# Patient Record
Sex: MALE | Race: WHITE | Employment: OTHER | URBAN - NONMETROPOLITAN AREA
[De-identification: names, ages, dates, MRNs, and addresses within clinical notes are randomized per-mention and may not be internally consistent; named-entity substitution may affect disease eponyms.]

---

## 2017-08-02 ENCOUNTER — HOSPITAL ENCOUNTER (OUTPATIENT)
Dept: CT IMAGING | Age: 67
Discharge: HOME | End: 2017-08-02
Payer: MEDICARE

## 2017-08-02 ENCOUNTER — HOSPITAL ENCOUNTER (OUTPATIENT)
Age: 67
Discharge: HOME | End: 2017-08-02
Payer: MEDICARE

## 2017-08-02 ENCOUNTER — TELEPHONE (OUTPATIENT)
Dept: SURGERY | Age: 67
End: 2017-08-02

## 2017-08-02 ENCOUNTER — OFFICE VISIT (OUTPATIENT)
Dept: SURGERY | Age: 67
End: 2017-08-02
Payer: MEDICARE

## 2017-08-02 VITALS
DIASTOLIC BLOOD PRESSURE: 68 MMHG | WEIGHT: 144 LBS | TEMPERATURE: 97.1 F | SYSTOLIC BLOOD PRESSURE: 120 MMHG | HEIGHT: 68 IN | RESPIRATION RATE: 18 BRPM | HEART RATE: 64 BPM | BODY MASS INDEX: 21.82 KG/M2

## 2017-08-02 DIAGNOSIS — N32.89 BLADDER WALL THICKENING: ICD-10-CM

## 2017-08-02 DIAGNOSIS — R10.84 GENERALIZED ABDOMINAL PAIN: Primary | ICD-10-CM

## 2017-08-02 DIAGNOSIS — N13.30 HYDRONEPHROSIS, RIGHT: ICD-10-CM

## 2017-08-02 DIAGNOSIS — R10.9 ABDOMINAL DISCOMFORT: ICD-10-CM

## 2017-08-02 LAB
BUN BLDV-MCNC: 19 MG/DL (ref 7–22)
POC CREATININE WHOLE BLOOD: 0.9 MG/DL (ref 0.5–1.2)

## 2017-08-02 PROCEDURE — 84520 ASSAY OF UREA NITROGEN: CPT

## 2017-08-02 PROCEDURE — 4040F PNEUMOC VAC/ADMIN/RCVD: CPT | Performed by: SURGERY

## 2017-08-02 PROCEDURE — 99204 OFFICE O/P NEW MOD 45 MIN: CPT | Performed by: SURGERY

## 2017-08-02 PROCEDURE — G8427 DOCREV CUR MEDS BY ELIG CLIN: HCPCS | Performed by: SURGERY

## 2017-08-02 PROCEDURE — 74177 CT ABD & PELVIS W/CONTRAST: CPT

## 2017-08-02 PROCEDURE — 36415 COLL VENOUS BLD VENIPUNCTURE: CPT

## 2017-08-02 PROCEDURE — 3017F COLORECTAL CA SCREEN DOC REV: CPT | Performed by: SURGERY

## 2017-08-02 PROCEDURE — G8420 CALC BMI NORM PARAMETERS: HCPCS | Performed by: SURGERY

## 2017-08-02 PROCEDURE — 4004F PT TOBACCO SCREEN RCVD TLK: CPT | Performed by: SURGERY

## 2017-08-02 PROCEDURE — 6360000004 HC RX CONTRAST MEDICATION: Performed by: PHYSICIAN ASSISTANT

## 2017-08-02 PROCEDURE — 82565 ASSAY OF CREATININE: CPT

## 2017-08-02 RX ADMIN — IOPAMIDOL 85 ML: 755 INJECTION, SOLUTION INTRAVENOUS at 08:44

## 2017-08-02 ASSESSMENT — ENCOUNTER SYMPTOMS
VOICE CHANGE: 0
ABDOMINAL PAIN: 1
BACK PAIN: 0
ABDOMINAL DISTENTION: 1
CONSTIPATION: 0
FACIAL SWELLING: 0
EYE REDNESS: 0
COUGH: 1
TROUBLE SWALLOWING: 0
SINUS PRESSURE: 0
VOMITING: 0
CHOKING: 0
CHEST TIGHTNESS: 0
BLOOD IN STOOL: 0
APNEA: 0
DIARRHEA: 0
SHORTNESS OF BREATH: 1
EYE DISCHARGE: 0
RHINORRHEA: 0
NAUSEA: 0
SORE THROAT: 0
WHEEZING: 1
EYE ITCHING: 0
ANAL BLEEDING: 0
STRIDOR: 0
RECTAL PAIN: 0
COLOR CHANGE: 0
PHOTOPHOBIA: 0
EYE PAIN: 0

## 2018-07-19 ENCOUNTER — APPOINTMENT (OUTPATIENT)
Dept: CT IMAGING | Age: 68
End: 2018-07-19
Payer: MEDICARE

## 2018-07-19 ENCOUNTER — HOSPITAL ENCOUNTER (EMERGENCY)
Age: 68
Discharge: HOME | End: 2018-07-19
Attending: EMERGENCY MEDICINE
Payer: MEDICARE

## 2018-07-19 ENCOUNTER — APPOINTMENT (OUTPATIENT)
Dept: GENERAL RADIOLOGY | Age: 68
End: 2018-07-19
Payer: MEDICARE

## 2018-07-19 VITALS
TEMPERATURE: 98.1 F | BODY MASS INDEX: 21.98 KG/M2 | OXYGEN SATURATION: 97 % | DIASTOLIC BLOOD PRESSURE: 90 MMHG | HEIGHT: 68 IN | SYSTOLIC BLOOD PRESSURE: 138 MMHG | RESPIRATION RATE: 16 BRPM | HEART RATE: 92 BPM | WEIGHT: 145 LBS

## 2018-07-19 DIAGNOSIS — R03.0 ELEVATED BLOOD PRESSURE READING: ICD-10-CM

## 2018-07-19 DIAGNOSIS — R20.2 PARESTHESIA: Primary | ICD-10-CM

## 2018-07-19 LAB
ALBUMIN SERPL-MCNC: 4.2 G/DL (ref 3.5–5.1)
ALP BLD-CCNC: 64 U/L (ref 38–126)
ALT SERPL-CCNC: 12 U/L (ref 11–66)
ANION GAP SERPL CALCULATED.3IONS-SCNC: 15 MEQ/L (ref 8–16)
AST SERPL-CCNC: 22 U/L (ref 5–40)
BASOPHILS # BLD: 0.5 %
BASOPHILS ABSOLUTE: 0 THOU/MM3 (ref 0–0.1)
BILIRUB SERPL-MCNC: 0.5 MG/DL (ref 0.3–1.2)
BUN BLDV-MCNC: 8 MG/DL (ref 7–22)
C-REACTIVE PROTEIN: 0.25 MG/DL (ref 0–1)
CALCIUM IONIZED: 1.13 MMOL/L (ref 1.12–1.32)
CALCIUM SERPL-MCNC: 9.6 MG/DL (ref 8.5–10.5)
CHLORIDE BLD-SCNC: 101 MEQ/L (ref 98–111)
CO2: 24 MEQ/L (ref 23–33)
CREAT SERPL-MCNC: 0.8 MG/DL (ref 0.4–1.2)
EKG ATRIAL RATE: 73 BPM
EKG P AXIS: 83 DEGREES
EKG P-R INTERVAL: 158 MS
EKG Q-T INTERVAL: 368 MS
EKG QRS DURATION: 116 MS
EKG QTC CALCULATION (BAZETT): 405 MS
EKG R AXIS: -36 DEGREES
EKG T AXIS: 71 DEGREES
EKG VENTRICULAR RATE: 73 BPM
EOSINOPHIL # BLD: 0 %
EOSINOPHILS ABSOLUTE: 0 THOU/MM3 (ref 0–0.4)
ERYTHROCYTE [DISTWIDTH] IN BLOOD BY AUTOMATED COUNT: 13.2 % (ref 11.5–14.5)
ERYTHROCYTE [DISTWIDTH] IN BLOOD BY AUTOMATED COUNT: 46.9 FL (ref 35–45)
GFR SERPL CREATININE-BSD FRML MDRD: > 90 ML/MIN/1.73M2
GLUCOSE BLD-MCNC: 88 MG/DL (ref 70–108)
HCT VFR BLD CALC: 45.7 % (ref 42–52)
HEMOGLOBIN: 15.6 GM/DL (ref 14–18)
IMMATURE GRANS (ABS): 0.02 THOU/MM3 (ref 0–0.07)
IMMATURE GRANULOCYTES: 0.3 %
LYMPHOCYTES # BLD: 34.2 %
LYMPHOCYTES ABSOLUTE: 2.5 THOU/MM3 (ref 1–4.8)
MAGNESIUM: 2.1 MG/DL (ref 1.6–2.4)
MCH RBC QN AUTO: 32.6 PG (ref 26–33)
MCHC RBC AUTO-ENTMCNC: 34.1 GM/DL (ref 32.2–35.5)
MCV RBC AUTO: 95.4 FL (ref 80–94)
MONOCYTES # BLD: 5.7 %
MONOCYTES ABSOLUTE: 0.4 THOU/MM3 (ref 0.4–1.3)
NUCLEATED RED BLOOD CELLS: 0 /100 WBC
OSMOLALITY CALCULATION: 277.1 MOSMOL/KG (ref 275–300)
PLATELET # BLD: 216 THOU/MM3 (ref 130–400)
PMV BLD AUTO: 9.4 FL (ref 9.4–12.4)
POTASSIUM SERPL-SCNC: 4.9 MEQ/L (ref 3.5–5.2)
RBC # BLD: 4.79 MILL/MM3 (ref 4.7–6.1)
SEDIMENTATION RATE, ERYTHROCYTE: 2 MM/HR (ref 0–10)
SEG NEUTROPHILS: 59.3 %
SEGMENTED NEUTROPHILS ABSOLUTE COUNT: 4.4 THOU/MM3 (ref 1.8–7.7)
SODIUM BLD-SCNC: 140 MEQ/L (ref 135–145)
TOTAL CK: 81 U/L (ref 55–170)
TOTAL PROTEIN: 6.7 G/DL (ref 6.1–8)
TROPONIN T: < 0.01 NG/ML
WBC # BLD: 7.4 THOU/MM3 (ref 4.8–10.8)

## 2018-07-19 PROCEDURE — 82550 ASSAY OF CK (CPK): CPT

## 2018-07-19 PROCEDURE — 93005 ELECTROCARDIOGRAM TRACING: CPT | Performed by: EMERGENCY MEDICINE

## 2018-07-19 PROCEDURE — 99284 EMERGENCY DEPT VISIT MOD MDM: CPT

## 2018-07-19 PROCEDURE — 71045 X-RAY EXAM CHEST 1 VIEW: CPT

## 2018-07-19 PROCEDURE — 85651 RBC SED RATE NONAUTOMATED: CPT

## 2018-07-19 PROCEDURE — 86140 C-REACTIVE PROTEIN: CPT

## 2018-07-19 PROCEDURE — 82330 ASSAY OF CALCIUM: CPT

## 2018-07-19 PROCEDURE — 85025 COMPLETE CBC W/AUTO DIFF WBC: CPT

## 2018-07-19 PROCEDURE — 84484 ASSAY OF TROPONIN QUANT: CPT

## 2018-07-19 PROCEDURE — 36415 COLL VENOUS BLD VENIPUNCTURE: CPT

## 2018-07-19 PROCEDURE — 80053 COMPREHEN METABOLIC PANEL: CPT

## 2018-07-19 PROCEDURE — 70450 CT HEAD/BRAIN W/O DYE: CPT

## 2018-07-19 PROCEDURE — 83735 ASSAY OF MAGNESIUM: CPT

## 2018-07-19 RX ORDER — SODIUM CHLORIDE 9 MG/ML
INJECTION, SOLUTION INTRAVENOUS CONTINUOUS
Status: DISCONTINUED | OUTPATIENT
Start: 2018-07-19 | End: 2018-07-20 | Stop reason: HOSPADM

## 2018-07-19 ASSESSMENT — ENCOUNTER SYMPTOMS
WHEEZING: 0
NAUSEA: 0
COUGH: 0
RHINORRHEA: 0
SHORTNESS OF BREATH: 0
VOMITING: 0
BACK PAIN: 0
SORE THROAT: 0
ABDOMINAL PAIN: 1
DIARRHEA: 0
EYE REDNESS: 0
EYE DISCHARGE: 0

## 2018-07-19 NOTE — ED PROVIDER NOTES
[facility]  eMERGENCY dEPARTMENT eNCOUnter          [address] [facility]       Chief Complaint   Patient presents with    Numbness     bilateral hands and feet       Nurses Notes reviewed and I agree except as noted in the HPI. HISTORY OF PRESENT ILLNESS    Rivero All is a 75 y.o. male who presents to Emergency Department with bilateral hand and leg numbness. The patient explains that the numbness has been ongoing intermittently since February of 2018 but his lower extremities and left forearm started feeling numb and tingly for the last six days. He reports that his strength has decreased due to the numbness and he finds it hard to even open up a bottle of water or hold things for a long time. Patient denies any fevers, chills, nausea, vomiting or diarrhea. Patient denies any chest pain or shortness of breath. He is a smoker and drinks alcohol occasionally. Patient has recently had some abdominal pain but denies any right now. He is allergic to lisinopril and has a history of   COPD and hypertension. He has no further complaints during initial evaluation. REVIEW OF SYSTEMS     Review of Systems   Constitutional: Negative for appetite change, chills, fatigue and fever. HENT: Negative for congestion, ear pain, rhinorrhea and sore throat. Eyes: Negative for discharge, redness and visual disturbance. Respiratory: Negative for cough, shortness of breath and wheezing. Cardiovascular: Negative for chest pain, palpitations and leg swelling. Gastrointestinal: Positive for abdominal pain (none now). Negative for diarrhea, nausea and vomiting. Genitourinary: Negative for decreased urine volume, difficulty urinating, dysuria and hematuria. Musculoskeletal: Negative for arthralgias, back pain, joint swelling and neck pain. Skin: Negative for pallor and rash. Allergic/Immunologic: Negative for environmental allergies.    Neurological: Positive for numbness (hands, legs, and left forearm). Negative for dizziness, syncope, weakness, light-headedness and headaches. Hematological: Negative for adenopathy. Psychiatric/Behavioral: Negative for confusion and suicidal ideas. The patient is not nervous/anxious. PAST MEDICAL HISTORY    has a past medical history of COPD (chronic obstructive pulmonary disease) ([address] Gastric reflux; Hyperlipidemia; and Hypertension. SURGICAL HISTORY      has a past surgical history that includes Kidney surgery and hernia repair (80's). CURRENT MEDICATIONS       Discharge Medication List as of 7/19/2018  9:47 PM      CONTINUE these medications which have NOT CHANGED    Details   busPIRone (BUSPAR) 10 MG tablet Take 10 mg by mouth 2 times dailyHistorical Med      etodolac (LODINE) 400 MG tablet Take 400 mg by mouth 2 times dailyHistorical Med      Ipratropium Bromide (ATROVENT IN) Inhale  into the lungs 4 times daily. Albuterol (PROVENTIL IN) Inhale  into the lungs as needed. budesonide-formoterol (SYMBICORT) 160-4.5 MCG/ACT AERO Inhale 2 puffs into the lungs 2 times daily. terazosin (HYTRIN) 2 MG capsule Take 2 mg by mouth nightly. sildenafil (VIAGRA) 100 MG tablet Take 50 mg by mouth as needed. omeprazole (PRILOSEC) 20 MG capsule Take 20 mg by mouth daily. ranitidine (ZANTAC) 150 MG tablet Take 150 mg by mouth every morning. losartan (COZAAR) 50 MG tablet Take 50 mg by mouth daily. atorvastatin (LIPITOR) 80 MG tablet Take 40 mg by mouth daily. hydrochlorothiazide (HYDRODIURIL) 25 MG tablet Take 12.5 mg by mouth daily. ALLERGIES     is allergic to lisinopril. FAMILY HISTORY     is adopted. family history is not on file. He was adopted. SOCIAL HISTORY      reports that he has been smoking Cigarettes. He has been smoking about 1.00 pack per day. He has never used smokeless tobacco. He reports that he drinks alcohol. He reports that he does not use drugs.     PHYSICAL EXAM     INITIAL Co-signs this chart in the absence of a cardiologist.  EKG interpreted by Jose Salinas MD:    Vent. Rate: 73 bpm  CO interval: 158 ms  QRS duration: 116 ms  QTc: 405 ms  P-R-T axes: 83, -36, 71  Normal sinus rhythm  Left axis deviation  Imcomplete right bundle branch block   Nonspecific ST and T wave abnormality  No STEMI. RADIOLOGY: non-plain film images(s) such as CT, Ultrasound and MRI are read by the radiologist.  No results found. CT HEAD WO CONTRAST   Final Result    No evidence of an acute process. **This report has been created using voice recognition software. It may contain minor errors which are inherent in voice recognition technology. **      Final report electronically signed by Dr. Jocelyn An on 7/19/2018 9:06 PM      XR CHEST PORTABLE   Final Result   Hyperinflated lungs suggesting COPD. No lobar consolidation               **This report has been created using voice recognition software. It may contain minor errors which are inherent in voice recognition technology. **      Final report electronically signed by Dr. Kimmy Shaikh on 7/19/2018 8:43 PM        [x] Visualized and interpreted by me   [x] Radiologist's Wet Read Report Reviewed   [] Discussed with Radiologist.    Ezequiel Alfaro:   Results for orders placed or performed during the hospital encounter of 07/19/18   CBC Auto Differential   Result Value Ref Range    WBC 7.4 4.8 - 10.8 thou/mm3    RBC 4.79 4.70 - 6.10 mill/mm3    Hemoglobin 15.6 14.0 - 18.0 gm/dl    Hematocrit 45.7 42.0 - 52.0 %    MCV 95.4 (H) 80.0 - 94.0 fL    MCH 32.6 26.0 - 33.0 pg    MCHC 34.1 32.2 - 35.5 gm/dl    RDW-CV 13.2 11.5 - 14.5 %    RDW-SD 46.9 (H) 35.0 - 45.0 fL    Platelets 716 788 - 222 thou/mm3    MPV 9.4 9.4 - 12.4 fL    Seg Neutrophils 59.3 %    Lymphocytes 34.2 %    Monocytes 5.7 %    Eosinophils 0.0 %    Basophils 0.5 %    Immature Granulocytes 0.3 %    Segs Absolute 4.4 1.8 - 7.7 thou/mm3    Lymphocytes # 2.5 1.0 - 4.8 thou/mm3    Monocytes # 0.4 Decision Making    Patient was seen and evaluated by me at bedside for bilateral hand and leg numbness that has been intermittent for months. Patient did not appear in any distress. History and physical exam were obtained which resulted within normal limits. ECG was normal. Appropriate labs and imaging studies were ordered and reviewed which were all within acceptable range. All results were discussed with patient. The patient was comfortable with the plan of discharge home and to follow up with his PCP and a cardiologist as discussed. Anticipatory guidance was given. The patient is instructed to return to the emergency department for any worsening or otherwise change in their symptoms. Patient discharged from the emergency department in good condition with all questions answered. See disposition below. CRITICAL CARE:   None    CONSULTS:  None     PROCEDURES:  None    FINAL IMPRESSION      1. Paresthesia    2. Elevated blood pressure reading          DISPOSITION/PLAN   Discharge    PATIENT REFERRED TO:  Diana Castellanos, [address] [place] [address]  [phone]            DISCHARGE MEDICATIONS:  Discharge Medication List as of 7/19/2018  9:47 PM        Scribe:  Mary Jo Don(Name) 7/19/18 9:20 PM Scribing for and in the presence of Jose Salinas MD.    Signed by: Mary Jo Don(Name), Scribe, 07/20/18 2:45 AM    Provider:  I personally performed the services described in the documentation, reviewed and edited the documentation which was dictated to the scribe in my presence, and it accurately records my words and actions.     Jose Salinas MD 07/20/18 2:45 AM    (Please note that portions of this note were completed with a voice recognition program.  Efforts were made to edit the dictations but occasionally words are mis-transcribed.)    MD Jose Mcgraw MD  07/20/18 [id number]

## 2018-07-20 PROCEDURE — 93010 ELECTROCARDIOGRAM REPORT: CPT | Performed by: INTERNAL MEDICINE

## 2018-09-05 ENCOUNTER — APPOINTMENT (OUTPATIENT)
Dept: GENERAL RADIOLOGY | Age: 68
DRG: 329 | End: 2018-09-05
Payer: MEDICARE

## 2018-09-05 ENCOUNTER — ANESTHESIA (OUTPATIENT)
Dept: OPERATING ROOM | Age: 68
DRG: 329 | End: 2018-09-05
Payer: MEDICARE

## 2018-09-05 ENCOUNTER — ANESTHESIA EVENT (OUTPATIENT)
Dept: OPERATING ROOM | Age: 68
DRG: 329 | End: 2018-09-05
Payer: MEDICARE

## 2018-09-05 ENCOUNTER — APPOINTMENT (OUTPATIENT)
Dept: CT IMAGING | Age: 68
DRG: 329 | End: 2018-09-05
Payer: MEDICARE

## 2018-09-05 ENCOUNTER — HOSPITAL ENCOUNTER (INPATIENT)
Age: 68
LOS: 12 days | Discharge: HOME | DRG: 329 | End: 2018-09-17
Attending: INTERNAL MEDICINE | Admitting: INTERNAL MEDICINE
Payer: MEDICARE

## 2018-09-05 VITALS — OXYGEN SATURATION: 95 % | DIASTOLIC BLOOD PRESSURE: 66 MMHG | SYSTOLIC BLOOD PRESSURE: 121 MMHG

## 2018-09-05 DIAGNOSIS — J44.1 CHRONIC OBSTRUCTIVE PULMONARY DISEASE WITH ACUTE EXACERBATION (HCC): Primary | ICD-10-CM

## 2018-09-05 DIAGNOSIS — R91.8 HILAR MASS: ICD-10-CM

## 2018-09-05 DIAGNOSIS — K40.30 UNILATERAL INGUINAL HERNIA WITH OBSTRUCTION AND WITHOUT GANGRENE, RECURRENCE NOT SPECIFIED: ICD-10-CM

## 2018-09-05 PROBLEM — K46.9 ABDOMINAL HERNIA: Status: ACTIVE | Noted: 2018-09-05

## 2018-09-05 LAB
ALBUMIN SERPL-MCNC: 4.2 G/DL (ref 3.5–5.1)
ALLEN TEST: POSITIVE
ALP BLD-CCNC: 78 U/L (ref 38–126)
ALT SERPL-CCNC: 10 U/L (ref 11–66)
ANION GAP SERPL CALCULATED.3IONS-SCNC: 20 MEQ/L (ref 8–16)
AST SERPL-CCNC: 18 U/L (ref 5–40)
BASE EXCESS (CALCULATED): 4.6 MMOL/L (ref -2.5–2.5)
BASOPHILS # BLD: 0.3 %
BASOPHILS ABSOLUTE: 0 THOU/MM3 (ref 0–0.1)
BILIRUB SERPL-MCNC: 0.9 MG/DL (ref 0.3–1.2)
BILIRUBIN DIRECT: < 0.2 MG/DL (ref 0–0.3)
BUN BLDV-MCNC: 18 MG/DL (ref 7–22)
CALCIUM SERPL-MCNC: 10 MG/DL (ref 8.5–10.5)
CHLORIDE BLD-SCNC: 95 MEQ/L (ref 98–111)
CO2: 23 MEQ/L (ref 23–33)
COLLECTED BY:: ABNORMAL
CREAT SERPL-MCNC: 0.6 MG/DL (ref 0.4–1.2)
DEVICE: ABNORMAL
EKG ATRIAL RATE: 102 BPM
EKG P AXIS: 79 DEGREES
EKG P-R INTERVAL: 152 MS
EKG Q-T INTERVAL: 346 MS
EKG QRS DURATION: 118 MS
EKG QTC CALCULATION (BAZETT): 450 MS
EKG R AXIS: 34 DEGREES
EKG T AXIS: 73 DEGREES
EKG VENTRICULAR RATE: 102 BPM
EOSINOPHIL # BLD: 0 %
EOSINOPHILS ABSOLUTE: 0 THOU/MM3 (ref 0–0.4)
ERYTHROCYTE [DISTWIDTH] IN BLOOD BY AUTOMATED COUNT: 12.2 % (ref 11.5–14.5)
ERYTHROCYTE [DISTWIDTH] IN BLOOD BY AUTOMATED COUNT: 41.5 FL (ref 35–45)
GFR SERPL CREATININE-BSD FRML MDRD: > 90 ML/MIN/1.73M2
GLUCOSE BLD-MCNC: 125 MG/DL (ref 70–108)
HCO3: 29 MMOL/L (ref 23–28)
HCT VFR BLD CALC: 44.6 % (ref 42–52)
HEMOGLOBIN: 16.1 GM/DL (ref 14–18)
IMMATURE GRANS (ABS): 0.04 THOU/MM3 (ref 0–0.07)
IMMATURE GRANULOCYTES: 0.4 %
LACTIC ACID: 0.9 MMOL/L (ref 0.5–2.2)
LIPASE: 16.6 U/L (ref 5.6–51.3)
LYMPHOCYTES # BLD: 15.4 %
LYMPHOCYTES ABSOLUTE: 1.6 THOU/MM3 (ref 1–4.8)
MCH RBC QN AUTO: 33.5 PG (ref 26–33)
MCHC RBC AUTO-ENTMCNC: 36.1 GM/DL (ref 32.2–35.5)
MCV RBC AUTO: 92.7 FL (ref 80–94)
MONOCYTES # BLD: 6.7 %
MONOCYTES ABSOLUTE: 0.7 THOU/MM3 (ref 0.4–1.3)
NUCLEATED RED BLOOD CELLS: 0 /100 WBC
O2 SATURATION: 95 %
OSMOLALITY CALCULATION: 279.1 MOSMOL/KG (ref 275–300)
PCO2: 39 MMHG (ref 35–45)
PH BLOOD GAS: 7.47 (ref 7.35–7.45)
PLATELET # BLD: 217 THOU/MM3 (ref 130–400)
PMV BLD AUTO: 9.3 FL (ref 9.4–12.4)
PO2: 73 MMHG (ref 71–104)
POTASSIUM SERPL-SCNC: 3.8 MEQ/L (ref 3.5–5.2)
PRO-BNP: 20.4 PG/ML (ref 0–900)
RBC # BLD: 4.81 MILL/MM3 (ref 4.7–6.1)
SEG NEUTROPHILS: 77.2 %
SEGMENTED NEUTROPHILS ABSOLUTE COUNT: 8 THOU/MM3 (ref 1.8–7.7)
SODIUM BLD-SCNC: 138 MEQ/L (ref 135–145)
SOURCE, BLOOD GAS: ABNORMAL
TOTAL PROTEIN: 7 G/DL (ref 6.1–8)
TROPONIN T: < 0.01 NG/ML
WBC # BLD: 10.3 THOU/MM3 (ref 4.8–10.8)

## 2018-09-05 PROCEDURE — 2580000003 HC RX 258: Performed by: INTERNAL MEDICINE

## 2018-09-05 PROCEDURE — 49521 REREPAIR ING HERNIA BLOCKED: CPT | Performed by: SURGERY

## 2018-09-05 PROCEDURE — 0YQ50ZZ REPAIR RIGHT INGUINAL REGION, OPEN APPROACH: ICD-10-PCS | Performed by: SURGERY

## 2018-09-05 PROCEDURE — 3600000003 HC SURGERY LEVEL 3 BASE: Performed by: SURGERY

## 2018-09-05 PROCEDURE — 82803 BLOOD GASES ANY COMBINATION: CPT

## 2018-09-05 PROCEDURE — 83605 ASSAY OF LACTIC ACID: CPT

## 2018-09-05 PROCEDURE — 99223 1ST HOSP IP/OBS HIGH 75: CPT | Performed by: SURGERY

## 2018-09-05 PROCEDURE — 83690 ASSAY OF LIPASE: CPT

## 2018-09-05 PROCEDURE — 2709999900 HC NON-CHARGEABLE SUPPLY

## 2018-09-05 PROCEDURE — C1781 MESH (IMPLANTABLE): HCPCS | Performed by: SURGERY

## 2018-09-05 PROCEDURE — 0DB80ZZ EXCISION OF SMALL INTESTINE, OPEN APPROACH: ICD-10-PCS | Performed by: SURGERY

## 2018-09-05 PROCEDURE — 6370000000 HC RX 637 (ALT 250 FOR IP): Performed by: INTERNAL MEDICINE

## 2018-09-05 PROCEDURE — 74177 CT ABD & PELVIS W/CONTRAST: CPT

## 2018-09-05 PROCEDURE — 94640 AIRWAY INHALATION TREATMENT: CPT

## 2018-09-05 PROCEDURE — 36415 COLL VENOUS BLD VENIPUNCTURE: CPT

## 2018-09-05 PROCEDURE — 93005 ELECTROCARDIOGRAM TRACING: CPT | Performed by: INTERNAL MEDICINE

## 2018-09-05 PROCEDURE — 6360000004 HC RX CONTRAST MEDICATION: Performed by: INTERNAL MEDICINE

## 2018-09-05 PROCEDURE — 3700000001 HC ADD 15 MINUTES (ANESTHESIA): Performed by: SURGERY

## 2018-09-05 PROCEDURE — 6360000002 HC RX W HCPCS: Performed by: NURSE ANESTHETIST, CERTIFIED REGISTERED

## 2018-09-05 PROCEDURE — 83880 ASSAY OF NATRIURETIC PEPTIDE: CPT

## 2018-09-05 PROCEDURE — 82248 BILIRUBIN DIRECT: CPT

## 2018-09-05 PROCEDURE — 80053 COMPREHEN METABOLIC PANEL: CPT

## 2018-09-05 PROCEDURE — 93010 ELECTROCARDIOGRAM REPORT: CPT | Performed by: NUCLEAR MEDICINE

## 2018-09-05 PROCEDURE — 99285 EMERGENCY DEPT VISIT HI MDM: CPT

## 2018-09-05 PROCEDURE — 85025 COMPLETE CBC W/AUTO DIFF WBC: CPT

## 2018-09-05 PROCEDURE — 3600000013 HC SURGERY LEVEL 3 ADDTL 15MIN: Performed by: SURGERY

## 2018-09-05 PROCEDURE — 3700000000 HC ANESTHESIA ATTENDED CARE: Performed by: SURGERY

## 2018-09-05 PROCEDURE — 1200000003 HC TELEMETRY R&B

## 2018-09-05 PROCEDURE — 2500000003 HC RX 250 WO HCPCS: Performed by: SURGERY

## 2018-09-05 PROCEDURE — 71045 X-RAY EXAM CHEST 1 VIEW: CPT

## 2018-09-05 PROCEDURE — 6360000002 HC RX W HCPCS: Performed by: SURGERY

## 2018-09-05 PROCEDURE — 84484 ASSAY OF TROPONIN QUANT: CPT

## 2018-09-05 PROCEDURE — 36600 WITHDRAWAL OF ARTERIAL BLOOD: CPT

## 2018-09-05 PROCEDURE — 71260 CT THORAX DX C+: CPT

## 2018-09-05 PROCEDURE — 0YJ54ZZ INSPECTION OF RIGHT INGUINAL REGION, PERCUTANEOUS ENDOSCOPIC APPROACH: ICD-10-PCS | Performed by: SURGERY

## 2018-09-05 DEVICE — MESH HERN L W1.6XL2IN INGUINAL WHT POLYPR MFIL PLUG PTCH: Type: IMPLANTABLE DEVICE | Site: GROIN | Status: FUNCTIONAL

## 2018-09-05 RX ORDER — 0.9 % SODIUM CHLORIDE 0.9 %
500 INTRAVENOUS SOLUTION INTRAVENOUS ONCE
Status: COMPLETED | OUTPATIENT
Start: 2018-09-05 | End: 2018-09-05

## 2018-09-05 RX ORDER — DOXAZOSIN 2 MG/1
2 TABLET ORAL NIGHTLY
Status: DISCONTINUED | OUTPATIENT
Start: 2018-09-05 | End: 2018-09-17 | Stop reason: HOSPADM

## 2018-09-05 RX ORDER — FENTANYL CITRATE 50 UG/ML
INJECTION, SOLUTION INTRAMUSCULAR; INTRAVENOUS PRN
Status: DISCONTINUED | OUTPATIENT
Start: 2018-09-05 | End: 2018-09-05 | Stop reason: SDUPTHER

## 2018-09-05 RX ORDER — RANITIDINE 150 MG/1
150 TABLET ORAL EVERY MORNING
Status: DISCONTINUED | OUTPATIENT
Start: 2018-09-06 | End: 2018-09-05 | Stop reason: CLARIF

## 2018-09-05 RX ORDER — SODIUM CHLORIDE 9 MG/ML
INJECTION, SOLUTION INTRAVENOUS CONTINUOUS
Status: DISCONTINUED | OUTPATIENT
Start: 2018-09-05 | End: 2018-09-11

## 2018-09-05 RX ORDER — PANTOPRAZOLE SODIUM 40 MG/1
40 TABLET, DELAYED RELEASE ORAL DAILY
Status: DISCONTINUED | OUTPATIENT
Start: 2018-09-05 | End: 2018-09-07

## 2018-09-05 RX ORDER — KETOROLAC TROMETHAMINE 30 MG/ML
15 INJECTION, SOLUTION INTRAMUSCULAR; INTRAVENOUS EVERY 6 HOURS
Status: DISPENSED | OUTPATIENT
Start: 2018-09-05 | End: 2018-09-07

## 2018-09-05 RX ORDER — TERAZOSIN 1 MG/1
2 CAPSULE ORAL NIGHTLY
Status: DISCONTINUED | OUTPATIENT
Start: 2018-09-05 | End: 2018-09-05 | Stop reason: CLARIF

## 2018-09-05 RX ORDER — LIDOCAINE HYDROCHLORIDE AND EPINEPHRINE 10; 10 MG/ML; UG/ML
INJECTION, SOLUTION INFILTRATION; PERINEURAL PRN
Status: DISCONTINUED | OUTPATIENT
Start: 2018-09-05 | End: 2018-09-05 | Stop reason: HOSPADM

## 2018-09-05 RX ORDER — BUPIVACAINE HYDROCHLORIDE 5 MG/ML
INJECTION, SOLUTION PERINEURAL PRN
Status: DISCONTINUED | OUTPATIENT
Start: 2018-09-05 | End: 2018-09-05 | Stop reason: HOSPADM

## 2018-09-05 RX ORDER — FAMOTIDINE 20 MG/1
20 TABLET, FILM COATED ORAL EVERY MORNING
Status: DISCONTINUED | OUTPATIENT
Start: 2018-09-06 | End: 2018-09-11

## 2018-09-05 RX ORDER — IPRATROPIUM BROMIDE AND ALBUTEROL SULFATE 2.5; .5 MG/3ML; MG/3ML
1 SOLUTION RESPIRATORY (INHALATION) ONCE
Status: COMPLETED | OUTPATIENT
Start: 2018-09-05 | End: 2018-09-05

## 2018-09-05 RX ORDER — IPRATROPIUM BROMIDE AND ALBUTEROL SULFATE 2.5; .5 MG/3ML; MG/3ML
1 SOLUTION RESPIRATORY (INHALATION)
Status: DISCONTINUED | OUTPATIENT
Start: 2018-09-05 | End: 2018-09-17 | Stop reason: HOSPADM

## 2018-09-05 RX ORDER — CEFOXITIN 1 G/1
INJECTION, POWDER, FOR SOLUTION INTRAVENOUS PRN
Status: DISCONTINUED | OUTPATIENT
Start: 2018-09-05 | End: 2018-09-05 | Stop reason: SDUPTHER

## 2018-09-05 RX ORDER — BUDESONIDE AND FORMOTEROL FUMARATE DIHYDRATE 160; 4.5 UG/1; UG/1
2 AEROSOL RESPIRATORY (INHALATION) 2 TIMES DAILY
Status: DISCONTINUED | OUTPATIENT
Start: 2018-09-05 | End: 2018-09-05 | Stop reason: CLARIF

## 2018-09-05 RX ORDER — ACETAMINOPHEN 325 MG/1
650 TABLET ORAL EVERY 4 HOURS PRN
Status: DISCONTINUED | OUTPATIENT
Start: 2018-09-05 | End: 2018-09-17 | Stop reason: HOSPADM

## 2018-09-05 RX ORDER — PROPOFOL 10 MG/ML
INJECTION, EMULSION INTRAVENOUS PRN
Status: DISCONTINUED | OUTPATIENT
Start: 2018-09-05 | End: 2018-09-05 | Stop reason: SDUPTHER

## 2018-09-05 RX ORDER — OMEPRAZOLE 20 MG/1
20 CAPSULE, DELAYED RELEASE ORAL DAILY
Status: DISCONTINUED | OUTPATIENT
Start: 2018-09-05 | End: 2018-09-05 | Stop reason: CLARIF

## 2018-09-05 RX ORDER — MIDAZOLAM HYDROCHLORIDE 1 MG/ML
INJECTION INTRAMUSCULAR; INTRAVENOUS PRN
Status: DISCONTINUED | OUTPATIENT
Start: 2018-09-05 | End: 2018-09-05 | Stop reason: SDUPTHER

## 2018-09-05 RX ORDER — LOSARTAN POTASSIUM 50 MG/1
50 TABLET ORAL DAILY
Status: DISCONTINUED | OUTPATIENT
Start: 2018-09-06 | End: 2018-09-17 | Stop reason: HOSPADM

## 2018-09-05 RX ORDER — BUSPIRONE HYDROCHLORIDE 10 MG/1
10 TABLET ORAL 2 TIMES DAILY
Status: DISCONTINUED | OUTPATIENT
Start: 2018-09-05 | End: 2018-09-17 | Stop reason: HOSPADM

## 2018-09-05 RX ADMIN — BUSPIRONE HYDROCHLORIDE 10 MG: 10 TABLET ORAL at 22:17

## 2018-09-05 RX ADMIN — PROPOFOL 50 MG: 10 INJECTION, EMULSION INTRAVENOUS at 18:51

## 2018-09-05 RX ADMIN — DOXAZOSIN 2 MG: 2 TABLET ORAL at 22:17

## 2018-09-05 RX ADMIN — PROPOFOL 50 MG: 10 INJECTION, EMULSION INTRAVENOUS at 18:35

## 2018-09-05 RX ADMIN — Medication 2 PUFF: at 22:11

## 2018-09-05 RX ADMIN — FENTANYL CITRATE 50 MCG: 50 INJECTION INTRAMUSCULAR; INTRAVENOUS at 18:35

## 2018-09-05 RX ADMIN — PANTOPRAZOLE SODIUM 40 MG: 40 TABLET, DELAYED RELEASE ORAL at 22:17

## 2018-09-05 RX ADMIN — FENTANYL CITRATE 50 MCG: 50 INJECTION INTRAMUSCULAR; INTRAVENOUS at 18:58

## 2018-09-05 RX ADMIN — SODIUM CHLORIDE 500 ML: 9 INJECTION, SOLUTION INTRAVENOUS at 15:32

## 2018-09-05 RX ADMIN — IPRATROPIUM BROMIDE AND ALBUTEROL SULFATE 1 AMPULE: .5; 3 SOLUTION RESPIRATORY (INHALATION) at 22:11

## 2018-09-05 RX ADMIN — PROPOFOL 50 MG: 10 INJECTION, EMULSION INTRAVENOUS at 18:41

## 2018-09-05 RX ADMIN — SODIUM CHLORIDE: 9 INJECTION, SOLUTION INTRAVENOUS at 18:15

## 2018-09-05 RX ADMIN — IOPAMIDOL 80 ML: 755 INJECTION, SOLUTION INTRAVENOUS at 16:35

## 2018-09-05 RX ADMIN — IPRATROPIUM BROMIDE AND ALBUTEROL SULFATE 1 AMPULE: .5; 3 SOLUTION RESPIRATORY (INHALATION) at 14:22

## 2018-09-05 RX ADMIN — MIDAZOLAM HYDROCHLORIDE 2 MG: 1 INJECTION, SOLUTION INTRAMUSCULAR; INTRAVENOUS at 18:34

## 2018-09-05 RX ADMIN — KETOROLAC TROMETHAMINE 15 MG: 30 INJECTION, SOLUTION INTRAMUSCULAR at 22:17

## 2018-09-05 RX ADMIN — PROPOFOL 50 MG: 10 INJECTION, EMULSION INTRAVENOUS at 18:58

## 2018-09-05 RX ADMIN — CEFOXITIN 2 G: 1 INJECTION, POWDER, FOR SOLUTION INTRAVENOUS at 18:38

## 2018-09-05 ASSESSMENT — PULMONARY FUNCTION TESTS
PIF_VALUE: 0
PIF_VALUE: 1
PIF_VALUE: 0

## 2018-09-05 ASSESSMENT — ENCOUNTER SYMPTOMS
WHEEZING: 0
COLOR CHANGE: 0
BLOOD IN STOOL: 0
ABDOMINAL PAIN: 0
SHORTNESS OF BREATH: 1
VOMITING: 1
NAUSEA: 1
RHINORRHEA: 0
EYE REDNESS: 0
COUGH: 1
TROUBLE SWALLOWING: 0
NAUSEA: 0
VOICE CHANGE: 0
DIARRHEA: 0
SORE THROAT: 0
VOMITING: 0
BACK PAIN: 0
EYE DISCHARGE: 0
DYSPNEA ACTIVITY LEVEL: AFTER AMBULATING 1 FLIGHT OF STAIRS

## 2018-09-05 ASSESSMENT — PAIN SCALES - GENERAL
PAINLEVEL_OUTOF10: 4
PAINLEVEL_OUTOF10: 6

## 2018-09-05 ASSESSMENT — COPD QUESTIONNAIRES: CAT_SEVERITY: SEVERE

## 2018-09-05 NOTE — BRIEF OP NOTE
Brief Postoperative Note  ______________________________________________________________    Patient: Mendez Lewis  YOB: 1950  MRN: [id number]  Date of Procedure: 9/5/2018    Pre-Op Diagnosis: RIGHT INGUINAL HERNIA, incarcerated recurrent    Post-Op Diagnosis: Same       Procedure(s):  RIGHT INGUINAL HERNIA REPAIR    Anesthesia: Monitor Anesthesia Care    Surgeon(s):  Fe Zambrano MD    Staff:  Bonnie Scrub: Мария Trinh  Scrub Person First: Prem Casas  Scrub Person Second: Rylee Meléndez     Estimated Blood Loss: * No values recorded between 9/5/2018  6:29 PM and 9/5/2018  7:14 PM 40 mL    Complications: None    Specimens:   * No specimens in log *    Implants:    Implant Name Type Inv.  Item Serial No.  Lot No. LRB No. Used   MESH SURG ANAHY PLUG PERFIX XL 1.6X2IN Mesh MESH SURG ANAHY PLUG PERFIX XL 1.6X2IN   [employer] [id number] Right 1         Drains:      Findings:     Fe Zambrano MD  Date: 9/5/2018  Time: 7:14 PM

## 2018-09-05 NOTE — ED TRIAGE NOTES
Pt presents to rm 12 with family c/o dizziness, weakness, and an inguinal hernia that started today. Wife states that pt has not been feeling well for awhile and has been losing a lot of weight over the last year. VSS. Dr. Bismark Agee at bedside for evaluation. Call light in reach. Will monitor.

## 2018-09-05 NOTE — CONSULTS
[address] [address]  General Surgery Consult Note  Margy Mckinnon MD     Pt Name: Jessica Villegas  MRN: [id number]  YOB: 1950  Date of evaluation: 9/5/2018  Primary Care Physician: Chantell Strange MD  Patient evaluated at the request of  Dr. Adina Salas  Reason for evaluation: incarcerated right inguinal hernia  IMPRESSIONS:   1. Incarcerated left inguinal hernia. Small bowel with closed loop obstruction  2. COPD  3. Left perihilar pulmonary mass  4. HTN  5. Prior history of alcohol abuse  PLANS:   1. Urgent operative repair of incarcerated right inguinal hernia  2. MAC anesthesia, general if required. 3.  Risks of procedure discussed as below. 4.  Evaluation of left perihilar mass per Dr. noel who has graciously accepted the patient for admission is multiple medical issues. SUBJECTIVE:   Chief complaint: Dizziness nausea and painful mass in right groin    History of present illness: Mr. Marla Razo is a 50-year-old male who presented to the emergency department with complaints of shortness of breath and dizziness for about a week. He has chronic shortness of breath and COPD but is not on home oxygen. He has had a cough but no hemoptysis. He states he is vomited secondary to shortness of breath. With his coughing or last day or so he developed a mass in his right groin consistent with an incarcerated hernia. He has remote history of bilateral inguinal hernia repairs in the 80s. He stated he was told in [place] that if he were to have surgery he might not survive due to his lung status. On examination he has a palpable nonreducible mass in the right groin. He stated the pain is made worse by coughing or standing. There is no overlying skin erythema. Could not really palpate any bowel loops. CT scan revealed incarcerated small bowel with closed loop obstruction. Urgent operation was recommended. We will attempt to perform the surgery under local anesthesia with sedation.   However, he was Intravenous Once     Continuous Infusions:  PRN Meds:. Allergies:  is allergic to lisinopril. Family History:  family history is not on file. He was adopted. Social History:   reports that he has been smoking Cigarettes. He has been smoking about 1.00 pack per day. He has never used smokeless tobacco. He reports that he drinks alcohol. He reports that he does not use drugs. Review of Systems:  Review of Systems   Constitutional: Negative for chills, fatigue, fever and unexpected weight change. HENT: Negative for sore throat, trouble swallowing and voice change. Eyes: Negative for visual disturbance. Respiratory: Positive for cough and shortness of breath. Negative for wheezing. Cardiovascular: Negative for chest pain and palpitations. Gastrointestinal: Positive for nausea. Negative for abdominal pain, blood in stool and vomiting. Endocrine: Negative for cold intolerance, heat intolerance and polydipsia. Genitourinary: Negative for dysuria, flank pain and hematuria. Musculoskeletal: Negative for gait problem, joint swelling and myalgias. Skin: Negative for color change and rash. Allergic/Immunologic: Negative for immunocompromised state. Neurological: Positive for dizziness and light-headedness. Negative for tremors, seizures and speech difficulty. Hematological: Does not bruise/bleed easily. Psychiatric/Behavioral: Negative for behavioral problems, confusion and suicidal ideas. OBJECTIVE:   CURRENT VITALS:  oral temperature is 97.9 °F (36.6 °C). His blood pressure is 130/79 and his pulse is 93. His respiration is 20 and oxygen saturation is 96%.    Temperature Range (24h):Temp: 97.9 °F (36.6 °C) Temp  Av.9 °F (36.6 °C)  Min: 97.9 °F (36.6 °C)  Max: 97.9 °F (36.6 °C)  BP Range (12G): Systolic (08TBB), IXA:205 , Min:118 , XRK:862     Diastolic (86BLP), GSK:59, Min:79, Max:79    Pulse Range (24h): Pulse  Av.5  Min: 93  Max: 110  Respiration Range (24h): Resp  Av BUN 18 09/05/2018    CREATININE 0.6 09/05/2018    LABGLOM >90 09/05/2018    GLUCOSE 125 09/05/2018    PROT 7.0 09/05/2018    LABALBU 4.2 09/05/2018    CALCIUM 10.0 09/05/2018    BILITOT 0.9 09/05/2018    ALKPHOS 78 09/05/2018    AST 18 09/05/2018    ALT 10 09/05/2018     U/A:    Lab Results   Component Value Date    COLORU YELLOW 07/11/2017    PROTEINU NEGATIVE 07/11/2017    PHUR 7.0 07/11/2017    WBCUA NONE SEEN 07/11/2017    RBCUA NONE SEEN 07/11/2017    YEAST NONE SEEN 07/11/2017    BACTERIA NONE 07/11/2017    LEUKOCYTESUR NEGATIVE 07/11/2017    UROBILINOGEN 0.2 07/11/2017    BILIRUBINUR NEGATIVE 07/11/2017    BLOODU NEGATIVE 07/11/2017    GLUCOSEU NEGATIVE 07/11/2017       RADIOLOGY:   I have personally reviewed the following films:       Narrative   PROCEDURE: CT ABDOMEN PELVIS W IV CONTRAST       CLINICAL INFORMATION: 60year-old male with generalized abdominal pressure and pain at hernia site .       COMPARISON: Comparison is made to previous exam dated 11-17.       TECHNIQUE: 5 mm axial CT images were obtained through the abdomen and pelvis after the administration of intravenous and oral contrast. Coronal and sagittal reconstructions were obtained.       All CT scans at this facility use dose modulation, iterative reconstruction, and/or weight-based dosing when appropriate to reduce radiation dose to as low as reasonably achievable.       FINDINGS:   Please refer to dictation from CT of the thorax from the same date for thoracic findings.       The liver has a smooth contour and is normal in size.       The gallbladder, spleen, pancreas and adrenal glands are within normal limits.       The kidneys are symmetric in degree of enhancement. There is prominence of the right renal collecting system which is similar to the prior study.       There is a right direct inguinal hernia which contains a loop of small bowel. The small intestine proximal to the site is markedly dilated and distal is decompressed.  These findings are consistent with small bowel obstruction.       Diverticula are scattered throughout the sigmoid colon. No colonic wall thickening or edema.       There is no free intraperitoneal air. There is no free fluid in the abdomen.       Atherosclerotic calcifications are through the visualized portions of the aorta.       The bladder has a normal appearance.       There are no acute fractures.           Impression   1. Right-sided direct inguinal hernia containing a loop of small bowel resulting in complete small bowel obstruction. 2. Sigmoid diverticulosis without evidence of diverticulitis.       Findings were discussed by Dr. Blade Meraz with Dr. Una Mora at 4:53 PM  09/05/2018.           **This report has been created using voice recognition software. It may contain minor errors which are inherent in voice recognition technology. **       Final report electronically signed by Dr Rowena Ann on 9/5/2018 4:58 PM            Narrative   PROCEDURE: CT CHEST W CONTRAST       CLINICAL INFORMATION: 77-year-old male with fullness of the hilum on x-ray. Chest congestion and chronic shortness of breath.       COMPARISON: X-ray dated 9/5/2018.       TECHNIQUE: 5 mm axial images were obtained through the chest after the administration of intravenous contrast. Sagittal and coronal reconstructions were obtained.       All CT scans at this facility use dose modulation, iterative reconstruction, and/or weight-based dosing when appropriate to reduce radiation dose to as low as reasonably achievable.       FINDINGS:       There are centrilobular emphysematous changes of the lungs bilaterally. There is no pleural effusion or pneumothorax. The right lung is clear of masses or nodules.       The heart is normal in size. There is no pericardial effusion.       Atherosclerotic calcifications are seen throughout the visualized portions of the aorta.  There is no aneurysmal dilatation.       In the left hilar region there is a

## 2018-09-05 NOTE — ANESTHESIA PRE PROCEDURE
Department of Anesthesiology  Preprocedure Note       Name:  Baldomero Coombs   Age:  75 y.o.  :  1950                                          MRN:  [id number]         Date:  2018      Surgeon: Natasha Martinez):  Evan Marcus MD    Procedure: Procedure(s):  RIGHT INGUINAL HERNIA REPAIR    Medications prior to admission:   Prior to Admission medications    Medication Sig Start Date End Date Taking? Authorizing Provider   busPIRone (BUSPAR) 10 MG tablet Take 10 mg by mouth 2 times daily   Yes Historical Provider, MD   etodolac (LODINE) 400 MG tablet Take 400 mg by mouth 2 times daily   Yes Historical Provider, MD   Ipratropium Bromide (ATROVENT IN) Inhale  into the lungs 4 times daily. Yes Historical Provider, MD   Albuterol (PROVENTIL IN) Inhale  into the lungs as needed. Yes Historical Provider, MD   budesonide-formoterol (SYMBICORT) 160-4.5 MCG/ACT AERO Inhale 2 puffs into the lungs 2 times daily. Yes Historical Provider, MD   terazosin (HYTRIN) 2 MG capsule Take 2 mg by mouth nightly. Yes Historical Provider, MD   sildenafil (VIAGRA) 100 MG tablet Take 50 mg by mouth as needed. Yes Historical Provider, MD   omeprazole (PRILOSEC) 20 MG capsule Take 20 mg by mouth daily. Yes Historical Provider, MD   ranitidine (ZANTAC) 150 MG tablet Take 150 mg by mouth every morning. Yes Historical Provider, MD   losartan (COZAAR) 50 MG tablet Take 50 mg by mouth daily. Yes Historical Provider, MD   atorvastatin (LIPITOR) 80 MG tablet Take 40 mg by mouth daily. Yes Historical Provider, MD   hydrochlorothiazide (HYDRODIURIL) 25 MG tablet Take 12.5 mg by mouth daily.    Yes Historical Provider, MD       Current medications:    Current Facility-Administered Medications   Medication Dose Route Frequency Provider Last Rate Last Dose    budesonide-formoterol (SYMBICORT) 160-4.5 MCG/ACT inhaler 2 puff  2 puff Inhalation BID Ly Mckay MD        busPIRone (BUSPAR) tablet 10 mg  10 mg Oral BID Ly Mckay MD  losartan (COZAAR) tablet 50 mg  50 mg Oral Daily Bin Bhat MD        omeprazole (PRILOSEC) delayed release capsule 20 mg  20 mg Oral Daily Bin Bhat MD       [facility] ranitidine (ZANTAC) tablet 150 mg  150 mg Oral QAM Bin Bhat MD        terazosin (HYTRIN) capsule 2 mg  2 mg Oral Nightly Bin Bhat MD        enoxaparin (LOVENOX) injection 40 mg  40 mg Subcutaneous Daily Bin Bhat MD        0.9 % sodium chloride infusion   Intravenous Continuous Bin Bhat MD        ipratropium-albuterol (DUONEB) nebulizer solution 1 ampule  1 ampule Inhalation Q4H WA Bin Bhat MD           Allergies:     Allergies   Allergen Reactions    Lisinopril Other (See Comments)     coughing       Problem List:    Patient Active Problem List   Diagnosis Code    Abdominal hernia K46.9       Past Medical History:        Diagnosis Date    COPD ([facility]ronic obstructive [facility] disease) ([facility]     Gastric reflux     Hyperlipidemia     Hypertension        Past Surgical History:        Procedure Laterality Date    HERNIA REPAIR  80's    bilateral inguinal    KIDNEY SURGERY      stent placement       Social History:    Social History   Substance Use Topics    Smoking status: Current Every Day Smoker     Packs/day: 1.00     Types: Cigarettes    Smokeless tobacco: Never Used    Alcohol use Yes      Comment: once and awhile                                 Ready to quit: Not Answered  Counseling given: Not Answered      Vital Signs (Current):   Vitals:    09/05/18 1451 09/05/18 1530 09/05/18 1700 09/05/18 1745   BP: 130/79 134/72 134/68 (!) 140/65   Pulse: 93 81 78 82   Resp: 20 20 20 22   Temp:    98.2 °F (36.8 °C)   TempSrc:    Oral   SpO2: 96% 95% 95% 97%   Weight:    128 lb 3.2 oz (58.2 kg)   Height:    5' 8\" (1.727 m)                                              BP Readings from Last 3 Encounters:   09/05/18 (!) 140/65   07/19/18 (!) 138/90   08/02/17 120/68       NPO Status: BMI:   Wt Readings from Last 3 Encounters:   09/05/18 128 lb 3.2 oz (58.2 kg)   07/19/18 145 lb (65.8 kg)   08/02/17 144 lb (65.3 kg)     Body mass index is 19.49 kg/m². CBC:   Lab Results   Component Value Date    WBC 10.3 09/05/2018    RBC 4.81 09/05/2018    HGB 16.1 09/05/2018    HCT 44.6 09/05/2018    MCV 92.7 09/05/2018    RDW 13.3 09/09/2013     09/05/2018       CMP:   Lab Results   Component Value Date     09/05/2018    K 3.8 09/05/2018    CL 95 09/05/2018    CO2 23 09/05/2018    BUN 18 09/05/2018    CREATININE 0.6 09/05/2018    LABGLOM >90 09/05/2018    GLUCOSE 125 09/05/2018    PROT 7.0 09/05/2018    CALCIUM 10.0 09/05/2018    BILITOT 0.9 09/05/2018    ALKPHOS 78 09/05/2018    AST 18 09/05/2018    ALT 10 09/05/2018       POC Tests: No results for input(s): POCGLU, POCNA, POCK, POCCL, POCBUN, POCHEMO, POCHCT in the last 72 hours. Coags: No results found for: PROTIME, INR, APTT    HCG (If Applicable): No results found for: PREGTESTUR, PREGSERUM, HCG, HCGQUANT     ABGs: No results found for: PHART, PO2ART, UFT2DSE, ZRA7CVL, BEART, U4SXWFFN     Type & Screen (If Applicable):  No results found for: [facility]    Anesthesia Evaluation  Patient summary reviewed and Nursing notes reviewed no history of anesthetic complications:   Airway: Mallampati: II  TM distance: >3 FB   Neck ROM: full  Mouth opening: > = 3 FB Dental:          Pulmonary: breath sounds clear to auscultation  (+) COPD: severe,                             Cardiovascular:  Exercise tolerance: poor (<4 METS),   (+) hypertension:, [place]: after ambulating 1 flight of stairs,       ECG reviewed    Rate: normal                    Neuro/Psych:               GI/Hepatic/Renal:   (+) GERD:,           Endo/Other:                     Abdominal:       Abdomen: soft.     Vascular:                                        Anesthesia Plan      MAC     ASA 4       Induction:

## 2018-09-05 NOTE — ED PROVIDER NOTES
[facility]  eMERGENCY dEPARTMENT eNCOUnter          [facility]       Chief Complaint   Patient presents with    Nausea    Fatigue    Dizziness    Inguinal Hernia       Nurses Notes reviewed and I agree except as noted in the HPI. HISTORY OF PRESENT ILLNESS    Josie Giles is a 75 y.o. male who presents to the Emergency Department with a medical history of COPD, indigestion, and HTN for the evaluation of shortness of breath and dizziness. The patient states that his shortness of breath is exacerbated secondary to exertion. He reports a cough and vomiting secondary to his shortness of breath. He states that he has inhalers at home but denies to have any albuterol and nebulizer treatments. He denies any fever, chills, nausea, post nasal drip, constipation, diarrhea, chest pain, or abdominal pain. The patient describes his dizziness as a spinning sensation. He denies any syncopal episodes. He smokes one pack of cigarettes a day. No additional symptoms or complaints at this time. The HPI was provided by the patient. REVIEW OF SYSTEMS     Review of Systems   Constitutional: Negative for appetite change, chills, fatigue and fever. HENT: Negative for congestion, ear pain, rhinorrhea and sore throat. Eyes: Negative for discharge, redness and visual disturbance. Respiratory: Positive for cough and shortness of breath. Negative for wheezing. Cardiovascular: Negative for chest pain, palpitations and leg swelling. Gastrointestinal: Positive for vomiting. Negative for abdominal pain, diarrhea and nausea. Genitourinary: Negative for decreased urine volume, difficulty urinating, dysuria and hematuria. Musculoskeletal: Negative for arthralgias, back pain, joint swelling and neck pain. Skin: Negative for pallor and rash. Allergic/Immunologic: Negative for environmental allergies.    Neurological: Negative for dizziness, syncope, weakness, light-headedness, numbness and headaches. Hematological: Negative for adenopathy. Psychiatric/Behavioral: Negative for confusion and suicidal ideas. The patient is not nervous/anxious. PAST MEDICAL HISTORY    has a past medical history of COPD (chronic obstructive pulmonary disease) ([address] Gastric reflux; Hyperlipidemia; and Hypertension. SURGICAL HISTORY      has a past surgical history that includes Kidney surgery and hernia repair (80's). CURRENT MEDICATIONS       Current Discharge Medication List      CONTINUE these medications which have NOT CHANGED    Details   busPIRone (BUSPAR) 10 MG tablet Take 10 mg by mouth 2 times daily      etodolac (LODINE) 400 MG tablet Take 400 mg by mouth 2 times daily      Ipratropium Bromide (ATROVENT IN) Inhale  into the lungs 4 times daily. Albuterol (PROVENTIL IN) Inhale  into the lungs as needed. budesonide-formoterol (SYMBICORT) 160-4.5 MCG/ACT AERO Inhale 2 puffs into the lungs 2 times daily. sildenafil (VIAGRA) 100 MG tablet Take 50 mg by mouth as needed. omeprazole (PRILOSEC) 20 MG capsule Take 20 mg by mouth daily. ranitidine (ZANTAC) 150 MG tablet Take 150 mg by mouth every morning. losartan (COZAAR) 50 MG tablet Take 50 mg by mouth daily. atorvastatin (LIPITOR) 80 MG tablet Take 40 mg by mouth daily. hydrochlorothiazide (HYDRODIURIL) 25 MG tablet Take 12.5 mg by mouth daily. terazosin (HYTRIN) 2 MG capsule Take 2 mg by mouth nightly. ALLERGIES     is allergic to lisinopril. FAMILY HISTORY     is adopted. family history is not on file. He was adopted. SOCIAL HISTORY      reports that he has been smoking Cigarettes. He has been smoking about 1.00 pack per day. He has never used smokeless tobacco. He reports that he drinks alcohol. He reports that he does not use drugs. PHYSICAL EXAM     INITIAL VITALS:  height is 5' 8\" (1.727 m) and weight is 128 lb 3.2 oz (58.2 kg). His oral temperature is 97.4 °F (36.3 °C).  His

## 2018-09-06 PROBLEM — J44.9 COPD (CHRONIC OBSTRUCTIVE PULMONARY DISEASE) (HCC): Status: ACTIVE | Noted: 2018-09-06

## 2018-09-06 PROBLEM — I10 HYPERTENSION: Status: ACTIVE | Noted: 2018-09-06

## 2018-09-06 PROBLEM — K46.0 INCARCERATED HERNIA: Status: ACTIVE | Noted: 2018-09-05

## 2018-09-06 PROBLEM — E43 SEVERE MALNUTRITION (HCC): Status: ACTIVE | Noted: 2018-09-06

## 2018-09-06 LAB
ANION GAP SERPL CALCULATED.3IONS-SCNC: 13 MEQ/L (ref 8–16)
BASOPHILS # BLD: 0.4 %
BASOPHILS ABSOLUTE: 0 THOU/MM3 (ref 0–0.1)
BUN BLDV-MCNC: 15 MG/DL (ref 7–22)
CALCIUM SERPL-MCNC: 8.7 MG/DL (ref 8.5–10.5)
CHLORIDE BLD-SCNC: 98 MEQ/L (ref 98–111)
CO2: 24 MEQ/L (ref 23–33)
CREAT SERPL-MCNC: 0.7 MG/DL (ref 0.4–1.2)
EOSINOPHIL # BLD: 0.1 %
EOSINOPHILS ABSOLUTE: 0 THOU/MM3 (ref 0–0.4)
ERYTHROCYTE [DISTWIDTH] IN BLOOD BY AUTOMATED COUNT: 12.5 % (ref 11.5–14.5)
ERYTHROCYTE [DISTWIDTH] IN BLOOD BY AUTOMATED COUNT: 42.9 FL (ref 35–45)
GFR SERPL CREATININE-BSD FRML MDRD: > 90 ML/MIN/1.73M2
GLUCOSE BLD-MCNC: 125 MG/DL (ref 70–108)
HCT VFR BLD CALC: 40.2 % (ref 42–52)
HEMOGLOBIN: 14 GM/DL (ref 14–18)
IMMATURE GRANS (ABS): 0.04 THOU/MM3 (ref 0–0.07)
IMMATURE GRANULOCYTES: 0.5 %
LYMPHOCYTES # BLD: 15.4 %
LYMPHOCYTES ABSOLUTE: 1.2 THOU/MM3 (ref 1–4.8)
MCH RBC QN AUTO: 32.9 PG (ref 26–33)
MCHC RBC AUTO-ENTMCNC: 34.8 GM/DL (ref 32.2–35.5)
MCV RBC AUTO: 94.6 FL (ref 80–94)
MONOCYTES # BLD: 7.3 %
MONOCYTES ABSOLUTE: 0.6 THOU/MM3 (ref 0.4–1.3)
NUCLEATED RED BLOOD CELLS: 0 /100 WBC
PLATELET # BLD: 198 THOU/MM3 (ref 130–400)
PMV BLD AUTO: 9.6 FL (ref 9.4–12.4)
POTASSIUM SERPL-SCNC: 3.9 MEQ/L (ref 3.5–5.2)
RBC # BLD: 4.25 MILL/MM3 (ref 4.7–6.1)
SEG NEUTROPHILS: 76.3 %
SEGMENTED NEUTROPHILS ABSOLUTE COUNT: 6.1 THOU/MM3 (ref 1.8–7.7)
SODIUM BLD-SCNC: 135 MEQ/L (ref 135–145)
WBC # BLD: 8 THOU/MM3 (ref 4.8–10.8)

## 2018-09-06 PROCEDURE — 6370000000 HC RX 637 (ALT 250 FOR IP): Performed by: INTERNAL MEDICINE

## 2018-09-06 PROCEDURE — 99024 POSTOP FOLLOW-UP VISIT: CPT | Performed by: SURGERY

## 2018-09-06 PROCEDURE — 94640 AIRWAY INHALATION TREATMENT: CPT

## 2018-09-06 PROCEDURE — 80048 BASIC METABOLIC PNL TOTAL CA: CPT

## 2018-09-06 PROCEDURE — 6360000002 HC RX W HCPCS: Performed by: SURGERY

## 2018-09-06 PROCEDURE — 2580000003 HC RX 258: Performed by: SURGERY

## 2018-09-06 PROCEDURE — 36415 COLL VENOUS BLD VENIPUNCTURE: CPT

## 2018-09-06 PROCEDURE — A4614 HAND-HELD PEFR METER: HCPCS

## 2018-09-06 PROCEDURE — 2709999900 HC NON-CHARGEABLE SUPPLY

## 2018-09-06 PROCEDURE — 1200000003 HC TELEMETRY R&B

## 2018-09-06 PROCEDURE — 85025 COMPLETE CBC W/AUTO DIFF WBC: CPT

## 2018-09-06 RX ADMIN — PANTOPRAZOLE SODIUM 40 MG: 40 TABLET, DELAYED RELEASE ORAL at 06:08

## 2018-09-06 RX ADMIN — IPRATROPIUM BROMIDE AND ALBUTEROL SULFATE 1 AMPULE: .5; 3 SOLUTION RESPIRATORY (INHALATION) at 17:23

## 2018-09-06 RX ADMIN — CEFOXITIN SODIUM 1 G: 1 POWDER, FOR SOLUTION INTRAVENOUS at 11:47

## 2018-09-06 RX ADMIN — Medication 2 PUFF: at 22:08

## 2018-09-06 RX ADMIN — CEFOXITIN SODIUM 1 G: 1 POWDER, FOR SOLUTION INTRAVENOUS at 18:12

## 2018-09-06 RX ADMIN — KETOROLAC TROMETHAMINE 15 MG: 30 INJECTION, SOLUTION INTRAMUSCULAR at 08:22

## 2018-09-06 RX ADMIN — KETOROLAC TROMETHAMINE: 30 INJECTION, SOLUTION INTRAMUSCULAR at 20:32

## 2018-09-06 RX ADMIN — IPRATROPIUM BROMIDE AND ALBUTEROL SULFATE 1 AMPULE: .5; 3 SOLUTION RESPIRATORY (INHALATION) at 07:51

## 2018-09-06 RX ADMIN — Medication 2 PUFF: at 07:58

## 2018-09-06 RX ADMIN — KETOROLAC TROMETHAMINE 15 MG: 30 INJECTION, SOLUTION INTRAMUSCULAR at 13:59

## 2018-09-06 RX ADMIN — CEFOXITIN SODIUM 1 G: 1 POWDER, FOR SOLUTION INTRAVENOUS at 00:54

## 2018-09-06 RX ADMIN — CEFOXITIN SODIUM 1 G: 1 POWDER, FOR SOLUTION INTRAVENOUS at 05:40

## 2018-09-06 RX ADMIN — BUSPIRONE HYDROCHLORIDE 10 MG: 10 TABLET ORAL at 20:32

## 2018-09-06 RX ADMIN — FAMOTIDINE 20 MG: 20 TABLET, FILM COATED ORAL at 08:22

## 2018-09-06 RX ADMIN — KETOROLAC TROMETHAMINE 15 MG: 30 INJECTION, SOLUTION INTRAMUSCULAR at 03:31

## 2018-09-06 RX ADMIN — DOXAZOSIN 2 MG: 2 TABLET ORAL at 20:32

## 2018-09-06 RX ADMIN — BUSPIRONE HYDROCHLORIDE 10 MG: 10 TABLET ORAL at 08:22

## 2018-09-06 RX ADMIN — ENOXAPARIN SODIUM 40 MG: 40 INJECTION SUBCUTANEOUS at 08:22

## 2018-09-06 RX ADMIN — LOSARTAN POTASSIUM 50 MG: 50 TABLET, FILM COATED ORAL at 08:23

## 2018-09-06 RX ADMIN — IPRATROPIUM BROMIDE AND ALBUTEROL SULFATE 1 AMPULE: .5; 3 SOLUTION RESPIRATORY (INHALATION) at 22:08

## 2018-09-06 ASSESSMENT — PAIN SCALES - GENERAL
PAINLEVEL_OUTOF10: 6
PAINLEVEL_OUTOF10: 0
PAINLEVEL_OUTOF10: 4

## 2018-09-06 NOTE — H&P
[address] [place] [address]                               HISTORY AND PHYSICAL    PATIENT NAME: Rosaura Alcantar                    :        1950  MED REC NO:   [id number]                           ROOM:       0012  ACCOUNT NO:   [de-identified]                           ADMIT DATE: 2018  PROVIDER:     Belem Nicole M.D. PRESENTING COMPLAINT:  Lower abdominal discomfort with a groin swelling. HISTORY OF PRESENT ILLNESS:  The patient is 75years old who has been on  disability since fall, a , who visits the hospital for his routine  medical need, presented to the hospital today due to an apparent lower  abdominal discomfort, pain, and then right groin swelling. The patient  states that this occurred about a week ago, appears to have this swelling  come up actually this morning and keeping him uncomfortable. There was no  accompanying nausea or vomiting with this problem. The patient also notes  about a 30-pound weight loss in just about a year or so despite his good  eating habit. The patient was similarly evaluated by the ER physician, was  consulted with a surgeon, since then being evaluated as well. We requested  for a CT of the abdomen, just to rule out any possible incarcerated hernia. The patient also has some shortness of breath, but he does have a history  of obstructive airway disease despite which he smokes a pack a day. PAST MEDICAL HISTORY:  Remarkable for COPD, some GERD, hyperlipidemia, and  hypertension. PAST SURGICAL HISTORY:  Remarkable for hernia repair in the past, both  bilateral in the groin, and he has had kidney stent placement. ALLERGIES:  Listed allergies to LISINOPRIL. SOCIAL HISTORY:  He is single, never . No children. Smokes about a  pack day he states from unknown number of years. He also drinks alcohol  socially.     FAMILY HISTORY:  Not retrievable as he was an adopted child. HOME MEDICATIONS:  Yet to be reconciled, but looking through some past  history, listed as BuSpar, Atrovent, Proventil, Symbicort, Hytrin, Viagra,  Prilosec, Zantac, Cozaar, Lipitor, and HydroDIURIL. REVIEW OF SYSTEMS:  Appetite is fair. Again, no nausea or vomiting. He  continues to lose weight. The patient also admits to dyspnea on exertion. No report of any significant cough, fever, or chills. Again, the abdomen  showed some slight discomfort with right groin swelling. PHYSICAL EXAMINATION:  GENERAL:  I found an elderly patient who appears chronically ill looking  actually. HEENT:  Head normocephalic. Poor dentition. VITAL SIGNS:  Showing blood pressure of 130/79 with a pulse of about 93,  respiration is 20, and temperature of 97.9. LUNGS:  Show diminished breath sounds. No obvious wheezes currently. CARDIOVASCULAR SYSTEM:  Diffuse PMI. S1 and S2 well heard. No gallop or  rub. ABDOMEN:  Full. Bowel sounds positive. Some slight discomfort suprapubic  mainly right side. He does have a swelling in the groin and not quite  certain if it is a part of the gut or omentum. EXTREMITIES:  Show no edema or any cyanosis. NEUROLOGIC:  He is alert, awake, responsive to commands appropriately and  able to move all extremities without any focal neurological deficits. AVAILABLE DIAGNOSTIC DATA:  Includes the CBC with a white count of 10.3,  hemoglobin is 16.1, hematocrit is 44.6, and a platelet count of 903. Normal differential and a blood gas, pH 7.47, pCO2 is 39, _____ 73, and  saturation of 95%. Electrolyte pending. Portable chest x-ray reports  abnormal left hilar contour concerning for mass or lymphadenopathy. Electrocardiogram:  Sinus rhythm with a right bundle-branch block. ASSESSMENT AND PLAN:  1. History of possible incarcerated hernia. The patient already evaluated  by the surgeon.   A CT scan of the abdomen and pelvis requested and we will  evaluate and

## 2018-09-06 NOTE — OP NOTE
[address] [place] [address]                                 OPERATIVE REPORT    PATIENT NAME: Jania Greer                    :        1950  MED REC NO:   [id number]                           ROOM:       0012  ACCOUNT NO:   [de-identified]                           ADMIT DATE: 2018  PROVIDER:     Wil Chen M.D.    DATE OF PROCEDURE:  2018    PREOPERATIVE DIAGNOSIS:  Incarcerated recurrent right inguinal hernia,  direct. POSTOPERATIVE DIAGNOSIS:  Incarcerated recurrent right inguinal hernia,  direct. PROCEDURE:  Open repair of recurrent incarcerated right inguinal hernia  repair, direct. SURGEON:  Wil Chen M.D. FINDINGS:  Incarcerated loop of small bowel. Obstruction relieved bowel  appeared viable. ANESTHESIA:  IV local sedation and monitored anesthesia care. ESTIMATED BLOOD LOSS:  40 ml. INDICATIONS:  The patient is a 50-year-old male who has shortness of breath  and cough for a week. With coughing, he developed palpable mass in the  right groin which developed some pain and he presented to the emergency  department for evaluation. He was told he should not have general  anesthesia, he might not survive. Discussed with him CT findings and  recommended urgent operative intervention and he agreed. PROCEDURE IN DETAILS:  The patient was brought to the operating room,  placed supine on the operating table. He was given antibiotic  intravenously. He was given sedation per the anesthesia department. He  had pneumatic sequential compression devices on lower extremities. Right  groin was prepped and draped. Incision was made over the old incision and  opened. The incarcerated hernia sac was encountered that was in a direct  space and it was quite tight. It was opened laterally out towards and  adjacent to the femoral vessels.   Small bowel was then reduced, it pinked  up and appeared

## 2018-09-06 NOTE — PROGRESS NOTES
Pharmacy Medication History Note      List of current medications patient is taking is complete. Source of information: patient and prescription bottles    Changes made to medication list:  Medications removed (include reason, ex. therapy complete or physician discontinued):  · none    Medications added/doses adjusted:  · Atorvastatin 80mg tab, 40mg daily - clarified as atrovastatin 80mg, 80mg daily  · Ipratropium bromide (Atrovent) QID - clarified as BID  · Omeprazole 20mg daily - clarified as omeprazole 20mg, 40mg daily    Other notes (ex. Recent course of antibiotics, Coumadin dosing):    · Denies use of other OTC or herbal medications.       Allergies reviewed      Electronically signed by Ev Razo on 9/6/2018 at 12:04 PM

## 2018-09-06 NOTE — PROGRESS NOTES
Dr. Richelle Nicole Progress note        9/6/2018                  12:27 PM        Patient:  Kareem Flowers  YOB: 1950    MRN: [id number]   Acct:  [de-identified]   [id number]-12/012-A  Primary Care Physician: Phu Cutler MD    Admit Date: 9/5/2018           Subjective: s/p relief of the incarcerated hernia. Objective:   Vitals: BP (!) 101/59   Pulse 91   Temp 98.8 °F (37.1 °C) (Oral)   Resp 16   Ht 5' 8\" (1.727 m)   Wt 128 lb 3.2 oz (58.2 kg)   SpO2 93%   BMI 19.49 kg/m²   Physical Exam:  General appearance: alert, appears stated age and cooperative  Skin: Skin color, texture, turgor normal.   HEENT: Head: Normal, normocephalic, atraumatic. Neck: no adenopathy, no carotid bruit and no JVD  Lungs: honorio air movement  Heart: S1, S2 normal  Abdomen: ice over the site of surgery on the rt groin.  pos bs  Extremities: extremities normal, atraumatic, no cyanosis or edema  Lymphatic: No significant lymph node enlargement papable  Neurologic: Mental status: Alert, oriented, thought content appropriate      Diet: DIET CLEAR LIQUID;        Data:   Scheduled Meds: Scheduled Meds:   busPIRone  10 mg Oral BID    losartan  50 mg Oral Daily    ipratropium-albuterol  1 ampule Inhalation Q4H WA    enoxaparin  40 mg Subcutaneous Daily    cefOXitin  1 g Intravenous 4 times per day    ketorolac  15 mg Intravenous Q6H    pantoprazole  40 mg Oral Daily    doxazosin  2 mg Oral Nightly    mometasone-formoterol  2 puff Inhalation BID    famotidine  20 mg Oral QAM     Continuous Infusions:   sodium chloride       PRN Meds:.acetaminophen  Continuous Infusions:   sodium chloride         Intake/Output Summary (Last 24 hours) at 09/06/18 1227  Last data filed at 09/06/18 1000   Gross per 24 hour   Intake             1545 ml   Output              300 ml   Net             1245 ml       CBC:   Recent Labs      09/05/18   1407  09/06/18   0642   WBC  10.3  8.0   HGB 16.1  14.0   HCT  44.6  40.2*   PLT  217  198     BMP:  Recent Labs      09/05/18   1407  09/06/18   0642   NA  138  135   K  3.8  3.9   CL  95*  98   CO2  23  24   BUN  18  15   CREATININE  0.6  0.7   GLUCOSE  125*  125*     Hepatic: Recent Labs      09/05/18   1407   AST  18   ALT  10*   BILITOT  0.9   ALKPHOS  78     Urine: urine culture  ABGs: No results found for: PHART, PO2ART, BCL2CWJ  INR: No results for input(s): INR in the last 72 hours. -----------------------------------------------------------------  Data Review Recent Labs      09/05/18   1407  09/06/18   0642   WBC  10.3  8.0   HGB  16.1  14.0   HCT  44.6  40.2*   PLT  217  198     Recent Labs      09/05/18   1407  09/06/18   0642   NA  138  135   CL  95*  98   K  3.8  3.9   CO2  23  24   BUN  18  15   CREATININE  0.6  0.7   GLUCOSE  125*  125*   CALCIUM  10.0  8.7   LIPASE  16.6   --    PROT  7.0   --    BILIDIR  <0.2   --    BILITOT  0.9   --    ALKPHOS  78   --      No results for input(s): CKTOTAL, CKMB, CKMBINDEX, CKMBCALCMETH, TROPONINI in the last 72 hours. No results for input(s): HDL, LDLDIRECT, TRIG in the last 72 hours. Invalid input(s): TOTALCHLTL, LDLCHLC  No results for input(s): BNP, TSH, FT4, ACETONE in the last 72 hours. Assessment   Principal Problem:    Incarcerated hernia  Active Problems:    COPD (chronic obstructive pulmonary disease) ([facility])    Hypertension  Resolved Problems:    * No resolved hospital problems.  *        Patient Active Problem List   Diagnosis    Incarcerated hernia    [facility]    Hypertension        Plan   Will follow post op protocol  Ot/pt  Dc home vale Sung MD

## 2018-09-07 ENCOUNTER — APPOINTMENT (OUTPATIENT)
Dept: GENERAL RADIOLOGY | Age: 68
DRG: 329 | End: 2018-09-07
Payer: MEDICARE

## 2018-09-07 LAB
ERYTHROCYTE [DISTWIDTH] IN BLOOD BY AUTOMATED COUNT: 12.3 % (ref 11.5–14.5)
ERYTHROCYTE [DISTWIDTH] IN BLOOD BY AUTOMATED COUNT: 43.1 FL (ref 35–45)
HCT VFR BLD CALC: 41 % (ref 42–52)
HEMOGLOBIN: 14.5 GM/DL (ref 14–18)
INR BLD: 1.07 (ref 0.85–1.13)
MCH RBC QN AUTO: 33.6 PG (ref 26–33)
MCHC RBC AUTO-ENTMCNC: 35.4 GM/DL (ref 32.2–35.5)
MCV RBC AUTO: 94.9 FL (ref 80–94)
PLATELET # BLD: 198 THOU/MM3 (ref 130–400)
PMV BLD AUTO: 9.7 FL (ref 9.4–12.4)
RBC # BLD: 4.32 MILL/MM3 (ref 4.7–6.1)
WBC # BLD: 6.3 THOU/MM3 (ref 4.8–10.8)

## 2018-09-07 PROCEDURE — 6370000000 HC RX 637 (ALT 250 FOR IP): Performed by: INTERNAL MEDICINE

## 2018-09-07 PROCEDURE — 31622 DX BRONCHOSCOPE/WASH: CPT | Performed by: INTERNAL MEDICINE

## 2018-09-07 PROCEDURE — 99024 POSTOP FOLLOW-UP VISIT: CPT | Performed by: SURGERY

## 2018-09-07 PROCEDURE — 99233 SBSQ HOSP IP/OBS HIGH 50: CPT | Performed by: INTERNAL MEDICINE

## 2018-09-07 PROCEDURE — 6360000002 HC RX W HCPCS: Performed by: SURGERY

## 2018-09-07 PROCEDURE — 99152 MOD SED SAME PHYS/QHP 5/>YRS: CPT | Performed by: INTERNAL MEDICINE

## 2018-09-07 PROCEDURE — 88305 TISSUE EXAM BY PATHOLOGIST: CPT

## 2018-09-07 PROCEDURE — 85610 PROTHROMBIN TIME: CPT

## 2018-09-07 PROCEDURE — 87184 SC STD DISK METHOD PER PLATE: CPT

## 2018-09-07 PROCEDURE — 94640 AIRWAY INHALATION TREATMENT: CPT

## 2018-09-07 PROCEDURE — 87116 MYCOBACTERIA CULTURE: CPT

## 2018-09-07 PROCEDURE — 36415 COLL VENOUS BLD VENIPUNCTURE: CPT

## 2018-09-07 PROCEDURE — C9113 INJ PANTOPRAZOLE SODIUM, VIA: HCPCS | Performed by: SURGERY

## 2018-09-07 PROCEDURE — 87206 SMEAR FLUORESCENT/ACID STAI: CPT

## 2018-09-07 PROCEDURE — 99153 MOD SED SAME PHYS/QHP EA: CPT | Performed by: INTERNAL MEDICINE

## 2018-09-07 PROCEDURE — 74019 RADEX ABDOMEN 2 VIEWS: CPT

## 2018-09-07 PROCEDURE — 85027 COMPLETE CBC AUTOMATED: CPT

## 2018-09-07 PROCEDURE — 87081 CULTURE SCREEN ONLY: CPT

## 2018-09-07 PROCEDURE — 0BJ08ZZ INSPECTION OF TRACHEOBRONCHIAL TREE, VIA NATURAL OR ARTIFICIAL OPENING ENDOSCOPIC: ICD-10-PCS | Performed by: INTERNAL MEDICINE

## 2018-09-07 PROCEDURE — 6360000002 HC RX W HCPCS: Performed by: INTERNAL MEDICINE

## 2018-09-07 PROCEDURE — 87205 SMEAR GRAM STAIN: CPT

## 2018-09-07 PROCEDURE — 88112 CYTOPATH CELL ENHANCE TECH: CPT

## 2018-09-07 PROCEDURE — 2580000003 HC RX 258: Performed by: SURGERY

## 2018-09-07 PROCEDURE — 3609027000 HC BRONCHOSCOPY FLUOROSCOPY: Performed by: INTERNAL MEDICINE

## 2018-09-07 PROCEDURE — 2709999900 HC NON-CHARGEABLE SUPPLY

## 2018-09-07 PROCEDURE — 87070 CULTURE OTHR SPECIMN AEROBIC: CPT

## 2018-09-07 PROCEDURE — 87102 FUNGUS ISOLATION CULTURE: CPT

## 2018-09-07 PROCEDURE — 87255 GENET VIRUS ISOLATE HSV: CPT

## 2018-09-07 PROCEDURE — 87186 SC STD MICRODIL/AGAR DIL: CPT

## 2018-09-07 PROCEDURE — 87077 CULTURE AEROBIC IDENTIFY: CPT

## 2018-09-07 PROCEDURE — 1200000003 HC TELEMETRY R&B

## 2018-09-07 RX ORDER — FENTANYL CITRATE 50 UG/ML
INJECTION, SOLUTION INTRAMUSCULAR; INTRAVENOUS PRN
Status: DISCONTINUED | OUTPATIENT
Start: 2018-09-07 | End: 2018-09-07 | Stop reason: HOSPADM

## 2018-09-07 RX ORDER — PANTOPRAZOLE SODIUM 40 MG/10ML
40 INJECTION, POWDER, LYOPHILIZED, FOR SOLUTION INTRAVENOUS DAILY
Status: DISCONTINUED | OUTPATIENT
Start: 2018-09-07 | End: 2018-09-17 | Stop reason: HOSPADM

## 2018-09-07 RX ORDER — MIDAZOLAM HYDROCHLORIDE 1 MG/ML
INJECTION INTRAMUSCULAR; INTRAVENOUS PRN
Status: DISCONTINUED | OUTPATIENT
Start: 2018-09-07 | End: 2018-09-07 | Stop reason: HOSPADM

## 2018-09-07 RX ORDER — ONDANSETRON 2 MG/ML
4 INJECTION INTRAMUSCULAR; INTRAVENOUS EVERY 6 HOURS PRN
Status: DISCONTINUED | OUTPATIENT
Start: 2018-09-07 | End: 2018-09-17 | Stop reason: HOSPADM

## 2018-09-07 RX ORDER — 0.9 % SODIUM CHLORIDE 0.9 %
10 VIAL (ML) INJECTION DAILY
Status: DISCONTINUED | OUTPATIENT
Start: 2018-09-07 | End: 2018-09-17 | Stop reason: HOSPADM

## 2018-09-07 RX ADMIN — Medication 10 ML: at 15:29

## 2018-09-07 RX ADMIN — BUSPIRONE HYDROCHLORIDE 10 MG: 10 TABLET ORAL at 21:02

## 2018-09-07 RX ADMIN — CEFOXITIN SODIUM 1 G: 1 POWDER, FOR SOLUTION INTRAVENOUS at 06:15

## 2018-09-07 RX ADMIN — FAMOTIDINE 20 MG: 20 TABLET, FILM COATED ORAL at 08:02

## 2018-09-07 RX ADMIN — LOSARTAN POTASSIUM 50 MG: 50 TABLET, FILM COATED ORAL at 08:03

## 2018-09-07 RX ADMIN — CEFOXITIN SODIUM 1 G: 1 POWDER, FOR SOLUTION INTRAVENOUS at 01:35

## 2018-09-07 RX ADMIN — SODIUM CHLORIDE: 9 INJECTION, SOLUTION INTRAVENOUS at 12:35

## 2018-09-07 RX ADMIN — CEFOXITIN SODIUM 1 G: 1 POWDER, FOR SOLUTION INTRAVENOUS at 12:33

## 2018-09-07 RX ADMIN — KETOROLAC TROMETHAMINE 15 MG: 30 INJECTION, SOLUTION INTRAMUSCULAR at 15:28

## 2018-09-07 RX ADMIN — ONDANSETRON 4 MG: 2 INJECTION INTRAMUSCULAR; INTRAVENOUS at 08:40

## 2018-09-07 RX ADMIN — IPRATROPIUM BROMIDE AND ALBUTEROL SULFATE 1 AMPULE: .5; 3 SOLUTION RESPIRATORY (INHALATION) at 15:42

## 2018-09-07 RX ADMIN — DOXAZOSIN 2 MG: 2 TABLET ORAL at 21:03

## 2018-09-07 RX ADMIN — BUSPIRONE HYDROCHLORIDE 10 MG: 10 TABLET ORAL at 08:03

## 2018-09-07 RX ADMIN — Medication 2 PUFF: at 20:58

## 2018-09-07 RX ADMIN — PANTOPRAZOLE SODIUM 40 MG: 40 INJECTION, POWDER, FOR SOLUTION INTRAVENOUS at 15:28

## 2018-09-07 RX ADMIN — IPRATROPIUM BROMIDE AND ALBUTEROL SULFATE 1 AMPULE: .5; 3 SOLUTION RESPIRATORY (INHALATION) at 12:29

## 2018-09-07 RX ADMIN — IPRATROPIUM BROMIDE AND ALBUTEROL SULFATE 1 AMPULE: .5; 3 SOLUTION RESPIRATORY (INHALATION) at 20:58

## 2018-09-07 RX ADMIN — PANTOPRAZOLE SODIUM 40 MG: 40 TABLET, DELAYED RELEASE ORAL at 06:16

## 2018-09-07 RX ADMIN — KETOROLAC TROMETHAMINE 15 MG: 30 INJECTION, SOLUTION INTRAMUSCULAR at 08:02

## 2018-09-07 RX ADMIN — CEFOXITIN SODIUM 1 G: 1 POWDER, FOR SOLUTION INTRAVENOUS at 21:02

## 2018-09-07 ASSESSMENT — PAIN SCALES - GENERAL
PAINLEVEL_OUTOF10: 4
PAINLEVEL_OUTOF10: 4
PAINLEVEL_OUTOF10: 5
PAINLEVEL_OUTOF10: 4
PAINLEVEL_OUTOF10: 4
PAINLEVEL_OUTOF10: 0

## 2018-09-07 NOTE — PROGRESS NOTES
6811  09/07/18   0633   WBC  10.3  8.0  6.3   HGB  16.1  14.0  14.5   HCT  44.6  40.2*  41.0*   PLT  217  198  198     BMP:    Recent Labs      09/05/18   1407  09/06/18   0642   NA  138  135   K  3.8  3.9   CL  95*  98   CO2  23  24   BUN  18  15   CREATININE  0.6  0.7   GLUCOSE  125*  125*     Hepatic:   Recent Labs      09/05/18   1407   AST  18   ALT  10*   BILITOT  0.9   ALKPHOS  78     Urine: urine culture  ABGs: No results found for: PHART, PO2ART, KSM9VHH  INR: No results for input(s): INR in the last 72 hours. -----------------------------------------------------------------  Data Review   Recent Labs      09/06/18   0642  09/07/18   0633   WBC  8.0  6.3   HGB  14.0  14.5   HCT  40.2*  41.0*   PLT  198  198     Recent Labs      09/05/18   1407  09/06/18   0642   NA  138  135   CL  95*  98   K  3.8  3.9   CO2  23  24   BUN  18  15   CREATININE  0.6  0.7   GLUCOSE  125*  125*   CALCIUM  10.0  8.7   LIPASE  16.6   --    PROT  7.0   --    BILIDIR  <0.2   --    BILITOT  0.9   --    ALKPHOS  78   --      No results for input(s): CKTOTAL, CKMB, CKMBINDEX, CKMBCALCMETH, TROPONINI in the last 72 hours. No results for input(s): HDL, LDLDIRECT, TRIG in the last 72 hours. Invalid input(s): TOTALCHLTL, LDLCHLC  No results for input(s): BNP, TSH, FT4, ACETONE in the last 72 hours. Assessment   Principal Problem:    Incarcerated hernia  Active Problems:    COPD (chronic obstructive pulmonary disease) (HCC)    Hypertension    Severe malnutrition (HCC)  Resolved Problems:    * No resolved hospital problems.  *  Severe protein-calorie malnutrition    in the setting of weight loss, BMI <20, lung mass, & patient meets ASPEN criteria being treated with dietician following      Patient Active Problem List   Diagnosis    Incarcerated hernia    COPD (chronic obstructive [facility] disease) ([facility]    Hypertension    Severe malnutrition ([facility]        Plan   Planning for ng tube placement  Cont ivf  F/u labs    Alicia L

## 2018-09-07 NOTE — OP NOTE
Bronchoscopy Procedure Note    Patient: Ted Fernandez  : 1950        Surgeon: Ana Garces    Operation: Flexible diagnostic fiberoptic bronchoscopy without fluoroscopy. During procedure Bronch washings obtained  from Left tracheobronchial tree including left upper lobe, lingular and left lower lobe sub segments. Date of Operation: 18    Indication for procedure:   Abnormal CT chest with Left hilar mass. Chronic hx of tobacco smoking. COPD unspecified. Pre operative diagnoses:   Abnormal CT chest with Left hilar mass. Chronic hx of tobacco smoking. COPD unspecified. Pre operative diagnoses:   Abnormal CT chest with Left hilar mass. Chronic hx of tobacco smoking. COPD unspecified. Consent: Ted Fernandez is a 75 y.o. male . The risks, benefits, complications, treatment options and expected outcomes were discussed with the patient. Consent obtained from the patient after explaining the risks and complications of the procedure. The possibilities of reaction to medication, pulmonary aspiration, perforation of a viscus, bleeding, failure to diagnose a condition and creating a complication requiring transfusion or operation were discussed with the patient who freely signed the consent. Anesthesia: Patient was given conscious sedation with Versed 3mg ( 1+ 1+1) and Fentanyl 50mcg IV      Description of Procedure: The patient was taken to endoscopy suite, identified as Ted Fernandez and the procedure verified as Flexible Fiberoptic Bronchoscopy. A Time Out was held and the above information confirmed. After the induction of topical nasopharyngeal anesthesia, the patient was placed in appropriate position and the Flexible Fibrooptic bronchoscope was passed through the Oral route. The vocal cords were visualized and 1% Lidocaine was topically placed onto the cords. The cords were normal and moving equally with no growths. The scope was then passed into the trachea. Lidocaine 1% was used topically on the trevon. The trevon is sharp with no growths. Careful inspection of the tracheal lumen was accomplished with no growths. The scope was  advanced into the right main bronchus and then into the Right upper lobe, Right middle lobe, and Right lower lobe bronchi and segmental bronchi. The scope was sequentially passed into the left main and then left upper and lower bronchi and segmental bronchi. Left apico- posterior segmental bronchus was found to be narrowed from external compression. Bronchial washings were done form Left tracheobronchial tree including left upper lobe, lingular and left lower lobe sub segments. More than 25 ml of Bronchial washings were obtained and sent to lab for further analysis. Endobronchial findings:  Trachea: Normal mucosa. Trevon: Normal mucosa  Right main bronchus: Normal mucosa  Right upper lobe bronchus: Normal mucosa  Right Middle lobe bronchus: Normal mucosa  Right Lower lobe bronchus:Normal mucosa  Left main bronchus: Normal mucosa  Left upper lobe bronchus: Narrowed Left apico- posterior segmental bronchus. No obvious mass shadow seen on Fluoroscopy to biopsy. Due to persistent cough and oxygen desaturations no biopsies were attempted. Left lower lobe bronchus: Normal mucosa    No endobronchial lesions seen. The Patient was taken to the Endoscopy Recovery area in satisfactory condition. Estimated Blood Loss: Less than 5ml. Complications: None. Recommendation/Plan:    1. F/U on culturs results  2. F/U on cytology results. 3. Will request CT guided biopsy of left apical mass by IR. Attestation: I performed the procedure.     Arthur Garcia

## 2018-09-07 NOTE — PROGRESS NOTES
obtained reviewed dilated stomach and loops of small bowel. Ileus versus partial obstruction    ASSESSMENT  1. POD # 2. Repair of incarcerated recurrent right direct inguinal hernia  2. Left perihilar lung mass  3. Bowel distention    PLAN  1. Nothing by mouth and NG tube recommended  2. VTE: lovenox  3. Evaluation of lung mass per Dr. Mayco Pak. Recommend pulmonary consult. 4.  Continue IV antibiotics today with incarcerated small bowel and possibility of translocation in the setting of a prosthetic mesh in the groin.   Charan Rosas MD  Electronically signed 9/7/2018 at 8:16 AM

## 2018-09-07 NOTE — CONSULTS
[place] [facility]    Patient - Mendez Lewis   MRN -  [id number]   [facility]   - 1950      Date of Admission -  2018  1:12 PM  Date of evaluation -  2018  [address], [place] Primary Care Physician - Manny Justin MD     Problem List      Active Hospital Problems    Diagnosis Date Noted    COPD (chronic obstructive pulmonary disease) ([facility] [address] [J44.9] 2018    Hypertension [I10] 2018    Severe malnutrition ([facility] [address] [E43] 2018     Class: Chronic    Incarcerated hernia [K46.0] 2018     Reason for Consult    For further evaluation and management of Left hilar lung mass. HPI   History Obtained From: Patient and electronic medical record. Mendez Lewis is a 75 y.o. male  was initially admitted under hospitalist service. Pulmonary medicine was consulted for further management of Left hilar lung mass noted on CT chest. He is S/p Incarcerated recurrent right direct inguinal hernia POD #2. He underwent CT scan of chest on 18 at [facility]. He had a chronic hx of tobacco smoking for 55years. He is currently smoking with 1PPD prior to hospitalization. He usually follows with [facility] for his COPD. He is currently on treatment with  Symbicort 160/4.5mcg spray MDI, 2 puffs via inhalation BID along with Albuterol HFA 90mcg/Spray MDI, 2puffs  Q6Hprn and Atrovent HFA 2puffs Q6h prn. He is not using any home O2 at home. He was never diagnosed with any lung cancer or mass so far. He gives a hx of occasional cough with no hemoptysis or expectoration. No recent travel history. He denies any history of pulmonary tuberculosis or exposure to TB patients recently. He denies any recent travel history to endemic places to tuberculosis. He denies any history of hemoptysis. He denies any history of orthopnea. He denies any history of paroxysmal nocturnal dyspnea.      He ml   Net             -258 ml     I/O last 3 completed shifts: In: 1622 [P. O.:680; I.V.:942]  Out: 500 [Urine:500]   Patient Vitals for the past 96 hrs (Last 3 readings):   Weight   09/05/18 1745 128 lb 3.2 oz (58.2 kg)       Exam   General Appearance: moderately built, moderately nourished in no acute distress on room air. HEENT: Normal, Head is normocephalic, atraumatic. Oropharynx is clear and moist.  No oral thrush. PERRL. Neck - Supple, No JVD present. No tracheal deviation. Lungs - Bilateral air entry present. Good breath sounds on  bilateral side of chest. Bilateral expiratory wheezes. No rales. Cardiovascular - Heart sounds are normal.  Regular rhythm normal rate without murmur, gallop or rub. Abdomen - Soft, nontender, nondistended, no masses or organomegaly. Old surgical scar present over right groin. NG tube in place. Neurologic - Awake, alert, oriented. There are no focal motor or sensory deficits. Extremities - No cyanosis, clubbing or edema. Musculoskeletal: Normal range of motion. Patient exhibits no tenderness. Lymphadenopathy:  No cervical adenopathy. Psychiatric: Patient  has a normal mood and affect. Skin - No bruising or bleeding.     Labs  - Old records and notes have been reviewed in [facility]   ABG  Lab Results   Component Value Date    PH 7.47 09/05/2018    PO2 73 09/05/2018    PCO2 39 09/05/2018    HCO3 29 09/05/2018    O2SAT 95 09/05/2018     No results found for: Lieutenant Bermeo, JESSICA  CBC  Recent Labs      09/05/18   1407  09/06/18   0642  09/07/18   0633   WBC  10.3  8.0  6.3   RBC  4.81  4.25*  4.32*   HGB  16.1  14.0  14.5   HCT  44.6  40.2*  41.0*   MCV  92.7  94.6*  94.9*   MCH  33.5*  32.9  33.6*   MCHC  36.1*  34.8  35.4   PLT  217  198  198   MPV  9.3*  9.6  9.7      BMP  Recent Labs      09/05/18   1407  09/06/18   0642   NA  138  135   K  3.8  3.9   CL  95*  98   CO2  23  24   BUN  18  15   CREATININE  0.6  0.7   GLUCOSE  125*  125*   CALCIUM  10.0  8.7 LFT  Recent Labs      09/05/18   1407   AST  18   ALT  10*   BILITOT  0.9   ALKPHOS  78   LIPASE  16.6     TROP  Lab Results   Component Value Date    TROPONINT < 0.010 09/05/2018    TROPONINT < 0.010 07/19/2018     BNP  No results for input(s): BNP in the last 72 hours. Lactic Acid  Recent Labs      09/05/18   1407   LACTA  0.9     INR  No results for input(s): INR, PROTIME in the last 72 hours. PTT  No results for input(s): APTT in the last 72 hours. Glucose  No results for input(s): POCGLU in the last 72 hours. UA No results for input(s): SPECGRAV, PHUR, COLORU, CLARITYU, MUCUS, PROTEINU, BLOODU, RBCUA, WBCUA, BACTERIA, NITRU, GLUCOSEU, BILIRUBINUR, UROBILINOGEN, KETUA, LABCAST, LABCASTTY, AMORPHOS in the last 72 hours. Invalid input(s): CRYSTALS. PFTs   No old studies in Epic. Sleep studies   No old studies in Epic. Cultures    None    EKG     Echocardiogram   No old studies in Epic. Radiology    CXR  Sep 5, 2018  PROCEDURE: XR CHEST PORTABLE  Abnormal left hilar contour concerning for possible underlying mass or lymphadenopathy. Findings might also be vascular. Further evaluation with chest CT is advised. CT Scans  (See actual reports for details)  9/05/2018  PROCEDURE: CT CHEST W CONTRAST  1. Conglomeration of soft tissue density in the left hilar region surrounding the left main bronchus and pulmonary artery, highly concerning for malignancy. 2. Upper lobe predominant centrilobular emphysematous changes of the lungs. CT abdomen and pelvis with contrast:  Sep 5, 2018  PROCEDURE: CT ABDOMEN PELVIS W IV CONTRAST  1. Right-sided direct inguinal hernia containing a loop of small bowel resulting in complete small bowel obstruction. 2. Sigmoid diverticulosis without evidence of diverticulitis. Findings were discussed by Dr. Yayo Loera with Dr. Juancho Kendrick at 4:53 PM  09/05/2018.         Assessment   -Left hilar mass surrounding the left main bronchus and pulmonary artery, highly concerning for malignancy  -COPD unspecified severity.  -Chronic hx of tobacco smoking for 55years with 1PPD. -GERD.  -S/p Incarcerated recurrent right direct inguinal hernia POD #2.  -Hypertension.  -Severe malnutrition. Plan   -Keep patient NPO except meds from now.  -Hold Lovenox. He had last dose of of Lovenox on 9/6/18- On hold due to coffee ground emesis.  -Schedule patient for Bronchoscopy with  biopsy under fluoroscopy in endoscopy suite at [address] [place] [address] to further evaluate the etiology of Left hilar mass.   -Mat All educated and informed about bronchoscopy procedure,method, complicantions of procedure including but not limited to development of pneumothorax with requirement of chest tube placement. He agreed for the procedure and verbalizes understanding.  -Continue Dulera ( Symbicort at home)  -Change duonebs 3ml via neb Q4h while awake. -Will send INR and PT now.  -Deep Venous Thrombosis Prophylaxis: SCDs. \"Thank you for asking us to see this patient\"     -Discussed with Ms Edi Biggs RN taking care of patient regarding patient condition and my management plan. Purcell All educated about my impression and plan. He verbalizes understanding. Questions and concerns addressed.     Electronically signed by   Shavonne Zamora MD on 9/7/2018 at 1:57 PM

## 2018-09-07 NOTE — PROGRESS NOTES
Pt vomiting dark brown emesis. Notified Dr. Jean-Paul Schwartz that stomach seems more distended than assessment yesterday.  Asked for order for anti-emetic

## 2018-09-07 NOTE — PROGRESS NOTES
S:  Nothing was said because the patient was sleeping peacefully. O:   The pt. was resting comfortably in their bed. And no family was present. A:  Since the pt. was unable to respond, I offered a prayer of                        Comfort at patients bedside. P:            It would be helpful if Spiritual Care would continue to follow up on this                       case. I also placed a spiritual care card in the room just in case if the                      patient or family needed to contact a .

## 2018-09-08 LAB
ANION GAP SERPL CALCULATED.3IONS-SCNC: 14 MEQ/L (ref 8–16)
BUN BLDV-MCNC: 12 MG/DL (ref 7–22)
CALCIUM SERPL-MCNC: 8.9 MG/DL (ref 8.5–10.5)
CHLORIDE BLD-SCNC: 96 MEQ/L (ref 98–111)
CO2: 25 MEQ/L (ref 23–33)
CREAT SERPL-MCNC: 0.5 MG/DL (ref 0.4–1.2)
GFR SERPL CREATININE-BSD FRML MDRD: > 90 ML/MIN/1.73M2
GLUCOSE BLD-MCNC: 112 MG/DL (ref 70–108)
POTASSIUM SERPL-SCNC: 3.7 MEQ/L (ref 3.5–5.2)
SODIUM BLD-SCNC: 135 MEQ/L (ref 135–145)

## 2018-09-08 PROCEDURE — C9113 INJ PANTOPRAZOLE SODIUM, VIA: HCPCS | Performed by: SURGERY

## 2018-09-08 PROCEDURE — 6370000000 HC RX 637 (ALT 250 FOR IP): Performed by: INTERNAL MEDICINE

## 2018-09-08 PROCEDURE — 94640 AIRWAY INHALATION TREATMENT: CPT

## 2018-09-08 PROCEDURE — 2709999900 HC NON-CHARGEABLE SUPPLY

## 2018-09-08 PROCEDURE — 80048 BASIC METABOLIC PNL TOTAL CA: CPT

## 2018-09-08 PROCEDURE — 2580000003 HC RX 258: Performed by: SURGERY

## 2018-09-08 PROCEDURE — 6360000002 HC RX W HCPCS: Performed by: SURGERY

## 2018-09-08 PROCEDURE — 1200000003 HC TELEMETRY R&B

## 2018-09-08 PROCEDURE — 99233 SBSQ HOSP IP/OBS HIGH 50: CPT | Performed by: INTERNAL MEDICINE

## 2018-09-08 PROCEDURE — 94760 N-INVAS EAR/PLS OXIMETRY 1: CPT

## 2018-09-08 PROCEDURE — 99024 POSTOP FOLLOW-UP VISIT: CPT | Performed by: SURGERY

## 2018-09-08 PROCEDURE — 36415 COLL VENOUS BLD VENIPUNCTURE: CPT

## 2018-09-08 RX ADMIN — IPRATROPIUM BROMIDE AND ALBUTEROL SULFATE 1 AMPULE: .5; 3 SOLUTION RESPIRATORY (INHALATION) at 08:52

## 2018-09-08 RX ADMIN — IPRATROPIUM BROMIDE AND ALBUTEROL SULFATE 1 AMPULE: .5; 3 SOLUTION RESPIRATORY (INHALATION) at 12:05

## 2018-09-08 RX ADMIN — CEFOXITIN SODIUM 1 G: 1 POWDER, FOR SOLUTION INTRAVENOUS at 13:15

## 2018-09-08 RX ADMIN — SODIUM CHLORIDE: 9 INJECTION, SOLUTION INTRAVENOUS at 19:15

## 2018-09-08 RX ADMIN — IPRATROPIUM BROMIDE AND ALBUTEROL SULFATE 1 AMPULE: .5; 3 SOLUTION RESPIRATORY (INHALATION) at 21:03

## 2018-09-08 RX ADMIN — Medication 2 PUFF: at 21:11

## 2018-09-08 RX ADMIN — BUSPIRONE HYDROCHLORIDE 10 MG: 10 TABLET ORAL at 09:33

## 2018-09-08 RX ADMIN — BUSPIRONE HYDROCHLORIDE 10 MG: 10 TABLET ORAL at 21:18

## 2018-09-08 RX ADMIN — DOXAZOSIN 2 MG: 2 TABLET ORAL at 22:11

## 2018-09-08 RX ADMIN — LOSARTAN POTASSIUM 50 MG: 50 TABLET, FILM COATED ORAL at 09:32

## 2018-09-08 RX ADMIN — Medication 10 ML: at 09:35

## 2018-09-08 RX ADMIN — FAMOTIDINE 20 MG: 20 TABLET, FILM COATED ORAL at 09:37

## 2018-09-08 RX ADMIN — PANTOPRAZOLE SODIUM 40 MG: 40 INJECTION, POWDER, FOR SOLUTION INTRAVENOUS at 09:33

## 2018-09-08 RX ADMIN — CEFOXITIN SODIUM 1 G: 1 POWDER, FOR SOLUTION INTRAVENOUS at 18:15

## 2018-09-08 RX ADMIN — CEFOXITIN SODIUM 1 G: 1 POWDER, FOR SOLUTION INTRAVENOUS at 00:53

## 2018-09-08 RX ADMIN — CEFOXITIN SODIUM 1 G: 1 POWDER, FOR SOLUTION INTRAVENOUS at 06:34

## 2018-09-08 RX ADMIN — ENOXAPARIN SODIUM 40 MG: 40 INJECTION SUBCUTANEOUS at 19:45

## 2018-09-08 RX ADMIN — IPRATROPIUM BROMIDE AND ALBUTEROL SULFATE 1 AMPULE: .5; 3 SOLUTION RESPIRATORY (INHALATION) at 17:23

## 2018-09-08 RX ADMIN — Medication 2 PUFF: at 08:57

## 2018-09-08 RX ADMIN — SODIUM CHLORIDE: 9 INJECTION, SOLUTION INTRAVENOUS at 09:30

## 2018-09-08 ASSESSMENT — PAIN DESCRIPTION - LOCATION: LOCATION: ABDOMEN

## 2018-09-08 ASSESSMENT — PAIN SCALES - GENERAL
PAINLEVEL_OUTOF10: 0
PAINLEVEL_OUTOF10: 2

## 2018-09-08 ASSESSMENT — PAIN DESCRIPTION - PAIN TYPE: TYPE: ACUTE PAIN

## 2018-09-08 NOTE — PROGRESS NOTES
[facility]  Postoperative Progress Note    Pt Name: Patricia Kan  Medical Record Number: [id number]  Date of Birth 1950   Today's Date: 9/8/2018    Tony An says he is hungry. Thousand mL's bilious output from his NG last shift. No flatus or bowel movement as yet. Incision right groin looks good. Abdomen distended is nontender. Status post bronchoscopy yesterday. Urine output remains adequate. OBJECTIVE  VITALS: VITALS:  /61   Pulse 112   Temp 99 °F (37.2 °C) (Oral)   Resp 20   Ht 5' 8\" (1.727 m)   Wt 128 lb 3.2 oz (58.2 kg)   SpO2 91%   BMI 19.49 kg/m²   GENERAL: alert, cooperative, no acute distress, nontoxic in appearance.   LUNGS: clear to ausculation, without wheezes, rales or rhonci nonproductive cough  HEART: Slightly tachycardia cardiac  ABDOMEN: soft, non-tender, mild to moderate distention though not taut  INCISION: clean, dry and intact  EXTERMITY: no cyanosis or edema  INTAKE/OUTPUT :   INTAKE/OUTPUT:      Intake/Output Summary (Last 24 hours) at 09/08/18 0902  Last data filed at 09/08/18 0525   Gross per 24 hour   Intake                0 ml   Output             2175 ml   Net            -2175 ml        LABS  CBC with Differential:    Lab Results   Component Value Date    WBC 6.3 09/07/2018    RBC 4.32 09/07/2018    HGB 14.5 09/07/2018    HCT 41.0 09/07/2018     09/07/2018    MCV 94.9 09/07/2018    MCH 33.6 09/07/2018    MCHC 35.4 09/07/2018    RDW 13.3 09/09/2013    NRBC 0 09/06/2018    SEGSPCT 76.3 09/06/2018    MONOPCT 7.3 09/06/2018    MONOSABS 0.6 09/06/2018    LYMPHSABS 1.2 09/06/2018    EOSABS 0.0 09/06/2018    BASOSABS 0.0 09/06/2018     BMP:    Lab Results   Component Value Date     09/06/2018    K 3.9 09/06/2018    CL 98 09/06/2018    CO2 24 09/06/2018    BUN 15 09/06/2018    LABALBU 4.2 09/05/2018    CREATININE 0.7 09/06/2018    CALCIUM 8.7 09/06/2018    LABGLOM >90 09/06/2018    GLUCOSE

## 2018-09-08 NOTE — PLAN OF CARE
Problem: Impaired respiratory status  Goal: Clear lung sounds  Outcome: Ongoing  Treatment will be continued as ordered to improve aeration.

## 2018-09-08 NOTE — PROGRESS NOTES
sluggish  Extremities: warm  edema  Neurologic:  Alert, oriented X3      Impression :   S/p Repair of incarcerated Inguinal hernia Rt   S/p bronch with washing per pulm  COPD  HTN   Hyperlipidemia  GERD  Plan :   Cont bronchodilators  Increase activity  Informed NGT fell off and sherri be replaced today  F/u electrolytes  Cont ARB  GI and DVT prophylaxis    Yeny Carranza MD

## 2018-09-08 NOTE — PROGRESS NOTES
(HYDRODIURIL) 25 MG tablet, Take 12.5 mg by mouth daily. terazosin (HYTRIN) 2 MG capsule, Take 2 mg by mouth nightly. Diet    Diet NPO Effective Now  Allergies    Lisinopril  Vitals     height is 5' 8\" (1.727 m) and weight is 128 lb 3.2 oz (58.2 kg). His oral temperature is 99 °F (37.2 °C). His blood pressure is 129/61 and his pulse is 112. His respiration is 20 and oxygen saturation is 91%. Body mass index is 19.49 kg/m². SUPPLEMENTAL O2:     I/O        Intake/Output Summary (Last 24 hours) at 09/08/18 1411  Last data filed at 09/08/18 0525   Gross per 24 hour   Intake                0 ml   Output             1175 ml   Net            -1175 ml     I/O last 3 completed shifts:  In: -   Out: 2175 [Urine:175; Emesis/NG output:2000]   Patient Vitals for the past 96 hrs (Last 3 readings):   Weight   09/05/18 1745 128 lb 3.2 oz (58.2 kg)       Exam   Constitutional: Patient appears in no distress. Mouth/Throat: Oropharynx is clear and moist.  No oral thrush. Neck: Neck supple. Cardiovascular: S1 and S2 heard. No murmurs. Pulmonary/Chest: Bilateral air entry present. Good breath sounds on both sides, diminished at bases. No wheezes. No rales. Abdominal: Soft. No tenderness. S/p hernia surgery scar present. Extremities: Patient exhibits no edema. Neurological: Patient is awake and alert.       Labs  - Old records and notes have been reviewed in [facility]   ABG  Lab Results   Component Value Date    PH 7.47 09/05/2018    PO2 73 09/05/2018    PCO2 39 09/05/2018    HCO3 29 09/05/2018    O2SAT 95 09/05/2018     No results found for: Lupis Mayela, SETPEEP  CBC  Recent Labs      09/06/18   0642  09/07/18   0633   WBC  8.0  6.3   RBC  4.25*  4.32*   HGB  14.0  14.5   HCT  40.2*  41.0*   MCV  94.6*  94.9*   MCH  32.9  33.6*   MCHC  34.8  35.4   PLT  198  198   MPV  9.6  9.7      BMP  Recent Labs      09/06/18   0642  09/08/18   0840   NA  135  135   K  3.9  3.7   CL  98  96*   CO2  24  25   BUN  15  12

## 2018-09-09 ENCOUNTER — APPOINTMENT (OUTPATIENT)
Dept: GENERAL RADIOLOGY | Age: 68
DRG: 329 | End: 2018-09-09
Payer: MEDICARE

## 2018-09-09 LAB
ANION GAP SERPL CALCULATED.3IONS-SCNC: 14 MEQ/L (ref 8–16)
BUN BLDV-MCNC: 13 MG/DL (ref 7–22)
CALCIUM SERPL-MCNC: 8.7 MG/DL (ref 8.5–10.5)
CHLORIDE BLD-SCNC: 98 MEQ/L (ref 98–111)
CO2: 25 MEQ/L (ref 23–33)
CREAT SERPL-MCNC: 0.6 MG/DL (ref 0.4–1.2)
ERYTHROCYTE [DISTWIDTH] IN BLOOD BY AUTOMATED COUNT: 12.3 % (ref 11.5–14.5)
ERYTHROCYTE [DISTWIDTH] IN BLOOD BY AUTOMATED COUNT: 43.4 FL (ref 35–45)
GFR SERPL CREATININE-BSD FRML MDRD: > 90 ML/MIN/1.73M2
GLUCOSE BLD-MCNC: 110 MG/DL (ref 70–108)
HCT VFR BLD CALC: 38.7 % (ref 42–52)
HEMOGLOBIN: 13.4 GM/DL (ref 14–18)
MCH RBC QN AUTO: 32.9 PG (ref 26–33)
MCHC RBC AUTO-ENTMCNC: 34.6 GM/DL (ref 32.2–35.5)
MCV RBC AUTO: 95.1 FL (ref 80–94)
PLATELET # BLD: 210 THOU/MM3 (ref 130–400)
PMV BLD AUTO: 9.3 FL (ref 9.4–12.4)
POTASSIUM SERPL-SCNC: 3.3 MEQ/L (ref 3.5–5.2)
POTASSIUM SERPL-SCNC: 3.6 MEQ/L (ref 3.5–5.2)
RBC # BLD: 4.07 MILL/MM3 (ref 4.7–6.1)
SODIUM BLD-SCNC: 137 MEQ/L (ref 135–145)
WBC # BLD: 3.8 THOU/MM3 (ref 4.8–10.8)

## 2018-09-09 PROCEDURE — 2580000003 HC RX 258: Performed by: SURGERY

## 2018-09-09 PROCEDURE — 36415 COLL VENOUS BLD VENIPUNCTURE: CPT

## 2018-09-09 PROCEDURE — 74019 RADEX ABDOMEN 2 VIEWS: CPT

## 2018-09-09 PROCEDURE — 6360000002 HC RX W HCPCS: Performed by: SURGERY

## 2018-09-09 PROCEDURE — 2709999900 HC NON-CHARGEABLE SUPPLY

## 2018-09-09 PROCEDURE — 2580000003 HC RX 258: Performed by: INTERNAL MEDICINE

## 2018-09-09 PROCEDURE — 99024 POSTOP FOLLOW-UP VISIT: CPT | Performed by: SURGERY

## 2018-09-09 PROCEDURE — 6370000000 HC RX 637 (ALT 250 FOR IP): Performed by: INTERNAL MEDICINE

## 2018-09-09 PROCEDURE — 51798 US URINE CAPACITY MEASURE: CPT

## 2018-09-09 PROCEDURE — C9113 INJ PANTOPRAZOLE SODIUM, VIA: HCPCS | Performed by: SURGERY

## 2018-09-09 PROCEDURE — 84132 ASSAY OF SERUM POTASSIUM: CPT

## 2018-09-09 PROCEDURE — 1200000003 HC TELEMETRY R&B

## 2018-09-09 PROCEDURE — 80048 BASIC METABOLIC PNL TOTAL CA: CPT

## 2018-09-09 PROCEDURE — 6370000000 HC RX 637 (ALT 250 FOR IP): Performed by: SURGERY

## 2018-09-09 PROCEDURE — 99232 SBSQ HOSP IP/OBS MODERATE 35: CPT | Performed by: INTERNAL MEDICINE

## 2018-09-09 PROCEDURE — 94640 AIRWAY INHALATION TREATMENT: CPT

## 2018-09-09 PROCEDURE — 85027 COMPLETE CBC AUTOMATED: CPT

## 2018-09-09 RX ORDER — SODIUM CHLORIDE 9 MG/ML
INJECTION, SOLUTION INTRAVENOUS CONTINUOUS
Status: ACTIVE | OUTPATIENT
Start: 2018-09-09 | End: 2018-09-09

## 2018-09-09 RX ORDER — POTASSIUM CHLORIDE 7.45 MG/ML
10 INJECTION INTRAVENOUS
Status: DISCONTINUED | OUTPATIENT
Start: 2018-09-09 | End: 2018-09-09

## 2018-09-09 RX ORDER — SCOLOPAMINE TRANSDERMAL SYSTEM 1 MG/1
1 PATCH, EXTENDED RELEASE TRANSDERMAL
Status: DISCONTINUED | OUTPATIENT
Start: 2018-09-09 | End: 2018-09-09

## 2018-09-09 RX ORDER — SODIUM CHLORIDE 9 MG/ML
INJECTION, SOLUTION INTRAVENOUS CONTINUOUS
Status: ACTIVE | OUTPATIENT
Start: 2018-09-09 | End: 2018-09-11

## 2018-09-09 RX ADMIN — IPRATROPIUM BROMIDE AND ALBUTEROL SULFATE 1 AMPULE: .5; 3 SOLUTION RESPIRATORY (INHALATION) at 16:52

## 2018-09-09 RX ADMIN — Medication 10 ML: at 10:22

## 2018-09-09 RX ADMIN — LOSARTAN POTASSIUM 50 MG: 50 TABLET, FILM COATED ORAL at 09:41

## 2018-09-09 RX ADMIN — CEFOXITIN SODIUM 1 G: 1 POWDER, FOR SOLUTION INTRAVENOUS at 00:12

## 2018-09-09 RX ADMIN — IPRATROPIUM BROMIDE AND ALBUTEROL SULFATE 1 AMPULE: .5; 3 SOLUTION RESPIRATORY (INHALATION) at 12:20

## 2018-09-09 RX ADMIN — CEFOXITIN SODIUM 1 G: 1 POWDER, FOR SOLUTION INTRAVENOUS at 05:23

## 2018-09-09 RX ADMIN — SODIUM CHLORIDE: 9 INJECTION, SOLUTION INTRAVENOUS at 20:05

## 2018-09-09 RX ADMIN — FAMOTIDINE 20 MG: 20 TABLET, FILM COATED ORAL at 09:42

## 2018-09-09 RX ADMIN — SODIUM CHLORIDE: 9 INJECTION, SOLUTION INTRAVENOUS at 17:45

## 2018-09-09 RX ADMIN — CEFOXITIN SODIUM 1 G: 1 POWDER, FOR SOLUTION INTRAVENOUS at 12:03

## 2018-09-09 RX ADMIN — IPRATROPIUM BROMIDE AND ALBUTEROL SULFATE 1 AMPULE: .5; 3 SOLUTION RESPIRATORY (INHALATION) at 20:55

## 2018-09-09 RX ADMIN — Medication 2 PUFF: at 21:00

## 2018-09-09 RX ADMIN — PANTOPRAZOLE SODIUM 40 MG: 40 INJECTION, POWDER, FOR SOLUTION INTRAVENOUS at 10:22

## 2018-09-09 RX ADMIN — Medication 2 PUFF: at 08:49

## 2018-09-09 RX ADMIN — ACETAMINOPHEN 650 MG: 325 TABLET ORAL at 20:04

## 2018-09-09 RX ADMIN — BUSPIRONE HYDROCHLORIDE 10 MG: 10 TABLET ORAL at 09:42

## 2018-09-09 RX ADMIN — CEFOXITIN SODIUM 1 G: 1 POWDER, FOR SOLUTION INTRAVENOUS at 17:43

## 2018-09-09 RX ADMIN — CEFOXITIN SODIUM 1 G: 1 POWDER, FOR SOLUTION INTRAVENOUS at 23:49

## 2018-09-09 RX ADMIN — BUSPIRONE HYDROCHLORIDE 10 MG: 10 TABLET ORAL at 20:04

## 2018-09-09 RX ADMIN — IPRATROPIUM BROMIDE AND ALBUTEROL SULFATE 1 AMPULE: .5; 3 SOLUTION RESPIRATORY (INHALATION) at 08:48

## 2018-09-09 RX ADMIN — DOXAZOSIN 2 MG: 2 TABLET ORAL at 20:04

## 2018-09-09 ASSESSMENT — PAIN SCALES - GENERAL
PAINLEVEL_OUTOF10: 0

## 2018-09-09 NOTE — PROGRESS NOTES
Problem: Impaired respiratory status  Goal: Clear lung sounds  Outcome: Ongoing  Breathing treatments continued as ordered to help improve aeration, and to maintain clear airways.

## 2018-09-09 NOTE — PROGRESS NOTES
09/08/18   0840  09/09/18   0617  09/09/18   0842   NA  135  137   --    K  3.7  3.3*  3.6   CL  96*  98   --    CO2  25  25   --    BUN  12  13   --    CREATININE  0.5  0.6   --    GLUCOSE  112*  110*   --    CALCIUM  8.9  8.7   --      LFT  No results for input(s): AST, ALT, ALB, BILITOT, ALKPHOS, LIPASE in the last 72 hours. Invalid input(s): AMYLASE  TROP  Lab Results   Component Value Date    TROPONINT < 0.010 09/05/2018    TROPONINT < 0.010 07/19/2018     BNP  No results for input(s): BNP in the last 72 hours. Lactic Acid  No results for input(s): LACTA in the last 72 hours. INR  Recent Labs      09/07/18   1525   INR  1.07     PTT  No results for input(s): APTT in the last 72 hours. Glucose  No results for input(s): POCGLU in the last 72 hours. UA No results for input(s): SPECGRAV, PHUR, COLORU, CLARITYU, MUCUS, PROTEINU, BLOODU, RBCUA, WBCUA, BACTERIA, NITRU, GLUCOSEU, BILIRUBINUR, UROBILINOGEN, KETUA, LABCAST, LABCASTTY, AMORPHOS in the last 72 hours. Invalid input(s): CRYSTALS. PFTs   No old studies in Epic. Sleep studies   No old studies in Epic. Cultures    Bronch cultures- normal alvaro. Bronch washings on 9/7/18: Streptococcus pneumoniae (A)- moderate growth. EKG     Echocardiogram   No old studies in Epic. Radiology    CXR  Sep 5, 2018  PROCEDURE: XR CHEST PORTABLE  Abnormal left hilar contour concerning for possible underlying mass or lymphadenopathy. Findings might also be vascular. Further evaluation with chest CT is advised. CT Scans  (See actual reports for details)  9/05/2018  PROCEDURE: CT CHEST W CONTRAST  1. Conglomeration of soft tissue density in the left hilar region surrounding the left main bronchus and pulmonary artery, highly concerning for malignancy. 2. Upper lobe predominant centrilobular emphysematous changes of the lungs. CT abdomen and pelvis with contrast:  Sep 5, 2018  PROCEDURE: CT ABDOMEN PELVIS W IV CONTRAST  1.  Right-sided direct

## 2018-09-09 NOTE — PROGRESS NOTES
edema  Neurologic:  Alert, oriented X3      Impression :   S/p Repair of incarcerated Inguinal hernia Rt   S/p bronch with washing per pulm  COPD  HTN   Hyperlipidemia  GERD  Hypokalemia    Plan :   Await repeat Xray abd  Ambulate  Replace K    Alex Michel MD

## 2018-09-10 ENCOUNTER — APPOINTMENT (OUTPATIENT)
Dept: CT IMAGING | Age: 68
DRG: 329 | End: 2018-09-10
Payer: MEDICARE

## 2018-09-10 ENCOUNTER — ANESTHESIA EVENT (OUTPATIENT)
Dept: OPERATING ROOM | Age: 68
DRG: 329 | End: 2018-09-10
Payer: MEDICARE

## 2018-09-10 LAB
BASOPHILS # BLD: 0.4 %
BASOPHILS ABSOLUTE: 0 THOU/MM3 (ref 0–0.1)
EOSINOPHIL # BLD: 6 %
EOSINOPHILS ABSOLUTE: 0.3 THOU/MM3 (ref 0–0.4)
ERYTHROCYTE [DISTWIDTH] IN BLOOD BY AUTOMATED COUNT: 12.4 % (ref 11.5–14.5)
ERYTHROCYTE [DISTWIDTH] IN BLOOD BY AUTOMATED COUNT: 44.8 FL (ref 35–45)
HCT VFR BLD CALC: 36 % (ref 42–52)
HEMOGLOBIN: 12.3 GM/DL (ref 14–18)
IMMATURE GRANS (ABS): 0.01 THOU/MM3 (ref 0–0.07)
IMMATURE GRANULOCYTES: 0.2 %
LYMPHOCYTES # BLD: 17.2 %
LYMPHOCYTES ABSOLUTE: 0.8 THOU/MM3 (ref 1–4.8)
MCH RBC QN AUTO: 33.7 PG (ref 26–33)
MCHC RBC AUTO-ENTMCNC: 34.2 GM/DL (ref 32.2–35.5)
MCV RBC AUTO: 98.6 FL (ref 80–94)
MISC. #1 REFERENCE GROUP TEST: NORMAL
MONOCYTES # BLD: 14.5 %
MONOCYTES ABSOLUTE: 0.7 THOU/MM3 (ref 0.4–1.3)
NUCLEATED RED BLOOD CELLS: 0 /100 WBC
PLATELET # BLD: 235 THOU/MM3 (ref 130–400)
PMV BLD AUTO: 9.6 FL (ref 9.4–12.4)
RBC # BLD: 3.65 MILL/MM3 (ref 4.7–6.1)
SEG NEUTROPHILS: 61.7 %
SEGMENTED NEUTROPHILS ABSOLUTE COUNT: 2.9 THOU/MM3 (ref 1.8–7.7)
WBC # BLD: 4.7 THOU/MM3 (ref 4.8–10.8)

## 2018-09-10 PROCEDURE — 51798 US URINE CAPACITY MEASURE: CPT

## 2018-09-10 PROCEDURE — APPSS45 APP SPLIT SHARED TIME 31-45 MINUTES: Performed by: NURSE PRACTITIONER

## 2018-09-10 PROCEDURE — 6370000000 HC RX 637 (ALT 250 FOR IP): Performed by: INTERNAL MEDICINE

## 2018-09-10 PROCEDURE — 99024 POSTOP FOLLOW-UP VISIT: CPT | Performed by: SURGERY

## 2018-09-10 PROCEDURE — 1200000003 HC TELEMETRY R&B

## 2018-09-10 PROCEDURE — 2580000003 HC RX 258: Performed by: SURGERY

## 2018-09-10 PROCEDURE — 85025 COMPLETE CBC W/AUTO DIFF WBC: CPT

## 2018-09-10 PROCEDURE — 74177 CT ABD & PELVIS W/CONTRAST: CPT

## 2018-09-10 PROCEDURE — 6360000002 HC RX W HCPCS: Performed by: SURGERY

## 2018-09-10 PROCEDURE — 6360000002 HC RX W HCPCS: Performed by: INTERNAL MEDICINE

## 2018-09-10 PROCEDURE — C9113 INJ PANTOPRAZOLE SODIUM, VIA: HCPCS | Performed by: SURGERY

## 2018-09-10 PROCEDURE — 6360000004 HC RX CONTRAST MEDICATION: Performed by: SURGERY

## 2018-09-10 PROCEDURE — 99232 SBSQ HOSP IP/OBS MODERATE 35: CPT | Performed by: INTERNAL MEDICINE

## 2018-09-10 PROCEDURE — 2580000003 HC RX 258: Performed by: INTERNAL MEDICINE

## 2018-09-10 PROCEDURE — 94640 AIRWAY INHALATION TREATMENT: CPT

## 2018-09-10 PROCEDURE — 36415 COLL VENOUS BLD VENIPUNCTURE: CPT

## 2018-09-10 PROCEDURE — 2709999900 HC NON-CHARGEABLE SUPPLY

## 2018-09-10 PROCEDURE — 6370000000 HC RX 637 (ALT 250 FOR IP): Performed by: SURGERY

## 2018-09-10 RX ORDER — DOXAZOSIN MESYLATE 4 MG/1
2 TABLET ORAL DAILY
Status: DISCONTINUED | OUTPATIENT
Start: 2018-09-10 | End: 2018-09-10 | Stop reason: SDUPTHER

## 2018-09-10 RX ORDER — HYDROCHLOROTHIAZIDE 25 MG/1
12.5 TABLET ORAL DAILY
Status: DISCONTINUED | OUTPATIENT
Start: 2018-09-10 | End: 2018-09-17 | Stop reason: HOSPADM

## 2018-09-10 RX ORDER — NICOTINE 21 MG/24HR
1 PATCH, TRANSDERMAL 24 HOURS TRANSDERMAL DAILY
Status: DISCONTINUED | OUTPATIENT
Start: 2018-09-10 | End: 2018-09-17 | Stop reason: HOSPADM

## 2018-09-10 RX ADMIN — FAMOTIDINE 20 MG: 20 TABLET, FILM COATED ORAL at 08:38

## 2018-09-10 RX ADMIN — ENOXAPARIN SODIUM 40 MG: 40 INJECTION SUBCUTANEOUS at 21:35

## 2018-09-10 RX ADMIN — IPRATROPIUM BROMIDE AND ALBUTEROL SULFATE 1 AMPULE: .5; 3 SOLUTION RESPIRATORY (INHALATION) at 19:58

## 2018-09-10 RX ADMIN — Medication 2 PUFF: at 19:58

## 2018-09-10 RX ADMIN — IOPAMIDOL 80 ML: 755 INJECTION, SOLUTION INTRAVENOUS at 10:56

## 2018-09-10 RX ADMIN — IPRATROPIUM BROMIDE AND ALBUTEROL SULFATE 1 AMPULE: .5; 3 SOLUTION RESPIRATORY (INHALATION) at 12:10

## 2018-09-10 RX ADMIN — PANTOPRAZOLE SODIUM 40 MG: 40 INJECTION, POWDER, FOR SOLUTION INTRAVENOUS at 08:29

## 2018-09-10 RX ADMIN — SODIUM CHLORIDE: 9 INJECTION, SOLUTION INTRAVENOUS at 01:53

## 2018-09-10 RX ADMIN — ACETAMINOPHEN 650 MG: 325 TABLET ORAL at 08:27

## 2018-09-10 RX ADMIN — BUSPIRONE HYDROCHLORIDE 10 MG: 10 TABLET ORAL at 21:34

## 2018-09-10 RX ADMIN — HYDROCHLOROTHIAZIDE 12.5 MG: 25 TABLET ORAL at 14:03

## 2018-09-10 RX ADMIN — BUSPIRONE HYDROCHLORIDE 10 MG: 10 TABLET ORAL at 08:28

## 2018-09-10 RX ADMIN — IPRATROPIUM BROMIDE AND ALBUTEROL SULFATE 1 AMPULE: .5; 3 SOLUTION RESPIRATORY (INHALATION) at 16:01

## 2018-09-10 RX ADMIN — SODIUM CHLORIDE: 9 INJECTION, SOLUTION INTRAVENOUS at 22:53

## 2018-09-10 RX ADMIN — LOSARTAN POTASSIUM 50 MG: 50 TABLET, FILM COATED ORAL at 08:28

## 2018-09-10 RX ADMIN — DOXAZOSIN 2 MG: 2 TABLET ORAL at 21:42

## 2018-09-10 RX ADMIN — Medication 2 PUFF: at 08:52

## 2018-09-10 RX ADMIN — SODIUM CHLORIDE: 9 INJECTION, SOLUTION INTRAVENOUS at 12:15

## 2018-09-10 RX ADMIN — CEFOXITIN SODIUM 1 G: 1 POWDER, FOR SOLUTION INTRAVENOUS at 06:09

## 2018-09-10 RX ADMIN — CEFEPIME HYDROCHLORIDE 2 G: 2 INJECTION, POWDER, FOR SOLUTION INTRAVENOUS at 21:35

## 2018-09-10 RX ADMIN — CEFOXITIN SODIUM 1 G: 1 POWDER, FOR SOLUTION INTRAVENOUS at 12:22

## 2018-09-10 RX ADMIN — Medication 10 ML: at 08:29

## 2018-09-10 RX ADMIN — IPRATROPIUM BROMIDE AND ALBUTEROL SULFATE 1 AMPULE: .5; 3 SOLUTION RESPIRATORY (INHALATION) at 08:44

## 2018-09-10 ASSESSMENT — PAIN DESCRIPTION - PAIN TYPE: TYPE: ACUTE PAIN

## 2018-09-10 ASSESSMENT — PAIN DESCRIPTION - LOCATION: LOCATION: ABDOMEN

## 2018-09-10 ASSESSMENT — PAIN SCALES - GENERAL
PAINLEVEL_OUTOF10: 0
PAINLEVEL_OUTOF10: 6
PAINLEVEL_OUTOF10: 0
PAINLEVEL_OUTOF10: 2
PAINLEVEL_OUTOF10: 0

## 2018-09-10 NOTE — CARE COORDINATION
9/10/18, 2:12 PM      [facility]  Location: [facility]12/012-A Reason for admit: Abdominal hernia [K46.9]   Procedure: 9/05/18 Open repair of recurrent incarcerated right inguinal hernia  repair, direct. 9/07/18 Bronch  9/11/18 OR planned-laparoscopy and possible laparotomy. Treatment Plan of Care: N/G to LIWS, General Surgery and Pulmonology following, CT guided lung biopsy on hold at this time, IV fluids, IV Mefoxin, Duoneb nebulizer, IV Protonix, Potassium replacement protocol, Nicotine patch for smoking cessation, incentive spirometry, telemetry. PCP: Giacomo Kim MD  Readmission Risk Score: 9%  Discharge Plan: Home with his fiance pending course of recovery.

## 2018-09-10 NOTE — PROGRESS NOTES
CREATININE 0.6 09/09/2018    CALCIUM 8.7 09/09/2018    LABGLOM >90 09/09/2018    GLUCOSE 110 09/09/2018         RADIOLOGY:  AXR ordered for today  ASSESSMENT  1. POD # 5. Repair of incarcerated recurrent right direct inguinal hernia  2. Left perihilar lung mass/Evaluation in process. CT-guided biopsy plan today. 3.  Bowel distention- abdominal x-ray not improved. 4.  Persistent NG outputs. 5. PLAN  1. Nothing by mouth. Continue NG tube. Had a lengthy discussion with the patient. I think he warrants a laparoscopy he may have residual obstruction or abnormality in the segment of small bowel that was reduced and has failed to recover. If no improvement by tomorrow recommend laparoscopy and possible laparotomy. Patient is aware he will require general anesthesia. He is agreeable. 2. VTE: lovenox on hold today for CT-guided biopsy. 3. Pulmonary following for evaluation of a lung mass. CT-guided biopsy planned  4. Continue IV antibiotics with incarcerated small bowel and possibility of translocation in the setting of a prosthetic mesh in the groin. 5.  Resume home Terazosin  Diuretic per primary service.   Larry Adan MD  Electronically signed 9/10/2018 at 8:22 AM

## 2018-09-10 NOTE — PROGRESS NOTES
Nutrition Assessment    Type and Reason for Visit: Reassess (follow-up)    Nutrition Recommendations:   If bowel rest needed recommend consider TPN with a dose weight of 58 kg, and start at 10 kcals/kg, 1.2 grams protein/kg, with 30% lipids. Malnutrition Assessment:  · Malnutrition Status: Meets the criteria for severe malnutrition  · Context: Chronic illness  · Findings of the 6 clinical characteristics of malnutrition (Minimum of 2 out of 6 clinical characteristics is required to make the diagnosis of moderate or severe Protein Calorie Malnutrition based on AND/ASPEN Guidelines):  1. Energy Intake-Less than or equal to 75%, greater than or equal to 1 month    2. Weight Loss- (-11% weight loss), in 1 year (per EMR)  3. Fat Loss-Severe subcutaneous fat loss, Triceps, Orbital  4. Muscle Loss-Severe muscle mass loss, Temples (temporalis muscle), Clavicles (pectoralis and deltoids), Thigh (quadriceps)  5. Fluid Accumulation-No significant fluid accumulation, Extremities  6.  Strength-Not measured    Nutrition Diagnosis:   · Problem: Severe malnutrition, in context of chronic illness  · Etiology: related to Insufficient energy/nutrient consumption     Signs and symptoms:  as evidenced by Patient report of, Severe loss of subcutaneous fat, Severe muscle loss (consuming less than 75% of energy intake over the past month)    Nutrition Assessment:  · Subjective Assessment: Patient admitted with abdominal hernia. Patient is s/p infuinal hernia repair on 9/5. Patient seen by RD on 9/6 and reported only eating 1-2 meals/day, 30# weight loss since Dec 2018, ONS was started (ensure clear). Patient is currently NPO with planned lung bx today. Patient has NGT with persistent output (1100 ml note din the last 24 hours). Per MD notes: bowel distention not improved, may need laparoscopy with possible laparotom due to possible residual obstruction or abnormality.   If bowel rest needed will need to consider TPN for

## 2018-09-10 NOTE — PROGRESS NOTES
[place] [facility]    Patient - Levi Byrd   MRN -  [id number]   [facility]   - 1950      Date of Admission -  2018  1:12 PM  Date of evaluation -  9/10/2018  Room - --A   Hospital Day - 428 Aundrea Turner MD Primary Care Physician - Tin Cooley MD     Problem List      Active Hospital Problems    Diagnosis Date Noted    Recurrent unilateral inguinal hernia with obstruction and without gangrene [K40.31]     Hilar mass [R91.8]     Tobacco abuse [Z72.0]     COPD (chronic obstructive pulmonary disease) ([facility] [address] [J44.9] 2018    Hypertension [I10] 2018    Severe malnutrition ([facility] [address] [E43] 2018     Class: Chronic    Incarcerated hernia [K46.0] 2018     Chief complaint   For management of left upper lobe lung mass  HPI   History Obtained From: Patient and electronic medical record. Levi Byrd is a 75 y.o. male  was initially admitted under hospitalist service. Pulmonary medicine was consulted for further management of Left hilar lung mass noted on CT chest. He is S/p Incarcerated recurrent right direct inguinal hernia POD #2. He underwent CT scan of chest on 18 at [facility]. He had a chronic hx of tobacco smoking for 55years. He is currently smoking with 1PPD prior to hospitalization. He usually follows with [facility] for his COPD. He is currently on treatment with  Symbicort 160/4.5mcg spray MDI, 2 puffs via inhalation BID along with Albuterol HFA 90mcg/Spray MDI, 2puffs  Q6Hprn and Atrovent HFA 2puffs Q6h prn. He is not using any home O2 at home. He was never diagnosed with any lung cancer or mass so far. He gives a hx of occasional cough with no hemoptysis or expectoration. No recent travel history. He denies any history of pulmonary tuberculosis or exposure to TB patients recently. He denies any recent travel history to endemic places to tuberculosis.     He denies any history of hemoptysis. He denies any history of orthopnea. He denies any history of paroxysmal nocturnal dyspnea. He denies any history of lung cancer in first-degree relative; exposure to asbestos, radon, or uranium. He was never diagnosed with malignancy in the past. No previous history of pulmonary embolism.          Endobronchial findings: 9/7/18  Trachea: Normal mucosa. Vesta: Normal mucosa  Right main bronchus: Normal mucosa  Right upper lobe bronchus: Normal mucosa  Right Middle lobe bronchus: Normal mucosa  Right Lower lobe bronchus:Normal mucosa  Left main bronchus: Normal mucosa  Left upper lobe bronchus: Narrowed Left apico- posterior segmental bronchus. No obvious mass shadow seen on Fluoroscopy to biopsy. Due to persistent cough and oxygen desaturations no biopsies were attempted.    Left lower lobe bronchus: Normal mucosa     No endobronchial lesions seen.      Subjective/Events Past 24 hours/ROS   Chart reviewed  Remains on room air with no SOB or cough 91-93%  Bronch washing with strep pneumoniae and gram negative bacilli  Afebrile  Remains NPO with NG to suction  Defers CT guided biopsy of DUNG lung mass until recovered from surgery  [facility] and is concerned about medical cost while here    PMHx   Past Medical History      Diagnosis Date    [facility] obstructive pulmonary disease) ([facility] [address]     Gastric reflux     Hyperlipidemia     Hypertension      Meds    Current Medications    hydrochlorothiazide  12.5 mg Oral Daily    potassium replacement protocol   Other RX Placeholder    pantoprazole  40 mg Intravenous Daily    And    sodium chloride (PF)  10 mL Intravenous Daily    busPIRone  10 mg Oral BID    losartan  50 mg Oral Daily    ipratropium-albuterol  1 ampule Inhalation Q4H WA    enoxaparin  40 mg Subcutaneous Daily    cefOXitin  1 g Intravenous 4 times per day    doxazosin  2 mg Oral Nightly    mometasone-formoterol  2 puff Inhalation BID    famotidine

## 2018-09-10 NOTE — PLAN OF CARE
Problem: Pain:  Goal: Pain level will decrease  Pain level will decrease   Outcome: Ongoing  HAS SOME LOW ABD DISCOMFORT relieved with tylenol    Problem: Nutrition  Goal: Optimal nutrition therapy  Outcome: Ongoing  Npo has n/g draining  Dark liquid had ct abdomen awaiting results    Problem: Respiratory  Goal: No pulmonary complications  Outcome: Ongoing  Ct chest needle biopsy on hold until stomach  resolves  Goal: O2 Sat > 90%  Outcome: Ongoing  91 % on rm air encouraged to use incentive spirometer    Problem: GI  Goal: No bowel complications  Outcome: Ongoing  Passing some gas ambulating with assist in prakash    Comments: Care plan reviewed with patient and . Patient and  verbalize understanding of the plan of care and contribute to goal setting.

## 2018-09-10 NOTE — PLAN OF CARE
Problem: Nutrition  Goal: Optimal nutrition therapy  Outcome: Ongoing  Nutrition Problem: Severe malnutrition, in context of chronic illness  Intervention: Food and/or Nutrient Delivery: Continue NPO (Start oral diet as feasible.   Consider TPN if bowel rest needed.)  Nutritional Goals: Pt will receive adequate nutrition within 1-4 days

## 2018-09-11 ENCOUNTER — ANESTHESIA (OUTPATIENT)
Dept: OPERATING ROOM | Age: 68
DRG: 329 | End: 2018-09-11
Payer: MEDICARE

## 2018-09-11 VITALS
OXYGEN SATURATION: 99 % | RESPIRATION RATE: 3 BRPM | SYSTOLIC BLOOD PRESSURE: 140 MMHG | DIASTOLIC BLOOD PRESSURE: 72 MMHG | TEMPERATURE: 98.4 F

## 2018-09-11 LAB
ANION GAP SERPL CALCULATED.3IONS-SCNC: 20 MEQ/L (ref 8–16)
BUN BLDV-MCNC: 8 MG/DL (ref 7–22)
CALCIUM IONIZED: 1.17 MMOL/L (ref 1.12–1.32)
CALCIUM SERPL-MCNC: 8.7 MG/DL (ref 8.5–10.5)
CHLORIDE BLD-SCNC: 100 MEQ/L (ref 98–111)
CO2: 20 MEQ/L (ref 23–33)
CREAT SERPL-MCNC: 0.5 MG/DL (ref 0.4–1.2)
GFR SERPL CREATININE-BSD FRML MDRD: > 90 ML/MIN/1.73M2
GLUCOSE BLD-MCNC: 84 MG/DL (ref 70–108)
GRAM STAIN RESULT: ABNORMAL
MAGNESIUM: 1.7 MG/DL (ref 1.6–2.4)
ORGANISM: ABNORMAL
ORGANISM: ABNORMAL
PHOSPHORUS: 3.4 MG/DL (ref 2.4–4.7)
POTASSIUM SERPL-SCNC: 3.5 MEQ/L (ref 3.5–5.2)
RESPIRATORY CULTURE: ABNORMAL
SODIUM BLD-SCNC: 140 MEQ/L (ref 135–145)
TRIGL SERPL-MCNC: 84 MG/DL (ref 0–199)

## 2018-09-11 PROCEDURE — 6370000000 HC RX 637 (ALT 250 FOR IP): Performed by: INTERNAL MEDICINE

## 2018-09-11 PROCEDURE — 3600000014 HC SURGERY LEVEL 4 ADDTL 15MIN: Performed by: SURGERY

## 2018-09-11 PROCEDURE — 80048 BASIC METABOLIC PNL TOTAL CA: CPT

## 2018-09-11 PROCEDURE — 2500000003 HC RX 250 WO HCPCS: Performed by: NURSE ANESTHETIST, CERTIFIED REGISTERED

## 2018-09-11 PROCEDURE — 3600000004 HC SURGERY LEVEL 4 BASE: Performed by: SURGERY

## 2018-09-11 PROCEDURE — 2580000003 HC RX 258: Performed by: INTERNAL MEDICINE

## 2018-09-11 PROCEDURE — 99232 SBSQ HOSP IP/OBS MODERATE 35: CPT | Performed by: INTERNAL MEDICINE

## 2018-09-11 PROCEDURE — APPSS30 APP SPLIT SHARED TIME 16-30 MINUTES: Performed by: NURSE PRACTITIONER

## 2018-09-11 PROCEDURE — 84478 ASSAY OF TRIGLYCERIDES: CPT

## 2018-09-11 PROCEDURE — 94640 AIRWAY INHALATION TREATMENT: CPT

## 2018-09-11 PROCEDURE — 1200000003 HC TELEMETRY R&B

## 2018-09-11 PROCEDURE — 83735 ASSAY OF MAGNESIUM: CPT

## 2018-09-11 PROCEDURE — 82330 ASSAY OF CALCIUM: CPT

## 2018-09-11 PROCEDURE — 2709999900 HC NON-CHARGEABLE SUPPLY

## 2018-09-11 PROCEDURE — 6360000002 HC RX W HCPCS: Performed by: SURGERY

## 2018-09-11 PROCEDURE — 2709999900 HC NON-CHARGEABLE SUPPLY: Performed by: SURGERY

## 2018-09-11 PROCEDURE — 94760 N-INVAS EAR/PLS OXIMETRY 1: CPT

## 2018-09-11 PROCEDURE — 2580000003 HC RX 258: Performed by: SURGERY

## 2018-09-11 PROCEDURE — 6360000002 HC RX W HCPCS: Performed by: INTERNAL MEDICINE

## 2018-09-11 PROCEDURE — 36415 COLL VENOUS BLD VENIPUNCTURE: CPT

## 2018-09-11 PROCEDURE — 7100000000 HC PACU RECOVERY - FIRST 15 MIN: Performed by: SURGERY

## 2018-09-11 PROCEDURE — 84100 ASSAY OF PHOSPHORUS: CPT

## 2018-09-11 PROCEDURE — C9113 INJ PANTOPRAZOLE SODIUM, VIA: HCPCS | Performed by: SURGERY

## 2018-09-11 PROCEDURE — 2700000000 HC OXYGEN THERAPY PER DAY

## 2018-09-11 PROCEDURE — 2580000003 HC RX 258: Performed by: NURSE ANESTHETIST, CERTIFIED REGISTERED

## 2018-09-11 PROCEDURE — 3700000000 HC ANESTHESIA ATTENDED CARE: Performed by: SURGERY

## 2018-09-11 PROCEDURE — 88307 TISSUE EXAM BY PATHOLOGIST: CPT

## 2018-09-11 PROCEDURE — 94761 N-INVAS EAR/PLS OXIMETRY MLT: CPT

## 2018-09-11 PROCEDURE — 44120 REMOVAL OF SMALL INTESTINE: CPT | Performed by: SURGERY

## 2018-09-11 PROCEDURE — 6360000002 HC RX W HCPCS: Performed by: NURSE ANESTHETIST, CERTIFIED REGISTERED

## 2018-09-11 PROCEDURE — 3700000001 HC ADD 15 MINUTES (ANESTHESIA): Performed by: SURGERY

## 2018-09-11 PROCEDURE — 7100000001 HC PACU RECOVERY - ADDTL 15 MIN: Performed by: SURGERY

## 2018-09-11 PROCEDURE — 2500000003 HC RX 250 WO HCPCS: Performed by: SURGERY

## 2018-09-11 RX ORDER — MEPERIDINE HYDROCHLORIDE 25 MG/ML
12.5 INJECTION INTRAMUSCULAR; INTRAVENOUS; SUBCUTANEOUS EVERY 5 MIN PRN
Status: DISCONTINUED | OUTPATIENT
Start: 2018-09-11 | End: 2018-09-11 | Stop reason: HOSPADM

## 2018-09-11 RX ORDER — DEXTROSE MONOHYDRATE 50 MG/ML
INJECTION, SOLUTION INTRAVENOUS CONTINUOUS
Status: DISCONTINUED | OUTPATIENT
Start: 2018-09-11 | End: 2018-09-12

## 2018-09-11 RX ORDER — SODIUM CHLORIDE 9 MG/ML
INJECTION, SOLUTION INTRAVENOUS CONTINUOUS
Status: DISCONTINUED | OUTPATIENT
Start: 2018-09-11 | End: 2018-09-11

## 2018-09-11 RX ORDER — PROPOFOL 10 MG/ML
INJECTION, EMULSION INTRAVENOUS PRN
Status: DISCONTINUED | OUTPATIENT
Start: 2018-09-11 | End: 2018-09-11 | Stop reason: SDUPTHER

## 2018-09-11 RX ORDER — LIDOCAINE HYDROCHLORIDE 10 MG/ML
5 INJECTION, SOLUTION EPIDURAL; INFILTRATION; INTRACAUDAL; PERINEURAL ONCE
Status: DISCONTINUED | OUTPATIENT
Start: 2018-09-11 | End: 2018-09-11

## 2018-09-11 RX ORDER — FENTANYL CITRATE 50 UG/ML
25 INJECTION, SOLUTION INTRAMUSCULAR; INTRAVENOUS EVERY 5 MIN PRN
Status: DISCONTINUED | OUTPATIENT
Start: 2018-09-11 | End: 2018-09-11 | Stop reason: HOSPADM

## 2018-09-11 RX ORDER — DEXAMETHASONE SODIUM PHOSPHATE 4 MG/ML
INJECTION, SOLUTION INTRA-ARTICULAR; INTRALESIONAL; INTRAMUSCULAR; INTRAVENOUS; SOFT TISSUE PRN
Status: DISCONTINUED | OUTPATIENT
Start: 2018-09-11 | End: 2018-09-11 | Stop reason: SDUPTHER

## 2018-09-11 RX ORDER — FENTANYL CITRATE 50 UG/ML
INJECTION, SOLUTION INTRAMUSCULAR; INTRAVENOUS PRN
Status: DISCONTINUED | OUTPATIENT
Start: 2018-09-11 | End: 2018-09-11 | Stop reason: SDUPTHER

## 2018-09-11 RX ORDER — LABETALOL HYDROCHLORIDE 5 MG/ML
5 INJECTION, SOLUTION INTRAVENOUS EVERY 10 MIN PRN
Status: DISCONTINUED | OUTPATIENT
Start: 2018-09-11 | End: 2018-09-11 | Stop reason: HOSPADM

## 2018-09-11 RX ORDER — FENTANYL CITRATE 50 UG/ML
50 INJECTION, SOLUTION INTRAMUSCULAR; INTRAVENOUS EVERY 5 MIN PRN
Status: DISCONTINUED | OUTPATIENT
Start: 2018-09-11 | End: 2018-09-11 | Stop reason: HOSPADM

## 2018-09-11 RX ORDER — BUPIVACAINE HYDROCHLORIDE AND EPINEPHRINE 5; 5 MG/ML; UG/ML
INJECTION, SOLUTION EPIDURAL; INTRACAUDAL; PERINEURAL PRN
Status: DISCONTINUED | OUTPATIENT
Start: 2018-09-11 | End: 2018-09-11 | Stop reason: HOSPADM

## 2018-09-11 RX ORDER — ONDANSETRON 2 MG/ML
INJECTION INTRAMUSCULAR; INTRAVENOUS PRN
Status: DISCONTINUED | OUTPATIENT
Start: 2018-09-11 | End: 2018-09-11 | Stop reason: SDUPTHER

## 2018-09-11 RX ORDER — SODIUM CHLORIDE 0.9 % (FLUSH) 0.9 %
10 SYRINGE (ML) INJECTION PRN
Status: DISCONTINUED | OUTPATIENT
Start: 2018-09-11 | End: 2018-09-17 | Stop reason: HOSPADM

## 2018-09-11 RX ORDER — HYDRALAZINE HYDROCHLORIDE 20 MG/ML
5 INJECTION INTRAMUSCULAR; INTRAVENOUS EVERY 10 MIN PRN
Status: DISCONTINUED | OUTPATIENT
Start: 2018-09-11 | End: 2018-09-11 | Stop reason: HOSPADM

## 2018-09-11 RX ORDER — ROCURONIUM BROMIDE 10 MG/ML
INJECTION, SOLUTION INTRAVENOUS PRN
Status: DISCONTINUED | OUTPATIENT
Start: 2018-09-11 | End: 2018-09-11 | Stop reason: SDUPTHER

## 2018-09-11 RX ORDER — SODIUM CHLORIDE, SODIUM LACTATE, POTASSIUM CHLORIDE, CALCIUM CHLORIDE 600; 310; 30; 20 MG/100ML; MG/100ML; MG/100ML; MG/100ML
INJECTION, SOLUTION INTRAVENOUS CONTINUOUS PRN
Status: DISCONTINUED | OUTPATIENT
Start: 2018-09-11 | End: 2018-09-11 | Stop reason: SDUPTHER

## 2018-09-11 RX ORDER — ONDANSETRON 2 MG/ML
4 INJECTION INTRAMUSCULAR; INTRAVENOUS
Status: DISCONTINUED | OUTPATIENT
Start: 2018-09-11 | End: 2018-09-11 | Stop reason: HOSPADM

## 2018-09-11 RX ORDER — KETOROLAC TROMETHAMINE 30 MG/ML
15 INJECTION, SOLUTION INTRAMUSCULAR; INTRAVENOUS EVERY 6 HOURS
Status: COMPLETED | OUTPATIENT
Start: 2018-09-11 | End: 2018-09-13

## 2018-09-11 RX ORDER — SODIUM CHLORIDE 0.9 % (FLUSH) 0.9 %
10 SYRINGE (ML) INJECTION EVERY 12 HOURS SCHEDULED
Status: DISCONTINUED | OUTPATIENT
Start: 2018-09-11 | End: 2018-09-17 | Stop reason: HOSPADM

## 2018-09-11 RX ORDER — SUCCINYLCHOLINE CHLORIDE 20 MG/ML
INJECTION INTRAMUSCULAR; INTRAVENOUS PRN
Status: DISCONTINUED | OUTPATIENT
Start: 2018-09-11 | End: 2018-09-11 | Stop reason: SDUPTHER

## 2018-09-11 RX ORDER — PROMETHAZINE HYDROCHLORIDE 25 MG/ML
6.25 INJECTION, SOLUTION INTRAMUSCULAR; INTRAVENOUS
Status: DISCONTINUED | OUTPATIENT
Start: 2018-09-11 | End: 2018-09-11 | Stop reason: HOSPADM

## 2018-09-11 RX ADMIN — CEFEPIME HYDROCHLORIDE 2 G: 2 INJECTION, POWDER, FOR SOLUTION INTRAVENOUS at 02:27

## 2018-09-11 RX ADMIN — DOXAZOSIN 2 MG: 2 TABLET ORAL at 21:49

## 2018-09-11 RX ADMIN — IPRATROPIUM BROMIDE AND ALBUTEROL SULFATE 1 AMPULE: .5; 3 SOLUTION RESPIRATORY (INHALATION) at 07:57

## 2018-09-11 RX ADMIN — CEFEPIME HYDROCHLORIDE 2 G: 2 INJECTION, POWDER, FOR SOLUTION INTRAVENOUS at 18:43

## 2018-09-11 RX ADMIN — IPRATROPIUM BROMIDE AND ALBUTEROL SULFATE 1 AMPULE: .5; 3 SOLUTION RESPIRATORY (INHALATION) at 20:04

## 2018-09-11 RX ADMIN — DEXAMETHASONE SODIUM PHOSPHATE 8 MG: 4 INJECTION, SOLUTION INTRAMUSCULAR; INTRAVENOUS at 13:20

## 2018-09-11 RX ADMIN — PROPOFOL 100 MG: 10 INJECTION, EMULSION INTRAVENOUS at 12:59

## 2018-09-11 RX ADMIN — FENTANYL CITRATE 100 MCG: 50 INJECTION INTRAMUSCULAR; INTRAVENOUS at 12:59

## 2018-09-11 RX ADMIN — FENTANYL CITRATE 50 MCG: 50 INJECTION INTRAMUSCULAR; INTRAVENOUS at 13:12

## 2018-09-11 RX ADMIN — SODIUM CHLORIDE, POTASSIUM CHLORIDE, SODIUM LACTATE AND CALCIUM CHLORIDE: 600; 310; 30; 20 INJECTION, SOLUTION INTRAVENOUS at 12:52

## 2018-09-11 RX ADMIN — LOSARTAN POTASSIUM 50 MG: 50 TABLET, FILM COATED ORAL at 09:02

## 2018-09-11 RX ADMIN — SUGAMMADEX 116 MG: 100 INJECTION, SOLUTION INTRAVENOUS at 13:44

## 2018-09-11 RX ADMIN — DEXTROSE MONOHYDRATE: 50 INJECTION, SOLUTION INTRAVENOUS at 21:47

## 2018-09-11 RX ADMIN — HYDROCHLOROTHIAZIDE 12.5 MG: 25 TABLET ORAL at 09:02

## 2018-09-11 RX ADMIN — ROCURONIUM BROMIDE 30 MG: 10 INJECTION INTRAVENOUS at 13:09

## 2018-09-11 RX ADMIN — Medication 10 ML: at 09:02

## 2018-09-11 RX ADMIN — Medication 2 PUFF: at 20:12

## 2018-09-11 RX ADMIN — FAMOTIDINE 20 MG: 20 TABLET, FILM COATED ORAL at 09:02

## 2018-09-11 RX ADMIN — CEFEPIME HYDROCHLORIDE 2 G: 2 INJECTION, POWDER, FOR SOLUTION INTRAVENOUS at 11:01

## 2018-09-11 RX ADMIN — SODIUM CHLORIDE: 9 INJECTION, SOLUTION INTRAVENOUS at 18:43

## 2018-09-11 RX ADMIN — BUSPIRONE HYDROCHLORIDE 10 MG: 10 TABLET ORAL at 09:02

## 2018-09-11 RX ADMIN — Medication 100 MG: at 12:59

## 2018-09-11 RX ADMIN — PANTOPRAZOLE SODIUM 40 MG: 40 INJECTION, POWDER, FOR SOLUTION INTRAVENOUS at 09:02

## 2018-09-11 RX ADMIN — KETOROLAC TROMETHAMINE 15 MG: 30 INJECTION, SOLUTION INTRAMUSCULAR at 21:42

## 2018-09-11 RX ADMIN — Medication 10 ML: at 21:43

## 2018-09-11 RX ADMIN — ONDANSETRON HYDROCHLORIDE 4 MG: 4 INJECTION, SOLUTION INTRAMUSCULAR; INTRAVENOUS at 13:20

## 2018-09-11 RX ADMIN — BUSPIRONE HYDROCHLORIDE 10 MG: 10 TABLET ORAL at 21:43

## 2018-09-11 RX ADMIN — IPRATROPIUM BROMIDE AND ALBUTEROL SULFATE 1 AMPULE: .5; 3 SOLUTION RESPIRATORY (INHALATION) at 15:55

## 2018-09-11 RX ADMIN — Medication 2 PUFF: at 08:05

## 2018-09-11 ASSESSMENT — PULMONARY FUNCTION TESTS
PIF_VALUE: 15
PIF_VALUE: 17
PIF_VALUE: 15
PIF_VALUE: 16
PIF_VALUE: 20
PIF_VALUE: 15
PIF_VALUE: 16
PIF_VALUE: 1
PIF_VALUE: 7
PIF_VALUE: 15
PIF_VALUE: 19
PIF_VALUE: 6
PIF_VALUE: 15
PIF_VALUE: 1
PIF_VALUE: 16
PIF_VALUE: 1
PIF_VALUE: 15
PIF_VALUE: 16
PIF_VALUE: 15
PIF_VALUE: 16
PIF_VALUE: 16
PIF_VALUE: 15
PIF_VALUE: 16
PIF_VALUE: 1
PIF_VALUE: 15
PIF_VALUE: 1
PIF_VALUE: 16
PIF_VALUE: 15
PIF_VALUE: 15
PIF_VALUE: 14
PIF_VALUE: 19
PIF_VALUE: 32
PIF_VALUE: 14
PIF_VALUE: 15
PIF_VALUE: 3
PIF_VALUE: 17
PIF_VALUE: 15
PIF_VALUE: 18
PIF_VALUE: 15
PIF_VALUE: 1
PIF_VALUE: 16
PIF_VALUE: 15
PIF_VALUE: 15
PIF_VALUE: 16
PIF_VALUE: 1
PIF_VALUE: 2
PIF_VALUE: 16
PIF_VALUE: 15
PIF_VALUE: 16
PIF_VALUE: 15
PIF_VALUE: 15
PIF_VALUE: 1
PIF_VALUE: 15

## 2018-09-11 ASSESSMENT — PAIN DESCRIPTION - PAIN TYPE: TYPE: ACUTE PAIN

## 2018-09-11 ASSESSMENT — PAIN SCALES - GENERAL
PAINLEVEL_OUTOF10: 5
PAINLEVEL_OUTOF10: 0
PAINLEVEL_OUTOF10: 0
PAINLEVEL_OUTOF10: 5
PAINLEVEL_OUTOF10: 0
PAINLEVEL_OUTOF10: 0

## 2018-09-11 ASSESSMENT — ENCOUNTER SYMPTOMS: DYSPNEA ACTIVITY LEVEL: AFTER AMBULATING 1 FLIGHT OF STAIRS

## 2018-09-11 ASSESSMENT — PAIN DESCRIPTION - LOCATION: LOCATION: ABDOMEN

## 2018-09-11 ASSESSMENT — COPD QUESTIONNAIRES: CAT_SEVERITY: SEVERE

## 2018-09-11 NOTE — PLAN OF CARE
Problem: Impaired respiratory status  Goal: Clear lung sounds  Outcome: Ongoing  Keep lungs clear of any wheezes

## 2018-09-11 NOTE — PROGRESS NOTES
He denies any history of orthopnea. He denies any history of paroxysmal nocturnal dyspnea. He denies any history of lung cancer in first-degree relative; exposure to asbestos, radon, or uranium. He was never diagnosed with malignancy in the past. No previous history of pulmonary embolism. Endobronchial findings: 9/7/18  Trachea: Normal mucosa. Vesta: Normal mucosa  Right main bronchus: Normal mucosa  Right upper lobe bronchus: Normal mucosa  Right Middle lobe bronchus: Normal mucosa  Right Lower lobe bronchus:Normal mucosa  Left main bronchus: Normal mucosa  Left upper lobe bronchus: Narrowed Left apico- posterior segmental bronchus. No obvious mass shadow seen on Fluoroscopy to biopsy. Due to persistent cough and oxygen desaturations no biopsies were attempted.    Left lower lobe bronchus: Normal mucosa     No endobronchial lesions seen.      Subjective/Events Past 24 hours/ROS   Chart reviewed, got update from nurse   Had a second surgery this am for exploratory lap and small bowel resection   Has NG tube to LIWS  Started on TPN per Dr Lawyer Whyte  Low grade temp - max 99.6   Antibiotic changed to Cefepime   Compliant with IS  Use  On 4L/min NC - feels slightly more SOB than yesterday   PMHx   Past Medical History      Diagnosis Date    COPD (chronic obstructive pulmonary disease) (HCC)     Gastric reflux     Hyperlipidemia     Hypertension      Meds    Current Medications    lidocaine 1 % injection  5 mL Intradermal Once    sodium chloride flush  10 mL Intravenous 2 times per day    insulin lispro  0-12 Units Subcutaneous Q6H    hydrochlorothiazide  12.5 mg Oral Daily    nicotine  1 patch Transdermal Daily    cefepime  2 g Intravenous Q8H    potassium replacement protocol   Other RX Placeholder    pantoprazole  40 mg Intravenous Daily    And    sodium chloride (PF)  10 mL Intravenous Daily    busPIRone  10 mg Oral BID    losartan  50 mg Oral Daily    ipratropium-albuterol  1 ampule gentamicin Sensitive <=1 mcg/mL Final   trimethoprim-sulfamethoxazole Sensitive <=20 mcg/mL Final         STREPTOCOCCUS PNEUMONIAE     Antibiotic Interpretation TREMAINE Unit Status   Ceftriaxone (non-meningitis) Sensitive <=0.12 mcg/mL Final   Penicilin V oral Intermediate =0.25 mcg/mL Final   Penicillin IV  meningitis Resistant =0.25 mcg/mL Final   Penicillin IV  non-meningitis Sensitive =0.25 mcg/mL Final   cefTRIAXone (meningitis) Sensitive <=0.12 mcg/mL Final   vancomycin Sensitive =0.5 mcg/mL Final           EKG     Echocardiogram   No old studies in Epic. Radiology    CXR  Sep 5, 2018  PROCEDURE: XR CHEST PORTABLE  Abnormal left hilar contour concerning for possible underlying mass or lymphadenopathy. Findings might also be vascular. Further evaluation with chest CT is advised. CT Scans  (See actual reports for details)  9/05/2018  PROCEDURE: CT CHEST W CONTRAST  1. Conglomeration of soft tissue density in the left hilar region surrounding the left main bronchus and pulmonary artery, highly concerning for malignancy. 2. Upper lobe predominant centrilobular emphysematous changes of the lungs. CT abdomen and pelvis with contrast:  Sep 5, 2018  PROCEDURE: CT ABDOMEN PELVIS W IV CONTRAST  1. Right-sided direct inguinal hernia containing a loop of small bowel resulting in complete small bowel obstruction. 2. Sigmoid diverticulosis without evidence of diverticulitis. Findings were discussed by Dr. Willian Tracy with Dr. Elvira Echols at 4:53 PM  09/05/2018. Assessment   Left hilar mass highly concerning for malignancy. S/p bronch- no endobronchial lesions. Unable to biopsy due to cough and hypoxia.   Positive Bronch washings for Streptococcus pneumoniae (A), .ENTEROBACTER CLOACAE COMPLEX- antibiotic was changed to Cefepime 9/10/18    COPD unspecified severity without exacerbation ([facility] [place], on Symbicort, albuterol and atrovent)  Nicotine dependence with 55 PY history still smoking 1

## 2018-09-11 NOTE — ANESTHESIA POSTPROCEDURE EVALUATION
Department of Anesthesiology  Postprocedure Note    Patient: Shaggy Conway  MRN: [id number]  YOB: 1950  Date of evaluation: 9/11/2018  Time:  3:28 PM     Procedure Summary     Date:  09/11/18 Room / Location:  [address] [place]    Anesthesia Start:  1252 Anesthesia Stop:  1400    Procedure:  EXPLORATORY LAPAROSCOPY  OPEN PROCEDURE, SMALL BOWEL RESECTION (N/A Abdomen) Diagnosis:  (INCARCERATED HERNIA)    Surgeon:  Larry Adan MD Responsible Provider:  Bernard Kerns DO    Anesthesia Type:  general ASA Status:  4          Anesthesia Type: general    Sully Phase I: Sully Score: 9    Sully Phase II: Sully Score: 10    Last vitals: Reviewed and per EMR flowsheets.        Anesthesia Post Evaluation    Patient location during evaluation: PACU  Patient participation: complete - patient participated  Level of consciousness: awake and alert  Airway patency: patent  Nausea & Vomiting: no nausea  Complications: no  Cardiovascular status: blood pressure returned to baseline and hemodynamically stable  Respiratory status: acceptable and spontaneous ventilation  Hydration status: euvolemic

## 2018-09-11 NOTE — ANESTHESIA PRE PROCEDURE
Department of Anesthesiology  Preprocedure Note       Name:  Alejandrina Johnson   Age:  75 y.o.  :  1950                                          MRN:  [id number]         Date:  2018      Surgeon: Iqra Collado):  Crow Samuel MD    Procedure: Procedure(s):  RIGHT INGUINAL HERNIA REPAIR    Medications prior to admission:   Prior to Admission medications    Medication Sig Start Date End Date Taking? Authorizing Provider   busPIRone (BUSPAR) 10 MG tablet Take 10 mg by mouth 2 times daily    Historical Provider, MD   etodolac (LODINE) 400 MG tablet Take 400 mg by mouth 2 times daily    Historical Provider, MD   Ipratropium Bromide (ATROVENT IN) Inhale into the lungs 2 times daily     Historical Provider, MD   Albuterol (PROVENTIL IN) Inhale  into the lungs as needed. Historical Provider, MD   budesonide-formoterol (SYMBICORT) 160-4.5 MCG/ACT AERO Inhale 2 puffs into the lungs 2 times daily. Historical Provider, MD   terazosin (HYTRIN) 2 MG capsule Take 2 mg by mouth nightly. Historical Provider, MD   sildenafil (VIAGRA) 100 MG tablet Take 50 mg by mouth as needed. Historical Provider, MD   omeprazole (PRILOSEC) 20 MG capsule Take 40 mg by mouth daily     Historical Provider, MD   ranitidine (ZANTAC) 150 MG tablet Take 150 mg by mouth every morning. Historical Provider, MD   losartan (COZAAR) 50 MG tablet Take 50 mg by mouth daily. Historical Provider, MD   atorvastatin (LIPITOR) 80 MG tablet Take 80 mg by mouth daily     Historical Provider, MD   hydrochlorothiazide (HYDRODIURIL) 25 MG tablet Take 12.5 mg by mouth daily. Historical Provider, MD       Current medications:    No current facility-administered medications for this visit. No current outpatient prescriptions on file.      Facility-Administered Medications Ordered in Other Visits   Medication Dose Route Frequency Provider Last Rate Last Dose    lidocaine PF 1 % injection 5 mL  5 mL Intradermal Once MD Raya Jovel

## 2018-09-11 NOTE — BRIEF OP NOTE
Brief Postoperative Note  ______________________________________________________________    Patient: iGlmar Abarca  YOB: 1950  MRN: [id number]  Date of Procedure: 9/11/2018    Pre-Op Diagnosis: sbo    Post-Op Diagnosis: Same       Procedure(s):  DIAGNOSTIC LAPAROSCOPY  OPEN SMALL BOWEL RESECTION    Anesthesia: General    Surgeon(s):  Rajiv Arias MD    Staff:  Scrub Person First: Duarte Will  Scrub Person Second: Babs Rocha     Estimated Blood Loss: * No values recorded between 9/11/2018 12:57 PM and 9/11/2018  3:89 PM *     Complications: None    Specimens:   ID Type Source Tests Collected by Time Destination   A : PORTION OF SMALL BOWEL Tissue Small Intestine SURGICAL PATHOLOGY Rajiv Arias MD 9/11/2018 1329        Implants:  * No implants in log *      Drains:   NG/OG Tube Nasogastric 16 fr Left nostril (Active)   Surrounding Skin Dry; Intact 9/11/2018  6:00 AM   Dressing Status Clean;Dry; Intact 9/11/2018  6:00 AM   Status Suction-low intermittent 9/11/2018  6:00 AM   Placement Verified by Gastric Contents 9/11/2018  6:00 AM   Drainage Appearance Ree Opitz; Coffee Ground 9/11/2018  6:00 AM   Output (mL) 50 ml 9/11/2018  6:00 AM       Findings:     Rajiv Arias MD  Date: 9/11/2018  Time: 1:53 PM

## 2018-09-11 NOTE — PROGRESS NOTES
Pharmacy Consult       TPN    Ordering Provider: Dr. Judith Evans    Indication for TPN: possible bowel resection, NPO    Macronutrients (per dietary):   DW: 58kg   AA: 1.2 g/kg   Total Kcal: 10 Kcal/kg   Lipids: 30 % of Total Kcal   Infusion Rate: 80 mL/hr    Electrolytes (per bag)   Na Acetate:    20 meq   NaCl:         160 meq   NaPhos:       9 mmol     K Acetate:     60 meq     Calcium Gluc:   2.33 meq   Magnesium:      8.12 meq      MV:      10 mL    Electrolyte Replacement: none    Assessment/ Plan:  1. Ordered medium dose insulin sliding scale  2.   Ordered BMP, Mg, Phos, ical for tomorrow AM

## 2018-09-11 NOTE — PLAN OF CARE
Problem: Pain:  Goal: Pain level will decrease  Pain level will decrease   Outcome: Ongoing  Patient reports no pain with pain goal of 4/10. Patient satisfied with current interventions. Pain assessments ongoing. Problem: Nutrition  Goal: Optimal nutrition therapy  Outcome: Ongoing  Patient NPO at this time. Problem: Safety:  Goal: Free from accidental physical injury  Free from accidental physical injury   Outcome: Ongoing  Patient free from falls this shift. Patient refusing bed alarm but using call light appropriately. Call light in reach, nonskid footwear on, hourly rounding, fall sign posted, bed rails up x2. Patient at risk for falls due to generalized weakness. Problem: Daily Care:  Goal: Daily care needs are met  Daily care needs are met   Outcome: Ongoing  Patient assisted with ADLs as needed. Problem: Cardiovascular  Goal: No DVT, peripheral vascular complications  Outcome: Ongoing  Patient denies calf pain. Patient wearing SCDs while in bed and receiving lovenox. Problem:   Goal: Adequate urinary output  Outcome: Ongoing  Patient voiding adequate amounts with no pain or complications reported. Comments: Care plan reviewed with patient. Patient verbalizes understanding of the plan of care and contribute to goal setting.

## 2018-09-11 NOTE — PLAN OF CARE
Problem: Nutrition  Goal: Optimal nutrition therapy  Outcome: Ongoing  Nutrition Problem: Severe malnutrition, in context of chronic illness  Intervention: Food and/or Nutrient Delivery: Continue NPO, Start Parenteral Nutrition  Nutritional Goals: Pt will receive PN to meet 75% or more of estimated nutritional needs until able to transition to PO feeds during LOS.

## 2018-09-11 NOTE — PROGRESS NOTES
Dr. Avery Led Progress note        9/11/2018                  9:58 AM        Patient:  Levi Byrd  YOB: 1950    MRN: [id number]   Acct:  [de-identified]   5K-12/012-A  Primary Care Physician: Tin Cooley MD    Admit Date: 9/5/2018           Subjective: remains npo with ng and reportedly going back for possible bowel resection. Objective:   Vitals: /63   Pulse 98   Temp 98.8 °F (37.1 °C) (Oral)   Resp 19   Ht 5' 8\" (1.727 m)   Wt 128 lb 3.2 oz (58.2 kg)   SpO2 90%   BMI 19.49 kg/m²   Physical Exam:  General appearance: alert, appears stated age and cooperative  Skin: Skin color, texture, turgor normal.   HEENT: Head: Normal, normocephalic, atraumatic. Neck: no adenopathy, no carotid bruit and no JVD  Lungs: honorio air movement  Heart: S1, S2 normal  Abdomen: full but with pos bs.   Extremities: extremities normal, atraumatic, no cyanosis or edema  Lymphatic: No significant lymph node enlargement papable  Neurologic: Mental status: Alert, oriented, thought content appropriate      Diet: Diet NPO Effective Now        Data:   Scheduled Meds: Scheduled Meds:   hydrochlorothiazide  12.5 mg Oral Daily    nicotine  1 patch Transdermal Daily    cefepime  2 g Intravenous Q8H    potassium replacement protocol   Other RX Placeholder    pantoprazole  40 mg Intravenous Daily    And    sodium chloride (PF)  10 mL Intravenous Daily    busPIRone  10 mg Oral BID    losartan  50 mg Oral Daily    ipratropium-albuterol  1 ampule Inhalation Q4H WA    enoxaparin  40 mg Subcutaneous Daily    doxazosin  2 mg Oral Nightly    mometasone-formoterol  2 puff Inhalation BID    famotidine  20 mg Oral QAM     Continuous Infusions:   sodium chloride 100 mL/hr at 09/10/18 2253     PRN Meds:.ondansetron, phenol, acetaminophen  Continuous Infusions:   sodium chloride 100 mL/hr at 09/10/18 2253       Intake/Output Summary (Last 24 hours) at 09/11/18 427 Fide ,# 29 filed at 09/11/18 0600   Gross per 24 hour   Intake             2392 ml   Output             1600 ml   Net              792 ml       CBC:   Recent Labs      09/09/18   0617  09/10/18   0543   WBC  3.8*  4.7*   HGB  13.4*  12.3*   HCT  38.7*  36.0*   PLT  210  235     BMP:    Recent Labs      09/09/18 0617 09/09/18   0842   NA  137   --    K  3.3*  3.6   CL  98   --    CO2  25   --    BUN  13   --    CREATININE  0.6   --    GLUCOSE  110*   --      Hepatic:   No results for input(s): AST, ALT, ALB, BILITOT, ALKPHOS in the last 72 hours. Urine: urine culture  ABGs: No results found for: PHART, PO2ART, [id number]  INR: No results for input(s): INR in the last 72 hours. -----------------------------------------------------------------  Data Review   Recent Labs      09/09/18 0617  09/10/18   0543   WBC  3.8*  4.7*   HGB  13.4*  12.3*   HCT  38.7*  36.0*   PLT  210  235     Recent Labs      09/09/18 0617  09/09/18   0842   NA  137   --    CL  98   --    K  3.3*  3.6   CO2  25   --    BUN  13   --    CREATININE  0.6   --    GLUCOSE  110*   --    CALCIUM  8.7   --      No results for input(s): CKTOTAL, CKMB, CKMBINDEX, CKMBCALCMETH, TROPONINI in the last 72 hours. No results for input(s): HDL, LDLDIRECT, TRIG in the last 72 hours. Invalid input(s): TOTALCHLTL, LDLCHLC  No results for input(s): BNP, TSH, FT4, ACETONE in the last 72 hours. Assessment   Principal Problem:    Incarcerated hernia  Active Problems:    COPD (chronic obstructive pulmonary disease) (HCC)    Hypertension    Severe malnutrition (HCC)    Hilar mass    Tobacco abuse    Recurrent unilateral inguinal hernia with obstruction and without gangrene  Resolved Problems:    * No resolved hospital problems.  *  Severe protein-calorie malnutrition    in the setting of weight loss, BMI <20, lung mass, & patient meets ASPEN criteria being treated with dietician following      Patient Active Problem List   Diagnosis    Incarcerated

## 2018-09-11 NOTE — PLAN OF CARE
Problem: Pain:  Goal: Pain level will decrease  Pain level will decrease   Outcome: Ongoing  Patient denies pain during shift. Problem: Impaired respiratory status  Goal: Clear lung sounds  Outcome: Ongoing  Lung sounds clear     Problem: Nutrition  Goal: Optimal nutrition therapy  Outcome: Ongoing  Diet poor due to NPO status. PICC line ordered and  TPN to be initiated. Problem: Discharge Planning:  Goal: Discharged to appropriate level of care  Discharged to appropriate level of care   Outcome: Ongoing  No plans for discharge at this time. Patient resides with fiance in private residence. Problem: Safety:  Goal: Free from accidental physical injury  Free from accidental physical injury   Outcome: Ongoing  All fall precautions in place. Bed in low position, alarm activated and appropriate use of call light. Problem: Cardiovascular  Goal: No DVT, peripheral vascular complications  Outcome: Ongoing  No signs of DVT during shift. Patient receiving Lovenox injection for DVT prophylaxis. Problem: Respiratory  Goal: O2 Sat > 90%  Outcome: Ongoing  O2 Saturation > 90%    Problem: GI  Goal: No bowel complications  Outcome: Ongoing  Patient to have bowel resection today. No active bowel sounds.

## 2018-09-12 LAB
ALBUMIN SERPL-MCNC: 2.6 G/DL (ref 3.5–5.1)
ALP BLD-CCNC: 46 U/L (ref 38–126)
ALT SERPL-CCNC: 7 U/L (ref 11–66)
ANION GAP SERPL CALCULATED.3IONS-SCNC: 16 MEQ/L (ref 8–16)
AST SERPL-CCNC: 14 U/L (ref 5–40)
BASOPHILS # BLD: 0.5 %
BASOPHILS ABSOLUTE: 0 THOU/MM3 (ref 0–0.1)
BILIRUB SERPL-MCNC: 0.4 MG/DL (ref 0.3–1.2)
BUN BLDV-MCNC: 9 MG/DL (ref 7–22)
CALCIUM IONIZED: 1.05 MMOL/L (ref 1.12–1.32)
CALCIUM SERPL-MCNC: 8.4 MG/DL (ref 8.5–10.5)
CHLORIDE BLD-SCNC: 98 MEQ/L (ref 98–111)
CO2: 21 MEQ/L (ref 23–33)
CREAT SERPL-MCNC: 0.5 MG/DL (ref 0.4–1.2)
EOSINOPHIL # BLD: 0 %
EOSINOPHILS ABSOLUTE: 0 THOU/MM3 (ref 0–0.4)
ERYTHROCYTE [DISTWIDTH] IN BLOOD BY AUTOMATED COUNT: 12.2 % (ref 11.5–14.5)
ERYTHROCYTE [DISTWIDTH] IN BLOOD BY AUTOMATED COUNT: 43.2 FL (ref 35–45)
GFR SERPL CREATININE-BSD FRML MDRD: > 90 ML/MIN/1.73M2
GLUCOSE BLD-MCNC: 100 MG/DL (ref 70–108)
GLUCOSE BLD-MCNC: 106 MG/DL (ref 70–108)
GLUCOSE BLD-MCNC: 112 MG/DL (ref 70–108)
GLUCOSE BLD-MCNC: 122 MG/DL (ref 70–108)
GLUCOSE BLD-MCNC: 141 MG/DL (ref 70–108)
HCT VFR BLD CALC: 36.1 % (ref 42–52)
HEMOGLOBIN: 12.3 GM/DL (ref 14–18)
IMMATURE GRANS (ABS): 0.09 THOU/MM3 (ref 0–0.07)
IMMATURE GRANULOCYTES: 1.4 %
LYMPHOCYTES # BLD: 15.1 %
LYMPHOCYTES ABSOLUTE: 1 THOU/MM3 (ref 1–4.8)
MAGNESIUM: 1.7 MG/DL (ref 1.6–2.4)
MCH RBC QN AUTO: 32.9 PG (ref 26–33)
MCHC RBC AUTO-ENTMCNC: 34.1 GM/DL (ref 32.2–35.5)
MCV RBC AUTO: 96.5 FL (ref 80–94)
MONOCYTES # BLD: 11.7 %
MONOCYTES ABSOLUTE: 0.7 THOU/MM3 (ref 0.4–1.3)
NUCLEATED RED BLOOD CELLS: 0 /100 WBC
PHOSPHORUS: 3.4 MG/DL (ref 2.4–4.7)
PLATELET # BLD: 261 THOU/MM3 (ref 130–400)
PLATELET ESTIMATE: ADEQUATE
PMV BLD AUTO: 9.5 FL (ref 9.4–12.4)
POTASSIUM SERPL-SCNC: 3.4 MEQ/L (ref 3.5–5.2)
RBC # BLD: 3.74 MILL/MM3 (ref 4.7–6.1)
SCAN OF BLOOD SMEAR: NORMAL
SEG NEUTROPHILS: 71.3 %
SEGMENTED NEUTROPHILS ABSOLUTE COUNT: 4.5 THOU/MM3 (ref 1.8–7.7)
SODIUM BLD-SCNC: 135 MEQ/L (ref 135–145)
TOTAL PROTEIN: 5.4 G/DL (ref 6.1–8)
WBC # BLD: 6.3 THOU/MM3 (ref 4.8–10.8)

## 2018-09-12 PROCEDURE — 6360000002 HC RX W HCPCS: Performed by: INTERNAL MEDICINE

## 2018-09-12 PROCEDURE — 82948 REAGENT STRIP/BLOOD GLUCOSE: CPT

## 2018-09-12 PROCEDURE — 82330 ASSAY OF CALCIUM: CPT

## 2018-09-12 PROCEDURE — 36415 COLL VENOUS BLD VENIPUNCTURE: CPT

## 2018-09-12 PROCEDURE — A4212 NON CORING NEEDLE OR STYLET: HCPCS

## 2018-09-12 PROCEDURE — 99232 SBSQ HOSP IP/OBS MODERATE 35: CPT | Performed by: INTERNAL MEDICINE

## 2018-09-12 PROCEDURE — 2580000003 HC RX 258: Performed by: INTERNAL MEDICINE

## 2018-09-12 PROCEDURE — 99024 POSTOP FOLLOW-UP VISIT: CPT | Performed by: SURGERY

## 2018-09-12 PROCEDURE — 80053 COMPREHEN METABOLIC PANEL: CPT

## 2018-09-12 PROCEDURE — 6370000000 HC RX 637 (ALT 250 FOR IP): Performed by: INTERNAL MEDICINE

## 2018-09-12 PROCEDURE — 2709999900 HC NON-CHARGEABLE SUPPLY

## 2018-09-12 PROCEDURE — 6360000002 HC RX W HCPCS: Performed by: SURGERY

## 2018-09-12 PROCEDURE — 83735 ASSAY OF MAGNESIUM: CPT

## 2018-09-12 PROCEDURE — 85025 COMPLETE CBC W/AUTO DIFF WBC: CPT

## 2018-09-12 PROCEDURE — C1751 CATH, INF, PER/CENT/MIDLINE: HCPCS

## 2018-09-12 PROCEDURE — 94640 AIRWAY INHALATION TREATMENT: CPT

## 2018-09-12 PROCEDURE — 6360000002 HC RX W HCPCS: Performed by: PHARMACIST

## 2018-09-12 PROCEDURE — 76937 US GUIDE VASCULAR ACCESS: CPT

## 2018-09-12 PROCEDURE — 2500000003 HC RX 250 WO HCPCS: Performed by: PHARMACIST

## 2018-09-12 PROCEDURE — 2500000003 HC RX 250 WO HCPCS: Performed by: INTERNAL MEDICINE

## 2018-09-12 PROCEDURE — 2580000003 HC RX 258: Performed by: SURGERY

## 2018-09-12 PROCEDURE — 1200000003 HC TELEMETRY R&B

## 2018-09-12 PROCEDURE — C9113 INJ PANTOPRAZOLE SODIUM, VIA: HCPCS | Performed by: SURGERY

## 2018-09-12 PROCEDURE — 36569 INSJ PICC 5 YR+ W/O IMAGING: CPT

## 2018-09-12 PROCEDURE — 84100 ASSAY OF PHOSPHORUS: CPT

## 2018-09-12 PROCEDURE — 6370000000 HC RX 637 (ALT 250 FOR IP): Performed by: SURGERY

## 2018-09-12 PROCEDURE — APPSS30 APP SPLIT SHARED TIME 16-30 MINUTES: Performed by: NURSE PRACTITIONER

## 2018-09-12 PROCEDURE — 94760 N-INVAS EAR/PLS OXIMETRY 1: CPT

## 2018-09-12 RX ORDER — POTASSIUM CHLORIDE 29.8 MG/ML
20 INJECTION INTRAVENOUS
Status: COMPLETED | OUTPATIENT
Start: 2018-09-12 | End: 2018-09-12

## 2018-09-12 RX ORDER — POTASSIUM CHLORIDE 29.8 MG/ML
40 INJECTION INTRAVENOUS ONCE
Status: DISCONTINUED | OUTPATIENT
Start: 2018-09-12 | End: 2018-09-12

## 2018-09-12 RX ADMIN — PANTOPRAZOLE SODIUM 40 MG: 40 INJECTION, POWDER, FOR SOLUTION INTRAVENOUS at 10:45

## 2018-09-12 RX ADMIN — Medication 2 PUFF: at 21:18

## 2018-09-12 RX ADMIN — ENOXAPARIN SODIUM 40 MG: 40 INJECTION SUBCUTANEOUS at 20:36

## 2018-09-12 RX ADMIN — Medication 10 ML: at 20:37

## 2018-09-12 RX ADMIN — CEFEPIME HYDROCHLORIDE 2 G: 2 INJECTION, POWDER, FOR SOLUTION INTRAVENOUS at 02:59

## 2018-09-12 RX ADMIN — HYDROCHLOROTHIAZIDE 12.5 MG: 25 TABLET ORAL at 11:30

## 2018-09-12 RX ADMIN — BUSPIRONE HYDROCHLORIDE 10 MG: 10 TABLET ORAL at 20:36

## 2018-09-12 RX ADMIN — CEFEPIME HYDROCHLORIDE 2 G: 2 INJECTION, POWDER, FOR SOLUTION INTRAVENOUS at 20:24

## 2018-09-12 RX ADMIN — CALCIUM GLUCONATE: 94 INJECTION, SOLUTION INTRAVENOUS at 12:25

## 2018-09-12 RX ADMIN — CEFEPIME HYDROCHLORIDE 2 G: 2 INJECTION, POWDER, FOR SOLUTION INTRAVENOUS at 11:30

## 2018-09-12 RX ADMIN — HYDROCHLOROTHIAZIDE 12.5 MG: 25 TABLET ORAL at 09:25

## 2018-09-12 RX ADMIN — IPRATROPIUM BROMIDE AND ALBUTEROL SULFATE 1 AMPULE: .5; 3 SOLUTION RESPIRATORY (INHALATION) at 21:17

## 2018-09-12 RX ADMIN — KETOROLAC TROMETHAMINE 15 MG: 30 INJECTION, SOLUTION INTRAMUSCULAR at 10:46

## 2018-09-12 RX ADMIN — KETOROLAC TROMETHAMINE 15 MG: 30 INJECTION, SOLUTION INTRAMUSCULAR at 05:16

## 2018-09-12 RX ADMIN — POTASSIUM CHLORIDE 20 MEQ: 400 INJECTION, SOLUTION INTRAVENOUS at 16:13

## 2018-09-12 RX ADMIN — IPRATROPIUM BROMIDE AND ALBUTEROL SULFATE 1 AMPULE: .5; 3 SOLUTION RESPIRATORY (INHALATION) at 13:03

## 2018-09-12 RX ADMIN — NALOXEGOL OXALATE 12.5 MG: 12.5 TABLET, FILM COATED ORAL at 09:26

## 2018-09-12 RX ADMIN — CALCIUM GLUCONATE: 94 INJECTION, SOLUTION INTRAVENOUS at 18:41

## 2018-09-12 RX ADMIN — KETOROLAC TROMETHAMINE 15 MG: 30 INJECTION, SOLUTION INTRAMUSCULAR at 23:05

## 2018-09-12 RX ADMIN — BUSPIRONE HYDROCHLORIDE 10 MG: 10 TABLET ORAL at 09:25

## 2018-09-12 RX ADMIN — IPRATROPIUM BROMIDE AND ALBUTEROL SULFATE 1 AMPULE: .5; 3 SOLUTION RESPIRATORY (INHALATION) at 09:01

## 2018-09-12 RX ADMIN — Medication 10 ML: at 14:06

## 2018-09-12 RX ADMIN — KETOROLAC TROMETHAMINE 15 MG: 30 INJECTION, SOLUTION INTRAMUSCULAR at 16:18

## 2018-09-12 RX ADMIN — Medication 2 PUFF: at 09:01

## 2018-09-12 RX ADMIN — IPRATROPIUM BROMIDE AND ALBUTEROL SULFATE 1 AMPULE: .5; 3 SOLUTION RESPIRATORY (INHALATION) at 17:29

## 2018-09-12 RX ADMIN — POTASSIUM CHLORIDE 20 MEQ: 400 INJECTION, SOLUTION INTRAVENOUS at 14:06

## 2018-09-12 RX ADMIN — DEXTROSE MONOHYDRATE: 50 INJECTION, SOLUTION INTRAVENOUS at 10:47

## 2018-09-12 RX ADMIN — LOSARTAN POTASSIUM 50 MG: 50 TABLET, FILM COATED ORAL at 09:25

## 2018-09-12 RX ADMIN — DOXAZOSIN 2 MG: 2 TABLET ORAL at 20:36

## 2018-09-12 RX ADMIN — Medication 10 ML: at 10:46

## 2018-09-12 ASSESSMENT — PAIN SCALES - GENERAL
PAINLEVEL_OUTOF10: 0
PAINLEVEL_OUTOF10: 0
PAINLEVEL_OUTOF10: 1
PAINLEVEL_OUTOF10: 0

## 2018-09-12 NOTE — PROGRESS NOTES
Reached out to pharmacy to advise of no PICC line for pt at this time and TPN in refrigerator, was advised in report that PICC would be placed 9/12 and passed this along to pharmacy as well. Dr. Walt Licea notified of no PICC, updated fluid status to D5 continuous for pt, advised to contact surgery for NG tube status if to be clamped. Speaking with pt he noted an appointment for him in [place] on 9/25 with a neurosurgeon to see him regarding a pinched nerve in his neck that has been making him numb/tingling on his hands, bilat. Wanted to wait on CT needle-biopsy until after this was finalized.

## 2018-09-12 NOTE — PROGRESS NOTES
Received handoff report from Miriam Gerardo. Patient in bed, call light and table in reach, bed in lowest position.  Electronically signed by Gaby Burnham on 9/12/2018 at 07:47

## 2018-09-12 NOTE — OP NOTE
[address] [place] [address]                                 OPERATIVE REPORT    PATIENT NAME: Chinmay Santana                    :        1950  MED REC NO:   [id number]                           ROOM:       0012  ACCOUNT NO:   [de-identified]                           ADMIT DATE: 2018  PROVIDER:     Kvng Landeros M.D.    DATE OF PROCEDURE:  2018    PREOPERATIVE DIAGNOSIS:  Persistent small bowel obstruction. POSTOPERATIVE DIAGNOSES:  1. Persistent small bowel obstruction. 2.  Recent open anterior repair of incarcerated right inguinal hernia. PROCEDURE PERFORMED:  Diagnostic laparoscopy, laparotomy, and small bowel  resection. SURGEON:  Kvng Landeros M.D.    SPECIMEN:  Small bowel to Pathology. ANESTHESIA:  General.    ESTIMATED BLOOD LOSS:  20 mL. FINDINGS:  Persistent obstructive loop of small bowel and area of hernia  internally. INDICATIONS:  The patient is a 56-year-old male who earlier this admission  had undergone urgent open anterior repair of an incarcerated right inguinal  hernia. Small bowel loop appeared to be entirely reduced anteriorly;  however, symptoms of bowel obstruction persisted and were confirmed on  imaging studies. He was recommended laparoscopy and even laparotomy as  needed with possible small bowel resection. He consented. DESCRIPTION OF PROCEDURE:  The patient was brought to the operating room,  placed supine on the operating table with pneumatic sequential compression  devices on the lower extremities. He was already on broad spectrum  antibiotics therapy. After induction of general anesthetic, the patient's  abdomen was prepped and draped. A small incision was made below the  umbilicus. A 10-mm port was inserted in open fashion and CO2  pneumoperitoneum was introduced. There were visibly dilated loops of small  bowel which all appeared viable.   There appeared to

## 2018-09-12 NOTE — PROGRESS NOTES
0543  09/12/18   0529   WBC  4.7*  6.3   RBC  3.65*  3.74*   HGB  12.3*  12.3*   HCT  36.0*  36.1*   MCV  98.6*  96.5*   MCH  33.7*  32.9   MCHC  34.2  34.1   PLT  235  261   MPV  9.6  9.5      BMP  Recent Labs      09/11/18   1220  09/12/18   0529   NA  140  135   K  3.5  3.4*   CL  100  98   CO2  20*  21*   BUN  8  9   CREATININE  0.5  0.5   GLUCOSE  84  141*   MG  1.7  1.7   PHOS  3.4  3.4   CALCIUM  8.7  8.4*     LFT  Recent Labs      09/12/18   0529   AST  14   ALT  7*   BILITOT  0.4   ALKPHOS  46     TROP  Lab Results   Component Value Date    TROPONINT < 0.010 09/05/2018    TROPONINT < 0.010 07/19/2018     BNP  No results for input(s): BNP in the last 72 hours. Lactic Acid  No results for input(s): LACTA in the last 72 hours. INR  No results for input(s): INR, PROTIME in the last 72 hours. PTT  No results for input(s): APTT in the last 72 hours. Glucose  No results for input(s): POCGLU in the last 72 hours. UA No results for input(s): SPECGRAV, PHUR, COLORU, CLARITYU, MUCUS, PROTEINU, BLOODU, RBCUA, WBCUA, BACTERIA, NITRU, GLUCOSEU, BILIRUBINUR, UROBILINOGEN, KETUA, LABCAST, LABCASTTY, AMORPHOS in the last 72 hours. Invalid input(s): CRYSTALS. PFTs   No old studies in Epic. Sleep studies   No old studies in Epic.     Cultures    Bronch cultureswashings- Streptococcus pneumoniae with Enterobacter cloacae complex    Source: bronchial washings       Site: left trachial tree          Current Antibiotics:   Cefoxitin, Cefepime   Culture & Susceptibility     ENTEROBACTER CLOACAE COMPLEX     Antibiotic Interpretation TREMAINE Unit Status   cefepime Sensitive <=1 mcg/mL Final   gentamicin Sensitive <=1 mcg/mL Final   trimethoprim-sulfamethoxazole Sensitive <=20 mcg/mL Final         STREPTOCOCCUS PNEUMONIAE     Antibiotic Interpretation TREMAINE Unit Status   Ceftriaxone (non-meningitis) Sensitive <=0.12 mcg/mL Final   Penicilin V oral Intermediate =0.25 mcg/mL Final   Penicillin IV  meningitis Resistant =0.25

## 2018-09-12 NOTE — PLAN OF CARE
Problem: Impaired respiratory status  Goal: Clear lung sounds  Outcome: Ongoing  Duoneb and dulera ongoing to improve aeration to lungs. Weaning oxygen as tolerated.

## 2018-09-12 NOTE — PROGRESS NOTES
In bed, bed locked and in lowest position, rails up x2, call light and table in reach, 2 liters of O2 on. Reported off to [facility].  Electronically signed by Felicita Stephens RSCSN on 9/12/2018 at 2:32 PM

## 2018-09-12 NOTE — PROGRESS NOTES
PICC Procedure Note    Kareem July   Admitted- 9/5/2018  1:12 PM  Admission diagnosis- Abdominal hernia [K46.9]      Attending Physician- Joel Locke MD  Ordering Physician-Dr Wu  Indication for Insertion: TPN    Catheter Insertion Date- 9/12/2018   Lot Number- [id number]  Gauge-5  Lumen-dual    Insertion Site- PINO Brachial  Vein Diameter- 3 mm  Catheter Length- 42 cm  Internal Length- 40 cm  Exposed Catheter Length- 2cm   Upper Arm Circumference- 23cm  Tip Confirmation System Bundle met- Yes  If X-ray required, Tip Location- N/A  Radiologist- N/A    PICC insertion successful- Yes  Ultrasound- yes    Okay To Use PICC- Yes    Electronically signed by Marleni Vera RN on 9/12/2018 at 10:23 AM

## 2018-09-12 NOTE — PROGRESS NOTES
Dr. Abdirashid Fiore Progress note        9/12/2018                  2:24 PM        Patient:  Jessica Villegas  YOB: 1950    MRN: [id number]   Acct:  [de-identified]   5K-12/012-A  Primary Care Physician: Chantell Strange MD    Admit Date: 9/5/2018           Subjective: he feels better today after the surgery. Objective:   Vitals: /85   Pulse 81   Temp 98 °F (36.7 °C) (Oral)   Resp 18   Ht 5' 8\" (1.727 m)   Wt 128 lb 3.2 oz (58.2 kg)   SpO2 95%   BMI 19.49 kg/m²   Physical Exam:  General appearance: alert, appears stated age and cooperative  Skin: Skin color, texture, turgor normal.   HEENT: Head: Normal, normocephalic, atraumatic. Neck: no adenopathy, no carotid bruit and no JVD  Lungs: honorio air movement  Heart: S1, S2 normal  Abdomen: full but with pos bs.   Extremities: extremities normal, atraumatic, no cyanosis or edema  Lymphatic: No significant lymph node enlargement papable  Neurologic: Mental status: Alert, oriented, thought content appropriate      Diet: Diet NPO Effective Now Exceptions are: Sips with Meds  PN-Adult 3 IN 1 Peripheral Line (Custom)  PN-Adult 3 IN 1 Central Line (Custom)        Data:   Scheduled Meds: Scheduled Meds:   naloxegol  12.5 mg Oral QAM    potassium chloride  20 mEq Intravenous Q2H    sodium chloride flush  10 mL Intravenous 2 times per day    insulin lispro  0-12 Units Subcutaneous Q6H    enoxaparin  40 mg Subcutaneous Q24H    ketorolac  15 mg Intravenous Q6H    hydrochlorothiazide  12.5 mg Oral Daily    nicotine  1 patch Transdermal Daily    cefepime  2 g Intravenous Q8H    potassium replacement protocol   Other RX Placeholder    pantoprazole  40 mg Intravenous Daily    And    sodium chloride (PF)  10 mL Intravenous Daily    busPIRone  10 mg Oral BID    losartan  50 mg Oral Daily    ipratropium-albuterol  1 ampule Inhalation Q4H WA    doxazosin  2 mg Oral Nightly    mometasone-formoterol 2 puff Inhalation BID     Continuous Infusions:   PN-Adult  3 IN 1 Central Line (Custom)      PN-Adult  3 IN 1 Central Line (Custom) 80 mL/hr at 09/12/18 1225    dextrose 50 mL/hr at 09/12/18 1047     PRN Meds:.sodium chloride flush, ondansetron, phenol, acetaminophen  Continuous Infusions:   PN-Adult  3 IN 1 Central Line (Custom)      PN-Adult  3 IN 1 Central Line (Custom) 80 mL/hr at 09/12/18 1225    dextrose 50 mL/hr at 09/12/18 1047       Intake/Output Summary (Last 24 hours) at 09/12/18 1424  Last data filed at 09/12/18 1352   Gross per 24 hour   Intake          1663.88 ml   Output             3165 ml   Net         -1501.12 ml       CBC:   Recent Labs      09/10/18   0543  09/12/18   0529   WBC  4.7*  6.3   HGB  12.3*  12.3*   HCT  36.0*  36.1*   PLT  235  261     BMP:    Recent Labs      09/11/18   1220  09/12/18   0529   NA  140  135   K  3.5  3.4*   CL  100  98   CO2  20*  21*   BUN  8  9   CREATININE  0.5  0.5   GLUCOSE  84  141*     Hepatic:   Recent Labs      09/12/18   0529   AST  14   ALT  7*   BILITOT  0.4   ALKPHOS  46     Urine: urine culture  ABGs: No results found for: PHART, PO2ART, WUS1HWC  INR: No results for input(s): INR in the last 72 hours. -----------------------------------------------------------------  Data Review   Recent Labs      09/10/18   0543  09/12/18   0529   WBC  4.7*  6.3   HGB  12.3*  12.3*   HCT  36.0*  36.1*   PLT  235  261     Recent Labs      09/11/18   1220  09/12/18   0529   NA  140  135   CL  100  98   K  3.5  3.4*   CO2  20*  21*   BUN  8  9   CREATININE  0.5  0.5   GLUCOSE  84  141*   PHOS  3.4  3.4   MG  1.7  1.7   CALCIUM  8.7  8.4*   PROT   --   5.4*   BILITOT   --   0.4   ALKPHOS   --   46     No results for input(s): CKTOTAL, CKMB, CKMBINDEX, CKMBCALCMETH, TROPONINI in the last 72 hours. Recent Labs      09/11/18   1034   TRIG  84     No results for input(s): BNP, TSH, FT4, ACETONE in the last 72 hours.           Assessment   Principal Problem: Incarcerated hernia  Active Problems:    COPD (chronic obstructive pulmonary disease) (HCC)    Hypertension    Severe malnutrition (HCC)    Hilar mass    Tobacco abuse    Recurrent unilateral inguinal hernia with obstruction and without gangrene  Resolved Problems:    * No resolved hospital problems.  *  Severe protein-calorie malnutrition    in the setting of weight loss, BMI <20, lung mass, & patient meets ASPEN criteria being treated with dietician following      Patient Active Problem List   Diagnosis    Incarcerated hernia    COPD (chronic obstructive pulmonary disease) ([address]    Hypertension    Severe malnutrition ([address]    Hilar mass    Tobacco abuse    Recurrent unilateral inguinal hernia with obstruction and without gangrene        Plan   On tpn  Replace  Ot/pt    David Romano MD

## 2018-09-12 NOTE — PROGRESS NOTES
[facility]  Postoperative Progress Note    Pt Name: Josie Giles  Medical Record Number: [id number]  Date of Birth 1950   Today's Date: 9/12/2018    Josefina Ca says he wants to eat. OBJECTIVE  VITALS: VITALS:  BP (!) 142/60   Pulse 101   Temp 98.4 °F (36.9 °C) (Oral)   Resp 16   Ht 5' 8\" (1.727 m)   Wt 128 lb 3.2 oz (58.2 kg)   SpO2 92%   BMI 19.49 kg/m²   GENERAL: alert, cooperative, no acute distress, nontoxic in appearance. LUNGS: clear to ausculation, without wheezes, rales or rhonci nonproductive cough  HEART: Slightly tachycardic  ABDOMEN: soft, non-tender, nondistended. Bowel sounds are hypoactive. Complains of suprapubic pressure. INCISIONS: clean, dry and intact  EXTERMITY: no cyanosis or edema  INTAKE/OUTPUT :   INTAKE/OUTPUT:      Intake/Output Summary (Last 24 hours) at 09/12/18 0643  Last data filed at 09/12/18 0523   Gross per 24 hour   Intake           976.59 ml   Output             2440 ml   Net         -1463.41 ml        LABS  CBC with Differential:    Lab Results   Component Value Date    WBC 4.7 09/10/2018    RBC 3.65 09/10/2018    HGB 12.3 09/10/2018    HCT 36.0 09/10/2018     09/10/2018    MCV 98.6 09/10/2018    MCH 33.7 09/10/2018    MCHC 34.2 09/10/2018    RDW 13.3 09/09/2013    NRBC 0 09/10/2018    SEGSPCT 61.7 09/10/2018    MONOPCT 14.5 09/10/2018    MONOSABS 0.7 09/10/2018    LYMPHSABS 0.8 09/10/2018    EOSABS 0.3 09/10/2018    BASOSABS 0.0 09/10/2018     BMP:    Lab Results   Component Value Date     09/12/2018    K 3.4 09/12/2018    CL 98 09/12/2018    CO2 21 09/12/2018    BUN 9 09/12/2018    LABALBU 2.6 09/12/2018    CREATININE 0.5 09/12/2018    CALCIUM 8.4 09/12/2018    LABGLOM >90 09/12/2018    GLUCOSE 141 09/12/2018         RADIOLOGY:  AXR ordered for today  ASSESSMENT  1. POD # 56/1. Repair of incarcerated recurrent right direct inguinal hernia.small bowel resection  2.  Left perihilar lung

## 2018-09-13 LAB
ANION GAP SERPL CALCULATED.3IONS-SCNC: 11 MEQ/L (ref 8–16)
BUN BLDV-MCNC: 12 MG/DL (ref 7–22)
CALCIUM IONIZED: 1.14 MMOL/L (ref 1.12–1.32)
CALCIUM SERPL-MCNC: 8.6 MG/DL (ref 8.5–10.5)
CHLORIDE BLD-SCNC: 98 MEQ/L (ref 98–111)
CO2: 27 MEQ/L (ref 23–33)
CREAT SERPL-MCNC: 0.5 MG/DL (ref 0.4–1.2)
GFR SERPL CREATININE-BSD FRML MDRD: > 90 ML/MIN/1.73M2
GLUCOSE BLD-MCNC: 101 MG/DL (ref 70–108)
GLUCOSE BLD-MCNC: 102 MG/DL (ref 70–108)
GLUCOSE BLD-MCNC: 104 MG/DL (ref 70–108)
GLUCOSE BLD-MCNC: 122 MG/DL (ref 70–108)
GLUCOSE BLD-MCNC: 125 MG/DL (ref 70–108)
MAGNESIUM: 1.7 MG/DL (ref 1.6–2.4)
PHOSPHORUS: 2.4 MG/DL (ref 2.4–4.7)
POTASSIUM SERPL-SCNC: 3.6 MEQ/L (ref 3.5–5.2)
SODIUM BLD-SCNC: 136 MEQ/L (ref 135–145)

## 2018-09-13 PROCEDURE — 2580000003 HC RX 258: Performed by: SURGERY

## 2018-09-13 PROCEDURE — 1200000003 HC TELEMETRY R&B

## 2018-09-13 PROCEDURE — 94640 AIRWAY INHALATION TREATMENT: CPT

## 2018-09-13 PROCEDURE — 2500000003 HC RX 250 WO HCPCS: Performed by: PHARMACIST

## 2018-09-13 PROCEDURE — 99406 BEHAV CHNG SMOKING 3-10 MIN: CPT | Performed by: NURSE PRACTITIONER

## 2018-09-13 PROCEDURE — 6370000000 HC RX 637 (ALT 250 FOR IP): Performed by: INTERNAL MEDICINE

## 2018-09-13 PROCEDURE — 2580000003 HC RX 258: Performed by: INTERNAL MEDICINE

## 2018-09-13 PROCEDURE — C9113 INJ PANTOPRAZOLE SODIUM, VIA: HCPCS | Performed by: SURGERY

## 2018-09-13 PROCEDURE — 99024 POSTOP FOLLOW-UP VISIT: CPT | Performed by: SURGERY

## 2018-09-13 PROCEDURE — 83735 ASSAY OF MAGNESIUM: CPT

## 2018-09-13 PROCEDURE — 6360000002 HC RX W HCPCS: Performed by: INTERNAL MEDICINE

## 2018-09-13 PROCEDURE — 36415 COLL VENOUS BLD VENIPUNCTURE: CPT

## 2018-09-13 PROCEDURE — 2709999900 HC NON-CHARGEABLE SUPPLY

## 2018-09-13 PROCEDURE — 84100 ASSAY OF PHOSPHORUS: CPT

## 2018-09-13 PROCEDURE — 80048 BASIC METABOLIC PNL TOTAL CA: CPT

## 2018-09-13 PROCEDURE — 99232 SBSQ HOSP IP/OBS MODERATE 35: CPT | Performed by: INTERNAL MEDICINE

## 2018-09-13 PROCEDURE — 82948 REAGENT STRIP/BLOOD GLUCOSE: CPT

## 2018-09-13 PROCEDURE — 6360000002 HC RX W HCPCS: Performed by: SURGERY

## 2018-09-13 PROCEDURE — 6370000000 HC RX 637 (ALT 250 FOR IP): Performed by: SURGERY

## 2018-09-13 PROCEDURE — APPSS30 APP SPLIT SHARED TIME 16-30 MINUTES: Performed by: NURSE PRACTITIONER

## 2018-09-13 PROCEDURE — 82330 ASSAY OF CALCIUM: CPT

## 2018-09-13 RX ORDER — NICOTINE 21 MG/24HR
1 PATCH, TRANSDERMAL 24 HOURS TRANSDERMAL DAILY
Qty: 30 PATCH | Refills: 3
Start: 2018-09-14 | End: 2018-10-11

## 2018-09-13 RX ADMIN — IPRATROPIUM BROMIDE AND ALBUTEROL SULFATE 1 AMPULE: .5; 3 SOLUTION RESPIRATORY (INHALATION) at 12:08

## 2018-09-13 RX ADMIN — BUSPIRONE HYDROCHLORIDE 10 MG: 10 TABLET ORAL at 21:51

## 2018-09-13 RX ADMIN — Medication 2 PUFF: at 08:38

## 2018-09-13 RX ADMIN — Medication 10 ML: at 08:54

## 2018-09-13 RX ADMIN — ENOXAPARIN SODIUM 40 MG: 40 INJECTION SUBCUTANEOUS at 21:51

## 2018-09-13 RX ADMIN — BUSPIRONE HYDROCHLORIDE 10 MG: 10 TABLET ORAL at 08:11

## 2018-09-13 RX ADMIN — LOSARTAN POTASSIUM 50 MG: 50 TABLET, FILM COATED ORAL at 08:54

## 2018-09-13 RX ADMIN — NALOXEGOL OXALATE 12.5 MG: 12.5 TABLET, FILM COATED ORAL at 08:54

## 2018-09-13 RX ADMIN — Medication 10 ML: at 08:56

## 2018-09-13 RX ADMIN — PANTOPRAZOLE SODIUM 40 MG: 40 INJECTION, POWDER, FOR SOLUTION INTRAVENOUS at 08:55

## 2018-09-13 RX ADMIN — IPRATROPIUM BROMIDE AND ALBUTEROL SULFATE 1 AMPULE: .5; 3 SOLUTION RESPIRATORY (INHALATION) at 08:32

## 2018-09-13 RX ADMIN — Medication 2 PUFF: at 20:54

## 2018-09-13 RX ADMIN — CEFEPIME HYDROCHLORIDE 2 G: 2 INJECTION, POWDER, FOR SOLUTION INTRAVENOUS at 10:56

## 2018-09-13 RX ADMIN — KETOROLAC TROMETHAMINE 15 MG: 30 INJECTION, SOLUTION INTRAMUSCULAR at 05:12

## 2018-09-13 RX ADMIN — CEFEPIME HYDROCHLORIDE 2 G: 2 INJECTION, POWDER, FOR SOLUTION INTRAVENOUS at 17:44

## 2018-09-13 RX ADMIN — HYDROCHLOROTHIAZIDE 12.5 MG: 25 TABLET ORAL at 08:54

## 2018-09-13 RX ADMIN — CEFEPIME HYDROCHLORIDE 2 G: 2 INJECTION, POWDER, FOR SOLUTION INTRAVENOUS at 02:05

## 2018-09-13 RX ADMIN — CALCIUM GLUCONATE: 94 INJECTION, SOLUTION INTRAVENOUS at 18:47

## 2018-09-13 RX ADMIN — KETOROLAC TROMETHAMINE 15 MG: 30 INJECTION, SOLUTION INTRAMUSCULAR at 17:44

## 2018-09-13 RX ADMIN — IPRATROPIUM BROMIDE AND ALBUTEROL SULFATE 1 AMPULE: .5; 3 SOLUTION RESPIRATORY (INHALATION) at 15:35

## 2018-09-13 RX ADMIN — KETOROLAC TROMETHAMINE 15 MG: 30 INJECTION, SOLUTION INTRAMUSCULAR at 10:55

## 2018-09-13 RX ADMIN — IPRATROPIUM BROMIDE AND ALBUTEROL SULFATE 1 AMPULE: .5; 3 SOLUTION RESPIRATORY (INHALATION) at 20:54

## 2018-09-13 RX ADMIN — DOXAZOSIN 2 MG: 2 TABLET ORAL at 21:51

## 2018-09-13 ASSESSMENT — PAIN SCALES - GENERAL
PAINLEVEL_OUTOF10: 0
PAINLEVEL_OUTOF10: 3
PAINLEVEL_OUTOF10: 0

## 2018-09-13 NOTE — PROGRESS NOTES
Dr. Godoy [facility] Progress note        9/13/2018                  11:13 AM        Patient:  Vj Guzman  YOB: 1950    MRN: [id number]   Acct:  [de-identified]   5K-12/012-A  Primary Care Physician: Arie Nolan MD    Admit Date: 9/5/2018           Subjective: He feels better. Ng d/radhika. Objective:   Vitals: BP (!) 154/78   Pulse 100   Temp 98.3 °F (36.8 °C) (Oral)   Resp 18   Ht 5' 8\" (1.727 m)   Wt 128 lb 3.2 oz (58.2 kg)   SpO2 93%   BMI 19.49 kg/m²   Physical Exam:  General appearance: alert, appears stated age and cooperative  Skin: Skin color, texture, turgor normal.   HEENT: Head: Normal, normocephalic, atraumatic. Neck: no adenopathy, no carotid bruit and no JVD  Lungs: honorio air movement  Heart: S1, S2 normal  Abdomen: full but with pos bs.   Extremities: extremities normal, atraumatic, no cyanosis or edema  Lymphatic: No significant lymph node enlargement papable  Neurologic: Mental status: Alert, oriented, thought content appropriate      Diet: PN-Adult 3 IN 1 Central Line (Custom)  DIET CLEAR LIQUID;        Data:   Scheduled Meds: Scheduled Meds:   naloxegol  12.5 mg Oral QAM    sodium chloride flush  10 mL Intravenous 2 times per day    insulin lispro  0-12 Units Subcutaneous Q6H    enoxaparin  40 mg Subcutaneous Q24H    ketorolac  15 mg Intravenous Q6H    hydrochlorothiazide  12.5 mg Oral Daily    nicotine  1 patch Transdermal Daily    cefepime  2 g Intravenous Q8H    potassium replacement protocol   Other RX Placeholder    pantoprazole  40 mg Intravenous Daily    And    sodium chloride (PF)  10 mL Intravenous Daily    busPIRone  10 mg Oral BID    losartan  50 mg Oral Daily    ipratropium-albuterol  1 ampule Inhalation Q4H WA    doxazosin  2 mg Oral Nightly    mometasone-formoterol  2 puff Inhalation BID     Continuous Infusions:   PN-Adult  3 IN 1 Central Line (Custom) 80 mL/hr at 09/12/18 1841     PRN

## 2018-09-13 NOTE — PROGRESS NOTES
recurrent right direct inguinal hernia.small bowel resection  2. Left perihilar lung mass. Patient is holding off on CT-guided biopsy at present. 3.  Bowel distention- improved  4. Tolerating NG tube out     PLAN  1. Clear liquid diet if tolerates may advance to full liquid diet later today. 2. Encourage activity  3. Pulmonary following for evaluation of a lung mass. CT-guided biopsy planned at some point patient deferred at this point. 4.  Continue IV antibiotics per pulmonary for strep pneumo in sputum bronchial washings. Can discontinue antibiotics from a surgical standpoint at any point.     Radha Guzman MD  Electronically signed 9/13/2018 at 12:14 PM

## 2018-09-13 NOTE — PROGRESS NOTES
2997: Shift report with primary nurse Ant. Staciaida Bullard [facility].    0800: Shift assessment and vital signs in flowsheet. Patient denies and pain or discomfort at this time, resting in bed, side rails up x 2, wheels locked, low position. Call light within reach, bedside table within reach. Patient instructed to use call light with any needs or assistance. Staciaida Bullard [facility].     0900: Patient walked with 1 assist around the unit. Total of 950 feet. Patient tolerated well. [facility].     4022: Patient walked with 1 assist around the unit. Total of 950 feet. Patient tolerated well. Patient denies and pain or discomfort at this time, resting in bed, side rails up x 2, wheels locked, low position. Call light within reach, bedside table within reach. Patient instructed to use call light with any needs or assistance. [facility].    1330: Report off to primary nurse Ant. Patient denies and pain or discomfort at this time, resting in bed, side rails up x 2, wheels locked, low position. Call light within reach, bedside table within reach. Patient instructed to use call light with any needs or assistance.  [facility].

## 2018-09-13 NOTE — PROGRESS NOTES
[facility]    Patient - Alejandrina Johnson   MRN -  [id number]   [facility]   - 1950      Date of Admission -  2018  1:12 PM  Date of evaluation -  2018  Room - -12012-A   Hospital Day - MD Abraham Primary Care Physician - Jimy Momin MD     Problem List      Active Hospital Problems    Diagnosis Date Noted    Recurrent unilateral inguinal hernia with obstruction and without gangrene [K40.31]     Hilar mass [R91.8]     Tobacco abuse [Z72.0]     COPD (chronic obstructive [facility] disease) ([facility] [J44.9] 2018    Hypertension [I10] 2018    Severe malnutrition ([facility] [E43] 2018     Class: Chronic    Incarcerated hernia [K46.0] 2018     Chief complaint   Following for lung mass   HPI   History Obtained From: Patient and electronic medical record. Alejandrina Johnson is a 75 y.o. male  was initially admitted under hospitalist service. Pulmonary medicine was consulted for further management of Left hilar lung mass noted on CT chest. He is S/p Incarcerated recurrent right direct inguinal hernia POD #2. He underwent CT scan of chest on 18 at [facility]. He had a chronic hx of tobacco smoking for 55years. He is currently smoking with 1PPD prior to hospitalization. He usually follows with [facility] for his COPD. He is currently on treatment with  Symbicort 160/4.5mcg spray MDI, 2 puffs via inhalation BID along with Albuterol HFA 90mcg/Spray MDI, 2puffs  Q6Hprn and Atrovent HFA 2puffs Q6h prn. He is not using any home O2 at home. He was never diagnosed with any lung cancer or mass so far. He gives a hx of occasional cough with no hemoptysis or expectoration. No recent travel history. He denies any history of pulmonary tuberculosis or exposure to TB patients recently. He denies any recent travel history to endemic places to tuberculosis. He denies any history of hemoptysis.

## 2018-09-13 NOTE — PROGRESS NOTES
Nutrition Assessment    Type and Reason for Visit: Reassess (TPN Monitor)    Nutrition Recommendations: Recommend increasing 3-in-1 TPN to 15 kcal/kg, 1.5 g/kg protein and 30% lipid kcals based on dosing weight of 58 kg. *Continue Clear Liquid diet as tolerated. Continue Ensure Clear BID. Malnutrition Assessment:  · Malnutrition Status: Meets the criteria for severe malnutrition  · Context: Chronic illness  · Findings of the 6 clinical characteristics of malnutrition (Minimum of 2 out of 6 clinical characteristics is required to make the diagnosis of moderate or severe Protein Calorie Malnutrition based on AND/ASPEN Guidelines):  1. Energy Intake-Less than or equal to 75%, greater than or equal to 1 month    2. Fat Loss-Severe subcutaneous fat loss, Triceps, Orbital  3. Muscle Loss-Severe muscle mass loss, Temples (temporalis muscle), Clavicles (pectoralis and deltoids), Thigh (quadriceps)    Nutrition Diagnosis:   · Problem: Severe malnutrition, in context of chronic illness  · Etiology: related to Insufficient energy/nutrient consumption     Signs and symptoms:  as evidenced by Patient report of, Severe loss of subcutaneous fat, Severe muscle loss (consuming less than 75% of energy intake over the past month)    Nutrition Assessment:  · Subjective Assessment: Pt s/p diagnostic laparoscopy, laparotomy & small bowel resection on 9/11. NGT removed. Diet advanced to Clear Liquid today. Pt seen- he reports tolerating breakfast well, having some jello and spite. Pt denies nausea or abdominal pain. Pt reports he is passing flatus; no BM yet. Pt with PICC in place on TPN. Labs: K+ 3.6, Phos 2.4, Mg 1.7, Glucose 104. Rx includes: PPI, hydrodiuril, maxipime, humalog, movantic.   · Wound Type: Surgical Wound (s/p hernia repair 9/5/18, s/p laparoscopy, laparotomy and small bowel resection 9/11/18 (abdominal incision))  · Current Nutrition Therapies:  · Oral Diet Orders: Clear Liquid   · Oral Diet intake: 1-25% (bites of Intake, Diet Tolerance, Wound Healing, Weight, Comparative Standards, Pertinent Labs, Constipation    See Adult Nutrition Doc Flowsheet for more detail.      Electronically signed by Bienvenido Hernandez RD, LD on 9/13/18 at 10:28 AM    Contact Number: [phone]

## 2018-09-14 LAB
ANION GAP SERPL CALCULATED.3IONS-SCNC: 13 MEQ/L (ref 8–16)
BUN BLDV-MCNC: 12 MG/DL (ref 7–22)
CALCIUM IONIZED: 1.1 MMOL/L (ref 1.12–1.32)
CALCIUM SERPL-MCNC: 8.8 MG/DL (ref 8.5–10.5)
CHLORIDE BLD-SCNC: 95 MEQ/L (ref 98–111)
CO2: 30 MEQ/L (ref 23–33)
CREAT SERPL-MCNC: 0.4 MG/DL (ref 0.4–1.2)
GFR SERPL CREATININE-BSD FRML MDRD: > 90 ML/MIN/1.73M2
GLUCOSE BLD-MCNC: 117 MG/DL (ref 70–108)
GLUCOSE BLD-MCNC: 128 MG/DL (ref 70–108)
GLUCOSE BLD-MCNC: 129 MG/DL (ref 70–108)
GLUCOSE BLD-MCNC: 135 MG/DL (ref 70–108)
MAGNESIUM: 1.8 MG/DL (ref 1.6–2.4)
PHOSPHORUS: 2.7 MG/DL (ref 2.4–4.7)
POTASSIUM SERPL-SCNC: 4 MEQ/L (ref 3.5–5.2)
SODIUM BLD-SCNC: 138 MEQ/L (ref 135–145)
VIRAL CULTURE,RAPID,RESPIRATOR: NORMAL

## 2018-09-14 PROCEDURE — 2580000003 HC RX 258: Performed by: INTERNAL MEDICINE

## 2018-09-14 PROCEDURE — 84100 ASSAY OF PHOSPHORUS: CPT

## 2018-09-14 PROCEDURE — C9113 INJ PANTOPRAZOLE SODIUM, VIA: HCPCS | Performed by: SURGERY

## 2018-09-14 PROCEDURE — 1200000003 HC TELEMETRY R&B

## 2018-09-14 PROCEDURE — 6370000000 HC RX 637 (ALT 250 FOR IP): Performed by: INTERNAL MEDICINE

## 2018-09-14 PROCEDURE — 82330 ASSAY OF CALCIUM: CPT

## 2018-09-14 PROCEDURE — APPSS30 APP SPLIT SHARED TIME 16-30 MINUTES: Performed by: NURSE PRACTITIONER

## 2018-09-14 PROCEDURE — 6360000002 HC RX W HCPCS: Performed by: INTERNAL MEDICINE

## 2018-09-14 PROCEDURE — 6360000002 HC RX W HCPCS: Performed by: SURGERY

## 2018-09-14 PROCEDURE — 36415 COLL VENOUS BLD VENIPUNCTURE: CPT

## 2018-09-14 PROCEDURE — 80048 BASIC METABOLIC PNL TOTAL CA: CPT

## 2018-09-14 PROCEDURE — 99232 SBSQ HOSP IP/OBS MODERATE 35: CPT | Performed by: INTERNAL MEDICINE

## 2018-09-14 PROCEDURE — 94640 AIRWAY INHALATION TREATMENT: CPT

## 2018-09-14 PROCEDURE — 6370000000 HC RX 637 (ALT 250 FOR IP): Performed by: SURGERY

## 2018-09-14 PROCEDURE — 2580000003 HC RX 258: Performed by: SURGERY

## 2018-09-14 PROCEDURE — 83735 ASSAY OF MAGNESIUM: CPT

## 2018-09-14 PROCEDURE — 82948 REAGENT STRIP/BLOOD GLUCOSE: CPT

## 2018-09-14 RX ORDER — BISACODYL 10 MG
10 SUPPOSITORY, RECTAL RECTAL DAILY PRN
Status: DISCONTINUED | OUTPATIENT
Start: 2018-09-14 | End: 2018-09-17 | Stop reason: HOSPADM

## 2018-09-14 RX ORDER — CALCIUM CARBONATE 200(500)MG
500 TABLET,CHEWABLE ORAL 3 TIMES DAILY PRN
Status: DISCONTINUED | OUTPATIENT
Start: 2018-09-14 | End: 2018-09-17 | Stop reason: HOSPADM

## 2018-09-14 RX ADMIN — Medication 10 ML: at 08:55

## 2018-09-14 RX ADMIN — BISACODYL 10 MG: 10 SUPPOSITORY RECTAL at 16:32

## 2018-09-14 RX ADMIN — PANTOPRAZOLE SODIUM 40 MG: 40 INJECTION, POWDER, FOR SOLUTION INTRAVENOUS at 08:55

## 2018-09-14 RX ADMIN — CEFEPIME HYDROCHLORIDE 2 G: 2 INJECTION, POWDER, FOR SOLUTION INTRAVENOUS at 03:00

## 2018-09-14 RX ADMIN — Medication 2 PUFF: at 21:47

## 2018-09-14 RX ADMIN — DOXAZOSIN 2 MG: 2 TABLET ORAL at 20:46

## 2018-09-14 RX ADMIN — IPRATROPIUM BROMIDE AND ALBUTEROL SULFATE 1 AMPULE: .5; 3 SOLUTION RESPIRATORY (INHALATION) at 12:21

## 2018-09-14 RX ADMIN — IPRATROPIUM BROMIDE AND ALBUTEROL SULFATE 1 AMPULE: .5; 3 SOLUTION RESPIRATORY (INHALATION) at 09:05

## 2018-09-14 RX ADMIN — CEFEPIME HYDROCHLORIDE 2 G: 2 INJECTION, POWDER, FOR SOLUTION INTRAVENOUS at 09:39

## 2018-09-14 RX ADMIN — ENOXAPARIN SODIUM 40 MG: 40 INJECTION SUBCUTANEOUS at 20:46

## 2018-09-14 RX ADMIN — Medication 2 PUFF: at 09:05

## 2018-09-14 RX ADMIN — LOSARTAN POTASSIUM 50 MG: 50 TABLET, FILM COATED ORAL at 08:54

## 2018-09-14 RX ADMIN — ONDANSETRON 4 MG: 2 INJECTION INTRAMUSCULAR; INTRAVENOUS at 20:46

## 2018-09-14 RX ADMIN — NALOXEGOL OXALATE 12.5 MG: 12.5 TABLET, FILM COATED ORAL at 08:54

## 2018-09-14 RX ADMIN — BUSPIRONE HYDROCHLORIDE 10 MG: 10 TABLET ORAL at 20:46

## 2018-09-14 RX ADMIN — IPRATROPIUM BROMIDE AND ALBUTEROL SULFATE 1 AMPULE: .5; 3 SOLUTION RESPIRATORY (INHALATION) at 21:47

## 2018-09-14 RX ADMIN — HYDROCHLOROTHIAZIDE 12.5 MG: 25 TABLET ORAL at 08:54

## 2018-09-14 RX ADMIN — BUSPIRONE HYDROCHLORIDE 10 MG: 10 TABLET ORAL at 08:54

## 2018-09-14 RX ADMIN — Medication 10 ML: at 08:54

## 2018-09-14 RX ADMIN — IPRATROPIUM BROMIDE AND ALBUTEROL SULFATE 1 AMPULE: .5; 3 SOLUTION RESPIRATORY (INHALATION) at 17:27

## 2018-09-14 RX ADMIN — CEFEPIME HYDROCHLORIDE 2 G: 2 INJECTION, POWDER, FOR SOLUTION INTRAVENOUS at 17:53

## 2018-09-14 ASSESSMENT — PAIN SCALES - GENERAL
PAINLEVEL_OUTOF10: 0
PAINLEVEL_OUTOF10: 0

## 2018-09-14 NOTE — PLAN OF CARE
Problem: Pain:  Goal: Pain level will decrease  Pain level will decrease   Outcome: Ongoing  Patient denies pain this shift. Prn Tylenol available. Problem: Nutrition  Goal: Optimal nutrition therapy  Outcome: Ongoing  Patient on full liquid diet and tolerating well. Problem: Discharge Planning:  Goal: Discharged to appropriate level of care  Discharged to appropriate level of care   Outcome: Ongoing  Plans to return home at discharge. No discharge orders at this time. Problem: Skin Integrity:  Goal: Skin integrity will stabilize  Skin integrity will stabilize   Outcome: Ongoing  No new skin issues noted this shift. Patient up with 1 assist. Turns self. Ambulates frequently. Abdomen incisions clean, dry and intact. Problem: Cardiovascular  Goal: No DVT, peripheral vascular complications  Outcome: Ongoing  No s/s of DVT this shift. Patient ambulates frequently. Will continue to monitor. Problem: Respiratory  Goal: O2 Sat > 90%  Outcome: Ongoing  Patient SpO2 above 90 % on room air this shift. Problem: GI  Goal: No bowel complications  Outcome: Ongoing  Patient still unable to have bowel movement. Problem:   Goal: Adequate urinary output  Outcome: Ongoing  Patient voiding adequate amounts this shift. Problem: Infection - Central Venous Catheter-Associated Bloodstream Infection:  Goal: Will show no infection signs and symptoms  Will show no infection signs and symptoms   Outcome: Ongoing  No s/s of infection this shift. Patient afebrile. Problem: Falls - Risk of:  Goal: Will remain free from falls  Will remain free from falls   Outcome: Ongoing  Patient remains free from falls this shift. Call light and bedside table within reach. Bed in lowest position with alarm on. Rounding hourly. Comments: Care plan reviewed with patient. Patient verbalizes understanding of the plan of care and contribute to goal setting.

## 2018-09-14 NOTE — PROGRESS NOTES
[facility]    Patient - Wayne Eddy   MRN -  [id number]   [facility]   - 1950      Date of Admission -  2018  1:12 PM  Date of evaluation -  2018  Room - -012-A   Hospital Day - [address] [facility] [address], [place] Primary Care Physician - Heidi Madera MD     Problem List      Active Hospital Problems    Diagnosis Date Noted    Recurrent unilateral inguinal hernia with obstruction and without gangrene [K40.31]     Hilar mass [R91.8]     Tobacco abuse [Z72.0]     COPD (chronic obstructive pulmonary disease) [facility] [J44.9] 2018    Hypertension [I10] 2018    Severe malnutrition ([facility] [E43] 2018     Class: Chronic    Incarcerated hernia [K46.0] 2018     Chief complaint   Following for lung mass   HPI   History Obtained From: Patient and electronic medical record. Wayne Eddy is a 75 y.o. male  was initially admitted under hospitalist service. Pulmonary medicine was consulted for further management of Left hilar lung mass noted on CT chest. He is S/p Incarcerated recurrent right direct inguinal hernia POD #2. He underwent CT scan of chest on 18 at [facility]. He had a chronic hx of tobacco smoking for 55years. He is currently smoking with 1PPD prior to hospitalization. He usually follows with [facility] for his COPD. He is currently on treatment with  Symbicort 160/4.5mcg spray MDI, 2 puffs via inhalation BID along with Albuterol HFA 90mcg/Spray MDI, 2puffs  Q6Hprn and Atrovent HFA 2puffs Q6h prn. He is not using any home O2 at home. He was never diagnosed with any lung cancer or mass so far. He gives a hx of occasional cough with no hemoptysis or expectoration. No recent travel history. He denies any history of pulmonary tuberculosis or exposure to TB patients recently. He denies any recent travel history to endemic places to tuberculosis. He denies any history of hemoptysis. He denies any history of orthopnea. He denies any history of paroxysmal nocturnal dyspnea. He denies any history of lung cancer in first-degree relative; exposure to asbestos, radon, or uranium. He was never diagnosed with malignancy in the past. No previous history of pulmonary embolism. Endobronchial findings: 9/7/18  Trachea: Normal mucosa. Vesta: Normal mucosa  Right main bronchus: Normal mucosa  Right upper lobe bronchus: Normal mucosa  Right Middle lobe bronchus: Normal mucosa  Right Lower lobe bronchus:Normal mucosa  Left main bronchus: Normal mucosa  Left upper lobe bronchus: Narrowed Left apico- posterior segmental bronchus. No obvious mass shadow seen on Fluoroscopy to biopsy. Due to persistent cough and oxygen desaturations no biopsies were attempted.    Left lower lobe bronchus: Normal mucosa     No endobronchial lesions seen.      Subjective/Events Past 24 hours/ROS   -Afebrile  -OOB to chair  -Patient on Full liquid diet  -Passing flatus  -Reports SOB improved compared to time of admission  All other systems reviewed  PMHx   Past Medical History      Diagnosis Date    COPD (chronic obstructive pulmonary disease) (HCC)     Gastric reflux     Hyperlipidemia     Hypertension      Meds    Current Medications    naloxegol  12.5 mg Oral QAM    sodium chloride flush  10 mL Intravenous 2 times per day    insulin lispro  0-12 Units Subcutaneous Q6H    enoxaparin  40 mg Subcutaneous Q24H    hydrochlorothiazide  12.5 mg Oral Daily    nicotine  1 patch Transdermal Daily    cefepime  2 g Intravenous Q8H    potassium replacement protocol   Other RX Placeholder    pantoprazole  40 mg Intravenous Daily    And    sodium chloride (PF)  10 mL Intravenous Daily    busPIRone  10 mg Oral BID    losartan  50 mg Oral Daily    ipratropium-albuterol  1 ampule Inhalation Q4H WA    doxazosin  2 mg Oral Nightly    mometasone-formoterol  2 puff Inhalation BID     sodium chloride flush, ondansetron, HCT  36.1*   MCV  96.5*   MCH  32.9   MCHC  34.1   PLT  261   MPV  9.5      BMP  Recent Labs      09/12/18   0529  09/13/18   0520  09/14/18   0530   NA  135  136  138   K  3.4*  3.6  4.0   CL  98  98  95*   CO2  21*  27  30   BUN  9  12  12   CREATININE  0.5  0.5  0.4   GLUCOSE  141*  104  129*   MG  1.7  1.7  1.8   PHOS  3.4  2.4  2.7   CALCIUM  8.4*  8.6  8.8     LFT  Recent Labs      09/12/18   0529   AST  14   ALT  7*   BILITOT  0.4   ALKPHOS  46     TROP  Lab Results   Component Value Date    TROPONINT < 0.010 09/05/2018    TROPONINT < 0.010 07/19/2018     BNP  No results for input(s): BNP in the last 72 hours. Lactic Acid  No results for input(s): LACTA in the last 72 hours. INR  No results for input(s): INR, PROTIME in the last 72 hours. PTT  No results for input(s): APTT in the last 72 hours. Glucose  Recent Labs      09/14/18   0522  09/14/18   0815  09/14/18   1208   POCGLU  117*  135*  128*     UA No results for input(s): SPECGRAV, PHUR, COLORU, CLARITYU, MUCUS, PROTEINU, BLOODU, RBCUA, WBCUA, BACTERIA, NITRU, GLUCOSEU, BILIRUBINUR, UROBILINOGEN, KETUA, LABCAST, LABCASTTY, AMORPHOS in the last 72 hours. Invalid input(s): CRYSTALS. PFTs   No old studies in Epic. Sleep studies   No old studies in Epic.   Cultures    Bronch cultureswashings- Streptococcus pneumoniae with Enterobacter cloacae complex    Source: bronchial washings       Site: left trachial tree          Current Antibiotics:   Cefoxitin, Cefepime   Culture & Susceptibility     ENTEROBACTER CLOACAE COMPLEX     Antibiotic Interpretation TREMAINE Unit Status   cefepime Sensitive <=1 mcg/mL Final   gentamicin Sensitive <=1 mcg/mL Final   trimethoprim-sulfamethoxazole Sensitive <=20 mcg/mL Final         STREPTOCOCCUS PNEUMONIAE     Antibiotic Interpretation TREMAINE Unit Status   Ceftriaxone (non-meningitis) Sensitive <=0.12 mcg/mL Final   Penicilin V oral Intermediate =0.25 mcg/mL Final   Penicillin IV  meningitis Resistant =0.25 mcg/mL Final

## 2018-09-14 NOTE — PROGRESS NOTES
[facility]  Postoperative Progress Note    Pt Name: Regan Alcaraz  Medical Record Number: [id number]  Date of Birth 1950   Today's Date: 9/14/2018    Barbara Martin says he feels much better. He has taken small amounts of fluids. No flatus yet. He is holding off on CT-guided lung biopsy at this time. OBJECTIVE  VITALS: VITALS:  BP (!) 141/90   Pulse 101   Temp 98.1 °F (36.7 °C) (Oral)   Resp 17   Ht 5' 8\" (1.727 m)   Wt 128 lb 3.2 oz (58.2 kg)   SpO2 91%   BMI 19.49 kg/m²   GENERAL: alert, cooperative, no acute distress, nontoxic in appearance. LUNGS: clear to ausculation, without wheezes, rales or rhonci nonproductive cough  HEART: Slightly tachycardic  ABDOMEN: soft, non-tender, nondistended. Bowel sounds are hypoactive. INCISIONS: clean, dry and intact  EXTERMITY: no cyanosis or edema  INTAKE/OUTPUT :   INTAKE/OUTPUT:      Intake/Output Summary (Last 24 hours) at 09/14/18 1818  Last data filed at 09/14/18 1313   Gross per 24 hour   Intake             2369 ml   Output             1425 ml   Net              944 ml        LABS  CBC with Differential:    Lab Results   Component Value Date    WBC 6.3 09/12/2018    RBC 3.74 09/12/2018    HGB 12.3 09/12/2018    HCT 36.1 09/12/2018     09/12/2018    MCV 96.5 09/12/2018    MCH 32.9 09/12/2018    MCHC 34.1 09/12/2018    RDW 13.3 09/09/2013    NRBC 0 09/12/2018    SEGSPCT 71.3 09/12/2018    MONOPCT 11.7 09/12/2018    MONOSABS 0.7 09/12/2018    LYMPHSABS 1.0 09/12/2018    EOSABS 0.0 09/12/2018    BASOSABS 0.0 09/12/2018     BMP:    Lab Results   Component Value Date     09/14/2018    K 4.0 09/14/2018    CL 95 09/14/2018    CO2 30 09/14/2018    BUN 12 09/14/2018    LABALBU 2.6 09/12/2018    CREATININE 0.4 09/14/2018    CALCIUM 8.8 09/14/2018    LABGLOM >90 09/14/2018    GLUCOSE 129 09/14/2018         RADIOLOGY:  AXR ordered for today  ASSESSMENT  1. POD # 8/2.   Repair of incarcerated recurrent right direct inguinal hernia.small bowel resection  2. Left perihilar lung mass. Patient is holding off on CT-guided biopsy at present. 3.  Bowel distention- improved  4. Tolerating NG tube out     PLAN  1. Patient on full liquids but is really not had much in the way of bowel habits  2. Encourage activity  3. Pulmonary following for evaluation of a lung mass. CT-guided biopsy planned at some point patient deferred at this point. 4.  Continue IV antibiotics per pulmonary for strep pneumo in sputum bronchial washings. Can discontinue antibiotics from a surgical standpoint at any point.     Concepción Maurice MD  Electronically signed 9/14/2018 at 6:18 PM

## 2018-09-14 NOTE — PROGRESS NOTES
toward goals   · Goals: Pt sherri tolerate adequate nutrition support and po intake to meet 75% or more of estimated nutritional needs until able to transition to solely PO feeds during LOS   · Monitoring: Meal Intake, Supplement Intake, Diet Tolerance, Diet Progression, Skin Integrity, Wound Healing, Weight, Comparative Standards, Pertinent Labs, Constipation    See Adult Nutrition Doc Flowsheet for more detail.      Electronically signed by Beba Bustamante RD, CHANA on 9/14/18 at 10:22 AM    Contact Number: [phone]

## 2018-09-14 NOTE — PROGRESS NOTES
malnutrition ([address]    Hilar mass    Tobacco abuse    Recurrent unilateral inguinal hernia with obstruction and without gangrene  Resolved Problems:    * No resolved hospital problems.  *  Severe protein-calorie malnutrition    in the setting of weight loss, BMI <20, lung mass, & patient meets ASPEN criteria being treated with dietician following      Patient Active Problem List   Diagnosis    Incarcerated hernia    COPD (chronic obstructive pulmonary disease) ([address]    Hypertension    Severe malnutrition ([address]    Hilar mass    Tobacco abuse    Recurrent unilateral inguinal hernia with obstruction and without gangrene        Plan   On diet, clear liquid  Will stop tpn after this  Dulcolax supp      Ly Mckay MD

## 2018-09-14 NOTE — PROGRESS NOTES
TPN Follow Up Note    Assessment: Diet increased to full liquid but hasn't tried yet    Electrolyte Replacement: None    TPN changes for (today) at 1800:   1. Remove K Acetate  2. Add KCl 50meq  3.  Increase Calcium gluconate to 4.65meq    See dietitian's note for macro changes    Re-check BMP, Mg, PO4, iCa 9/15/18 AM    Susan Peerz PharmD, BCPS 9/14/2018 12:25 PM

## 2018-09-14 NOTE — CARE COORDINATION
9/14/18, 1:33 PM      [facility] day: 9  Location: [address] [place] [address] Reason for admit: Abdominal hernia [K46.9]   Procedure: 9/05/18  DIAGNOSTIC LAPAROSCOPY  OPEN SMALL BOWEL RESECTION &RIGHT INGUINAL HERNIA REPAIR  9/07/18 Bronch  9/11/18 Diagnostic laparoscopy, laparotomy, and small bowel resection. 9/12/18 PICC  Treatment Plan of Care: TPN last dose today. PCP: Heidi Madera MD  Readmission Risk Score: 11%  Discharge Plan: Home with [facility].

## 2018-09-15 PROCEDURE — 6370000000 HC RX 637 (ALT 250 FOR IP): Performed by: INTERNAL MEDICINE

## 2018-09-15 PROCEDURE — 6360000002 HC RX W HCPCS: Performed by: SURGERY

## 2018-09-15 PROCEDURE — 2580000003 HC RX 258: Performed by: SURGERY

## 2018-09-15 PROCEDURE — 6370000000 HC RX 637 (ALT 250 FOR IP): Performed by: SURGERY

## 2018-09-15 PROCEDURE — 6370000000 HC RX 637 (ALT 250 FOR IP): Performed by: HOSPITALIST

## 2018-09-15 PROCEDURE — 1200000003 HC TELEMETRY R&B

## 2018-09-15 PROCEDURE — C9113 INJ PANTOPRAZOLE SODIUM, VIA: HCPCS | Performed by: SURGERY

## 2018-09-15 PROCEDURE — 94640 AIRWAY INHALATION TREATMENT: CPT

## 2018-09-15 PROCEDURE — 6360000002 HC RX W HCPCS: Performed by: INTERNAL MEDICINE

## 2018-09-15 PROCEDURE — 2709999900 HC NON-CHARGEABLE SUPPLY

## 2018-09-15 PROCEDURE — 99024 POSTOP FOLLOW-UP VISIT: CPT | Performed by: SURGERY

## 2018-09-15 PROCEDURE — 2580000003 HC RX 258: Performed by: INTERNAL MEDICINE

## 2018-09-15 RX ORDER — METOCLOPRAMIDE HYDROCHLORIDE 5 MG/ML
10 INJECTION INTRAMUSCULAR; INTRAVENOUS EVERY 6 HOURS
Status: DISCONTINUED | OUTPATIENT
Start: 2018-09-15 | End: 2018-09-17 | Stop reason: HOSPADM

## 2018-09-15 RX ORDER — SODIUM CHLORIDE 9 MG/ML
INJECTION, SOLUTION INTRAVENOUS CONTINUOUS
Status: DISCONTINUED | OUTPATIENT
Start: 2018-09-15 | End: 2018-09-17 | Stop reason: HOSPADM

## 2018-09-15 RX ADMIN — IPRATROPIUM BROMIDE AND ALBUTEROL SULFATE 1 AMPULE: .5; 3 SOLUTION RESPIRATORY (INHALATION) at 15:54

## 2018-09-15 RX ADMIN — CEFEPIME HYDROCHLORIDE 2 G: 2 INJECTION, POWDER, FOR SOLUTION INTRAVENOUS at 11:06

## 2018-09-15 RX ADMIN — LOSARTAN POTASSIUM 50 MG: 50 TABLET, FILM COATED ORAL at 09:25

## 2018-09-15 RX ADMIN — SODIUM CHLORIDE: 9 INJECTION, SOLUTION INTRAVENOUS at 09:24

## 2018-09-15 RX ADMIN — CEFEPIME HYDROCHLORIDE 2 G: 2 INJECTION, POWDER, FOR SOLUTION INTRAVENOUS at 19:10

## 2018-09-15 RX ADMIN — SODIUM CHLORIDE: 9 INJECTION, SOLUTION INTRAVENOUS at 19:10

## 2018-09-15 RX ADMIN — ENOXAPARIN SODIUM 40 MG: 40 INJECTION SUBCUTANEOUS at 20:19

## 2018-09-15 RX ADMIN — Medication 2 PUFF: at 20:29

## 2018-09-15 RX ADMIN — ONDANSETRON 4 MG: 2 INJECTION INTRAMUSCULAR; INTRAVENOUS at 03:32

## 2018-09-15 RX ADMIN — Medication 2 PUFF: at 08:38

## 2018-09-15 RX ADMIN — Medication 10 ML: at 09:31

## 2018-09-15 RX ADMIN — IPRATROPIUM BROMIDE AND ALBUTEROL SULFATE 1 AMPULE: .5; 3 SOLUTION RESPIRATORY (INHALATION) at 08:32

## 2018-09-15 RX ADMIN — BUSPIRONE HYDROCHLORIDE 10 MG: 10 TABLET ORAL at 09:24

## 2018-09-15 RX ADMIN — IPRATROPIUM BROMIDE AND ALBUTEROL SULFATE 1 AMPULE: .5; 3 SOLUTION RESPIRATORY (INHALATION) at 20:23

## 2018-09-15 RX ADMIN — IPRATROPIUM BROMIDE AND ALBUTEROL SULFATE 1 AMPULE: .5; 3 SOLUTION RESPIRATORY (INHALATION) at 12:03

## 2018-09-15 RX ADMIN — DOXAZOSIN 2 MG: 2 TABLET ORAL at 20:19

## 2018-09-15 RX ADMIN — BUSPIRONE HYDROCHLORIDE 10 MG: 10 TABLET ORAL at 20:19

## 2018-09-15 RX ADMIN — METOCLOPRAMIDE 10 MG: 5 INJECTION, SOLUTION INTRAMUSCULAR; INTRAVENOUS at 09:24

## 2018-09-15 RX ADMIN — NALOXEGOL OXALATE 12.5 MG: 12.5 TABLET, FILM COATED ORAL at 09:24

## 2018-09-15 RX ADMIN — CEFEPIME HYDROCHLORIDE 2 G: 2 INJECTION, POWDER, FOR SOLUTION INTRAVENOUS at 03:25

## 2018-09-15 RX ADMIN — ANTACID TABLETS 500 MG: 500 TABLET, CHEWABLE ORAL at 00:17

## 2018-09-15 RX ADMIN — METOCLOPRAMIDE 10 MG: 5 INJECTION, SOLUTION INTRAMUSCULAR; INTRAVENOUS at 16:15

## 2018-09-15 RX ADMIN — HYDROCHLOROTHIAZIDE 12.5 MG: 25 TABLET ORAL at 09:25

## 2018-09-15 RX ADMIN — PANTOPRAZOLE SODIUM 40 MG: 40 INJECTION, POWDER, FOR SOLUTION INTRAVENOUS at 09:24

## 2018-09-15 RX ADMIN — METOCLOPRAMIDE 10 MG: 5 INJECTION, SOLUTION INTRAMUSCULAR; INTRAVENOUS at 20:19

## 2018-09-15 ASSESSMENT — PAIN SCALES - GENERAL
PAINLEVEL_OUTOF10: 0
PAINLEVEL_OUTOF10: 0

## 2018-09-15 NOTE — PROGRESS NOTES
[facility]  Postoperative Progress Note    Pt Name: Levi Byrd  Medical Record Number: [id number]  Date of Birth 1950   Today's Date: 9/15/2018      ASSESSMENT  1. POD # 9/3. Repair of incarcerated recurrent right direct inguinal hernia.small bowel resection  2. Left perihilar lung mass. Patient is holding off on CT-guided biopsy at present. 3.  Bowel distention- continues  Restart IVF  Already on GI stim  4. Tolerating NG tube out     PLAN  1. Patient on full liquids but is really not had much in the way of bowel habits  2. Encourage activity  3. Pulmonary following for evaluation of a lung mass. CT-guided biopsy planned at some point patient deferred at this point. 4.  Continue IV antibiotics per pulmonary for strep pneumo in sputum bronchial washings. Can discontinue antibiotics from a surgical standpoint at any point. Lv Menjivar says he had emesis this am, po not going well, having hiccups and a lot of heartburn    OBJECTIVE  VITALS: VITALS:  /77   Pulse 101   Temp 98.2 °F (36.8 °C) (Oral)   Resp 16   Ht 5' 8\" (1.727 m)   Wt 128 lb 3.2 oz (58.2 kg)   SpO2 92%   BMI 19.49 kg/m²   GENERAL: alert, cooperative, no acute distress, nontoxic in appearance. LUNGS: clear to ausculation, without wheezes, rales or rhonci nonproductive cough  HEART: Slightly tachycardic  ABDOMEN: soft, non-tender, mildly distended, hypoactive BS.     INCISIONS: clean, dry and intact  EXTREMITY: no cyanosis or edema  INTAKE/OUTPUT :   INTAKE/OUTPUT:      Intake/Output Summary (Last 24 hours) at 09/15/18 0718  Last data filed at 09/14/18 1313   Gross per 24 hour   Intake              710 ml   Output              225 ml   Net              485 ml        LABS  CBC with Differential:    Lab Results   Component Value Date    WBC 6.3 09/12/2018    RBC 3.74 09/12/2018    HGB 12.3 09/12/2018    HCT 36.1 09/12/2018     09/12/2018    MCV 96.5

## 2018-09-15 NOTE — PROGRESS NOTES
mL/hr at 09/15/18 0924       Intake/Output Summary (Last 24 hours) at 09/15/18 1054  Last data filed at 09/14/18 1313   Gross per 24 hour   Intake              710 ml   Output              225 ml   Net              485 ml       CBC:   No results for input(s): WBC, HGB, HCT, PLT in the last 72 hours. BMP:    Recent Labs      09/13/18   0520  09/14/18   0530   NA  136  138   K  3.6  4.0   CL  98  95*   CO2  27  30   BUN  12  12   CREATININE  0.5  0.4   GLUCOSE  104  129*     Hepatic:   No results for input(s): AST, ALT, ALB, BILITOT, ALKPHOS in the last 72 hours. Urine: urine culture  ABGs: No results found for: PHART, PO2ART, GTN5NHV  INR: No results for input(s): INR in the last 72 hours. -----------------------------------------------------------------  Data Review   No results for input(s): WBC, HGB, HCT, PLT, DIFFTYPE in the last 72 hours. Recent Labs      09/13/18   0520  09/14/18   0530   NA  136  138   CL  98  95*   K  3.6  4.0   CO2  27  30   BUN  12  12   CREATININE  0.5  0.4   GLUCOSE  104  129*   PHOS  2.4  2.7   MG  1.7  1.8   CALCIUM  8.6  8.8     No results for input(s): CKTOTAL, CKMB, CKMBINDEX, CKMBCALCMETH, TROPONINI in the last 72 hours. No results for input(s): HDL, LDLDIRECT, TRIG in the last 72 hours. Invalid input(s): TOTALCHLTL, LDLCHLC  No results for input(s): BNP, TSH, FT4, ACETONE in the last 72 hours. Assessment   Principal Problem:    Incarcerated hernia  Active Problems:    COPD (chronic obstructive pulmonary disease) (HCC)    Hypertension    Severe malnutrition (HCC)    Hilar mass    Tobacco abuse    Recurrent unilateral inguinal hernia with obstruction and without gangrene  Resolved Problems:    * No resolved hospital problems.  *  Severe protein-calorie malnutrition    in the setting of weight loss, BMI <20, lung mass, & patient meets ASPEN criteria being treated with dietician following      Patient Active Problem List   Diagnosis    Incarcerated hernia    [facility]    Hypertension    Severe malnutrition (HCC)    Hilar mass    Tobacco abuse    Recurrent unilateral inguinal hernia with obstruction and without gangrene        Plan   On diet, clear liquid  Had a bm with dulcolax supp  Cont post op protocol      Leroy Somers MD

## 2018-09-15 NOTE — PLAN OF CARE
Problem: Pain:  Goal: Pain level will decrease  Pain level will decrease   Outcome: Ongoing  Patient complains of trapped air in stomach. Reglan and Protonix ordered. Patient reports relief of discomfort. Problem: Nutrition  Goal: Optimal nutrition therapy  Outcome: Ongoing  Full liquid diet ordered for patient. Tolerating well. Patient states he is afraid to \"overdo it\" No Bowel movement during shift. Suppository offered but patient declines. Problem: Safety:  Goal: Free from accidental physical injury  Free from accidental physical injury   Outcome: Ongoing  All fall precautions in place. Bed in low position, alarm activated and appropriate use of call light. Problem: DISCHARGE BARRIERS  Goal: Patient's continuum of care needs are met  Outcome: Ongoing  Discharge plans possible at beginning of week. Patient to be discharged back to private residence with fiance.

## 2018-09-16 LAB — LEGIONELLA SPECIES CULTURE: NORMAL

## 2018-09-16 PROCEDURE — 94640 AIRWAY INHALATION TREATMENT: CPT

## 2018-09-16 PROCEDURE — 6360000002 HC RX W HCPCS: Performed by: SURGERY

## 2018-09-16 PROCEDURE — 6360000002 HC RX W HCPCS: Performed by: INTERNAL MEDICINE

## 2018-09-16 PROCEDURE — 2580000003 HC RX 258: Performed by: SURGERY

## 2018-09-16 PROCEDURE — 94762 N-INVAS EAR/PLS OXIMTRY CONT: CPT

## 2018-09-16 PROCEDURE — 6370000000 HC RX 637 (ALT 250 FOR IP): Performed by: INTERNAL MEDICINE

## 2018-09-16 PROCEDURE — 2709999900 HC NON-CHARGEABLE SUPPLY

## 2018-09-16 PROCEDURE — 94761 N-INVAS EAR/PLS OXIMETRY MLT: CPT

## 2018-09-16 PROCEDURE — 1200000003 HC TELEMETRY R&B

## 2018-09-16 PROCEDURE — 2580000003 HC RX 258: Performed by: INTERNAL MEDICINE

## 2018-09-16 PROCEDURE — 6370000000 HC RX 637 (ALT 250 FOR IP): Performed by: SURGERY

## 2018-09-16 PROCEDURE — C9113 INJ PANTOPRAZOLE SODIUM, VIA: HCPCS | Performed by: SURGERY

## 2018-09-16 RX ADMIN — PANTOPRAZOLE SODIUM 40 MG: 40 INJECTION, POWDER, FOR SOLUTION INTRAVENOUS at 08:17

## 2018-09-16 RX ADMIN — Medication 2 PUFF: at 08:38

## 2018-09-16 RX ADMIN — BISACODYL 10 MG: 10 SUPPOSITORY RECTAL at 10:22

## 2018-09-16 RX ADMIN — IPRATROPIUM BROMIDE AND ALBUTEROL SULFATE 1 AMPULE: .5; 3 SOLUTION RESPIRATORY (INHALATION) at 16:13

## 2018-09-16 RX ADMIN — METOCLOPRAMIDE 10 MG: 5 INJECTION, SOLUTION INTRAMUSCULAR; INTRAVENOUS at 20:48

## 2018-09-16 RX ADMIN — BUSPIRONE HYDROCHLORIDE 10 MG: 10 TABLET ORAL at 20:48

## 2018-09-16 RX ADMIN — CEFEPIME HYDROCHLORIDE 2 G: 2 INJECTION, POWDER, FOR SOLUTION INTRAVENOUS at 02:25

## 2018-09-16 RX ADMIN — ENOXAPARIN SODIUM 40 MG: 40 INJECTION SUBCUTANEOUS at 20:48

## 2018-09-16 RX ADMIN — METOCLOPRAMIDE 10 MG: 5 INJECTION, SOLUTION INTRAMUSCULAR; INTRAVENOUS at 08:17

## 2018-09-16 RX ADMIN — Medication 10 ML: at 08:17

## 2018-09-16 RX ADMIN — METOCLOPRAMIDE 10 MG: 5 INJECTION, SOLUTION INTRAMUSCULAR; INTRAVENOUS at 02:25

## 2018-09-16 RX ADMIN — METOCLOPRAMIDE 10 MG: 5 INJECTION, SOLUTION INTRAMUSCULAR; INTRAVENOUS at 15:07

## 2018-09-16 RX ADMIN — LOSARTAN POTASSIUM 50 MG: 50 TABLET, FILM COATED ORAL at 08:16

## 2018-09-16 RX ADMIN — DOXAZOSIN 2 MG: 2 TABLET ORAL at 20:48

## 2018-09-16 RX ADMIN — CEFEPIME HYDROCHLORIDE 2 G: 2 INJECTION, POWDER, FOR SOLUTION INTRAVENOUS at 18:26

## 2018-09-16 RX ADMIN — NALOXEGOL OXALATE 12.5 MG: 12.5 TABLET, FILM COATED ORAL at 08:16

## 2018-09-16 RX ADMIN — CEFEPIME HYDROCHLORIDE 2 G: 2 INJECTION, POWDER, FOR SOLUTION INTRAVENOUS at 10:22

## 2018-09-16 RX ADMIN — IPRATROPIUM BROMIDE AND ALBUTEROL SULFATE 1 AMPULE: .5; 3 SOLUTION RESPIRATORY (INHALATION) at 21:54

## 2018-09-16 RX ADMIN — HYDROCHLOROTHIAZIDE 12.5 MG: 25 TABLET ORAL at 08:16

## 2018-09-16 RX ADMIN — IPRATROPIUM BROMIDE AND ALBUTEROL SULFATE 1 AMPULE: .5; 3 SOLUTION RESPIRATORY (INHALATION) at 12:09

## 2018-09-16 RX ADMIN — BUSPIRONE HYDROCHLORIDE 10 MG: 10 TABLET ORAL at 08:16

## 2018-09-16 RX ADMIN — IPRATROPIUM BROMIDE AND ALBUTEROL SULFATE 1 AMPULE: .5; 3 SOLUTION RESPIRATORY (INHALATION) at 08:30

## 2018-09-16 RX ADMIN — SODIUM CHLORIDE: 9 INJECTION, SOLUTION INTRAVENOUS at 18:27

## 2018-09-16 RX ADMIN — Medication 2 PUFF: at 21:54

## 2018-09-16 ASSESSMENT — PAIN SCALES - GENERAL
PAINLEVEL_OUTOF10: 0
PAINLEVEL_OUTOF10: 0

## 2018-09-16 NOTE — PROGRESS NOTES
Dr. Enrico Sin Progress note        9/16/2018                  11:55 AM        Patient:  Shaggy Conway  YOB: 1950    MRN: [id number]   Acct:  [de-identified]   [id number]  Primary Care Physician: Giacomo Kim MD    Admit Date: 9/5/2018           Subjective: feels a lot better today. Off the tpn. Objective:   Vitals: BP (!) 143/72   Pulse 85   Temp 98.5 °F (36.9 °C) (Oral)   Resp 18   Ht 5' 8\" (1.727 m)   Wt 128 lb 3.2 oz (58.2 kg)   SpO2 92%   BMI 19.49 kg/m²   Physical Exam:  General appearance: alert, appears stated age and cooperative  Skin: Skin color, texture, turgor normal.   HEENT: Head: Normal, normocephalic, atraumatic. Neck: no adenopathy, no carotid bruit and no JVD  Lungs: honorio air movement  Heart: S1, S2 normal  Abdomen: full but with pos bs.   Extremities: extremities normal, atraumatic, no cyanosis or edema  Lymphatic: No significant lymph node enlargement papable  Neurologic: Mental status: Alert, oriented, thought content appropriate      Diet: DIET GENERAL;        Data:   Scheduled Meds: Scheduled Meds:   metoclopramide  10 mg Intravenous Q6H    naloxegol  12.5 mg Oral QAM    sodium chloride flush  10 mL Intravenous 2 times per day    enoxaparin  40 mg Subcutaneous Q24H    hydrochlorothiazide  12.5 mg Oral Daily    nicotine  1 patch Transdermal Daily    cefepime  2 g Intravenous Q8H    potassium replacement protocol   Other RX Placeholder    pantoprazole  40 mg Intravenous Daily    And    sodium chloride (PF)  10 mL Intravenous Daily    busPIRone  10 mg Oral BID    losartan  50 mg Oral Daily    ipratropium-albuterol  1 ampule Inhalation Q4H WA    doxazosin  2 mg Oral Nightly    mometasone-formoterol  2 puff Inhalation BID     Continuous Infusions:   sodium chloride 100 mL/hr at 09/15/18 1910     PRN Meds:.bisacodyl, calcium carbonate, sodium chloride flush, ondansetron, phenol, acetaminophen  Continuous Infusions:   sodium chloride 100 mL/hr at 09/15/18 1910       Intake/Output Summary (Last 24 hours) at 09/16/18 1155  Last data filed at 09/16/18 0649   Gross per 24 hour   Intake          3353.42 ml   Output             1225 ml   Net          2128.42 ml       CBC:   No results for input(s): WBC, HGB, HCT, PLT in the last 72 hours. BMP:    Recent Labs      09/14/18   0530   NA  138   K  4.0   CL  95*   CO2  30   BUN  12   CREATININE  0.4   GLUCOSE  129*     Hepatic:   No results for input(s): AST, ALT, ALB, BILITOT, ALKPHOS in the last 72 hours. Urine: urine culture  ABGs: No results found for: PHART, PO2ART, TDV4DXE  INR: No results for input(s): INR in the last 72 hours. -----------------------------------------------------------------  Data Review   No results for input(s): WBC, HGB, HCT, PLT, DIFFTYPE in the last 72 hours. Recent Labs      09/14/18   0530   NA  138   CL  95*   K  4.0   CO2  30   BUN  12   CREATININE  0.4   GLUCOSE  129*   PHOS  2.7   MG  1.8   CALCIUM  8.8     No results for input(s): CKTOTAL, CKMB, CKMBINDEX, CKMBCALCMETH, TROPONINI in the last 72 hours. No results for input(s): HDL, LDLDIRECT, TRIG in the last 72 hours. Invalid input(s): TOTALCHLTL, LDLCHLC  No results for input(s): BNP, TSH, FT4, ACETONE in the last 72 hours. Assessment   Principal Problem:    Incarcerated hernia  Active Problems:    COPD (chronic obstructive pulmonary disease) (HCC)    Hypertension    Severe malnutrition (HCC)    Hilar mass    Tobacco abuse    Recurrent unilateral inguinal hernia with obstruction and without gangrene  Resolved Problems:    * No resolved hospital problems.  *  Severe protein-calorie malnutrition    in the setting of weight loss, BMI <20, lung mass, & patient meets ASPEN criteria being treated with dietician following      Patient Active Problem List   Diagnosis    Incarcerated hernia    [facility]    Hypertension    Severe malnutrition (HCC)    Hilar mass    Tobacco abuse    Recurrent unilateral inguinal hernia with obstruction and without gangrene        Plan   Diet advanced  Cont pt/ot  Home vale Freire MD

## 2018-09-16 NOTE — PLAN OF CARE
Problem: Pain:  Goal: Pain level will decrease  Pain level will decrease   Outcome: Ongoing  Patient denies pain. Problem: Nutrition  Goal: Optimal nutrition therapy  Outcome: Ongoing  Tolerating full liquids. Increased to general diet for lunch. Problem: Discharge Planning:  Goal: Discharged to appropriate level of care  Discharged to appropriate level of care   Outcome: Ongoing  Care plan reviewed with patient. Patient verbalizes understanding of the plan of care and contribute to goal setting. Problem: Cardiovascular  Goal: No DVT, peripheral vascular complications  Outcome: Ongoing  Ambulates frequently in halls with standby assist.     Problem: Respiratory  Goal: No pulmonary complications  Outcome: Ongoing  Room air satuations 92%   Encouraged incentive spirometer    Problem: GI  Goal: No bowel complications  Outcome: Ongoing  Dulcolax suppository given with good results. Active bowel sounds. IV Reglan. Problem:   Goal: Adequate urinary output  Outcome: Ongoing  Continuing to measure accurate outputs. Voiding yellow urine, at least 30ml/hr.

## 2018-09-16 NOTE — PLAN OF CARE
Problem: Impaired respiratory status  Goal: Clear lung sounds  Outcome: Ongoing  Continue treatments as ordered to improve breath sounds

## 2018-09-17 VITALS
HEIGHT: 68 IN | TEMPERATURE: 98.2 F | OXYGEN SATURATION: 92 % | HEART RATE: 97 BPM | DIASTOLIC BLOOD PRESSURE: 72 MMHG | SYSTOLIC BLOOD PRESSURE: 126 MMHG | RESPIRATION RATE: 18 BRPM | WEIGHT: 128.2 LBS | BODY MASS INDEX: 19.43 KG/M2

## 2018-09-17 LAB — PHOSPHORUS: 3.2 MG/DL (ref 2.4–4.7)

## 2018-09-17 PROCEDURE — 6370000000 HC RX 637 (ALT 250 FOR IP): Performed by: INTERNAL MEDICINE

## 2018-09-17 PROCEDURE — 2580000003 HC RX 258: Performed by: SURGERY

## 2018-09-17 PROCEDURE — 6360000002 HC RX W HCPCS: Performed by: INTERNAL MEDICINE

## 2018-09-17 PROCEDURE — 6360000002 HC RX W HCPCS: Performed by: SURGERY

## 2018-09-17 PROCEDURE — 94762 N-INVAS EAR/PLS OXIMTRY CONT: CPT

## 2018-09-17 PROCEDURE — 99232 SBSQ HOSP IP/OBS MODERATE 35: CPT | Performed by: INTERNAL MEDICINE

## 2018-09-17 PROCEDURE — 94640 AIRWAY INHALATION TREATMENT: CPT

## 2018-09-17 PROCEDURE — 2580000003 HC RX 258: Performed by: INTERNAL MEDICINE

## 2018-09-17 PROCEDURE — C9113 INJ PANTOPRAZOLE SODIUM, VIA: HCPCS | Performed by: SURGERY

## 2018-09-17 PROCEDURE — 6370000000 HC RX 637 (ALT 250 FOR IP): Performed by: SURGERY

## 2018-09-17 PROCEDURE — 99024 POSTOP FOLLOW-UP VISIT: CPT | Performed by: SURGERY

## 2018-09-17 PROCEDURE — APPSS30 APP SPLIT SHARED TIME 16-30 MINUTES: Performed by: NURSE PRACTITIONER

## 2018-09-17 PROCEDURE — 36415 COLL VENOUS BLD VENIPUNCTURE: CPT

## 2018-09-17 PROCEDURE — 36592 COLLECT BLOOD FROM PICC: CPT

## 2018-09-17 PROCEDURE — 2709999900 HC NON-CHARGEABLE SUPPLY

## 2018-09-17 PROCEDURE — 84100 ASSAY OF PHOSPHORUS: CPT

## 2018-09-17 RX ORDER — ACETAMINOPHEN 325 MG/1
650 TABLET ORAL EVERY 4 HOURS PRN
Qty: 120 TABLET | Refills: 3 | Status: ON HOLD | OUTPATIENT
Start: 2018-09-17 | End: 2019-10-22 | Stop reason: HOSPADM

## 2018-09-17 RX ADMIN — IPRATROPIUM BROMIDE AND ALBUTEROL SULFATE 1 AMPULE: .5; 3 SOLUTION RESPIRATORY (INHALATION) at 08:16

## 2018-09-17 RX ADMIN — NALOXEGOL OXALATE 12.5 MG: 12.5 TABLET, FILM COATED ORAL at 08:23

## 2018-09-17 RX ADMIN — CEFEPIME HYDROCHLORIDE 2 G: 2 INJECTION, POWDER, FOR SOLUTION INTRAVENOUS at 17:12

## 2018-09-17 RX ADMIN — CEFEPIME HYDROCHLORIDE 2 G: 2 INJECTION, POWDER, FOR SOLUTION INTRAVENOUS at 11:07

## 2018-09-17 RX ADMIN — IPRATROPIUM BROMIDE AND ALBUTEROL SULFATE 1 AMPULE: .5; 3 SOLUTION RESPIRATORY (INHALATION) at 17:03

## 2018-09-17 RX ADMIN — IPRATROPIUM BROMIDE AND ALBUTEROL SULFATE 1 AMPULE: .5; 3 SOLUTION RESPIRATORY (INHALATION) at 13:19

## 2018-09-17 RX ADMIN — METOCLOPRAMIDE 10 MG: 5 INJECTION, SOLUTION INTRAMUSCULAR; INTRAVENOUS at 03:31

## 2018-09-17 RX ADMIN — HYDROCHLOROTHIAZIDE 12.5 MG: 25 TABLET ORAL at 08:23

## 2018-09-17 RX ADMIN — BUSPIRONE HYDROCHLORIDE 10 MG: 10 TABLET ORAL at 08:23

## 2018-09-17 RX ADMIN — Medication 10 ML: at 08:25

## 2018-09-17 RX ADMIN — METOCLOPRAMIDE 10 MG: 5 INJECTION, SOLUTION INTRAMUSCULAR; INTRAVENOUS at 08:23

## 2018-09-17 RX ADMIN — LOSARTAN POTASSIUM 50 MG: 50 TABLET, FILM COATED ORAL at 08:23

## 2018-09-17 RX ADMIN — Medication 2 PUFF: at 08:18

## 2018-09-17 RX ADMIN — PANTOPRAZOLE SODIUM 40 MG: 40 INJECTION, POWDER, FOR SOLUTION INTRAVENOUS at 08:23

## 2018-09-17 RX ADMIN — CEFEPIME HYDROCHLORIDE 2 G: 2 INJECTION, POWDER, FOR SOLUTION INTRAVENOUS at 03:28

## 2018-09-17 RX ADMIN — Medication 10 ML: at 08:23

## 2018-09-17 RX ADMIN — SODIUM CHLORIDE: 9 INJECTION, SOLUTION INTRAVENOUS at 05:18

## 2018-09-17 RX ADMIN — METOCLOPRAMIDE 10 MG: 5 INJECTION, SOLUTION INTRAMUSCULAR; INTRAVENOUS at 14:50

## 2018-09-17 ASSESSMENT — PAIN SCALES - GENERAL: PAINLEVEL_OUTOF10: 0

## 2018-09-17 NOTE — DISCHARGE SUMMARY
metoclopramide  10 mg Intravenous Q6H    naloxegol  12.5 mg Oral QAM    sodium chloride flush  10 mL Intravenous 2 times per day    enoxaparin  40 mg Subcutaneous Q24H    hydrochlorothiazide  12.5 mg Oral Daily    nicotine  1 patch Transdermal Daily    cefepime  2 g Intravenous Q8H    potassium replacement protocol   Other RX Placeholder    pantoprazole  40 mg Intravenous Daily    And    sodium chloride (PF)  10 mL Intravenous Daily    busPIRone  10 mg Oral BID    losartan  50 mg Oral Daily    ipratropium-albuterol  1 ampule Inhalation Q4H WA    doxazosin  2 mg Oral Nightly    mometasone-formoterol  2 puff Inhalation BID     Continuous Infusions:   sodium chloride 100 mL/hr at 09/17/18 0518     PRN Meds:.bisacodyl, calcium carbonate, sodium chloride flush, ondansetron, phenol, acetaminophen  Continuous Infusions:   sodium chloride 100 mL/hr at 09/17/18 0518       Intake/Output Summary (Last 24 hours) at 09/17/18 1124  Last data filed at 09/17/18 0830   Gross per 24 hour   Intake             1650 ml   Output             3150 ml   Net            -1500 ml       Diet:  DIET GENERAL;  Dietary Nutrition Supplements: Standard High Calorie Oral Supplement    Activity:  Up ad edda (Patient can move independently)    Follow-up:  in the next few weeks with Scott Hernandez MD,       Consultants:  tea [facility]. Procedures:  Surgery as stated.     Diagnostic Test:    Objective:  Lab Results   Component Value Date    WBC 6.3 09/12/2018    RBC 3.74 09/12/2018    HGB 12.3 09/12/2018    HCT 36.1 09/12/2018    MCV 96.5 09/12/2018    MCH 32.9 09/12/2018    MCHC 34.1 09/12/2018    RDW 13.3 09/09/2013     09/12/2018    MPV 9.5 09/12/2018     Lab Results   Component Value Date     09/14/2018    K 4.0 09/14/2018    CL 95 09/14/2018    CO2 30 09/14/2018    BUN 12 09/14/2018    CREATININE 0.4 09/14/2018    GLUCOSE 129 09/14/2018    CALCIUM 8.8 09/14/2018     Lab Results   Component Value Date    CALCIUM 8.8 09/14/2018     No results found for: IONCA  Lab Results   Component Value Date    MG 1.8 09/14/2018     Lab Results   Component Value Date    PHOS 3.2 09/17/2018     No results found for: BNP  Lab Results   Component Value Date    ALKPHOS 46 09/12/2018    ALT 7 09/12/2018    AST 14 09/12/2018    PROT 5.4 09/12/2018    BILITOT 0.4 09/12/2018    BILIDIR <0.2 09/05/2018    LABALBU 2.6 09/12/2018     Lab Results   Component Value Date    LACTA 0.9 09/05/2018     No results found for: AMYLASE  Lab Results   Component Value Date    LIPASE 16.6 09/05/2018     Lab Results   Component Value Date    TRIG 84 09/11/2018     Recent Labs      09/14/18   1208   POCGLU  128*     No results for input(s): CKTOTAL, CKMB, TROPONINI in the last 72 hours. No results found for: LABA1C  Lab Results   Component Value Date    INR 1.07 09/07/2018     No results found for: PHART, PO2ART, MNU0MHS, ROG6EAG, [facility] Course: clinical course has improved, after the surgery. First had repair of the rt incarcerated inguinal hernia and later had a small bowel resection for the sbo. His diet was advanced as he tolerates till this discharge. Vitals: /71   Pulse 88   Temp 98.4 °F (36.9 °C) (Oral)   Resp 17   Ht 5' 8\" (1.727 m)   Wt 128 lb 3.2 oz (58.2 kg)   SpO2 94%   BMI 19.49 kg/m²   Physical Exam:  General appearance: alert, appears stated age and cooperative  Skin: Skin color, texture, turgor normal.   HEENT: Head: Normal, normocephalic, atraumatic. Neck: no adenopathy, no carotid bruit and no JVD  Lungs: honorio air movement  Heart: S1, S2 normal  Abdomen: full but with pos bs.   Extremities: extremities normal, atraumatic, no cyanosis or edema  Lymphatic: No significant lymph node enlargement papable  Neurologic: Mental status: Alert, oriented, thought content appropriat          Disposition: home    Condition: Stable        Marlene Bermeo MD     Copy: Primary Care Physician: Emeka Wilson MD  Internal Medicine

## 2018-09-17 NOTE — PROGRESS NOTES
(Initiated today)  · Anthropometric Measures:  · Ht: 5' 8\" (172.7 cm)   · Current Body Wt: 128 lb 3.2 oz (58.2 kg) (9/5; no edema noted)  · Admission Body Wt: 128 lb 3.2 oz (58.2 kg) (9/5; no edema noted)  · Usual Body Wt: 166 lb (75.3 kg) (per pt report; 144 lb on 8/2/17)  · % Weight Change: -11.1% weight loss,  in one year per EMR  · Ideal Body Wt: 154 lb (69.9 kg), % Ideal Body 83%  · BMI Classification: BMI 18.5 - 24.9 Normal Weight (19.5)  · Comparative Standards (Estimated Nutrition Needs):  · Estimated Daily Total Kcal: ~1745 + (~30+ kcals/kg current weight of 58 kg)  · Estimated Daily Protein (g): 70+ (1.2+ grams/kg current weight of 58 kg)    Estimated Intake vs Estimated Needs: Intake Improving    Nutrition Risk Level: High    Nutrition Interventions:   Continue current diet, Continue current ONS, Vitamin Supplement (Recommend starting a Multivitamin w/minerals daily. Initiated [employer]ure [employer].)  Continued Inpatient Monitoring, Education Initiated (Encouraged po intake of meals and ONS at best effort during LOS)    Nutrition Evaluation:   · Evaluation: Goals set   · Goals: Pt will consume 75% or more of meals during LOS to aid in maintaining weight, lean muscle mass and aid in wound healing    · Monitoring: Meal Intake, Supplement Intake, Diet Tolerance, Skin Integrity, Wound Healing, Weight, Comparative Standards, Pertinent Labs    See Adult Nutrition Doc Flowsheet for more detail.      Electronically signed by Eleanor New RD, LD on 9/17/18 at 10:20 AM    Contact Number: [phone]

## 2018-09-17 NOTE — PLAN OF CARE
Problem: Pain:  Goal: Pain level will decrease  Pain level will decrease   Outcome: Ongoing  Patient denying pain throughout shift. Patient has prn pain medications ordered if needed. Problem: Nutrition  Goal: Optimal nutrition therapy  Outcome: Ongoing  Patient on general diet. Tolerating well at this point. Problem: Discharge Planning:  Goal: Discharged to appropriate level of care  Discharged to appropriate level of care   Outcome: Ongoing  Plan is for patient to return to private residence upon discharge. Problem: Safety:  Goal: Free from accidental physical injury  Free from accidental physical injury   Outcome: Ongoing  Free from accidental injury throughout shift. Problem: Daily Care:  Goal: Daily care needs are met  Daily care needs are met   Outcome: Ongoing  Patient assisted with daily cares as needed throughout shift. Problem: Skin Integrity:  Goal: Skin integrity will stabilize  Skin integrity will stabilize   Outcome: Ongoing  No new skin issues noted throughout shift. Problem: Cardiovascular  Goal: No DVT, peripheral vascular complications  Outcome: Ongoing  Patient free of sings of DVT throughout shift. Problem: Respiratory  Goal: No pulmonary complications  Outcome: Ongoing  Lung sounds are clear and diminished. Free of pulmonary complications at this time. Problem: GI  Goal: No bowel complications  Outcome: Ongoing  No bowel complications throughout shift. Patient had bowel movement during day shift. Problem:   Goal: Adequate urinary output  Outcome: Ongoing  Patient urinary output adequate throughout shift. Problem: Infection - Central Venous Catheter-Associated Bloodstream Infection:  Goal: Will show no infection signs and symptoms  Will show no infection signs and symptoms   Outcome: Ongoing  No signs or symptoms of infected PICC line.      Problem: Falls - Risk of:  Goal: Will remain free from falls  Will remain free from falls   Outcome: Ongoing  Patient free of falls throughout shift. Patient is standby assist. Tolerates well. Comments: Care plan reviewed with patient. Patient verbalizes understanding of plan of care.

## 2018-09-17 NOTE — PROGRESS NOTES
He denies any history of orthopnea. He denies any history of paroxysmal nocturnal dyspnea. He denies any history of lung cancer in first-degree relative; exposure to asbestos, radon, or uranium. He was never diagnosed with malignancy in the past. No previous history of pulmonary embolism. Endobronchial findings: 9/7/18  Trachea: Normal mucosa. Vesta: Normal mucosa  Right main bronchus: Normal mucosa  Right upper lobe bronchus: Normal mucosa  Right Middle lobe bronchus: Normal mucosa  Right Lower lobe bronchus:Normal mucosa  Left main bronchus: Normal mucosa  Left upper lobe bronchus: Narrowed Left apico- posterior segmental bronchus. No obvious mass shadow seen on Fluoroscopy to biopsy. Due to persistent cough and oxygen desaturations no biopsies were attempted.    Left lower lobe bronchus: Normal mucosa     No endobronchial lesions seen.      Subjective/Events Past 24 hours/ROS   -Afebrile  -On RA  -Reports SOB improved  PMHx   Past Medical History      Diagnosis Date    COPD (chronic obstructive pulmonary disease) (HCC)     Gastric reflux     Hyperlipidemia     Hypertension      Meds    Current Medications    metoclopramide  10 mg Intravenous Q6H    sodium chloride flush  10 mL Intravenous 2 times per day    enoxaparin  40 mg Subcutaneous Q24H    hydrochlorothiazide  12.5 mg Oral Daily    nicotine  1 patch Transdermal Daily    cefepime  2 g Intravenous Q8H    potassium replacement protocol   Other RX Placeholder    pantoprazole  40 mg Intravenous Daily    And    sodium chloride (PF)  10 mL Intravenous Daily    busPIRone  10 mg Oral BID    losartan  50 mg Oral Daily    ipratropium-albuterol  1 ampule Inhalation Q4H WA    doxazosin  2 mg Oral Nightly    mometasone-formoterol  2 puff Inhalation BID     bisacodyl, calcium carbonate, sodium chloride flush, ondansetron, phenol, acetaminophen  IV Drips/Infusions   sodium chloride Stopped (09/17/18 1208)       Diet    DIET GENERAL;  Dietary Component Value Date    TROPONINT < 0.010 09/05/2018    TROPONINT < 0.010 07/19/2018     BNP  No results for input(s): BNP in the last 72 hours. Lactic Acid  No results for input(s): LACTA in the last 72 hours. INR  No results for input(s): INR, PROTIME in the last 72 hours. PTT  No results for input(s): APTT in the last 72 hours. Glucose  No results for input(s): POCGLU in the last 72 hours. UA No results for input(s): SPECGRAV, PHUR, COLORU, CLARITYU, MUCUS, PROTEINU, BLOODU, RBCUA, WBCUA, BACTERIA, NITRU, GLUCOSEU, BILIRUBINUR, UROBILINOGEN, KETUA, LABCAST, LABCASTTY, AMORPHOS in the last 72 hours. Invalid input(s): CRYSTALS. PFTs   No old studies in Epic. Sleep studies   No old studies in Epic. Cultures    Bronch cultureswashings- Streptococcus pneumoniae with Enterobacter cloacae complex    Source: bronchial washings       Site: left trachial tree          Current Antibiotics:   Cefoxitin, Cefepime   Culture & Susceptibility     ENTEROBACTER CLOACAE COMPLEX     Antibiotic Interpretation TREMAINE Unit Status   cefepime Sensitive <=1 mcg/mL Final   gentamicin Sensitive <=1 mcg/mL Final   trimethoprim-sulfamethoxazole Sensitive <=20 mcg/mL Final         STREPTOCOCCUS PNEUMONIAE     Antibiotic Interpretation TREMAINE Unit Status   Ceftriaxone (non-meningitis) Sensitive <=0.12 mcg/mL Final   Penicilin V oral Intermediate =0.25 mcg/mL Final   Penicillin IV  meningitis Resistant =0.25 mcg/mL Final   Penicillin IV  non-meningitis Sensitive =0.25 mcg/mL Final   cefTRIAXone (meningitis) Sensitive <=0.12 mcg/mL Final   vancomycin Sensitive =0.5 mcg/mL Final           EKG     Echocardiogram   No old studies in Epic. Radiology    CXR  Sep 5, 2018  PROCEDURE: XR CHEST PORTABLE  Abnormal left hilar contour concerning for possible underlying mass or lymphadenopathy. Findings might also be vascular. Further evaluation with chest CT is advised.         CT Scans  (See actual reports for details)  9/05/2018  PROCEDURE: CT CHEST W CONTRAST  1. Conglomeration of soft tissue density in the left hilar region surrounding the left main bronchus and pulmonary artery, highly concerning for malignancy. 2. Upper lobe predominant centrilobular emphysematous changes of the lungs. CT abdomen and pelvis with contrast:  Sep 5, 2018  PROCEDURE: CT ABDOMEN PELVIS W IV CONTRAST  1. Right-sided direct inguinal hernia containing a loop of small bowel resulting in complete small bowel obstruction. 2. Sigmoid diverticulosis without evidence of diverticulitis. Findings were discussed by Dr. Regan Machuca with Dr. Tello Colon at 4:53 PM  09/05/2018. Home O2 carmelina Diaz [facility] 9/16/18  A home oxygen evaluation has been completed. Patient is an inpatient. It is expected that the patient will be discharged within the next 48 hours. Qualified provider to write order for home prescription if patient qualifies. Social service/care managers will arrange for home oxygen. If patient is active, arrange for Home Medical supplier to assess for Oxygen Conserving Device per pulse oximetry. Patient is an outpatient. Results will be faxed to the ordering provider. Qualified provider to write order for home prescription if patient qualifies and arranges for home oxygen. Patient has been on room air all day. SpO2 was 95 % on room air at rest. Patients SpO2 was above 88% and did not qualify for home oxygen. Patient was walked for 6 minutes. SpO2 was 91 % during walking. Patients SpO2 was above 88% and did not qualify for home oxygen. Patient does not have a positive pressure airway device at home. Patient is not  diagnosed with Obstructive Sleep Apnea. Patient will be set up/instructed on a nocturnal study. Results will be given to qualified provider. Assessment   Left hilar mass highly concerning for malignancy. S/p bronch- no endobronchial lesions. Unable to biopsy due to cough and hypoxia. He defers inpatient biopsy.    Positive Bronch washings for Streptococcus pneumoniae and enterobacter cloacae complex sensitive to Cefepime ( 9/10/18)   COPD unspecified severity without exacerbation ([facility] [place]erol and atrovent)  Nicotine dependence with 54 PY history still smoking 1 PPD  Incarcerated recurrent Rt inguinal hernia with repair POD 6  S/p exploratory laparoscopy, converted to open lap, small bowel resection POD 2  Hypertension: Controlled  Malnutrition  Deconditioning   Full Code  Plan   -Patient refusing CT guided biopsy at this time patient is concerned about finances and having neck surgery completed prior to CT guided biopsy explained to patient that hilar mass is concerning for possible malignancy patient verbalized understanding and prefers to have outpatient appointment with [place] [facility]. Informed patient that delay in diagnostic testing can result in delay of treatment and poorer outcomes. Patient verbalized understanding and wishes to pursue CT guided [facility] as [facility].  -Continue Cefepime for 7 day course last dose scheduled at 1830 9/17/18  -Continue Dulera and duonebs  -Patient reports that he is planning to quit smoking using \"cold turkey\" method offered NRT patient declined  -DVT prophylaxis with lovenox  -[facility] as noted [facility]    Case discussed with nurse, patient and Dr. Gustavo Duong. Questions and concerns addressed. Electronically signed by   CARLOS Arndt CNP on 9/17/2018 at 2:38 PM     Addendum by Dr. Gustavo Duong MD:  I have seen and examined the patient independently. Face to face evaluation and examination was performed. The above evaluation and note has been reviewed. Labs and radiographs were reviewed. I Have discussed with Mr. Nancy Lee CNP about this patient in detail. The above assessment and plan has been reviewed.  Please see my modifications

## 2018-10-08 LAB
FUNGUS IDENTIFIED: NORMAL
FUNGUS SMEAR: NORMAL

## 2018-10-11 ENCOUNTER — OFFICE VISIT (OUTPATIENT)
Dept: SURGERY | Age: 68
End: 2018-10-11

## 2018-10-11 VITALS
RESPIRATION RATE: 18 BRPM | TEMPERATURE: 96.8 F | SYSTOLIC BLOOD PRESSURE: 128 MMHG | DIASTOLIC BLOOD PRESSURE: 62 MMHG | OXYGEN SATURATION: 94 % | WEIGHT: 128.8 LBS | HEART RATE: 102 BPM | HEIGHT: 68 IN | BODY MASS INDEX: 19.52 KG/M2

## 2018-10-11 DIAGNOSIS — K46.0 INCARCERATED HERNIA: ICD-10-CM

## 2018-10-11 DIAGNOSIS — Z98.890 POSTOPERATIVE STATE: ICD-10-CM

## 2018-10-11 DIAGNOSIS — K40.31 RECURRENT UNILATERAL INGUINAL HERNIA WITH OBSTRUCTION AND WITHOUT GANGRENE: Primary | ICD-10-CM

## 2018-10-11 PROCEDURE — 99024 POSTOP FOLLOW-UP VISIT: CPT | Performed by: SURGERY

## 2018-10-11 NOTE — PROGRESS NOTES
evidence of recurrent inguinal hernia. No distention. [facility]     Jc Ware                  [id number]  Assoc.                                              Page 1 of 1  [address]  [facility] [place], [place] [address]                                                      PROC: 2018  [place]/ [facility]                                    RECV: 2018  [address]                                    RPTD: 2018  [facility] [place], [place] [address]                      MRN:  [id number]    LOC: 5KS                      ACCT: [de-identified]  SEX: M                      : 1950  AGE: 75 Y                         PATHOLOGY REPORT                      ATTN: Peter Elena                      REQ: Olamide PAYNE      Copies To:   Peter Elena      Clinical Information: INCARCERATED HERNIA    FINAL DIAGNOSIS:  Small bowel, excision:   Features consistent with mucosal ischemia.   Viable appearing resection margins. Specimen:  SMALL BOWEL EXCISION, PORTION OF      Gross Examination:  The container is labeled Jose Francisco Salvia, portion of small bowel. Received in formalin is a short segment of small bowel measuring 5 cm  in length x 2.5 cm in diameter.  The serosal surface is hemorrhagic. There is narrowing near one of the stapled lines.   The specimen is  opened revealing an erythematous appearing mucosal surface.   There are  no masses.   There is a small defect in the bowel wall at the area of  narrowing.   Representative sections are submitted.    Cassette 1 -  resection lines; cassette 2 - area of narrowing with defect.   SS. SMW/DKR:lgl    Microscopic Examination:  Sections demonstrate mucosal inflammation and necrosis consistent with  ischemia.  The resection margins appear viable.  There is no evidence  of malignancy. [id number]                                                    <Sign Out Dr. Nupur Olivares M.D., F.C.A.P.       [place]/ [facility]  Printed on:

## 2018-10-22 LAB — AFB CULTURE & SMEAR: NORMAL

## 2019-01-02 ENCOUNTER — HOSPITAL ENCOUNTER (EMERGENCY)
Age: 69
Discharge: HOME | End: 2019-01-02
Attending: FAMILY MEDICINE
Payer: MEDICARE

## 2019-01-02 ENCOUNTER — APPOINTMENT (OUTPATIENT)
Dept: GENERAL RADIOLOGY | Age: 69
End: 2019-01-02
Payer: MEDICARE

## 2019-01-02 VITALS
OXYGEN SATURATION: 93 % | DIASTOLIC BLOOD PRESSURE: 76 MMHG | HEART RATE: 84 BPM | RESPIRATION RATE: 23 BRPM | SYSTOLIC BLOOD PRESSURE: 137 MMHG | TEMPERATURE: 97.9 F

## 2019-01-02 DIAGNOSIS — R91.8 MASS OF LEFT LUNG: ICD-10-CM

## 2019-01-02 DIAGNOSIS — J44.1 COPD EXACERBATION (HCC): Primary | ICD-10-CM

## 2019-01-02 LAB
ALBUMIN SERPL-MCNC: 4 G/DL (ref 3.5–5.1)
ALLEN TEST: POSITIVE
ALP BLD-CCNC: 72 U/L (ref 38–126)
ALT SERPL-CCNC: 34 U/L (ref 11–66)
ANION GAP SERPL CALCULATED.3IONS-SCNC: 16 MEQ/L (ref 8–16)
APTT: 28.1 SECONDS (ref 22–38)
AST SERPL-CCNC: 27 U/L (ref 5–40)
BASE EXCESS (CALCULATED): 4.3 MMOL/L (ref -2.5–2.5)
BASOPHILS # BLD: 0.3 %
BASOPHILS ABSOLUTE: 0 THOU/MM3 (ref 0–0.1)
BILIRUB SERPL-MCNC: 0.7 MG/DL (ref 0.3–1.2)
BILIRUBIN DIRECT: < 0.2 MG/DL (ref 0–0.3)
BUN BLDV-MCNC: 7 MG/DL (ref 7–22)
CALCIUM SERPL-MCNC: 9.2 MG/DL (ref 8.5–10.5)
CHLORIDE BLD-SCNC: 82 MEQ/L (ref 98–111)
CO2: 26 MEQ/L (ref 23–33)
COLLECTED BY:: ABNORMAL
CREAT SERPL-MCNC: 0.4 MG/DL (ref 0.4–1.2)
DEVICE: ABNORMAL
EKG ATRIAL RATE: 90 BPM
EKG P AXIS: 71 DEGREES
EKG P-R INTERVAL: 178 MS
EKG Q-T INTERVAL: 342 MS
EKG QRS DURATION: 90 MS
EKG QTC CALCULATION (BAZETT): 429 MS
EKG R AXIS: -43 DEGREES
EKG T AXIS: 54 DEGREES
EKG VENTRICULAR RATE: 95 BPM
EOSINOPHIL # BLD: 0 %
EOSINOPHILS ABSOLUTE: 0 THOU/MM3 (ref 0–0.4)
ERYTHROCYTE [DISTWIDTH] IN BLOOD BY AUTOMATED COUNT: 12.6 % (ref 11.5–14.5)
ERYTHROCYTE [DISTWIDTH] IN BLOOD BY AUTOMATED COUNT: 41.1 FL (ref 35–45)
GFR SERPL CREATININE-BSD FRML MDRD: > 90 ML/MIN/1.73M2
GLUCOSE BLD-MCNC: 113 MG/DL (ref 70–108)
HCO3: 28 MMOL/L (ref 23–28)
HCT VFR BLD CALC: 38.3 % (ref 42–52)
HEMOGLOBIN: 13.1 GM/DL (ref 14–18)
IMMATURE GRANS (ABS): 0.06 THOU/MM3 (ref 0–0.07)
IMMATURE GRANULOCYTES: 0.5 %
INR BLD: 0.98 (ref 0.85–1.13)
LACTIC ACID: 1.1 MMOL/L (ref 0.5–2.2)
LIPASE: 33.8 U/L (ref 5.6–51.3)
LYMPHOCYTES # BLD: 14.6 %
LYMPHOCYTES ABSOLUTE: 1.6 THOU/MM3 (ref 1–4.8)
MCH RBC QN AUTO: 30.3 PG (ref 26–33)
MCHC RBC AUTO-ENTMCNC: 34.2 GM/DL (ref 32.2–35.5)
MCV RBC AUTO: 88.5 FL (ref 80–94)
MONOCYTES # BLD: 6.8 %
MONOCYTES ABSOLUTE: 0.8 THOU/MM3 (ref 0.4–1.3)
NUCLEATED RED BLOOD CELLS: 0 /100 WBC
O2 SATURATION: 99 %
OSMOLALITY CALCULATION: 248.4 MOSMOL/KG (ref 275–300)
PCO2: 39 MMHG (ref 35–45)
PH BLOOD GAS: 7.47 (ref 7.35–7.45)
PLATELET # BLD: 263 THOU/MM3 (ref 130–400)
PMV BLD AUTO: 8.7 FL (ref 9.4–12.4)
PO2: 111 MMHG (ref 71–104)
POTASSIUM SERPL-SCNC: 3.9 MEQ/L (ref 3.5–5.2)
PRO-BNP: 178.4 PG/ML (ref 0–900)
PROCALCITONIN: 0.14 NG/ML (ref 0.01–0.09)
RBC # BLD: 4.33 MILL/MM3 (ref 4.7–6.1)
SEG NEUTROPHILS: 77.8 %
SEGMENTED NEUTROPHILS ABSOLUTE COUNT: 8.6 THOU/MM3 (ref 1.8–7.7)
SODIUM BLD-SCNC: 124 MEQ/L (ref 135–145)
SOURCE, BLOOD GAS: ABNORMAL
TOTAL PROTEIN: 6.9 G/DL (ref 6.1–8)
TROPONIN T: < 0.01 NG/ML
WBC # BLD: 11.1 THOU/MM3 (ref 4.8–10.8)

## 2019-01-02 PROCEDURE — 83880 ASSAY OF NATRIURETIC PEPTIDE: CPT

## 2019-01-02 PROCEDURE — 6370000000 HC RX 637 (ALT 250 FOR IP): Performed by: FAMILY MEDICINE

## 2019-01-02 PROCEDURE — 71046 X-RAY EXAM CHEST 2 VIEWS: CPT

## 2019-01-02 PROCEDURE — 2580000003 HC RX 258: Performed by: FAMILY MEDICINE

## 2019-01-02 PROCEDURE — 84145 PROCALCITONIN (PCT): CPT

## 2019-01-02 PROCEDURE — 85730 THROMBOPLASTIN TIME PARTIAL: CPT

## 2019-01-02 PROCEDURE — 36415 COLL VENOUS BLD VENIPUNCTURE: CPT

## 2019-01-02 PROCEDURE — 99285 EMERGENCY DEPT VISIT HI MDM: CPT

## 2019-01-02 PROCEDURE — 36600 WITHDRAWAL OF ARTERIAL BLOOD: CPT

## 2019-01-02 PROCEDURE — 83690 ASSAY OF LIPASE: CPT

## 2019-01-02 PROCEDURE — 82803 BLOOD GASES ANY COMBINATION: CPT

## 2019-01-02 PROCEDURE — 93010 ELECTROCARDIOGRAM REPORT: CPT | Performed by: INTERNAL MEDICINE

## 2019-01-02 PROCEDURE — 82248 BILIRUBIN DIRECT: CPT

## 2019-01-02 PROCEDURE — 94640 AIRWAY INHALATION TREATMENT: CPT

## 2019-01-02 PROCEDURE — 80053 COMPREHEN METABOLIC PANEL: CPT

## 2019-01-02 PROCEDURE — 6360000002 HC RX W HCPCS: Performed by: FAMILY MEDICINE

## 2019-01-02 PROCEDURE — 93005 ELECTROCARDIOGRAM TRACING: CPT | Performed by: FAMILY MEDICINE

## 2019-01-02 PROCEDURE — 96374 THER/PROPH/DIAG INJ IV PUSH: CPT

## 2019-01-02 PROCEDURE — 84484 ASSAY OF TROPONIN QUANT: CPT

## 2019-01-02 PROCEDURE — 83605 ASSAY OF LACTIC ACID: CPT

## 2019-01-02 PROCEDURE — 85610 PROTHROMBIN TIME: CPT

## 2019-01-02 PROCEDURE — 85025 COMPLETE CBC W/AUTO DIFF WBC: CPT

## 2019-01-02 RX ORDER — IPRATROPIUM BROMIDE AND ALBUTEROL SULFATE 2.5; .5 MG/3ML; MG/3ML
1 SOLUTION RESPIRATORY (INHALATION) 4 TIMES DAILY
Qty: 60 VIAL | Refills: 2 | Status: SHIPPED | OUTPATIENT
Start: 2019-01-02 | End: 2019-02-20 | Stop reason: DRUGHIGH

## 2019-01-02 RX ORDER — PREDNISONE 10 MG/1
TABLET ORAL
Qty: 39 TABLET | Refills: 0 | Status: SHIPPED | OUTPATIENT
Start: 2019-01-02 | End: 2019-01-21

## 2019-01-02 RX ORDER — LEVOFLOXACIN 500 MG/1
500 TABLET, FILM COATED ORAL ONCE
Status: COMPLETED | OUTPATIENT
Start: 2019-01-02 | End: 2019-01-02

## 2019-01-02 RX ORDER — SODIUM CHLORIDE 9 MG/ML
INJECTION, SOLUTION INTRAVENOUS CONTINUOUS
Status: DISCONTINUED | OUTPATIENT
Start: 2019-01-02 | End: 2019-01-02 | Stop reason: HOSPADM

## 2019-01-02 RX ORDER — IPRATROPIUM BROMIDE AND ALBUTEROL SULFATE 2.5; .5 MG/3ML; MG/3ML
1 SOLUTION RESPIRATORY (INHALATION) ONCE
Status: COMPLETED | OUTPATIENT
Start: 2019-01-02 | End: 2019-01-02

## 2019-01-02 RX ORDER — LEVOFLOXACIN 500 MG/1
500 TABLET, FILM COATED ORAL DAILY
Qty: 10 TABLET | Refills: 0 | Status: SHIPPED | OUTPATIENT
Start: 2019-01-02 | End: 2019-01-12

## 2019-01-02 RX ORDER — METHYLPREDNISOLONE SODIUM SUCCINATE 125 MG/2ML
125 INJECTION, POWDER, LYOPHILIZED, FOR SOLUTION INTRAMUSCULAR; INTRAVENOUS ONCE
Status: COMPLETED | OUTPATIENT
Start: 2019-01-02 | End: 2019-01-02

## 2019-01-02 RX ADMIN — METHYLPREDNISOLONE SODIUM SUCCINATE 125 MG: 125 INJECTION, POWDER, FOR SOLUTION INTRAMUSCULAR; INTRAVENOUS at 12:24

## 2019-01-02 RX ADMIN — LEVOFLOXACIN 500 MG: 500 TABLET, FILM COATED ORAL at 15:50

## 2019-01-02 RX ADMIN — SODIUM CHLORIDE: 9 INJECTION, SOLUTION INTRAVENOUS at 12:24

## 2019-01-02 RX ADMIN — IPRATROPIUM BROMIDE AND ALBUTEROL SULFATE 1 AMPULE: .5; 3 SOLUTION RESPIRATORY (INHALATION) at 12:16

## 2019-01-02 ASSESSMENT — ENCOUNTER SYMPTOMS
ABDOMINAL PAIN: 0
WHEEZING: 1
COUGH: 1
EYE DISCHARGE: 0
SHORTNESS OF BREATH: 1

## 2019-01-21 ENCOUNTER — APPOINTMENT (OUTPATIENT)
Dept: GENERAL RADIOLOGY | Age: 69
DRG: 988 | End: 2019-01-21
Payer: MEDICARE

## 2019-01-21 ENCOUNTER — HOSPITAL ENCOUNTER (INPATIENT)
Age: 69
LOS: 5 days | Discharge: HOME | DRG: 988 | End: 2019-01-26
Attending: EMERGENCY MEDICINE | Admitting: INTERNAL MEDICINE
Payer: MEDICARE

## 2019-01-21 ENCOUNTER — APPOINTMENT (OUTPATIENT)
Dept: CT IMAGING | Age: 69
DRG: 988 | End: 2019-01-21
Payer: MEDICARE

## 2019-01-21 ENCOUNTER — APPOINTMENT (OUTPATIENT)
Dept: INTERVENTIONAL RADIOLOGY/VASCULAR | Age: 69
DRG: 988 | End: 2019-01-21
Payer: MEDICARE

## 2019-01-21 DIAGNOSIS — R91.8 LUNG MASS: ICD-10-CM

## 2019-01-21 DIAGNOSIS — J44.9 CHRONIC OBSTRUCTIVE PULMONARY DISEASE, UNSPECIFIED COPD TYPE (HCC): ICD-10-CM

## 2019-01-21 DIAGNOSIS — R06.02 SOB (SHORTNESS OF BREATH): Primary | ICD-10-CM

## 2019-01-21 DIAGNOSIS — C34.12 MALIGNANT NEOPLASM OF UPPER LOBE, LEFT BRONCHUS OR LUNG (HCC): ICD-10-CM

## 2019-01-21 DIAGNOSIS — F17.200 SMOKER: ICD-10-CM

## 2019-01-21 DIAGNOSIS — E87.1 HYPONATREMIA: ICD-10-CM

## 2019-01-21 DIAGNOSIS — Z72.0 TOBACCO ABUSE: ICD-10-CM

## 2019-01-21 LAB
ALBUMIN SERPL-MCNC: 4.2 G/DL (ref 3.5–5.1)
ALP BLD-CCNC: 86 U/L (ref 38–126)
ALT SERPL-CCNC: 16 U/L (ref 11–66)
ANION GAP SERPL CALCULATED.3IONS-SCNC: 13 MEQ/L (ref 8–16)
AST SERPL-CCNC: 22 U/L (ref 5–40)
BACTERIA: ABNORMAL /HPF
BASOPHILS # BLD: 0.6 %
BASOPHILS ABSOLUTE: 0 THOU/MM3 (ref 0–0.1)
BILIRUB SERPL-MCNC: 0.8 MG/DL (ref 0.3–1.2)
BILIRUBIN URINE: NEGATIVE
BLOOD, URINE: ABNORMAL
BUN BLDV-MCNC: 10 MG/DL (ref 7–22)
CALCIUM SERPL-MCNC: 9.4 MG/DL (ref 8.5–10.5)
CASTS 2: ABNORMAL /LPF
CASTS UA: ABNORMAL /LPF
CHARACTER, URINE: CLEAR
CHLORIDE BLD-SCNC: 85 MEQ/L (ref 98–111)
CO2: 26 MEQ/L (ref 23–33)
COLOR: YELLOW
CREAT SERPL-MCNC: 0.4 MG/DL (ref 0.4–1.2)
CRYSTALS, UA: ABNORMAL
EKG ATRIAL RATE: 88 BPM
EKG P AXIS: 80 DEGREES
EKG P-R INTERVAL: 150 MS
EKG Q-T INTERVAL: 348 MS
EKG QRS DURATION: 86 MS
EKG QTC CALCULATION (BAZETT): 421 MS
EKG R AXIS: -74 DEGREES
EKG T AXIS: 68 DEGREES
EKG VENTRICULAR RATE: 88 BPM
EOSINOPHIL # BLD: 0.4 %
EOSINOPHILS ABSOLUTE: 0 THOU/MM3 (ref 0–0.4)
EPITHELIAL CELLS, UA: ABNORMAL /HPF
ERYTHROCYTE [DISTWIDTH] IN BLOOD BY AUTOMATED COUNT: 12.6 % (ref 11.5–14.5)
ERYTHROCYTE [DISTWIDTH] IN BLOOD BY AUTOMATED COUNT: 39.4 FL (ref 35–45)
FLU A ANTIGEN: NEGATIVE
FLU B ANTIGEN: NEGATIVE
GFR SERPL CREATININE-BSD FRML MDRD: > 90 ML/MIN/1.73M2
GLUCOSE BLD-MCNC: 99 MG/DL (ref 70–108)
GLUCOSE URINE: NEGATIVE MG/DL
HCT VFR BLD CALC: 40 % (ref 42–52)
HEMOGLOBIN: 14.1 GM/DL (ref 14–18)
IMMATURE GRANS (ABS): 0.06 THOU/MM3 (ref 0–0.07)
IMMATURE GRANULOCYTES: 0.9 %
INR BLD: 0.98 (ref 0.85–1.13)
KETONES, URINE: NEGATIVE
LEUKOCYTE ESTERASE, URINE: NEGATIVE
LYMPHOCYTES # BLD: 18.1 %
LYMPHOCYTES ABSOLUTE: 1.2 THOU/MM3 (ref 1–4.8)
MCH RBC QN AUTO: 30.3 PG (ref 26–33)
MCHC RBC AUTO-ENTMCNC: 35.3 GM/DL (ref 32.2–35.5)
MCV RBC AUTO: 85.8 FL (ref 80–94)
MISCELLANEOUS 2: ABNORMAL
MONOCYTES # BLD: 7.7 %
MONOCYTES ABSOLUTE: 0.5 THOU/MM3 (ref 0.4–1.3)
NITRITE, URINE: NEGATIVE
NUCLEATED RED BLOOD CELLS: 0 /100 WBC
OSMOLALITY CALCULATION: 248.7 MOSMOL/KG (ref 275–300)
PH UA: 7.5
PLATELET # BLD: 283 THOU/MM3 (ref 130–400)
PMV BLD AUTO: 8.3 FL (ref 9.4–12.4)
POTASSIUM SERPL-SCNC: 4.3 MEQ/L (ref 3.5–5.2)
PRO-BNP: 110.3 PG/ML (ref 0–900)
PROTEIN UA: NEGATIVE
RBC # BLD: 4.66 MILL/MM3 (ref 4.7–6.1)
RBC URINE: ABNORMAL /HPF
RENAL EPITHELIAL, UA: ABNORMAL
SEG NEUTROPHILS: 72.3 %
SEGMENTED NEUTROPHILS ABSOLUTE COUNT: 5 THOU/MM3 (ref 1.8–7.7)
SODIUM BLD-SCNC: 124 MEQ/L (ref 135–145)
SPECIFIC GRAVITY, URINE: > 1.03 (ref 1–1.03)
TOTAL PROTEIN: 7 G/DL (ref 6.1–8)
TROPONIN T: < 0.01 NG/ML
UROBILINOGEN, URINE: 0.2 EU/DL
WBC # BLD: 6.9 THOU/MM3 (ref 4.8–10.8)
WBC UA: ABNORMAL /HPF
YEAST: ABNORMAL

## 2019-01-21 PROCEDURE — 83880 ASSAY OF NATRIURETIC PEPTIDE: CPT

## 2019-01-21 PROCEDURE — 93971 EXTREMITY STUDY: CPT

## 2019-01-21 PROCEDURE — 1200000000 HC SEMI PRIVATE

## 2019-01-21 PROCEDURE — 80053 COMPREHEN METABOLIC PANEL: CPT

## 2019-01-21 PROCEDURE — G0378 HOSPITAL OBSERVATION PER HR: HCPCS

## 2019-01-21 PROCEDURE — 85025 COMPLETE CBC W/AUTO DIFF WBC: CPT

## 2019-01-21 PROCEDURE — 36415 COLL VENOUS BLD VENIPUNCTURE: CPT

## 2019-01-21 PROCEDURE — 99222 1ST HOSP IP/OBS MODERATE 55: CPT | Performed by: INTERNAL MEDICINE

## 2019-01-21 PROCEDURE — 2709999900 HC NON-CHARGEABLE SUPPLY

## 2019-01-21 PROCEDURE — 93005 ELECTROCARDIOGRAM TRACING: CPT | Performed by: EMERGENCY MEDICINE

## 2019-01-21 PROCEDURE — 6370000000 HC RX 637 (ALT 250 FOR IP): Performed by: INTERNAL MEDICINE

## 2019-01-21 PROCEDURE — 94640 AIRWAY INHALATION TREATMENT: CPT

## 2019-01-21 PROCEDURE — 93010 ELECTROCARDIOGRAM REPORT: CPT | Performed by: INTERNAL MEDICINE

## 2019-01-21 PROCEDURE — 81001 URINALYSIS AUTO W/SCOPE: CPT

## 2019-01-21 PROCEDURE — 6360000004 HC RX CONTRAST MEDICATION: Performed by: EMERGENCY MEDICINE

## 2019-01-21 PROCEDURE — 71270 CT THORAX DX C-/C+: CPT

## 2019-01-21 PROCEDURE — 99223 1ST HOSP IP/OBS HIGH 75: CPT | Performed by: INTERNAL MEDICINE

## 2019-01-21 PROCEDURE — 85610 PROTHROMBIN TIME: CPT

## 2019-01-21 PROCEDURE — 71046 X-RAY EXAM CHEST 2 VIEWS: CPT

## 2019-01-21 PROCEDURE — 2580000003 HC RX 258: Performed by: INTERNAL MEDICINE

## 2019-01-21 PROCEDURE — 87804 INFLUENZA ASSAY W/OPTIC: CPT

## 2019-01-21 PROCEDURE — 6370000000 HC RX 637 (ALT 250 FOR IP): Performed by: EMERGENCY MEDICINE

## 2019-01-21 PROCEDURE — 99285 EMERGENCY DEPT VISIT HI MDM: CPT

## 2019-01-21 PROCEDURE — 84484 ASSAY OF TROPONIN QUANT: CPT

## 2019-01-21 RX ORDER — FAMOTIDINE 20 MG/1
20 TABLET, FILM COATED ORAL DAILY
Status: DISCONTINUED | OUTPATIENT
Start: 2019-01-22 | End: 2019-01-26 | Stop reason: HOSPADM

## 2019-01-21 RX ORDER — SODIUM CHLORIDE 0.9 % (FLUSH) 0.9 %
10 SYRINGE (ML) INJECTION EVERY 12 HOURS SCHEDULED
Status: DISCONTINUED | OUTPATIENT
Start: 2019-01-21 | End: 2019-01-26 | Stop reason: HOSPADM

## 2019-01-21 RX ORDER — PANTOPRAZOLE SODIUM 40 MG/1
40 TABLET, DELAYED RELEASE ORAL
Status: DISCONTINUED | OUTPATIENT
Start: 2019-01-22 | End: 2019-01-26 | Stop reason: HOSPADM

## 2019-01-21 RX ORDER — ACETAMINOPHEN 325 MG/1
650 TABLET ORAL EVERY 4 HOURS PRN
Status: DISCONTINUED | OUTPATIENT
Start: 2019-01-21 | End: 2019-01-26 | Stop reason: HOSPADM

## 2019-01-21 RX ORDER — IPRATROPIUM BROMIDE AND ALBUTEROL SULFATE 2.5; .5 MG/3ML; MG/3ML
1 SOLUTION RESPIRATORY (INHALATION) 4 TIMES DAILY
Status: DISCONTINUED | OUTPATIENT
Start: 2019-01-21 | End: 2019-01-21 | Stop reason: SDUPTHER

## 2019-01-21 RX ORDER — AZITHROMYCIN 250 MG/1
250 TABLET, FILM COATED ORAL DAILY
Status: COMPLETED | OUTPATIENT
Start: 2019-01-22 | End: 2019-01-25

## 2019-01-21 RX ORDER — POTASSIUM CHLORIDE 7.45 MG/ML
10 INJECTION INTRAVENOUS PRN
Status: DISCONTINUED | OUTPATIENT
Start: 2019-01-21 | End: 2019-01-26 | Stop reason: HOSPADM

## 2019-01-21 RX ORDER — DOXAZOSIN MESYLATE 4 MG/1
2 TABLET ORAL DAILY
Status: DISCONTINUED | OUTPATIENT
Start: 2019-01-21 | End: 2019-01-22

## 2019-01-21 RX ORDER — POTASSIUM CHLORIDE 20 MEQ/1
40 TABLET, EXTENDED RELEASE ORAL PRN
Status: DISCONTINUED | OUTPATIENT
Start: 2019-01-21 | End: 2019-01-26 | Stop reason: HOSPADM

## 2019-01-21 RX ORDER — GABAPENTIN 600 MG/1
600 TABLET ORAL 3 TIMES DAILY
Status: ON HOLD | COMMUNITY
End: 2019-01-26 | Stop reason: HOSPADM

## 2019-01-21 RX ORDER — LOSARTAN POTASSIUM 50 MG/1
50 TABLET ORAL DAILY
Status: DISCONTINUED | OUTPATIENT
Start: 2019-01-22 | End: 2019-01-26 | Stop reason: HOSPADM

## 2019-01-21 RX ORDER — BUSPIRONE HYDROCHLORIDE 5 MG/1
10 TABLET ORAL 2 TIMES DAILY
Status: DISCONTINUED | OUTPATIENT
Start: 2019-01-21 | End: 2019-01-26 | Stop reason: HOSPADM

## 2019-01-21 RX ORDER — IPRATROPIUM BROMIDE AND ALBUTEROL SULFATE 2.5; .5 MG/3ML; MG/3ML
1 SOLUTION RESPIRATORY (INHALATION) ONCE
Status: COMPLETED | OUTPATIENT
Start: 2019-01-21 | End: 2019-01-21

## 2019-01-21 RX ORDER — GABAPENTIN 600 MG/1
600 TABLET ORAL 3 TIMES DAILY
Status: DISCONTINUED | OUTPATIENT
Start: 2019-01-21 | End: 2019-01-26 | Stop reason: HOSPADM

## 2019-01-21 RX ORDER — ONDANSETRON 2 MG/ML
4 INJECTION INTRAMUSCULAR; INTRAVENOUS EVERY 6 HOURS PRN
Status: DISCONTINUED | OUTPATIENT
Start: 2019-01-21 | End: 2019-01-26 | Stop reason: HOSPADM

## 2019-01-21 RX ORDER — TAMSULOSIN HYDROCHLORIDE 0.4 MG/1
0.4 CAPSULE ORAL DAILY
Status: DISCONTINUED | OUTPATIENT
Start: 2019-01-21 | End: 2019-01-22

## 2019-01-21 RX ORDER — BUSPIRONE HYDROCHLORIDE 10 MG/1
10 TABLET ORAL 2 TIMES DAILY
Status: DISCONTINUED | OUTPATIENT
Start: 2019-01-21 | End: 2019-01-21 | Stop reason: SDUPTHER

## 2019-01-21 RX ORDER — POTASSIUM CHLORIDE 20MEQ/15ML
40 LIQUID (ML) ORAL PRN
Status: DISCONTINUED | OUTPATIENT
Start: 2019-01-21 | End: 2019-01-26 | Stop reason: HOSPADM

## 2019-01-21 RX ORDER — SODIUM CHLORIDE 0.9 % (FLUSH) 0.9 %
10 SYRINGE (ML) INJECTION PRN
Status: DISCONTINUED | OUTPATIENT
Start: 2019-01-21 | End: 2019-01-26 | Stop reason: HOSPADM

## 2019-01-21 RX ORDER — SODIUM CHLORIDE 9 MG/ML
INJECTION, SOLUTION INTRAVENOUS CONTINUOUS
Status: ACTIVE | OUTPATIENT
Start: 2019-01-21 | End: 2019-01-22

## 2019-01-21 RX ORDER — AZITHROMYCIN 250 MG/1
500 TABLET, FILM COATED ORAL ONCE
Status: COMPLETED | OUTPATIENT
Start: 2019-01-21 | End: 2019-01-21

## 2019-01-21 RX ORDER — ATORVASTATIN CALCIUM 80 MG/1
80 TABLET, FILM COATED ORAL DAILY
Status: DISCONTINUED | OUTPATIENT
Start: 2019-01-22 | End: 2019-01-26 | Stop reason: HOSPADM

## 2019-01-21 RX ORDER — IPRATROPIUM BROMIDE AND ALBUTEROL SULFATE 2.5; .5 MG/3ML; MG/3ML
1 SOLUTION RESPIRATORY (INHALATION)
Status: DISCONTINUED | OUTPATIENT
Start: 2019-01-21 | End: 2019-01-26 | Stop reason: HOSPADM

## 2019-01-21 RX ADMIN — Medication 2 PUFF: at 20:38

## 2019-01-21 RX ADMIN — IPRATROPIUM BROMIDE AND ALBUTEROL SULFATE 1 AMPULE: .5; 3 SOLUTION RESPIRATORY (INHALATION) at 11:51

## 2019-01-21 RX ADMIN — IOPAMIDOL 80 ML: 755 INJECTION, SOLUTION INTRAVENOUS at 12:53

## 2019-01-21 RX ADMIN — ACETAMINOPHEN 650 MG: 325 TABLET ORAL at 21:36

## 2019-01-21 RX ADMIN — AZITHROMYCIN 500 MG: 250 TABLET, FILM COATED ORAL at 21:35

## 2019-01-21 RX ADMIN — SODIUM CHLORIDE: 9 INJECTION, SOLUTION INTRAVENOUS at 19:06

## 2019-01-21 RX ADMIN — DOXAZOSIN 2 MG: 4 TABLET ORAL at 21:36

## 2019-01-21 RX ADMIN — BUSPIRONE HYDROCHLORIDE 10 MG: 5 TABLET ORAL at 21:35

## 2019-01-21 RX ADMIN — GABAPENTIN 600 MG: 600 TABLET, FILM COATED ORAL at 21:35

## 2019-01-21 RX ADMIN — IPRATROPIUM BROMIDE AND ALBUTEROL SULFATE 1 AMPULE: .5; 3 SOLUTION RESPIRATORY (INHALATION) at 20:38

## 2019-01-21 RX ADMIN — TAMSULOSIN HYDROCHLORIDE 0.4 MG: 0.4 CAPSULE ORAL at 21:35

## 2019-01-21 ASSESSMENT — PAIN DESCRIPTION - FREQUENCY
FREQUENCY: CONTINUOUS
FREQUENCY: CONTINUOUS

## 2019-01-21 ASSESSMENT — PAIN DESCRIPTION - PAIN TYPE
TYPE: ACUTE PAIN
TYPE: CHRONIC PAIN

## 2019-01-21 ASSESSMENT — PAIN DESCRIPTION - ONSET: ONSET: ON-GOING

## 2019-01-21 ASSESSMENT — PAIN DESCRIPTION - DESCRIPTORS
DESCRIPTORS: ACHING
DESCRIPTORS: ACHING

## 2019-01-21 ASSESSMENT — ENCOUNTER SYMPTOMS
COUGH: 1
RHINORRHEA: 0
BACK PAIN: 0
WHEEZING: 0
VOMITING: 0
SHORTNESS OF BREATH: 1
SORE THROAT: 0
DIARRHEA: 0
NAUSEA: 0
EYE REDNESS: 0
EYE DISCHARGE: 0
ABDOMINAL PAIN: 0

## 2019-01-21 ASSESSMENT — PAIN DESCRIPTION - ORIENTATION
ORIENTATION: RIGHT
ORIENTATION: LOWER

## 2019-01-21 ASSESSMENT — PAIN DESCRIPTION - LOCATION
LOCATION: FOOT
LOCATION: BACK

## 2019-01-21 ASSESSMENT — PAIN SCALES - GENERAL
PAINLEVEL_OUTOF10: 8
PAINLEVEL_OUTOF10: 4

## 2019-01-21 ASSESSMENT — PAIN DESCRIPTION - PROGRESSION
CLINICAL_PROGRESSION: NOT CHANGED

## 2019-01-22 ENCOUNTER — APPOINTMENT (OUTPATIENT)
Dept: ULTRASOUND IMAGING | Age: 69
DRG: 988 | End: 2019-01-22
Payer: MEDICARE

## 2019-01-22 LAB
SODIUM BLD-SCNC: 121 MEQ/L (ref 135–145)
SODIUM BLD-SCNC: 122 MEQ/L (ref 135–145)
SODIUM URINE: 82 MEQ/L

## 2019-01-22 PROCEDURE — G0378 HOSPITAL OBSERVATION PER HR: HCPCS

## 2019-01-22 PROCEDURE — 84300 ASSAY OF URINE SODIUM: CPT

## 2019-01-22 PROCEDURE — 07B23ZX EXCISION OF LEFT NECK LYMPHATIC, PERCUTANEOUS APPROACH, DIAGNOSTIC: ICD-10-PCS | Performed by: RADIOLOGY

## 2019-01-22 PROCEDURE — 88333 PATH CONSLTJ SURG CYTO XM 1: CPT

## 2019-01-22 PROCEDURE — 84295 ASSAY OF SERUM SODIUM: CPT

## 2019-01-22 PROCEDURE — 99233 SBSQ HOSP IP/OBS HIGH 50: CPT | Performed by: INTERNAL MEDICINE

## 2019-01-22 PROCEDURE — 6370000000 HC RX 637 (ALT 250 FOR IP): Performed by: INTERNAL MEDICINE

## 2019-01-22 PROCEDURE — 51798 US URINE CAPACITY MEASURE: CPT

## 2019-01-22 PROCEDURE — 94640 AIRWAY INHALATION TREATMENT: CPT

## 2019-01-22 PROCEDURE — 88307 TISSUE EXAM BY PATHOLOGIST: CPT

## 2019-01-22 PROCEDURE — 36415 COLL VENOUS BLD VENIPUNCTURE: CPT

## 2019-01-22 PROCEDURE — 1200000000 HC SEMI PRIVATE

## 2019-01-22 PROCEDURE — 38505 NEEDLE BIOPSY LYMPH NODES: CPT

## 2019-01-22 PROCEDURE — APPSS30 APP SPLIT SHARED TIME 16-30 MINUTES: Performed by: NURSE PRACTITIONER

## 2019-01-22 PROCEDURE — 2709999900 HC NON-CHARGEABLE SUPPLY

## 2019-01-22 PROCEDURE — 2580000003 HC RX 258: Performed by: INTERNAL MEDICINE

## 2019-01-22 RX ORDER — SODIUM CHLORIDE 9 MG/ML
INJECTION, SOLUTION INTRAVENOUS CONTINUOUS
Status: ACTIVE | OUTPATIENT
Start: 2019-01-22 | End: 2019-01-23

## 2019-01-22 RX ORDER — TAMSULOSIN HYDROCHLORIDE 0.4 MG/1
0.4 CAPSULE ORAL DAILY
Status: DISCONTINUED | OUTPATIENT
Start: 2019-01-23 | End: 2019-01-26 | Stop reason: HOSPADM

## 2019-01-22 RX ORDER — SODIUM CHLORIDE 1000 MG
2 TABLET, SOLUBLE MISCELLANEOUS 2 TIMES DAILY WITH MEALS
Status: DISCONTINUED | OUTPATIENT
Start: 2019-01-22 | End: 2019-01-26 | Stop reason: HOSPADM

## 2019-01-22 RX ADMIN — LOSARTAN POTASSIUM 50 MG: 50 TABLET, FILM COATED ORAL at 09:30

## 2019-01-22 RX ADMIN — BUSPIRONE HYDROCHLORIDE 10 MG: 5 TABLET ORAL at 09:31

## 2019-01-22 RX ADMIN — AZITHROMYCIN 250 MG: 250 TABLET, FILM COATED ORAL at 09:29

## 2019-01-22 RX ADMIN — SODIUM CHLORIDE TAB 1 GM 2 G: 1 TAB at 16:04

## 2019-01-22 RX ADMIN — DOXAZOSIN 2 MG: 4 TABLET ORAL at 09:29

## 2019-01-22 RX ADMIN — GABAPENTIN 600 MG: 600 TABLET, FILM COATED ORAL at 09:30

## 2019-01-22 RX ADMIN — Medication 2 PUFF: at 22:00

## 2019-01-22 RX ADMIN — IPRATROPIUM BROMIDE AND ALBUTEROL SULFATE 1 AMPULE: .5; 3 SOLUTION RESPIRATORY (INHALATION) at 12:03

## 2019-01-22 RX ADMIN — IPRATROPIUM BROMIDE AND ALBUTEROL SULFATE 1 AMPULE: .5; 3 SOLUTION RESPIRATORY (INHALATION) at 21:43

## 2019-01-22 RX ADMIN — GABAPENTIN 600 MG: 600 TABLET, FILM COATED ORAL at 21:32

## 2019-01-22 RX ADMIN — IPRATROPIUM BROMIDE AND ALBUTEROL SULFATE 1 AMPULE: .5; 3 SOLUTION RESPIRATORY (INHALATION) at 08:32

## 2019-01-22 RX ADMIN — PANTOPRAZOLE SODIUM 40 MG: 40 TABLET, DELAYED RELEASE ORAL at 06:22

## 2019-01-22 RX ADMIN — Medication 10 ML: at 09:32

## 2019-01-22 RX ADMIN — GABAPENTIN 600 MG: 600 TABLET, FILM COATED ORAL at 16:04

## 2019-01-22 RX ADMIN — SODIUM CHLORIDE: 9 INJECTION, SOLUTION INTRAVENOUS at 16:03

## 2019-01-22 RX ADMIN — ATORVASTATIN CALCIUM 80 MG: 80 TABLET, FILM COATED ORAL at 09:31

## 2019-01-22 RX ADMIN — ACETAMINOPHEN 650 MG: 325 TABLET ORAL at 16:03

## 2019-01-22 RX ADMIN — Medication 2 PUFF: at 08:32

## 2019-01-22 RX ADMIN — SODIUM CHLORIDE TAB 1 GM 2 G: 1 TAB at 09:34

## 2019-01-22 RX ADMIN — BUSPIRONE HYDROCHLORIDE 10 MG: 5 TABLET ORAL at 21:32

## 2019-01-22 ASSESSMENT — PAIN DESCRIPTION - PROGRESSION

## 2019-01-22 ASSESSMENT — PAIN SCALES - GENERAL
PAINLEVEL_OUTOF10: 0
PAINLEVEL_OUTOF10: 3

## 2019-01-23 PROBLEM — C80.1 SMALL CELL CARCINOMA (HCC): Status: ACTIVE | Noted: 2019-01-23

## 2019-01-23 LAB
ANION GAP SERPL CALCULATED.3IONS-SCNC: 12 MEQ/L (ref 8–16)
BUN BLDV-MCNC: 10 MG/DL (ref 7–22)
CALCIUM SERPL-MCNC: 8.3 MG/DL (ref 8.5–10.5)
CHLORIDE BLD-SCNC: 89 MEQ/L (ref 98–111)
CO2: 22 MEQ/L (ref 23–33)
CREAT SERPL-MCNC: 0.4 MG/DL (ref 0.4–1.2)
GFR SERPL CREATININE-BSD FRML MDRD: > 90 ML/MIN/1.73M2
GLUCOSE BLD-MCNC: 95 MG/DL (ref 70–108)
POTASSIUM SERPL-SCNC: 3.9 MEQ/L (ref 3.5–5.2)
SODIUM BLD-SCNC: 123 MEQ/L (ref 135–145)

## 2019-01-23 PROCEDURE — 2580000003 HC RX 258: Performed by: INTERNAL MEDICINE

## 2019-01-23 PROCEDURE — 99232 SBSQ HOSP IP/OBS MODERATE 35: CPT | Performed by: INTERNAL MEDICINE

## 2019-01-23 PROCEDURE — G0378 HOSPITAL OBSERVATION PER HR: HCPCS

## 2019-01-23 PROCEDURE — 1200000000 HC SEMI PRIVATE

## 2019-01-23 PROCEDURE — 2709999900 HC NON-CHARGEABLE SUPPLY

## 2019-01-23 PROCEDURE — 6370000000 HC RX 637 (ALT 250 FOR IP): Performed by: INTERNAL MEDICINE

## 2019-01-23 PROCEDURE — 36415 COLL VENOUS BLD VENIPUNCTURE: CPT

## 2019-01-23 PROCEDURE — 94640 AIRWAY INHALATION TREATMENT: CPT

## 2019-01-23 PROCEDURE — 6360000002 HC RX W HCPCS: Performed by: INTERNAL MEDICINE

## 2019-01-23 PROCEDURE — 80048 BASIC METABOLIC PNL TOTAL CA: CPT

## 2019-01-23 PROCEDURE — 90686 IIV4 VACC NO PRSV 0.5 ML IM: CPT | Performed by: INTERNAL MEDICINE

## 2019-01-23 PROCEDURE — 99232 SBSQ HOSP IP/OBS MODERATE 35: CPT | Performed by: FAMILY MEDICINE

## 2019-01-23 PROCEDURE — G0008 ADMIN INFLUENZA VIRUS VAC: HCPCS | Performed by: INTERNAL MEDICINE

## 2019-01-23 RX ADMIN — ACETAMINOPHEN 650 MG: 325 TABLET ORAL at 23:08

## 2019-01-23 RX ADMIN — GABAPENTIN 600 MG: 600 TABLET, FILM COATED ORAL at 08:34

## 2019-01-23 RX ADMIN — AZITHROMYCIN 250 MG: 250 TABLET, FILM COATED ORAL at 08:33

## 2019-01-23 RX ADMIN — IPRATROPIUM BROMIDE AND ALBUTEROL SULFATE 1 AMPULE: .5; 3 SOLUTION RESPIRATORY (INHALATION) at 08:22

## 2019-01-23 RX ADMIN — BUSPIRONE HYDROCHLORIDE 10 MG: 5 TABLET ORAL at 20:03

## 2019-01-23 RX ADMIN — SODIUM CHLORIDE TAB 1 GM 2 G: 1 TAB at 08:33

## 2019-01-23 RX ADMIN — LOSARTAN POTASSIUM 50 MG: 50 TABLET, FILM COATED ORAL at 08:34

## 2019-01-23 RX ADMIN — PANTOPRAZOLE SODIUM 40 MG: 40 TABLET, DELAYED RELEASE ORAL at 05:00

## 2019-01-23 RX ADMIN — IPRATROPIUM BROMIDE AND ALBUTEROL SULFATE 1 AMPULE: .5; 3 SOLUTION RESPIRATORY (INHALATION) at 11:50

## 2019-01-23 RX ADMIN — IPRATROPIUM BROMIDE AND ALBUTEROL SULFATE 1 AMPULE: .5; 3 SOLUTION RESPIRATORY (INHALATION) at 21:12

## 2019-01-23 RX ADMIN — SODIUM CHLORIDE TAB 1 GM 2 G: 1 TAB at 18:17

## 2019-01-23 RX ADMIN — GABAPENTIN 600 MG: 600 TABLET, FILM COATED ORAL at 13:40

## 2019-01-23 RX ADMIN — INFLUENZA A VIRUS A/MICHIGAN/45/2015 X-275 (H1N1) ANTIGEN (FORMALDEHYDE INACTIVATED), INFLUENZA A VIRUS A/SINGAPORE/INFIMH-16-0019/2016 IVR-186 (H3N2) ANTIGEN (FORMALDEHYDE INACTIVATED), INFLUENZA B VIRUS B/PHUKET/3073/2013 ANTIGEN (FORMALDEHYDE INACTIVATED), AND INFLUENZA B VIRUS B/MARYLAND/15/2016 BX-69A ANTIGEN (FORMALDEHYDE INACTIVATED) 0.5 ML: 15; 15; 15; 15 INJECTION, SUSPENSION INTRAMUSCULAR at 09:50

## 2019-01-23 RX ADMIN — Medication 2 PUFF: at 08:22

## 2019-01-23 RX ADMIN — ATORVASTATIN CALCIUM 80 MG: 80 TABLET, FILM COATED ORAL at 08:33

## 2019-01-23 RX ADMIN — IPRATROPIUM BROMIDE AND ALBUTEROL SULFATE 1 AMPULE: .5; 3 SOLUTION RESPIRATORY (INHALATION) at 16:08

## 2019-01-23 RX ADMIN — Medication 10 ML: at 20:03

## 2019-01-23 RX ADMIN — Medication 2 PUFF: at 21:12

## 2019-01-23 RX ADMIN — GABAPENTIN 600 MG: 600 TABLET, FILM COATED ORAL at 20:03

## 2019-01-23 RX ADMIN — BUSPIRONE HYDROCHLORIDE 10 MG: 5 TABLET ORAL at 08:34

## 2019-01-23 RX ADMIN — ENOXAPARIN SODIUM 40 MG: 40 INJECTION SUBCUTANEOUS at 18:19

## 2019-01-23 RX ADMIN — ACETAMINOPHEN 650 MG: 325 TABLET ORAL at 13:03

## 2019-01-23 RX ADMIN — Medication 10 ML: at 09:50

## 2019-01-23 RX ADMIN — TAMSULOSIN HYDROCHLORIDE 0.4 MG: 0.4 CAPSULE ORAL at 08:34

## 2019-01-23 RX ADMIN — FAMOTIDINE 20 MG: 20 TABLET, FILM COATED ORAL at 08:33

## 2019-01-23 ASSESSMENT — PAIN SCALES - GENERAL
PAINLEVEL_OUTOF10: 0
PAINLEVEL_OUTOF10: 5
PAINLEVEL_OUTOF10: 0
PAINLEVEL_OUTOF10: 4

## 2019-01-23 ASSESSMENT — PAIN DESCRIPTION - ORIENTATION: ORIENTATION: RIGHT

## 2019-01-23 ASSESSMENT — PAIN DESCRIPTION - LOCATION
LOCATION: ANKLE
LOCATION: FLANK

## 2019-01-23 ASSESSMENT — PAIN DESCRIPTION - DESCRIPTORS: DESCRIPTORS: ACHING

## 2019-01-23 ASSESSMENT — PAIN DESCRIPTION - PAIN TYPE: TYPE: CHRONIC PAIN

## 2019-01-23 ASSESSMENT — PAIN DESCRIPTION - FREQUENCY: FREQUENCY: INTERMITTENT

## 2019-01-23 ASSESSMENT — PAIN DESCRIPTION - PROGRESSION: CLINICAL_PROGRESSION: NOT CHANGED

## 2019-01-24 ENCOUNTER — CLINICAL DOCUMENTATION (OUTPATIENT)
Dept: RADIATION ONCOLOGY | Age: 69
End: 2019-01-24

## 2019-01-24 ENCOUNTER — HOSPITAL ENCOUNTER (OUTPATIENT)
Dept: RADIATION ONCOLOGY | Age: 69
Discharge: HOME | End: 2019-01-24
Payer: MEDICARE

## 2019-01-24 ENCOUNTER — APPOINTMENT (OUTPATIENT)
Dept: CT IMAGING | Age: 69
DRG: 988 | End: 2019-01-24
Payer: MEDICARE

## 2019-01-24 LAB
ANION GAP SERPL CALCULATED.3IONS-SCNC: 13 MEQ/L (ref 8–16)
BASOPHILS # BLD: 0.9 %
BASOPHILS ABSOLUTE: 0.1 THOU/MM3 (ref 0–0.1)
BUN BLDV-MCNC: 9 MG/DL (ref 7–22)
CALCIUM SERPL-MCNC: 8.3 MG/DL (ref 8.5–10.5)
CHLORIDE BLD-SCNC: 90 MEQ/L (ref 98–111)
CO2: 21 MEQ/L (ref 23–33)
CREAT SERPL-MCNC: 0.4 MG/DL (ref 0.4–1.2)
EOSINOPHIL # BLD: 0.4 %
EOSINOPHILS ABSOLUTE: 0 THOU/MM3 (ref 0–0.4)
ERYTHROCYTE [DISTWIDTH] IN BLOOD BY AUTOMATED COUNT: 12.9 % (ref 11.5–14.5)
ERYTHROCYTE [DISTWIDTH] IN BLOOD BY AUTOMATED COUNT: 41 FL (ref 35–45)
GFR SERPL CREATININE-BSD FRML MDRD: > 90 ML/MIN/1.73M2
GLUCOSE BLD-MCNC: 111 MG/DL (ref 70–108)
HCT VFR BLD CALC: 34.5 % (ref 42–52)
HEMOGLOBIN: 12.1 GM/DL (ref 14–18)
IMMATURE GRANS (ABS): 0.07 THOU/MM3 (ref 0–0.07)
IMMATURE GRANULOCYTES: 1.3 %
LYMPHOCYTES # BLD: 23.2 %
LYMPHOCYTES ABSOLUTE: 1.3 THOU/MM3 (ref 1–4.8)
MCH RBC QN AUTO: 30.5 PG (ref 26–33)
MCHC RBC AUTO-ENTMCNC: 35.1 GM/DL (ref 32.2–35.5)
MCV RBC AUTO: 86.9 FL (ref 80–94)
MONOCYTES # BLD: 8.3 %
MONOCYTES ABSOLUTE: 0.5 THOU/MM3 (ref 0.4–1.3)
NUCLEATED RED BLOOD CELLS: 0 /100 WBC
PLATELET # BLD: 232 THOU/MM3 (ref 130–400)
PMV BLD AUTO: 8.6 FL (ref 9.4–12.4)
POTASSIUM SERPL-SCNC: 4 MEQ/L (ref 3.5–5.2)
RBC # BLD: 3.97 MILL/MM3 (ref 4.7–6.1)
SEG NEUTROPHILS: 65.9 %
SEGMENTED NEUTROPHILS ABSOLUTE COUNT: 3.7 THOU/MM3 (ref 1.8–7.7)
SODIUM BLD-SCNC: 124 MEQ/L (ref 135–145)
WBC # BLD: 5.6 THOU/MM3 (ref 4.8–10.8)

## 2019-01-24 PROCEDURE — APPSS30 APP SPLIT SHARED TIME 16-30 MINUTES: Performed by: NURSE PRACTITIONER

## 2019-01-24 PROCEDURE — 2709999900 HC NON-CHARGEABLE SUPPLY

## 2019-01-24 PROCEDURE — 36415 COLL VENOUS BLD VENIPUNCTURE: CPT

## 2019-01-24 PROCEDURE — 6370000000 HC RX 637 (ALT 250 FOR IP): Performed by: INTERNAL MEDICINE

## 2019-01-24 PROCEDURE — 99232 SBSQ HOSP IP/OBS MODERATE 35: CPT | Performed by: FAMILY MEDICINE

## 2019-01-24 PROCEDURE — 6360000004 HC RX CONTRAST MEDICATION: Performed by: INTERNAL MEDICINE

## 2019-01-24 PROCEDURE — 6360000002 HC RX W HCPCS: Performed by: INTERNAL MEDICINE

## 2019-01-24 PROCEDURE — 2580000003 HC RX 258: Performed by: INTERNAL MEDICINE

## 2019-01-24 PROCEDURE — 99232 SBSQ HOSP IP/OBS MODERATE 35: CPT | Performed by: INTERNAL MEDICINE

## 2019-01-24 PROCEDURE — 80048 BASIC METABOLIC PNL TOTAL CA: CPT

## 2019-01-24 PROCEDURE — 1200000000 HC SEMI PRIVATE

## 2019-01-24 PROCEDURE — 70470 CT HEAD/BRAIN W/O & W/DYE: CPT

## 2019-01-24 PROCEDURE — 94761 N-INVAS EAR/PLS OXIMETRY MLT: CPT

## 2019-01-24 PROCEDURE — 94760 N-INVAS EAR/PLS OXIMETRY 1: CPT

## 2019-01-24 PROCEDURE — 85025 COMPLETE CBC W/AUTO DIFF WBC: CPT

## 2019-01-24 PROCEDURE — 94762 N-INVAS EAR/PLS OXIMTRY CONT: CPT

## 2019-01-24 PROCEDURE — 94640 AIRWAY INHALATION TREATMENT: CPT

## 2019-01-24 RX ORDER — ALBUTEROL SULFATE 90 UG/1
2 AEROSOL, METERED RESPIRATORY (INHALATION) EVERY 4 HOURS PRN
Qty: 1 INHALER | Refills: 11 | Status: ON HOLD | OUTPATIENT
Start: 2019-01-24 | End: 2019-09-12

## 2019-01-24 RX ADMIN — LOSARTAN POTASSIUM 50 MG: 50 TABLET, FILM COATED ORAL at 08:14

## 2019-01-24 RX ADMIN — PANTOPRAZOLE SODIUM 40 MG: 40 TABLET, DELAYED RELEASE ORAL at 05:23

## 2019-01-24 RX ADMIN — IPRATROPIUM BROMIDE AND ALBUTEROL SULFATE 1 AMPULE: .5; 3 SOLUTION RESPIRATORY (INHALATION) at 16:20

## 2019-01-24 RX ADMIN — SODIUM CHLORIDE TAB 1 GM 2 G: 1 TAB at 16:29

## 2019-01-24 RX ADMIN — BUSPIRONE HYDROCHLORIDE 10 MG: 5 TABLET ORAL at 20:46

## 2019-01-24 RX ADMIN — TAMSULOSIN HYDROCHLORIDE 0.4 MG: 0.4 CAPSULE ORAL at 08:14

## 2019-01-24 RX ADMIN — IPRATROPIUM BROMIDE AND ALBUTEROL SULFATE 1 AMPULE: .5; 3 SOLUTION RESPIRATORY (INHALATION) at 22:32

## 2019-01-24 RX ADMIN — BUSPIRONE HYDROCHLORIDE 10 MG: 5 TABLET ORAL at 08:14

## 2019-01-24 RX ADMIN — ACETAMINOPHEN 650 MG: 325 TABLET ORAL at 08:14

## 2019-01-24 RX ADMIN — ENOXAPARIN SODIUM 40 MG: 40 INJECTION SUBCUTANEOUS at 21:34

## 2019-01-24 RX ADMIN — Medication 2 PUFF: at 22:38

## 2019-01-24 RX ADMIN — SODIUM CHLORIDE TAB 1 GM 2 G: 1 TAB at 09:58

## 2019-01-24 RX ADMIN — GABAPENTIN 600 MG: 600 TABLET, FILM COATED ORAL at 08:14

## 2019-01-24 RX ADMIN — GABAPENTIN 600 MG: 600 TABLET, FILM COATED ORAL at 20:46

## 2019-01-24 RX ADMIN — ATORVASTATIN CALCIUM 80 MG: 80 TABLET, FILM COATED ORAL at 08:14

## 2019-01-24 RX ADMIN — AZITHROMYCIN 250 MG: 250 TABLET, FILM COATED ORAL at 08:14

## 2019-01-24 RX ADMIN — Medication 10 ML: at 08:15

## 2019-01-24 RX ADMIN — GABAPENTIN 600 MG: 600 TABLET, FILM COATED ORAL at 16:29

## 2019-01-24 RX ADMIN — IOPAMIDOL 70 ML: 755 INJECTION, SOLUTION INTRAVENOUS at 13:27

## 2019-01-24 RX ADMIN — FAMOTIDINE 20 MG: 20 TABLET, FILM COATED ORAL at 08:14

## 2019-01-24 RX ADMIN — IPRATROPIUM BROMIDE AND ALBUTEROL SULFATE 1 AMPULE: .5; 3 SOLUTION RESPIRATORY (INHALATION) at 11:29

## 2019-01-24 ASSESSMENT — PAIN SCALES - GENERAL
PAINLEVEL_OUTOF10: 2
PAINLEVEL_OUTOF10: 0
PAINLEVEL_OUTOF10: 0
PAINLEVEL_OUTOF10: 5

## 2019-01-24 ASSESSMENT — PAIN DESCRIPTION - DESCRIPTORS: DESCRIPTORS: ACHING

## 2019-01-24 ASSESSMENT — PAIN DESCRIPTION - FREQUENCY: FREQUENCY: INTERMITTENT

## 2019-01-24 ASSESSMENT — PAIN DESCRIPTION - ORIENTATION: ORIENTATION: RIGHT

## 2019-01-24 ASSESSMENT — PAIN DESCRIPTION - ONSET: ONSET: ON-GOING

## 2019-01-24 ASSESSMENT — PAIN DESCRIPTION - LOCATION: LOCATION: BACK;ANKLE

## 2019-01-24 ASSESSMENT — PAIN DESCRIPTION - PAIN TYPE: TYPE: CHRONIC PAIN

## 2019-01-24 ASSESSMENT — PAIN DESCRIPTION - PROGRESSION: CLINICAL_PROGRESSION: NOT CHANGED

## 2019-01-25 ENCOUNTER — HOSPITAL ENCOUNTER (INPATIENT)
Dept: CT IMAGING | Age: 69
Discharge: HOME | DRG: 988 | End: 2019-01-25
Payer: MEDICARE

## 2019-01-25 ENCOUNTER — APPOINTMENT (OUTPATIENT)
Dept: CT IMAGING | Age: 69
DRG: 988 | End: 2019-01-25
Payer: MEDICARE

## 2019-01-25 LAB
ANION GAP SERPL CALCULATED.3IONS-SCNC: 12 MEQ/L (ref 8–16)
BUN BLDV-MCNC: 9 MG/DL (ref 7–22)
CALCIUM SERPL-MCNC: 8.6 MG/DL (ref 8.5–10.5)
CHLORIDE BLD-SCNC: 90 MEQ/L (ref 98–111)
CO2: 23 MEQ/L (ref 23–33)
CREAT SERPL-MCNC: 0.4 MG/DL (ref 0.4–1.2)
ERYTHROCYTE [DISTWIDTH] IN BLOOD BY AUTOMATED COUNT: 12.9 % (ref 11.5–14.5)
ERYTHROCYTE [DISTWIDTH] IN BLOOD BY AUTOMATED COUNT: 40.9 FL (ref 35–45)
GFR SERPL CREATININE-BSD FRML MDRD: > 90 ML/MIN/1.73M2
GLUCOSE BLD-MCNC: 88 MG/DL (ref 70–108)
HCT VFR BLD CALC: 35.6 % (ref 42–52)
HEMOGLOBIN: 12.1 GM/DL (ref 14–18)
MCH RBC QN AUTO: 29.7 PG (ref 26–33)
MCHC RBC AUTO-ENTMCNC: 34 GM/DL (ref 32.2–35.5)
MCV RBC AUTO: 87.5 FL (ref 80–94)
PLATELET # BLD: 239 THOU/MM3 (ref 130–400)
PMV BLD AUTO: 8.4 FL (ref 9.4–12.4)
POTASSIUM SERPL-SCNC: 4.2 MEQ/L (ref 3.5–5.2)
RBC # BLD: 4.07 MILL/MM3 (ref 4.7–6.1)
SODIUM BLD-SCNC: 125 MEQ/L (ref 135–145)
WBC # BLD: 5.4 THOU/MM3 (ref 4.8–10.8)

## 2019-01-25 PROCEDURE — 77295 3-D RADIOTHERAPY PLAN: CPT | Performed by: RADIOLOGY

## 2019-01-25 PROCEDURE — 85027 COMPLETE CBC AUTOMATED: CPT

## 2019-01-25 PROCEDURE — 99232 SBSQ HOSP IP/OBS MODERATE 35: CPT | Performed by: FAMILY MEDICINE

## 2019-01-25 PROCEDURE — 1200000000 HC SEMI PRIVATE

## 2019-01-25 PROCEDURE — APPSS30 APP SPLIT SHARED TIME 16-30 MINUTES: Performed by: NURSE PRACTITIONER

## 2019-01-25 PROCEDURE — 6360000002 HC RX W HCPCS: Performed by: INTERNAL MEDICINE

## 2019-01-25 PROCEDURE — 99232 SBSQ HOSP IP/OBS MODERATE 35: CPT | Performed by: INTERNAL MEDICINE

## 2019-01-25 PROCEDURE — 2580000003 HC RX 258: Performed by: INTERNAL MEDICINE

## 2019-01-25 PROCEDURE — 36415 COLL VENOUS BLD VENIPUNCTURE: CPT

## 2019-01-25 PROCEDURE — 77412 RADIATION TX DELIVERY LVL 3: CPT | Performed by: RADIOLOGY

## 2019-01-25 PROCEDURE — 3209999900 CT GUIDE RADIATION THERAPY NO CHARGE

## 2019-01-25 PROCEDURE — 77334 RADIATION TREATMENT AID(S): CPT | Performed by: RADIOLOGY

## 2019-01-25 PROCEDURE — 77300 RADIATION THERAPY DOSE PLAN: CPT | Performed by: RADIOLOGY

## 2019-01-25 PROCEDURE — 74177 CT ABD & PELVIS W/CONTRAST: CPT

## 2019-01-25 PROCEDURE — 6360000004 HC RX CONTRAST MEDICATION: Performed by: INTERNAL MEDICINE

## 2019-01-25 PROCEDURE — 77417 THER RADIOLOGY PORT IMAGE(S): CPT | Performed by: RADIOLOGY

## 2019-01-25 PROCEDURE — 80048 BASIC METABOLIC PNL TOTAL CA: CPT

## 2019-01-25 PROCEDURE — 94640 AIRWAY INHALATION TREATMENT: CPT

## 2019-01-25 PROCEDURE — 6370000000 HC RX 637 (ALT 250 FOR IP): Performed by: INTERNAL MEDICINE

## 2019-01-25 RX ADMIN — IOPAMIDOL 80 ML: 755 INJECTION, SOLUTION INTRAVENOUS at 15:24

## 2019-01-25 RX ADMIN — ENOXAPARIN SODIUM 40 MG: 40 INJECTION SUBCUTANEOUS at 20:21

## 2019-01-25 RX ADMIN — IPRATROPIUM BROMIDE AND ALBUTEROL SULFATE 1 AMPULE: .5; 3 SOLUTION RESPIRATORY (INHALATION) at 12:55

## 2019-01-25 RX ADMIN — FAMOTIDINE 20 MG: 20 TABLET, FILM COATED ORAL at 08:57

## 2019-01-25 RX ADMIN — ATORVASTATIN CALCIUM 80 MG: 80 TABLET, FILM COATED ORAL at 08:57

## 2019-01-25 RX ADMIN — ACETAMINOPHEN 650 MG: 325 TABLET ORAL at 01:14

## 2019-01-25 RX ADMIN — GABAPENTIN 600 MG: 600 TABLET, FILM COATED ORAL at 13:25

## 2019-01-25 RX ADMIN — ACETAMINOPHEN 650 MG: 325 TABLET ORAL at 17:54

## 2019-01-25 RX ADMIN — IPRATROPIUM BROMIDE AND ALBUTEROL SULFATE 1 AMPULE: .5; 3 SOLUTION RESPIRATORY (INHALATION) at 21:12

## 2019-01-25 RX ADMIN — SODIUM CHLORIDE TAB 1 GM 2 G: 1 TAB at 08:58

## 2019-01-25 RX ADMIN — Medication 10 ML: at 20:17

## 2019-01-25 RX ADMIN — BUSPIRONE HYDROCHLORIDE 10 MG: 5 TABLET ORAL at 08:58

## 2019-01-25 RX ADMIN — Medication 10 ML: at 13:25

## 2019-01-25 RX ADMIN — TAMSULOSIN HYDROCHLORIDE 0.4 MG: 0.4 CAPSULE ORAL at 08:57

## 2019-01-25 RX ADMIN — GABAPENTIN 600 MG: 600 TABLET, FILM COATED ORAL at 20:17

## 2019-01-25 RX ADMIN — ACETAMINOPHEN 650 MG: 325 TABLET ORAL at 22:14

## 2019-01-25 RX ADMIN — GABAPENTIN 600 MG: 600 TABLET, FILM COATED ORAL at 08:57

## 2019-01-25 RX ADMIN — IPRATROPIUM BROMIDE AND ALBUTEROL SULFATE 1 AMPULE: .5; 3 SOLUTION RESPIRATORY (INHALATION) at 17:23

## 2019-01-25 RX ADMIN — Medication 2 PUFF: at 21:12

## 2019-01-25 RX ADMIN — SODIUM CHLORIDE TAB 1 GM 2 G: 1 TAB at 17:54

## 2019-01-25 RX ADMIN — Medication 2 PUFF: at 09:17

## 2019-01-25 RX ADMIN — Medication 10 ML: at 01:15

## 2019-01-25 RX ADMIN — BUSPIRONE HYDROCHLORIDE 10 MG: 5 TABLET ORAL at 20:17

## 2019-01-25 RX ADMIN — PANTOPRAZOLE SODIUM 40 MG: 40 TABLET, DELAYED RELEASE ORAL at 06:39

## 2019-01-25 RX ADMIN — AZITHROMYCIN 250 MG: 250 TABLET, FILM COATED ORAL at 08:57

## 2019-01-25 RX ADMIN — IPRATROPIUM BROMIDE AND ALBUTEROL SULFATE 1 AMPULE: .5; 3 SOLUTION RESPIRATORY (INHALATION) at 09:10

## 2019-01-25 RX ADMIN — LOSARTAN POTASSIUM 50 MG: 50 TABLET, FILM COATED ORAL at 08:57

## 2019-01-25 ASSESSMENT — PAIN SCALES - GENERAL
PAINLEVEL_OUTOF10: 5
PAINLEVEL_OUTOF10: 4
PAINLEVEL_OUTOF10: 6
PAINLEVEL_OUTOF10: 4
PAINLEVEL_OUTOF10: 2
PAINLEVEL_OUTOF10: 1
PAINLEVEL_OUTOF10: 0

## 2019-01-25 ASSESSMENT — PAIN DESCRIPTION - PAIN TYPE
TYPE: CHRONIC PAIN

## 2019-01-25 ASSESSMENT — PAIN DESCRIPTION - LOCATION
LOCATION: ANKLE;BACK
LOCATION: BACK;ANKLE

## 2019-01-25 ASSESSMENT — PAIN DESCRIPTION - DESCRIPTORS
DESCRIPTORS: ACHING
DESCRIPTORS: ACHING

## 2019-01-25 ASSESSMENT — PAIN DESCRIPTION - FREQUENCY: FREQUENCY: INTERMITTENT

## 2019-01-25 ASSESSMENT — PAIN DESCRIPTION - PROGRESSION: CLINICAL_PROGRESSION: NOT CHANGED

## 2019-01-26 VITALS
DIASTOLIC BLOOD PRESSURE: 78 MMHG | RESPIRATION RATE: 20 BRPM | TEMPERATURE: 97.8 F | SYSTOLIC BLOOD PRESSURE: 170 MMHG | BODY MASS INDEX: 19.04 KG/M2 | OXYGEN SATURATION: 96 % | HEIGHT: 68 IN | WEIGHT: 125.6 LBS | HEART RATE: 89 BPM

## 2019-01-26 LAB
ANION GAP SERPL CALCULATED.3IONS-SCNC: 13 MEQ/L (ref 8–16)
BUN BLDV-MCNC: 10 MG/DL (ref 7–22)
CALCIUM SERPL-MCNC: 8.7 MG/DL (ref 8.5–10.5)
CHLORIDE BLD-SCNC: 88 MEQ/L (ref 98–111)
CO2: 23 MEQ/L (ref 23–33)
CREAT SERPL-MCNC: 0.4 MG/DL (ref 0.4–1.2)
GFR SERPL CREATININE-BSD FRML MDRD: > 90 ML/MIN/1.73M2
GLUCOSE BLD-MCNC: 93 MG/DL (ref 70–108)
POTASSIUM SERPL-SCNC: 4.4 MEQ/L (ref 3.5–5.2)
SODIUM BLD-SCNC: 124 MEQ/L (ref 135–145)

## 2019-01-26 PROCEDURE — 99239 HOSP IP/OBS DSCHRG MGMT >30: CPT | Performed by: FAMILY MEDICINE

## 2019-01-26 PROCEDURE — 80048 BASIC METABOLIC PNL TOTAL CA: CPT

## 2019-01-26 PROCEDURE — 77412 RADIATION TX DELIVERY LVL 3: CPT | Performed by: RADIOLOGY

## 2019-01-26 PROCEDURE — 77387 GUIDANCE FOR RADJ TX DLVR: CPT | Performed by: RADIOLOGY

## 2019-01-26 PROCEDURE — 6370000000 HC RX 637 (ALT 250 FOR IP): Performed by: INTERNAL MEDICINE

## 2019-01-26 PROCEDURE — 2580000003 HC RX 258: Performed by: INTERNAL MEDICINE

## 2019-01-26 PROCEDURE — 36415 COLL VENOUS BLD VENIPUNCTURE: CPT

## 2019-01-26 PROCEDURE — 94760 N-INVAS EAR/PLS OXIMETRY 1: CPT

## 2019-01-26 PROCEDURE — 94640 AIRWAY INHALATION TREATMENT: CPT

## 2019-01-26 RX ORDER — SODIUM CHLORIDE 1000 MG
2 TABLET, SOLUBLE MISCELLANEOUS 2 TIMES DAILY WITH MEALS
Qty: 90 TABLET | Refills: 0 | Status: SHIPPED | OUTPATIENT
Start: 2019-01-26 | End: 2019-02-25 | Stop reason: ALTCHOICE

## 2019-01-26 RX ORDER — TAMSULOSIN HYDROCHLORIDE 0.4 MG/1
0.4 CAPSULE ORAL DAILY
Qty: 30 CAPSULE | Refills: 0 | Status: SHIPPED | OUTPATIENT
Start: 2019-01-27 | End: 2019-02-25 | Stop reason: SDUPTHER

## 2019-01-26 RX ORDER — ONDANSETRON 4 MG/1
4 TABLET, FILM COATED ORAL EVERY 8 HOURS PRN
Qty: 30 TABLET | Refills: 0 | Status: SHIPPED | OUTPATIENT
Start: 2019-01-26

## 2019-01-26 RX ORDER — GABAPENTIN 600 MG/1
600 TABLET ORAL 3 TIMES DAILY
Qty: 90 TABLET | Refills: 0 | Status: SHIPPED | OUTPATIENT
Start: 2019-01-26 | End: 2019-05-13

## 2019-01-26 RX ADMIN — BUSPIRONE HYDROCHLORIDE 10 MG: 5 TABLET ORAL at 09:00

## 2019-01-26 RX ADMIN — ATORVASTATIN CALCIUM 80 MG: 80 TABLET, FILM COATED ORAL at 09:00

## 2019-01-26 RX ADMIN — FAMOTIDINE 20 MG: 20 TABLET, FILM COATED ORAL at 09:00

## 2019-01-26 RX ADMIN — IPRATROPIUM BROMIDE AND ALBUTEROL SULFATE 1 AMPULE: .5; 3 SOLUTION RESPIRATORY (INHALATION) at 08:08

## 2019-01-26 RX ADMIN — TAMSULOSIN HYDROCHLORIDE 0.4 MG: 0.4 CAPSULE ORAL at 09:00

## 2019-01-26 RX ADMIN — Medication 10 ML: at 09:01

## 2019-01-26 RX ADMIN — IPRATROPIUM BROMIDE AND ALBUTEROL SULFATE 1 AMPULE: .5; 3 SOLUTION RESPIRATORY (INHALATION) at 11:48

## 2019-01-26 RX ADMIN — LOSARTAN POTASSIUM 50 MG: 50 TABLET, FILM COATED ORAL at 09:00

## 2019-01-26 RX ADMIN — PANTOPRAZOLE SODIUM 40 MG: 40 TABLET, DELAYED RELEASE ORAL at 09:00

## 2019-01-26 RX ADMIN — Medication 2 PUFF: at 08:13

## 2019-01-26 RX ADMIN — SODIUM CHLORIDE TAB 1 GM 2 G: 1 TAB at 09:00

## 2019-01-26 RX ADMIN — GABAPENTIN 600 MG: 600 TABLET, FILM COATED ORAL at 09:00

## 2019-01-26 ASSESSMENT — PAIN SCALES - GENERAL
PAINLEVEL_OUTOF10: 0
PAINLEVEL_OUTOF10: 3

## 2019-01-26 ASSESSMENT — PAIN DESCRIPTION - PAIN TYPE: TYPE: CHRONIC PAIN

## 2019-01-26 ASSESSMENT — PAIN DESCRIPTION - LOCATION: LOCATION: HAND

## 2019-01-28 PROCEDURE — 77336 RADIATION PHYSICS CONSULT: CPT | Performed by: RADIOLOGY

## 2019-01-28 PROCEDURE — 77412 RADIATION TX DELIVERY LVL 3: CPT | Performed by: RADIOLOGY

## 2019-01-29 ENCOUNTER — HOSPITAL ENCOUNTER (OUTPATIENT)
Age: 69
Discharge: HOME | End: 2019-01-29
Payer: MEDICARE

## 2019-01-29 LAB
BASOPHILS # BLD: 0.1 %
BASOPHILS ABSOLUTE: 0 THOU/MM3 (ref 0–0.1)
EOSINOPHIL # BLD: 0 %
EOSINOPHILS ABSOLUTE: 0 THOU/MM3 (ref 0–0.4)
ERYTHROCYTE [DISTWIDTH] IN BLOOD BY AUTOMATED COUNT: 13.3 % (ref 11.5–14.5)
ERYTHROCYTE [DISTWIDTH] IN BLOOD BY AUTOMATED COUNT: 43.4 FL (ref 35–45)
HCT VFR BLD CALC: 39.9 % (ref 42–52)
HEMOGLOBIN: 13.4 GM/DL (ref 14–18)
IMMATURE GRANS (ABS): 0.07 THOU/MM3 (ref 0–0.07)
IMMATURE GRANULOCYTES: 1 %
LYMPHOCYTES # BLD: 5.9 %
LYMPHOCYTES ABSOLUTE: 0.4 THOU/MM3 (ref 1–4.8)
MCH RBC QN AUTO: 30.1 PG (ref 26–33)
MCHC RBC AUTO-ENTMCNC: 33.6 GM/DL (ref 32.2–35.5)
MCV RBC AUTO: 89.7 FL (ref 80–94)
MONOCYTES # BLD: 2.4 %
MONOCYTES ABSOLUTE: 0.2 THOU/MM3 (ref 0.4–1.3)
NUCLEATED RED BLOOD CELLS: 0 /100 WBC
PLATELET # BLD: 342 THOU/MM3 (ref 130–400)
PMV BLD AUTO: 8.6 FL (ref 9.4–12.4)
RBC # BLD: 4.45 MILL/MM3 (ref 4.7–6.1)
SEG NEUTROPHILS: 90.6 %
SEGMENTED NEUTROPHILS ABSOLUTE COUNT: 6.3 THOU/MM3 (ref 1.8–7.7)
WBC # BLD: 7 THOU/MM3 (ref 4.8–10.8)

## 2019-01-29 PROCEDURE — 85025 COMPLETE CBC W/AUTO DIFF WBC: CPT

## 2019-01-29 PROCEDURE — 36415 COLL VENOUS BLD VENIPUNCTURE: CPT

## 2019-01-29 PROCEDURE — 77387 GUIDANCE FOR RADJ TX DLVR: CPT | Performed by: RADIOLOGY

## 2019-01-29 PROCEDURE — 77412 RADIATION TX DELIVERY LVL 3: CPT | Performed by: RADIOLOGY

## 2019-01-30 PROCEDURE — 77412 RADIATION TX DELIVERY LVL 3: CPT | Performed by: RADIOLOGY

## 2019-01-30 PROCEDURE — 77387 GUIDANCE FOR RADJ TX DLVR: CPT | Performed by: RADIOLOGY

## 2019-01-31 PROCEDURE — 77387 GUIDANCE FOR RADJ TX DLVR: CPT | Performed by: RADIOLOGY

## 2019-01-31 PROCEDURE — 77412 RADIATION TX DELIVERY LVL 3: CPT | Performed by: RADIOLOGY

## 2019-02-01 ENCOUNTER — HOSPITAL ENCOUNTER (OUTPATIENT)
Dept: RADIATION ONCOLOGY | Age: 69
Discharge: HOME | End: 2019-02-01
Payer: MEDICARE

## 2019-02-04 ENCOUNTER — HOSPITAL ENCOUNTER (OUTPATIENT)
Age: 69
Discharge: HOME | End: 2019-02-04
Payer: MEDICARE

## 2019-02-04 LAB
ALBUMIN SERPL-MCNC: 3.9 G/DL (ref 3.5–5.1)
ALP BLD-CCNC: 83 U/L (ref 38–126)
ALT SERPL-CCNC: 43 U/L (ref 11–66)
ANION GAP SERPL CALCULATED.3IONS-SCNC: 13 MEQ/L (ref 8–16)
AST SERPL-CCNC: 20 U/L (ref 5–40)
BASOPHILS # BLD: 3 %
BASOPHILS ABSOLUTE: 0.1 THOU/MM3 (ref 0–0.1)
BILIRUB SERPL-MCNC: 0.5 MG/DL (ref 0.3–1.2)
BUN BLDV-MCNC: 16 MG/DL (ref 7–22)
CALCIUM SERPL-MCNC: 9.5 MG/DL (ref 8.5–10.5)
CHLORIDE BLD-SCNC: 99 MEQ/L (ref 98–111)
CO2: 26 MEQ/L (ref 23–33)
CREAT SERPL-MCNC: 0.5 MG/DL (ref 0.4–1.2)
DIFFERENTIAL, MANUAL: NORMAL
EOSINOPHIL # BLD: 3 %
EOSINOPHILS ABSOLUTE: 0.1 THOU/MM3 (ref 0–0.4)
ERYTHROCYTE [DISTWIDTH] IN BLOOD BY AUTOMATED COUNT: 13.3 % (ref 11.5–14.5)
ERYTHROCYTE [DISTWIDTH] IN BLOOD BY AUTOMATED COUNT: 45.1 FL (ref 35–45)
GFR SERPL CREATININE-BSD FRML MDRD: > 90 ML/MIN/1.73M2
GLUCOSE BLD-MCNC: 109 MG/DL (ref 70–108)
HCT VFR BLD CALC: 37 % (ref 42–52)
HEMOGLOBIN: 12 GM/DL (ref 14–18)
LYMPHOCYTES # BLD: 21 %
LYMPHOCYTES ABSOLUTE: 0.8 THOU/MM3 (ref 1–4.8)
MCH RBC QN AUTO: 29.9 PG (ref 26–33)
MCHC RBC AUTO-ENTMCNC: 32.4 GM/DL (ref 32.2–35.5)
MCV RBC AUTO: 92.3 FL (ref 80–94)
MONOCYTES # BLD: 2 %
MONOCYTES ABSOLUTE: 0.1 THOU/MM3 (ref 0.4–1.3)
NUCLEATED RED BLOOD CELLS: 0 /100 WBC
PLATELET # BLD: 246 THOU/MM3 (ref 130–400)
PLATELET ESTIMATE: ADEQUATE
PMV BLD AUTO: 8.5 FL (ref 9.4–12.4)
POTASSIUM SERPL-SCNC: 4.9 MEQ/L (ref 3.5–5.2)
RBC # BLD: 4.01 MILL/MM3 (ref 4.7–6.1)
SEG NEUTROPHILS: 71 %
SEGMENTED NEUTROPHILS ABSOLUTE COUNT: 2.7 THOU/MM3 (ref 1.8–7.7)
SODIUM BLD-SCNC: 138 MEQ/L (ref 135–145)
TOTAL PROTEIN: 6.6 G/DL (ref 6.1–8)
WBC # BLD: 3.8 THOU/MM3 (ref 4.8–10.8)

## 2019-02-04 PROCEDURE — 77336 RADIATION PHYSICS CONSULT: CPT | Performed by: RADIOLOGY

## 2019-02-04 PROCEDURE — 77387 GUIDANCE FOR RADJ TX DLVR: CPT | Performed by: RADIOLOGY

## 2019-02-04 PROCEDURE — 77412 RADIATION TX DELIVERY LVL 3: CPT | Performed by: RADIOLOGY

## 2019-02-04 PROCEDURE — 85027 COMPLETE CBC AUTOMATED: CPT

## 2019-02-04 PROCEDURE — 80053 COMPREHEN METABOLIC PANEL: CPT

## 2019-02-04 PROCEDURE — 36415 COLL VENOUS BLD VENIPUNCTURE: CPT

## 2019-02-05 PROCEDURE — 77387 GUIDANCE FOR RADJ TX DLVR: CPT | Performed by: RADIOLOGY

## 2019-02-05 PROCEDURE — 77412 RADIATION TX DELIVERY LVL 3: CPT | Performed by: RADIOLOGY

## 2019-02-06 ENCOUNTER — CLINICAL DOCUMENTATION (OUTPATIENT)
Dept: NUTRITION | Age: 69
End: 2019-02-06

## 2019-02-06 ENCOUNTER — HOSPITAL ENCOUNTER (OUTPATIENT)
Dept: PULMONOLOGY | Age: 69
Discharge: HOME | End: 2019-02-06
Payer: MEDICARE

## 2019-02-06 DIAGNOSIS — F17.200 SMOKER: ICD-10-CM

## 2019-02-06 DIAGNOSIS — R06.02 SOB (SHORTNESS OF BREATH): ICD-10-CM

## 2019-02-06 DIAGNOSIS — J44.9 CHRONIC OBSTRUCTIVE PULMONARY DISEASE, UNSPECIFIED COPD TYPE (HCC): ICD-10-CM

## 2019-02-06 DIAGNOSIS — R91.8 LUNG MASS: ICD-10-CM

## 2019-02-06 DIAGNOSIS — Z72.0 TOBACCO ABUSE: ICD-10-CM

## 2019-02-06 PROCEDURE — 77387 GUIDANCE FOR RADJ TX DLVR: CPT | Performed by: RADIOLOGY

## 2019-02-06 PROCEDURE — 77412 RADIATION TX DELIVERY LVL 3: CPT | Performed by: RADIOLOGY

## 2019-02-06 PROCEDURE — 94060 EVALUATION OF WHEEZING: CPT

## 2019-02-06 PROCEDURE — 94729 DIFFUSING CAPACITY: CPT

## 2019-02-06 PROCEDURE — 2709999900 HC NON-CHARGEABLE SUPPLY

## 2019-02-06 PROCEDURE — 94726 PLETHYSMOGRAPHY LUNG VOLUMES: CPT

## 2019-02-07 ENCOUNTER — OFFICE VISIT (OUTPATIENT)
Dept: ONCOLOGY | Age: 69
End: 2019-02-07
Payer: MEDICARE

## 2019-02-07 ENCOUNTER — HOSPITAL ENCOUNTER (OUTPATIENT)
Dept: INFUSION THERAPY | Age: 69
Discharge: HOME | End: 2019-02-07
Payer: MEDICARE

## 2019-02-07 VITALS
WEIGHT: 132.4 LBS | HEIGHT: 68 IN | DIASTOLIC BLOOD PRESSURE: 64 MMHG | OXYGEN SATURATION: 97 % | BODY MASS INDEX: 20.07 KG/M2 | RESPIRATION RATE: 18 BRPM | TEMPERATURE: 98.6 F | HEART RATE: 103 BPM | SYSTOLIC BLOOD PRESSURE: 106 MMHG

## 2019-02-07 DIAGNOSIS — C34.12 MALIGNANT NEOPLASM OF UPPER LOBE, LEFT BRONCHUS OR LUNG (HCC): ICD-10-CM

## 2019-02-07 DIAGNOSIS — C78.7 LIVER METASTASIS (HCC): ICD-10-CM

## 2019-02-07 DIAGNOSIS — C80.1 SMALL CELL CARCINOMA (HCC): ICD-10-CM

## 2019-02-07 DIAGNOSIS — E87.1 HYPONATREMIA: ICD-10-CM

## 2019-02-07 DIAGNOSIS — J44.1 CHRONIC OBSTRUCTIVE PULMONARY DISEASE WITH ACUTE EXACERBATION (HCC): ICD-10-CM

## 2019-02-07 DIAGNOSIS — R06.02 SOB (SHORTNESS OF BREATH): Primary | ICD-10-CM

## 2019-02-07 PROCEDURE — 99211 OFF/OP EST MAY X REQ PHY/QHP: CPT

## 2019-02-07 PROCEDURE — 77412 RADIATION TX DELIVERY LVL 3: CPT | Performed by: RADIOLOGY

## 2019-02-07 PROCEDURE — 99205 OFFICE O/P NEW HI 60 MIN: CPT | Performed by: INTERNAL MEDICINE

## 2019-02-07 PROCEDURE — 77387 GUIDANCE FOR RADJ TX DLVR: CPT | Performed by: RADIOLOGY

## 2019-02-08 PROCEDURE — 77412 RADIATION TX DELIVERY LVL 3: CPT | Performed by: RADIOLOGY

## 2019-02-11 PROCEDURE — 77336 RADIATION PHYSICS CONSULT: CPT | Performed by: RADIOLOGY

## 2019-02-11 PROCEDURE — 77387 GUIDANCE FOR RADJ TX DLVR: CPT | Performed by: RADIOLOGY

## 2019-02-11 PROCEDURE — 77412 RADIATION TX DELIVERY LVL 3: CPT | Performed by: RADIOLOGY

## 2019-02-12 ENCOUNTER — HOSPITAL ENCOUNTER (OUTPATIENT)
Age: 69
Discharge: HOME | End: 2019-02-12
Payer: MEDICARE

## 2019-02-12 LAB — SCAN OF BLOOD SMEAR: NORMAL

## 2019-02-12 PROCEDURE — 85025 COMPLETE CBC W/AUTO DIFF WBC: CPT

## 2019-02-12 PROCEDURE — 36415 COLL VENOUS BLD VENIPUNCTURE: CPT

## 2019-02-12 PROCEDURE — 77412 RADIATION TX DELIVERY LVL 3: CPT | Performed by: RADIOLOGY

## 2019-02-12 PROCEDURE — 77387 GUIDANCE FOR RADJ TX DLVR: CPT | Performed by: RADIOLOGY

## 2019-02-13 LAB
ATYPICAL LYMPHOCYTES: ABNORMAL %
BASOPHILS # BLD: 0.7 %
BASOPHILS ABSOLUTE: 0 THOU/MM3 (ref 0–0.1)
EOSINOPHIL # BLD: 0 %
EOSINOPHILS ABSOLUTE: 0 THOU/MM3 (ref 0–0.4)
ERYTHROCYTE [DISTWIDTH] IN BLOOD BY AUTOMATED COUNT: 14.1 % (ref 11.5–14.5)
ERYTHROCYTE [DISTWIDTH] IN BLOOD BY AUTOMATED COUNT: 45.9 FL (ref 35–45)
HCT VFR BLD CALC: 35.9 % (ref 42–52)
HEMOGLOBIN: 11.6 GM/DL (ref 14–18)
IMMATURE GRANS (ABS): 0 THOU/MM3 (ref 0–0.07)
IMMATURE GRANULOCYTES: 0 %
LYMPHOCYTES # BLD: 38.5 %
LYMPHOCYTES ABSOLUTE: 0.5 THOU/MM3 (ref 1–4.8)
MCH RBC QN AUTO: 30.1 PG (ref 26–33)
MCHC RBC AUTO-ENTMCNC: 32.3 GM/DL (ref 32.2–35.5)
MCV RBC AUTO: 93.2 FL (ref 80–94)
MONOCYTES # BLD: 23 %
MONOCYTES ABSOLUTE: 0.3 THOU/MM3 (ref 0.4–1.3)
NUCLEATED RED BLOOD CELLS: 0 /100 WBC
PATHOLOGIST REVIEW: ABNORMAL
PLATELET # BLD: 116 THOU/MM3 (ref 130–400)
PLATELET ESTIMATE: ABNORMAL
PMV BLD AUTO: 8.9 FL (ref 9.4–12.4)
RBC # BLD: 3.85 MILL/MM3 (ref 4.7–6.1)
SEG NEUTROPHILS: 37.8 %
SEGMENTED NEUTROPHILS ABSOLUTE COUNT: 0.5 THOU/MM3 (ref 1.8–7.7)
WBC # BLD: 1.4 THOU/MM3 (ref 4.8–10.8)

## 2019-02-13 PROCEDURE — 77387 GUIDANCE FOR RADJ TX DLVR: CPT | Performed by: RADIOLOGY

## 2019-02-13 PROCEDURE — 77412 RADIATION TX DELIVERY LVL 3: CPT | Performed by: RADIOLOGY

## 2019-02-14 PROCEDURE — 77387 GUIDANCE FOR RADJ TX DLVR: CPT | Performed by: RADIOLOGY

## 2019-02-14 PROCEDURE — 77412 RADIATION TX DELIVERY LVL 3: CPT | Performed by: RADIOLOGY

## 2019-02-20 ENCOUNTER — OFFICE VISIT (OUTPATIENT)
Dept: PULMONOLOGY | Age: 69
End: 2019-02-20
Payer: MEDICARE

## 2019-02-20 VITALS
TEMPERATURE: 97.9 F | OXYGEN SATURATION: 99 % | DIASTOLIC BLOOD PRESSURE: 68 MMHG | HEART RATE: 105 BPM | SYSTOLIC BLOOD PRESSURE: 102 MMHG | WEIGHT: 120 LBS | HEIGHT: 68 IN | BODY MASS INDEX: 18.19 KG/M2

## 2019-02-20 DIAGNOSIS — I10 BENIGN ESSENTIAL HTN: ICD-10-CM

## 2019-02-20 DIAGNOSIS — F17.211 CIGARETTE NICOTINE DEPENDENCE IN REMISSION: ICD-10-CM

## 2019-02-20 DIAGNOSIS — J44.9 STAGE 3 SEVERE COPD BY GOLD CLASSIFICATION (HCC): Primary | ICD-10-CM

## 2019-02-20 DIAGNOSIS — R63.4 WEIGHT LOSS: ICD-10-CM

## 2019-02-20 DIAGNOSIS — C34.90 SMALL CELL LUNG CANCER (HCC): ICD-10-CM

## 2019-02-20 DIAGNOSIS — R13.10 DYSPHAGIA, UNSPECIFIED TYPE: ICD-10-CM

## 2019-02-20 PROCEDURE — 99214 OFFICE O/P EST MOD 30 MIN: CPT | Performed by: NURSE PRACTITIONER

## 2019-02-20 RX ORDER — IPRATROPIUM BROMIDE AND ALBUTEROL SULFATE 2.5; .5 MG/3ML; MG/3ML
1 SOLUTION RESPIRATORY (INHALATION) EVERY 4 HOURS
Qty: 360 ML | Refills: 11 | Status: SHIPPED | OUTPATIENT
Start: 2019-02-20 | End: 2019-02-20 | Stop reason: SDUPTHER

## 2019-02-20 RX ORDER — IPRATROPIUM BROMIDE AND ALBUTEROL SULFATE 2.5; .5 MG/3ML; MG/3ML
1 SOLUTION RESPIRATORY (INHALATION) EVERY 4 HOURS
Qty: 360 ML | Refills: 11 | Status: ON HOLD | OUTPATIENT
Start: 2019-02-20 | End: 2019-09-12

## 2019-02-22 DIAGNOSIS — C80.1 SMALL CELL CARCINOMA (HCC): Primary | ICD-10-CM

## 2019-02-24 RX ORDER — DIPHENHYDRAMINE HYDROCHLORIDE 50 MG/ML
50 INJECTION INTRAMUSCULAR; INTRAVENOUS ONCE
Status: CANCELLED | OUTPATIENT
Start: 2019-02-27 | End: 2019-02-27

## 2019-02-24 RX ORDER — SODIUM CHLORIDE 9 MG/ML
INJECTION, SOLUTION INTRAVENOUS ONCE
Status: CANCELLED | OUTPATIENT
Start: 2019-02-26 | End: 2019-02-26

## 2019-02-24 RX ORDER — SODIUM CHLORIDE 0.9 % (FLUSH) 0.9 %
5 SYRINGE (ML) INJECTION PRN
Status: CANCELLED | OUTPATIENT
Start: 2019-02-27

## 2019-02-24 RX ORDER — SODIUM CHLORIDE 0.9 % (FLUSH) 0.9 %
10 SYRINGE (ML) INJECTION PRN
Status: CANCELLED | OUTPATIENT
Start: 2019-02-27

## 2019-02-24 RX ORDER — 0.9 % SODIUM CHLORIDE 0.9 %
10 VIAL (ML) INJECTION ONCE
Status: CANCELLED | OUTPATIENT
Start: 2019-02-25 | End: 2019-02-25

## 2019-02-24 RX ORDER — SODIUM CHLORIDE 9 MG/ML
INJECTION, SOLUTION INTRAVENOUS CONTINUOUS
Status: CANCELLED | OUTPATIENT
Start: 2019-02-26

## 2019-02-24 RX ORDER — HEPARIN SODIUM (PORCINE) LOCK FLUSH IV SOLN 100 UNIT/ML 100 UNIT/ML
500 SOLUTION INTRAVENOUS PRN
Status: CANCELLED | OUTPATIENT
Start: 2019-02-26

## 2019-02-24 RX ORDER — HEPARIN SODIUM (PORCINE) LOCK FLUSH IV SOLN 100 UNIT/ML 100 UNIT/ML
500 SOLUTION INTRAVENOUS PRN
Status: CANCELLED | OUTPATIENT
Start: 2019-02-25

## 2019-02-24 RX ORDER — METHYLPREDNISOLONE SODIUM SUCCINATE 125 MG/2ML
125 INJECTION, POWDER, LYOPHILIZED, FOR SOLUTION INTRAMUSCULAR; INTRAVENOUS ONCE
Status: CANCELLED | OUTPATIENT
Start: 2019-02-27 | End: 2019-02-27

## 2019-02-24 RX ORDER — METHYLPREDNISOLONE SODIUM SUCCINATE 125 MG/2ML
125 INJECTION, POWDER, LYOPHILIZED, FOR SOLUTION INTRAMUSCULAR; INTRAVENOUS ONCE
Status: CANCELLED | OUTPATIENT
Start: 2019-02-26 | End: 2019-02-26

## 2019-02-24 RX ORDER — SODIUM CHLORIDE 9 MG/ML
INJECTION, SOLUTION INTRAVENOUS CONTINUOUS
Status: CANCELLED | OUTPATIENT
Start: 2019-02-27

## 2019-02-24 RX ORDER — SODIUM CHLORIDE 9 MG/ML
INJECTION, SOLUTION INTRAVENOUS ONCE
Status: CANCELLED | OUTPATIENT
Start: 2019-02-27 | End: 2019-02-27

## 2019-02-24 RX ORDER — HEPARIN SODIUM (PORCINE) LOCK FLUSH IV SOLN 100 UNIT/ML 100 UNIT/ML
500 SOLUTION INTRAVENOUS PRN
Status: CANCELLED | OUTPATIENT
Start: 2019-02-27

## 2019-02-24 RX ORDER — SODIUM CHLORIDE 0.9 % (FLUSH) 0.9 %
5 SYRINGE (ML) INJECTION PRN
Status: CANCELLED | OUTPATIENT
Start: 2019-02-26

## 2019-02-24 RX ORDER — 0.9 % SODIUM CHLORIDE 0.9 %
10 VIAL (ML) INJECTION ONCE
Status: CANCELLED | OUTPATIENT
Start: 2019-02-26 | End: 2019-02-26

## 2019-02-24 RX ORDER — SODIUM CHLORIDE 9 MG/ML
INJECTION, SOLUTION INTRAVENOUS CONTINUOUS
Status: CANCELLED | OUTPATIENT
Start: 2019-02-25

## 2019-02-24 RX ORDER — PALONOSETRON 0.05 MG/ML
0.25 INJECTION, SOLUTION INTRAVENOUS ONCE
Status: CANCELLED | OUTPATIENT
Start: 2019-02-25

## 2019-02-24 RX ORDER — SODIUM CHLORIDE 9 MG/ML
INJECTION, SOLUTION INTRAVENOUS ONCE
Status: CANCELLED | OUTPATIENT
Start: 2019-02-25 | End: 2019-02-25

## 2019-02-24 RX ORDER — 0.9 % SODIUM CHLORIDE 0.9 %
10 VIAL (ML) INJECTION ONCE
Status: CANCELLED | OUTPATIENT
Start: 2019-02-27 | End: 2019-02-27

## 2019-02-24 RX ORDER — DIPHENHYDRAMINE HYDROCHLORIDE 50 MG/ML
50 INJECTION INTRAMUSCULAR; INTRAVENOUS ONCE
Status: CANCELLED | OUTPATIENT
Start: 2019-02-26 | End: 2019-02-26

## 2019-02-24 RX ORDER — DIPHENHYDRAMINE HYDROCHLORIDE 50 MG/ML
50 INJECTION INTRAMUSCULAR; INTRAVENOUS ONCE
Status: CANCELLED | OUTPATIENT
Start: 2019-02-25 | End: 2019-02-25

## 2019-02-24 RX ORDER — SODIUM CHLORIDE 0.9 % (FLUSH) 0.9 %
5 SYRINGE (ML) INJECTION PRN
Status: CANCELLED | OUTPATIENT
Start: 2019-02-25

## 2019-02-24 RX ORDER — SODIUM CHLORIDE 0.9 % (FLUSH) 0.9 %
10 SYRINGE (ML) INJECTION PRN
Status: CANCELLED | OUTPATIENT
Start: 2019-02-26

## 2019-02-24 RX ORDER — SODIUM CHLORIDE 0.9 % (FLUSH) 0.9 %
10 SYRINGE (ML) INJECTION PRN
Status: CANCELLED | OUTPATIENT
Start: 2019-02-25

## 2019-02-24 RX ORDER — METHYLPREDNISOLONE SODIUM SUCCINATE 125 MG/2ML
125 INJECTION, POWDER, LYOPHILIZED, FOR SOLUTION INTRAMUSCULAR; INTRAVENOUS ONCE
Status: CANCELLED | OUTPATIENT
Start: 2019-02-25 | End: 2019-02-25

## 2019-02-24 ASSESSMENT — ENCOUNTER SYMPTOMS
FACIAL SWELLING: 0
COLOR CHANGE: 0
RECTAL PAIN: 0
RECTAL PAIN: 0
COUGH: 1
ABDOMINAL DISTENTION: 0
VOMITING: 0
SHORTNESS OF BREATH: 1
SORE THROAT: 0
ABDOMINAL PAIN: 0
FACIAL SWELLING: 0
ABDOMINAL PAIN: 0
BACK PAIN: 0
BLOOD IN STOOL: 0
CONSTIPATION: 0
COLOR CHANGE: 0
NAUSEA: 0
TROUBLE SWALLOWING: 0
SORE THROAT: 0
WHEEZING: 0
WHEEZING: 0
SHORTNESS OF BREATH: 1
COUGH: 1
ABDOMINAL DISTENTION: 0
DIARRHEA: 0
TROUBLE SWALLOWING: 0
CHEST TIGHTNESS: 0
EYE DISCHARGE: 0
CHEST TIGHTNESS: 0
EYE DISCHARGE: 0
CONSTIPATION: 0
NAUSEA: 0
VOMITING: 0
DIARRHEA: 0
BACK PAIN: 0
BLOOD IN STOOL: 0

## 2019-02-25 ENCOUNTER — HOSPITAL ENCOUNTER (OUTPATIENT)
Dept: INFUSION THERAPY | Age: 69
Discharge: HOME | End: 2019-02-25
Payer: MEDICARE

## 2019-02-25 ENCOUNTER — OFFICE VISIT (OUTPATIENT)
Dept: ONCOLOGY | Age: 69
End: 2019-02-25
Payer: MEDICARE

## 2019-02-25 VITALS
HEART RATE: 96 BPM | BODY MASS INDEX: 19.34 KG/M2 | SYSTOLIC BLOOD PRESSURE: 132 MMHG | OXYGEN SATURATION: 96 % | RESPIRATION RATE: 20 BRPM | TEMPERATURE: 98.1 F | WEIGHT: 127.6 LBS | DIASTOLIC BLOOD PRESSURE: 64 MMHG | HEIGHT: 68 IN

## 2019-02-25 VITALS
OXYGEN SATURATION: 95 % | TEMPERATURE: 98.3 F | BODY MASS INDEX: 19.34 KG/M2 | WEIGHT: 127.6 LBS | DIASTOLIC BLOOD PRESSURE: 62 MMHG | HEART RATE: 92 BPM | SYSTOLIC BLOOD PRESSURE: 128 MMHG | RESPIRATION RATE: 20 BRPM | HEIGHT: 68 IN

## 2019-02-25 DIAGNOSIS — R59.0 MEDIASTINAL LYMPHADENOPATHY: ICD-10-CM

## 2019-02-25 DIAGNOSIS — Z51.11 ENCOUNTER FOR CHEMOTHERAPY MANAGEMENT: ICD-10-CM

## 2019-02-25 DIAGNOSIS — R06.02 SOB (SHORTNESS OF BREATH): ICD-10-CM

## 2019-02-25 DIAGNOSIS — C80.1 SMALL CELL CARCINOMA (HCC): ICD-10-CM

## 2019-02-25 DIAGNOSIS — J44.1 CHRONIC OBSTRUCTIVE PULMONARY DISEASE WITH ACUTE EXACERBATION (HCC): ICD-10-CM

## 2019-02-25 DIAGNOSIS — E87.1 HYPONATREMIA: ICD-10-CM

## 2019-02-25 DIAGNOSIS — C80.1 SMALL CELL CARCINOMA (HCC): Primary | ICD-10-CM

## 2019-02-25 LAB
ALBUMIN SERPL-MCNC: 3.3 G/DL (ref 3.5–5.1)
ALP BLD-CCNC: 98 U/L (ref 38–126)
ALT SERPL-CCNC: 7 U/L (ref 11–66)
AST SERPL-CCNC: 10 U/L (ref 5–40)
BASINOPHIL, AUTOMATED: 1 % (ref 0–3)
BILIRUB SERPL-MCNC: 0.3 MG/DL (ref 0.3–1.2)
BILIRUBIN DIRECT: < 0.2 MG/DL (ref 0–0.3)
BUN, WHOLE BLOOD: 7 MG/DL (ref 8–26)
CHLORIDE, WHOLE BLOOD: 99 MEQ/L (ref 98–109)
CREATININE, WHOLE BLOOD: 0.6 MG/DL (ref 0.5–1.2)
EOSINOPHILS RELATIVE PERCENT: 2 % (ref 0–4)
GFR, ESTIMATED: > 90 ML/MIN/1.73M2
GLUCOSE, WHOLE BLOOD: 95 MG/DL (ref 70–108)
HCT VFR BLD CALC: 32.4 % (ref 42–52)
HEMOGLOBIN: 11.1 GM/DL (ref 14–18)
IONIZED CALCIUM, WHOLE BLOOD: 1.17 MMOL/L (ref 1.12–1.32)
LYMPHOCYTES # BLD: 16 % (ref 15–47)
MCH RBC QN AUTO: 29.9 PG (ref 27–31)
MCHC RBC AUTO-ENTMCNC: 34.1 GM/DL (ref 33–37)
MCV RBC AUTO: 88 FL (ref 80–94)
MONOCYTES: 12 % (ref 0–12)
PDW BLD-RTO: 13 % (ref 11.5–14.5)
PLATELET # BLD: 394 THOU/MM3 (ref 130–400)
PMV BLD AUTO: 6 FL (ref 7.4–10.4)
POTASSIUM, WHOLE BLOOD: 4.1 MEQ/L (ref 3.5–4.9)
RBC # BLD: 3.7 MILL/MM3 (ref 4.7–6.1)
SEG NEUTROPHILS: 70 % (ref 43–75)
SODIUM, WHOLE BLOOD: 138 MEQ/L (ref 138–146)
TOTAL CO2, WHOLE BLOOD: 29 MEQ/L (ref 23–33)
TOTAL PROTEIN: 6.3 G/DL (ref 6.1–8)
WBC # BLD: 3.8 THOU/MM3 (ref 4.8–10.8)

## 2019-02-25 PROCEDURE — 80076 HEPATIC FUNCTION PANEL: CPT

## 2019-02-25 PROCEDURE — G0463 HOSPITAL OUTPT CLINIC VISIT: HCPCS

## 2019-02-25 PROCEDURE — 2709999900 HC NON-CHARGEABLE SUPPLY

## 2019-02-25 PROCEDURE — 80047 BASIC METABLC PNL IONIZED CA: CPT

## 2019-02-25 PROCEDURE — 96413 CHEMO IV INFUSION 1 HR: CPT

## 2019-02-25 PROCEDURE — 96375 TX/PRO/DX INJ NEW DRUG ADDON: CPT

## 2019-02-25 PROCEDURE — 2580000003 HC RX 258: Performed by: INTERNAL MEDICINE

## 2019-02-25 PROCEDURE — 85025 COMPLETE CBC W/AUTO DIFF WBC: CPT

## 2019-02-25 PROCEDURE — 36415 COLL VENOUS BLD VENIPUNCTURE: CPT

## 2019-02-25 PROCEDURE — 6360000002 HC RX W HCPCS: Performed by: INTERNAL MEDICINE

## 2019-02-25 PROCEDURE — 96367 TX/PROPH/DG ADDL SEQ IV INF: CPT

## 2019-02-25 PROCEDURE — 96417 CHEMO IV INFUS EACH ADDL SEQ: CPT

## 2019-02-25 PROCEDURE — 99214 OFFICE O/P EST MOD 30 MIN: CPT | Performed by: INTERNAL MEDICINE

## 2019-02-25 RX ORDER — PALONOSETRON 0.05 MG/ML
0.25 INJECTION, SOLUTION INTRAVENOUS ONCE
Status: COMPLETED | OUTPATIENT
Start: 2019-02-25 | End: 2019-02-25

## 2019-02-25 RX ORDER — SODIUM CHLORIDE 9 MG/ML
INJECTION, SOLUTION INTRAVENOUS ONCE
Status: COMPLETED | OUTPATIENT
Start: 2019-02-25 | End: 2019-02-25

## 2019-02-25 RX ORDER — TAMSULOSIN HYDROCHLORIDE 0.4 MG/1
0.4 CAPSULE ORAL DAILY
Qty: 30 CAPSULE | Refills: 0 | Status: SHIPPED | OUTPATIENT
Start: 2019-02-25 | End: 2019-03-25 | Stop reason: SDUPTHER

## 2019-02-25 RX ADMIN — PALONOSETRON 0.25 MG: 0.05 INJECTION, SOLUTION INTRAVENOUS at 12:27

## 2019-02-25 RX ADMIN — ETOPOSIDE 162 MG: 20 INJECTION, SOLUTION, CONCENTRATE INTRAVENOUS at 13:10

## 2019-02-25 RX ADMIN — DEXAMETHASONE SODIUM PHOSPHATE 12 MG: 4 INJECTION, SOLUTION INTRA-ARTICULAR; INTRALESIONAL; INTRAMUSCULAR; INTRAVENOUS; SOFT TISSUE at 11:25

## 2019-02-25 RX ADMIN — CARBOPLATIN 570 MG: 10 INJECTION, SOLUTION INTRAVENOUS at 12:35

## 2019-02-25 RX ADMIN — SODIUM CHLORIDE 150 MG: 9 INJECTION, SOLUTION INTRAVENOUS at 11:50

## 2019-02-25 RX ADMIN — SODIUM CHLORIDE: 9 INJECTION, SOLUTION INTRAVENOUS at 11:20

## 2019-02-25 NOTE — PROGRESS NOTES
Patient assessed for the following post chemotherapy:    Dizziness   No  Lightheadedness             No     Acute nausea/vomiting           No  Headache   No  Chest pain/pressure  No  Rash/itching   No  Shortness of breath  No    Patient kept for 20 minutes observation post infusion chemotherapy. Patient tolerated chemotherapy treatment carboplatin and etoposide without any complications. Last vital signs:   /62   Pulse 92   Temp 98.3 °F (36.8 °C) (Oral)   Resp 20   SpO2 95%     Patient instructed if experience any of the above symptoms following today's infusion,he/she is to notify MD immediately or go to the emergency department. Discharge instructions given to patient. Verbalizes understanding. Ambulated off unit per self with spouse with belongings.

## 2019-02-25 NOTE — PLAN OF CARE
Problem: Musculor/Skeletal Functional Status  Intervention: Provide standby assistance  Discussed the need to use the call light for assistance when getting up to ambulate. Call light within reach. Goal: Absence of falls  Outcome: Met This Shift  Free from falls while in O.P. Oncology. Problem: Intellectual/Education/Knowledge Deficit  Intervention: Verbal/written education provided  Chemotherapy Teaching     What is Chemotherapy   Drug action [x]   Method of Administration [x]   Handouts given []     Side Effects  Nausea/vomiting [x]   Diarrhea [x]   Fatigue [x]   Signs / Symptoms of infection [x]   Neutropenia [x]   Thrombocytopenia [x]   Alopecia [x]   neuropathy [x]   [place] diet &  the importance of fluids [x]       Micellaneous  Importance of nutrition [x]   Importance of oral hygiene [x]   When to call the MD [x]   Monitoring labs [x]   Use of supportive services []     Explanation of Drug Regimen / Frequency  Carboplatin and etoposide day#1 cycle #1     Comments  Verbalized understanding to drug,action,side effects and when to call MD       Goal: Teaching initiated upon admission  Outcome: Met This Shift  Patient verbalizes understanding to verbal information given on carboplatin and etoposide,action and possible side effects. Aware to call MD if develop complications. Problem: Discharge Planning  Intervention: Interaction with patient/family and care team  Provide discharge instructions. Goal: Knowledge of discharge instructions  Knowledge of discharge instructions    Outcome: Met This Shift  Verbalize understanding of discharge instructions, follow up appointments, and when to call Physician. Comments: Care plan reviewed with patient and family. Patient and family verbalize understanding of the plan of care and contribute to goal setting.

## 2019-02-25 NOTE — PROGRESS NOTES
New chemotherapy validation note:    Diagnosis for chemotherapy: small cell lung cancer    Regimen ordered: carboplatin/etoposide    Reference or literature used for validation: NCCN     Date literature or guideline last updated 10/10/18     Deviation from literature or guideline used: n/a    Summary of any verbal or telephone information obtained: n/a        Amanda Velarde [facility], BCPS, [facility]  2/25/2019     11:18 AM

## 2019-02-25 NOTE — ONCOLOGY
Chemotherapy Administration    Pre-assessment Data: Antineoplastic Agents  Other:   See toxicity flow sheet for assessment [x]     Physician Notification of Concerns Related to Chemotherapy Administration:   Physician Notified Hayes Tate / Time of Notification Dr Kamille Cardona seen today.      Interventions:   Lab work assessed  [x]   Height / Weight verified for dose [x]   Current MAR reviewed [x]   Emergency drugs available as appropriate [x]   Anaphylaxis assessment completed [x]   Pre-medications administered as ordered [x]   Blood return noted upon initiation of chemotherapy [x]   Blood return noted each 1-2ml of a vesicant medication if given IV push []   Blood return noted each 2-3ml of a non-vesicant medication if given IV push []   Monitor for signs / symptoms of hypersensitivity reaction [x]   Chemotherapy orders (drug/dose/rate) verified by 2 Chemo certified RNs [x]   Monitor IV site and blood return throughout the infusion of the medication [x]   Document IV site checks on the IV assessment form [x]   Document chemotherapy teaching on the Patient Education tab [x]   Document patient verbalizes understanding of medications being administered [x]   If IV infiltration, see ONS Guidelines []   Other:  Carboplatin and etoposide    [x]

## 2019-02-26 ENCOUNTER — HOSPITAL ENCOUNTER (OUTPATIENT)
Dept: INFUSION THERAPY | Age: 69
Discharge: HOME | End: 2019-02-26
Payer: MEDICARE

## 2019-02-26 VITALS
HEART RATE: 76 BPM | OXYGEN SATURATION: 97 % | SYSTOLIC BLOOD PRESSURE: 134 MMHG | BODY MASS INDEX: 19.34 KG/M2 | RESPIRATION RATE: 18 BRPM | DIASTOLIC BLOOD PRESSURE: 60 MMHG | HEIGHT: 68 IN | WEIGHT: 127.6 LBS | TEMPERATURE: 97.3 F

## 2019-02-26 DIAGNOSIS — C80.1 SMALL CELL CARCINOMA (HCC): ICD-10-CM

## 2019-02-26 DIAGNOSIS — R59.0 MEDIASTINAL LYMPHADENOPATHY: ICD-10-CM

## 2019-02-26 PROCEDURE — 2580000003 HC RX 258: Performed by: INTERNAL MEDICINE

## 2019-02-26 PROCEDURE — 96413 CHEMO IV INFUSION 1 HR: CPT

## 2019-02-26 PROCEDURE — 2709999900 HC NON-CHARGEABLE SUPPLY

## 2019-02-26 PROCEDURE — 6360000002 HC RX W HCPCS: Performed by: INTERNAL MEDICINE

## 2019-02-26 PROCEDURE — 96367 TX/PROPH/DG ADDL SEQ IV INF: CPT

## 2019-02-26 RX ORDER — SODIUM CHLORIDE 9 MG/ML
INJECTION, SOLUTION INTRAVENOUS ONCE
Status: COMPLETED | OUTPATIENT
Start: 2019-02-26 | End: 2019-02-26

## 2019-02-26 RX ADMIN — DEXAMETHASONE SODIUM PHOSPHATE 12 MG: 4 INJECTION, SOLUTION INTRA-ARTICULAR; INTRALESIONAL; INTRAMUSCULAR; INTRAVENOUS; SOFT TISSUE at 11:40

## 2019-02-26 RX ADMIN — ETOPOSIDE 162 MG: 20 INJECTION, SOLUTION, CONCENTRATE INTRAVENOUS at 12:00

## 2019-02-26 RX ADMIN — SODIUM CHLORIDE: 9 INJECTION, SOLUTION INTRAVENOUS at 11:25

## 2019-02-26 NOTE — PROGRESS NOTES
Patient assessed for the following post chemotherapy:    Dizziness   No  Lightheadedness  No      Acute nausea/vomiting No  Headache   No  Chest pain/pressure  No  Rash/itching   No  Shortness of breath  No    Patient kept for 20 minutes observation post infusion chemotherapy. Patient tolerated chemotherapy treatment VP16 without any complications. Last vital signs:   /60   Pulse 76   Temp 97.3 °F (36.3 °C) (Tympanic)   Resp 18   Ht 5' 7.5\" (1.715 m)   Wt 127 lb 9.6 oz (57.9 kg)   SpO2 97%   BMI 19.69 kg/m²     Patient instructed if experience any of the above symptoms following today's infusion, he is to notify MD immediately or go to the emergency department. Discharge instructions given to patient. Verbalizes understanding. Ambulated off unit per self, accompanied by spouse, with belongings.

## 2019-02-26 NOTE — PLAN OF CARE
Problem: Intellectual/Education/Knowledge Deficit  Intervention: Verbal/written education provided  Chemotherapy Teaching     What is Chemotherapy   Drug action [x]   Method of Administration [x]   Handouts given []     Side Effects  Nausea/vomiting [x]   Diarrhea [x]   Fatigue [x]   Signs / Symptoms of infection [x]   Neutropenia [x]   Thrombocytopenia [x]   Alopecia [x]   neuropathy [x]   [place] diet &  the importance of fluids [x]       Micellaneous  Importance of nutrition [x]   Importance of oral hygiene [x]   When to call the MD [x]   Monitoring labs [x]   Use of supportive services []     Explanation of Drug Regimen / Frequency  VP16:  D2C1     Comments  Verbalized understanding to drug,action,side effects and when to call MD       Goal: Teaching initiated upon admission  Outcome: Met This Shift  Patient verbalizes understanding to verbal information given on VP16,action and possible side effects. Aware to call MD if develop complications. Problem: Discharge Planning  Intervention: Discharge to appropriate level of care  Discuss understanding of discharge instructions,follow-up appointments, and when to call the physician. Goal: Knowledge of discharge instructions  Knowledge of discharge instructions     Outcome: Met This Shift  Verbalized understanding of discharge instructions, follow-up appointments, and when to call the physician. Comments: Care plan reviewed with patient and spouse. Patient and spouse verbalize understanding of the plan of care and contribute to goal setting.

## 2019-02-26 NOTE — ONCOLOGY
Chemotherapy Administration    Pre-assessment Data: Antineoplastic Agents  Other:   See toxicity flow sheet for assessment [x]     Physician Notification of Concerns Related to Chemotherapy Administration:   Physician Notified Suresh Phoenix / Time of Notification      Interventions:   Lab work assessed  [x]   Height / Weight verified for dose [x]   Current MAR reviewed [x]   Emergency drugs available as appropriate [x]   Anaphylaxis assessment completed [x]   Pre-medications administered as ordered [x]   Blood return noted upon initiation of chemotherapy [x]   Blood return noted each 1-2ml of a vesicant medication if given IV push []   Blood return noted each 2-3ml of a non-vesicant medication if given IV push []   Monitor for signs / symptoms of hypersensitivity reaction [x]   Chemotherapy orders (drug/dose/rate) verified by 2 Chemo certified RNs [x]   Monitor IV site and blood return throughout the infusion of the medication [x]   Document IV site checks on the IV assessment form [x]   Document chemotherapy teaching on the Patient Education tab [x]   Document patient verbalizes understanding of medications being administered [x]   If IV infiltration, see ONS Guidelines []   Other:     VP16 []

## 2019-02-27 ENCOUNTER — HOSPITAL ENCOUNTER (OUTPATIENT)
Dept: INFUSION THERAPY | Age: 69
Discharge: HOME | End: 2019-02-27
Payer: MEDICARE

## 2019-02-27 VITALS
DIASTOLIC BLOOD PRESSURE: 59 MMHG | HEART RATE: 88 BPM | WEIGHT: 127.6 LBS | RESPIRATION RATE: 18 BRPM | OXYGEN SATURATION: 95 % | HEIGHT: 68 IN | TEMPERATURE: 98.5 F | BODY MASS INDEX: 19.34 KG/M2 | SYSTOLIC BLOOD PRESSURE: 113 MMHG

## 2019-02-27 DIAGNOSIS — C80.1 SMALL CELL CARCINOMA (HCC): ICD-10-CM

## 2019-02-27 DIAGNOSIS — R59.0 MEDIASTINAL LYMPHADENOPATHY: ICD-10-CM

## 2019-02-27 PROCEDURE — 96367 TX/PROPH/DG ADDL SEQ IV INF: CPT

## 2019-02-27 PROCEDURE — 6360000002 HC RX W HCPCS: Performed by: INTERNAL MEDICINE

## 2019-02-27 PROCEDURE — 2580000003 HC RX 258: Performed by: INTERNAL MEDICINE

## 2019-02-27 PROCEDURE — 96413 CHEMO IV INFUSION 1 HR: CPT

## 2019-02-27 PROCEDURE — 2709999900 HC NON-CHARGEABLE SUPPLY

## 2019-02-27 RX ORDER — SODIUM CHLORIDE 9 MG/ML
INJECTION, SOLUTION INTRAVENOUS ONCE
Status: COMPLETED | OUTPATIENT
Start: 2019-02-27 | End: 2019-02-27

## 2019-02-27 RX ADMIN — Medication 162 MG: at 12:25

## 2019-02-27 RX ADMIN — SODIUM CHLORIDE: 9 INJECTION, SOLUTION INTRAVENOUS at 11:30

## 2019-02-27 RX ADMIN — DEXAMETHASONE SODIUM PHOSPHATE 12 MG: 4 INJECTION, SOLUTION INTRA-ARTICULAR; INTRALESIONAL; INTRAMUSCULAR; INTRAVENOUS; SOFT TISSUE at 11:47

## 2019-02-27 NOTE — PLAN OF CARE
Problem: Intellectual/Education/Knowledge Deficit  Intervention: Verbal/written education provided  Chemotherapy Teaching     What is Chemotherapy   Drug action [x]   Method of Administration [x]   Handouts given []     Side Effects  Nausea/vomiting [x]   Diarrhea [x]   Fatigue [x]   Signs / Symptoms of infection [x]   Neutropenia [x]   Thrombocytopenia [x]   Alopecia [x]   neuropathy [x]   [place] diet &  the importance of fluids [x]       Micellaneous  Importance of nutrition [x]   Importance of oral hygiene [x]   When to call the MD [x]   Monitoring labs [x]   Use of supportive services []     Explanation of Drug Regimen / Frequency  VP16     Comments  Verbalized understanding to drug,action,side effects and when to call MD       Goal: Teaching initiated upon admission  Patient verbalizes understanding to verbal information given on VP16 ,action and possible side effects. Aware to call MD if develop complications. Problem: Discharge Planning  Intervention: Discharge to appropriate level of care  Discuss understanding of discharge instructions,follow-up appointments, and when to call the physician. Goal: Knowledge of discharge instructions  Knowledge of discharge instructions     Outcome: Met This Shift  Verbalized understanding of discharge instructions, follow-up appointments, and when to call the physician. Comments: Care plan reviewed with patient and spouse. Patient and spouse verbalize understanding of the plan of care and contribute to goal setting.

## 2019-02-27 NOTE — PROGRESS NOTES
Patient assessed for the following post chemotherapy:    Dizziness   No  Lightheadedness  No      Acute nausea/vomiting No  Headache   No  Chest pain/pressure  No  Rash/itching   No  Shortness of breath  No    Patient kept for 20 minutes observation post infusion chemotherapy. Patient tolerated chemotherapy treatment VP16 without any complications. Last vital signs:   BP (!) 113/59   Pulse 88   Temp 98.5 °F (36.9 °C) (Oral)   Resp 18   Ht 5' 7.5\" (1.715 m)   Wt 127 lb 9.6 oz (57.9 kg)   SpO2 95%   BMI 19.69 kg/m²     Patient instructed if experience any of the above symptoms following today's infusion, she is to notify MD immediately or go to the emergency department. Discharge instructions given to patient. Verbalizes understanding. Ambulated off unit per self, accompanied by spouse, with belongings.

## 2019-02-27 NOTE — ONCOLOGY
Chemotherapy Administration    Pre-assessment Data: Antineoplastic Agents  Other:   See toxicity flow sheet for assessment [x]     Physician Notification of Concerns Related to Chemotherapy Administration:   Physician Notified Karel Hang / Time of Notification      Interventions:   Lab work assessed  [x]   Height / Weight verified for dose [x]   Current MAR reviewed [x]   Emergency drugs available as appropriate [x]   Anaphylaxis assessment completed [x]   Pre-medications administered as ordered [x]   Blood return noted upon initiation of chemotherapy [x]   Blood return noted each 1-2ml of a vesicant medication if given IV push []   Blood return noted each 2-3ml of a non-vesicant medication if given IV push []   Monitor for signs / symptoms of hypersensitivity reaction [x]   Chemotherapy orders (drug/dose/rate) verified by 2 Chemo certified RNs [x]   Monitor IV site and blood return throughout the infusion of the medication [x]   Document IV site checks on the IV assessment form [x]   Document chemotherapy teaching on the Patient Education tab [x]   Document patient verbalizes understanding of medications being administered [x]   If IV infiltration, see ONS Guidelines []   Other:     VP16 []

## 2019-03-01 DIAGNOSIS — C80.1 SMALL CELL CARCINOMA (HCC): Primary | ICD-10-CM

## 2019-03-04 ENCOUNTER — OFFICE VISIT (OUTPATIENT)
Dept: ONCOLOGY | Age: 69
End: 2019-03-04
Payer: MEDICARE

## 2019-03-04 ENCOUNTER — HOSPITAL ENCOUNTER (OUTPATIENT)
Dept: INFUSION THERAPY | Age: 69
Discharge: HOME | End: 2019-03-04
Payer: MEDICARE

## 2019-03-04 VITALS
OXYGEN SATURATION: 100 % | BODY MASS INDEX: 19.88 KG/M2 | TEMPERATURE: 97.3 F | RESPIRATION RATE: 16 BRPM | WEIGHT: 131.2 LBS | HEART RATE: 97 BPM | HEIGHT: 68 IN | DIASTOLIC BLOOD PRESSURE: 56 MMHG | SYSTOLIC BLOOD PRESSURE: 101 MMHG

## 2019-03-04 DIAGNOSIS — C80.1 SMALL CELL CARCINOMA (HCC): ICD-10-CM

## 2019-03-04 DIAGNOSIS — C80.1 SMALL CELL CARCINOMA (HCC): Primary | ICD-10-CM

## 2019-03-04 LAB
ALBUMIN SERPL-MCNC: 3.7 G/DL (ref 3.5–5.1)
ALP BLD-CCNC: 90 U/L (ref 38–126)
ALT SERPL-CCNC: 10 U/L (ref 11–66)
AST SERPL-CCNC: 12 U/L (ref 5–40)
BASINOPHIL, AUTOMATED: 0 % (ref 0–3)
BILIRUB SERPL-MCNC: 0.4 MG/DL (ref 0.3–1.2)
BILIRUBIN DIRECT: < 0.2 MG/DL (ref 0–0.3)
BUN, WHOLE BLOOD: 14 MG/DL (ref 8–26)
CHLORIDE, WHOLE BLOOD: 95 MEQ/L (ref 98–109)
CREATININE, WHOLE BLOOD: 0.6 MG/DL (ref 0.5–1.2)
EOSINOPHILS RELATIVE PERCENT: 1 % (ref 0–4)
GFR, ESTIMATED: > 90 ML/MIN/1.73M2
GLUCOSE, WHOLE BLOOD: 88 MG/DL (ref 70–108)
HCT VFR BLD CALC: 28.8 % (ref 42–52)
HEMOGLOBIN: 10.1 GM/DL (ref 14–18)
IONIZED CALCIUM, WHOLE BLOOD: 1.25 MMOL/L (ref 1.12–1.32)
LYMPHOCYTES # BLD: 12 % (ref 15–47)
MCH RBC QN AUTO: 30.9 PG (ref 27–31)
MCHC RBC AUTO-ENTMCNC: 35.2 GM/DL (ref 33–37)
MCV RBC AUTO: 88 FL (ref 80–94)
MONOCYTES: 2 % (ref 0–12)
PDW BLD-RTO: 14.4 % (ref 11.5–14.5)
PLATELET # BLD: 293 THOU/MM3 (ref 130–400)
PMV BLD AUTO: 6 FL (ref 7.4–10.4)
POTASSIUM, WHOLE BLOOD: 3.9 MEQ/L (ref 3.5–4.9)
RBC # BLD: 3.27 MILL/MM3 (ref 4.7–6.1)
SEG NEUTROPHILS: 85 % (ref 43–75)
SODIUM, WHOLE BLOOD: 135 MEQ/L (ref 138–146)
TOTAL CO2, WHOLE BLOOD: 28 MEQ/L (ref 23–33)
TOTAL PROTEIN: 6.5 G/DL (ref 6.1–8)
WBC # BLD: 3 THOU/MM3 (ref 4.8–10.8)

## 2019-03-04 PROCEDURE — 85025 COMPLETE CBC W/AUTO DIFF WBC: CPT

## 2019-03-04 PROCEDURE — 99213 OFFICE O/P EST LOW 20 MIN: CPT | Performed by: PHYSICIAN ASSISTANT

## 2019-03-04 PROCEDURE — 80047 BASIC METABLC PNL IONIZED CA: CPT

## 2019-03-04 PROCEDURE — 99211 OFF/OP EST MAY X REQ PHY/QHP: CPT

## 2019-03-04 PROCEDURE — 80076 HEPATIC FUNCTION PANEL: CPT

## 2019-03-04 PROCEDURE — 36415 COLL VENOUS BLD VENIPUNCTURE: CPT

## 2019-03-13 ENCOUNTER — TELEPHONE (OUTPATIENT)
Dept: PULMONOLOGY | Age: 69
End: 2019-03-13

## 2019-03-22 ENCOUNTER — HOSPITAL ENCOUNTER (OUTPATIENT)
Dept: RADIATION ONCOLOGY | Age: 69
Discharge: HOME | End: 2019-03-22
Payer: MEDICARE

## 2019-03-22 PROCEDURE — 99211 OFF/OP EST MAY X REQ PHY/QHP: CPT

## 2019-03-25 ENCOUNTER — HOSPITAL ENCOUNTER (OUTPATIENT)
Dept: INFUSION THERAPY | Age: 69
Discharge: HOME | End: 2019-03-25
Payer: MEDICARE

## 2019-03-25 ENCOUNTER — OFFICE VISIT (OUTPATIENT)
Dept: ONCOLOGY | Age: 69
End: 2019-03-25
Payer: MEDICARE

## 2019-03-25 VITALS
HEIGHT: 68 IN | TEMPERATURE: 98.1 F | OXYGEN SATURATION: 99 % | DIASTOLIC BLOOD PRESSURE: 83 MMHG | BODY MASS INDEX: 21.64 KG/M2 | WEIGHT: 142.8 LBS | HEART RATE: 77 BPM | RESPIRATION RATE: 16 BRPM | SYSTOLIC BLOOD PRESSURE: 157 MMHG

## 2019-03-25 VITALS
RESPIRATION RATE: 18 BRPM | DIASTOLIC BLOOD PRESSURE: 65 MMHG | BODY MASS INDEX: 21.64 KG/M2 | HEART RATE: 84 BPM | SYSTOLIC BLOOD PRESSURE: 143 MMHG | OXYGEN SATURATION: 96 % | TEMPERATURE: 98.1 F | WEIGHT: 142.8 LBS | HEIGHT: 68 IN

## 2019-03-25 DIAGNOSIS — Z51.11 ENCOUNTER FOR CHEMOTHERAPY MANAGEMENT: ICD-10-CM

## 2019-03-25 DIAGNOSIS — C80.1 SMALL CELL CARCINOMA (HCC): Primary | ICD-10-CM

## 2019-03-25 DIAGNOSIS — R59.0 MEDIASTINAL LYMPHADENOPATHY: ICD-10-CM

## 2019-03-25 LAB
ALBUMIN SERPL-MCNC: 3.6 G/DL (ref 3.5–5.1)
ALP BLD-CCNC: 83 U/L (ref 38–126)
ALT SERPL-CCNC: 7 U/L (ref 11–66)
AST SERPL-CCNC: 12 U/L (ref 5–40)
BILIRUB SERPL-MCNC: 0.3 MG/DL (ref 0.3–1.2)
BILIRUBIN DIRECT: < 0.2 MG/DL (ref 0–0.3)
BUN, WHOLE BLOOD: 13 MG/DL (ref 8–26)
CHLORIDE, WHOLE BLOOD: 100 MEQ/L (ref 98–109)
CREATININE, WHOLE BLOOD: 0.6 MG/DL (ref 0.5–1.2)
GFR, ESTIMATED: > 90 ML/MIN/1.73M2
GLUCOSE, WHOLE BLOOD: 96 MG/DL (ref 70–108)
HCT VFR BLD CALC: 30.4 % (ref 42–52)
HEMOGLOBIN: 10.3 GM/DL (ref 14–18)
IONIZED CALCIUM, WHOLE BLOOD: 1.21 MMOL/L (ref 1.12–1.32)
MCH RBC QN AUTO: 31.7 PG (ref 27–31)
MCHC RBC AUTO-ENTMCNC: 33.8 GM/DL (ref 33–37)
MCV RBC AUTO: 94 FL (ref 80–94)
PDW BLD-RTO: 17.5 % (ref 11.5–14.5)
PLATELET # BLD: 308 THOU/MM3 (ref 130–400)
PMV BLD AUTO: 5.8 FL (ref 7.4–10.4)
POTASSIUM, WHOLE BLOOD: 4.5 MEQ/L (ref 3.5–4.9)
RBC # BLD: 3.25 MILL/MM3 (ref 4.7–6.1)
SEG NEUTROPHILS: 71 % (ref 43–75)
SEGMENTED NEUTROPHILS ABSOLUTE COUNT: 3.4 THOU/MM3 (ref 1.8–7.7)
SODIUM, WHOLE BLOOD: 137 MEQ/L (ref 138–146)
TOTAL CO2, WHOLE BLOOD: 30 MEQ/L (ref 23–33)
TOTAL PROTEIN: 6.8 G/DL (ref 6.1–8)
WBC # BLD: 4.8 THOU/MM3 (ref 4.8–10.8)

## 2019-03-25 PROCEDURE — 96417 CHEMO IV INFUS EACH ADDL SEQ: CPT

## 2019-03-25 PROCEDURE — 80047 BASIC METABLC PNL IONIZED CA: CPT

## 2019-03-25 PROCEDURE — 80076 HEPATIC FUNCTION PANEL: CPT

## 2019-03-25 PROCEDURE — 85027 COMPLETE CBC AUTOMATED: CPT

## 2019-03-25 PROCEDURE — 2580000003 HC RX 258: Performed by: INTERNAL MEDICINE

## 2019-03-25 PROCEDURE — 6360000002 HC RX W HCPCS: Performed by: INTERNAL MEDICINE

## 2019-03-25 PROCEDURE — 99214 OFFICE O/P EST MOD 30 MIN: CPT | Performed by: INTERNAL MEDICINE

## 2019-03-25 PROCEDURE — G0463 HOSPITAL OUTPT CLINIC VISIT: HCPCS

## 2019-03-25 PROCEDURE — 2709999900 HC NON-CHARGEABLE SUPPLY

## 2019-03-25 PROCEDURE — 96413 CHEMO IV INFUSION 1 HR: CPT

## 2019-03-25 PROCEDURE — 96367 TX/PROPH/DG ADDL SEQ IV INF: CPT

## 2019-03-25 PROCEDURE — 36415 COLL VENOUS BLD VENIPUNCTURE: CPT

## 2019-03-25 PROCEDURE — 96375 TX/PRO/DX INJ NEW DRUG ADDON: CPT

## 2019-03-25 RX ORDER — SODIUM CHLORIDE 0.9 % (FLUSH) 0.9 %
5 SYRINGE (ML) INJECTION PRN
Status: CANCELLED | OUTPATIENT
Start: 2019-03-25

## 2019-03-25 RX ORDER — METHYLPREDNISOLONE SODIUM SUCCINATE 125 MG/2ML
125 INJECTION, POWDER, LYOPHILIZED, FOR SOLUTION INTRAMUSCULAR; INTRAVENOUS ONCE
Status: CANCELLED | OUTPATIENT
Start: 2019-03-26 | End: 2019-03-26

## 2019-03-25 RX ORDER — DIPHENHYDRAMINE HYDROCHLORIDE 50 MG/ML
50 INJECTION INTRAMUSCULAR; INTRAVENOUS ONCE
Status: CANCELLED | OUTPATIENT
Start: 2019-03-27 | End: 2019-03-27

## 2019-03-25 RX ORDER — 0.9 % SODIUM CHLORIDE 0.9 %
10 VIAL (ML) INJECTION ONCE
Status: CANCELLED | OUTPATIENT
Start: 2019-03-27 | End: 2019-03-27

## 2019-03-25 RX ORDER — SODIUM CHLORIDE 9 MG/ML
INJECTION, SOLUTION INTRAVENOUS CONTINUOUS
Status: CANCELLED | OUTPATIENT
Start: 2019-03-25

## 2019-03-25 RX ORDER — TAMSULOSIN HYDROCHLORIDE 0.4 MG/1
0.4 CAPSULE ORAL DAILY
Qty: 30 CAPSULE | Refills: 0 | Status: SHIPPED | OUTPATIENT
Start: 2019-03-25

## 2019-03-25 RX ORDER — 0.9 % SODIUM CHLORIDE 0.9 %
10 VIAL (ML) INJECTION ONCE
Status: CANCELLED | OUTPATIENT
Start: 2019-03-25 | End: 2019-03-25

## 2019-03-25 RX ORDER — DIPHENHYDRAMINE HYDROCHLORIDE 50 MG/ML
50 INJECTION INTRAMUSCULAR; INTRAVENOUS ONCE
Status: CANCELLED | OUTPATIENT
Start: 2019-03-25 | End: 2019-03-25

## 2019-03-25 RX ORDER — SODIUM CHLORIDE 9 MG/ML
INJECTION, SOLUTION INTRAVENOUS ONCE
Status: CANCELLED | OUTPATIENT
Start: 2019-03-27 | End: 2019-03-27

## 2019-03-25 RX ORDER — 0.9 % SODIUM CHLORIDE 0.9 %
10 VIAL (ML) INJECTION ONCE
Status: CANCELLED | OUTPATIENT
Start: 2019-03-26 | End: 2019-03-26

## 2019-03-25 RX ORDER — PALONOSETRON 0.05 MG/ML
0.25 INJECTION, SOLUTION INTRAVENOUS ONCE
Status: COMPLETED | OUTPATIENT
Start: 2019-03-25 | End: 2019-03-25

## 2019-03-25 RX ORDER — SODIUM CHLORIDE 0.9 % (FLUSH) 0.9 %
5 SYRINGE (ML) INJECTION PRN
Status: CANCELLED | OUTPATIENT
Start: 2019-03-27

## 2019-03-25 RX ORDER — SODIUM CHLORIDE 9 MG/ML
INJECTION, SOLUTION INTRAVENOUS CONTINUOUS
Status: CANCELLED | OUTPATIENT
Start: 2019-03-27

## 2019-03-25 RX ORDER — HEPARIN SODIUM (PORCINE) LOCK FLUSH IV SOLN 100 UNIT/ML 100 UNIT/ML
500 SOLUTION INTRAVENOUS PRN
Status: CANCELLED | OUTPATIENT
Start: 2019-03-25

## 2019-03-25 RX ORDER — DIPHENHYDRAMINE HYDROCHLORIDE 50 MG/ML
50 INJECTION INTRAMUSCULAR; INTRAVENOUS ONCE
Status: CANCELLED | OUTPATIENT
Start: 2019-03-26 | End: 2019-03-26

## 2019-03-25 RX ORDER — SODIUM CHLORIDE 9 MG/ML
INJECTION, SOLUTION INTRAVENOUS ONCE
Status: COMPLETED | OUTPATIENT
Start: 2019-03-25 | End: 2019-03-25

## 2019-03-25 RX ORDER — SODIUM CHLORIDE 9 MG/ML
INJECTION, SOLUTION INTRAVENOUS ONCE
Status: CANCELLED | OUTPATIENT
Start: 2019-03-26 | End: 2019-03-26

## 2019-03-25 RX ORDER — SODIUM CHLORIDE 0.9 % (FLUSH) 0.9 %
10 SYRINGE (ML) INJECTION PRN
Status: CANCELLED | OUTPATIENT
Start: 2019-03-25

## 2019-03-25 RX ORDER — SODIUM CHLORIDE 0.9 % (FLUSH) 0.9 %
10 SYRINGE (ML) INJECTION PRN
Status: CANCELLED | OUTPATIENT
Start: 2019-03-26

## 2019-03-25 RX ORDER — SODIUM CHLORIDE 0.9 % (FLUSH) 0.9 %
5 SYRINGE (ML) INJECTION PRN
Status: CANCELLED | OUTPATIENT
Start: 2019-03-26

## 2019-03-25 RX ORDER — SODIUM CHLORIDE 9 MG/ML
INJECTION, SOLUTION INTRAVENOUS CONTINUOUS
Status: CANCELLED | OUTPATIENT
Start: 2019-03-26

## 2019-03-25 RX ORDER — HEPARIN SODIUM (PORCINE) LOCK FLUSH IV SOLN 100 UNIT/ML 100 UNIT/ML
500 SOLUTION INTRAVENOUS PRN
Status: CANCELLED | OUTPATIENT
Start: 2019-03-26

## 2019-03-25 RX ORDER — SODIUM CHLORIDE 0.9 % (FLUSH) 0.9 %
10 SYRINGE (ML) INJECTION PRN
Status: CANCELLED | OUTPATIENT
Start: 2019-03-27

## 2019-03-25 RX ORDER — HEPARIN SODIUM (PORCINE) LOCK FLUSH IV SOLN 100 UNIT/ML 100 UNIT/ML
500 SOLUTION INTRAVENOUS PRN
Status: CANCELLED | OUTPATIENT
Start: 2019-03-27

## 2019-03-25 RX ORDER — METHYLPREDNISOLONE SODIUM SUCCINATE 125 MG/2ML
125 INJECTION, POWDER, LYOPHILIZED, FOR SOLUTION INTRAMUSCULAR; INTRAVENOUS ONCE
Status: CANCELLED | OUTPATIENT
Start: 2019-03-25 | End: 2019-03-25

## 2019-03-25 RX ORDER — METHYLPREDNISOLONE SODIUM SUCCINATE 125 MG/2ML
125 INJECTION, POWDER, LYOPHILIZED, FOR SOLUTION INTRAMUSCULAR; INTRAVENOUS ONCE
Status: CANCELLED | OUTPATIENT
Start: 2019-03-27 | End: 2019-03-27

## 2019-03-25 RX ADMIN — ETOPOSIDE 160 MG: 20 INJECTION, SOLUTION, CONCENTRATE INTRAVENOUS at 13:51

## 2019-03-25 RX ADMIN — SODIUM CHLORIDE: 9 INJECTION, SOLUTION INTRAVENOUS at 10:50

## 2019-03-25 RX ADMIN — SODIUM CHLORIDE 150 MG: 9 INJECTION, SOLUTION INTRAVENOUS at 12:04

## 2019-03-25 RX ADMIN — PALONOSETRON 0.25 MG: 0.05 INJECTION, SOLUTION INTRAVENOUS at 11:06

## 2019-03-25 RX ADMIN — CARBOPLATIN 380 MG: 10 INJECTION, SOLUTION INTRAVENOUS at 12:45

## 2019-03-25 RX ADMIN — DEXAMETHASONE SODIUM PHOSPHATE 12 MG: 4 INJECTION, SOLUTION INTRAMUSCULAR; INTRAVENOUS at 11:39

## 2019-03-25 ASSESSMENT — ENCOUNTER SYMPTOMS
WHEEZING: 0
COLOR CHANGE: 0
EYE DISCHARGE: 0
BACK PAIN: 0
ABDOMINAL DISTENTION: 0
SORE THROAT: 0
TROUBLE SWALLOWING: 0
NAUSEA: 0
CONSTIPATION: 0
COUGH: 1
ABDOMINAL PAIN: 0
BLOOD IN STOOL: 0
DIARRHEA: 0
VOMITING: 0
SHORTNESS OF BREATH: 1
RECTAL PAIN: 0
FACIAL SWELLING: 0
CHEST TIGHTNESS: 0

## 2019-03-25 NOTE — ONCOLOGY
Chemotherapy Administration    Pre-assessment Data: Antineoplastic Agents  Other:   See toxicity flow sheet for assessment [x]     Physician Notification of Concerns Related to Chemotherapy Administration:   Physician Notified Ania Curet / Time of Notification      Interventions:   Lab work assessed  [x]   Height / Weight verified for dose [x]   Current MAR reviewed [x]   Emergency drugs available as appropriate [x]   Anaphylaxis assessment completed [x]   Pre-medications administered as ordered [x]   Blood return noted upon initiation of chemotherapy [x]   Blood return noted each 1-2ml of a vesicant medication if given IV push []   Blood return noted each 2-3ml of a non-vesicant medication if given IV push []   Monitor for signs / symptoms of hypersensitivity reaction [x]   Chemotherapy orders (drug/dose/rate) verified by 2 Chemo certified RNs [x]   Monitor IV site and blood return throughout the infusion of the medication [x]   Document IV site checks on the IV assessment form [x]   Document chemotherapy teaching on the Patient Education tab [x]   Document patient verbalizes understanding of medications being administered [x]   If IV infiltration, see ONS Guidelines []   Other:     Carboplatin/Etoposide []

## 2019-03-25 NOTE — PROGRESS NOTES
Patient assessed for the following post chemotherapy:    Dizziness   No  Lightheadedness  No      Acute nausea/vomiting No   Headache   No  Chest pain/pressure  No  Rash/itching   No  Shortness of breath  No    Patient kept for 20 minutes observation post infusion chemotherapy. Patient tolerated chemotherapy treatment Carboplatin and Etoposide without any complications. Last vital signs:   BP (!) 143/65   Pulse 84   Temp 98.1 °F (36.7 °C) (Oral)   Resp 18   Ht 5' 8\" (1.727 m)   Wt 142 lb 12.8 oz (64.8 kg)   SpO2 96%   BMI 21.71 kg/m²     Patient instructed if he experiences any of the above symptoms following today's infusion,he is to notify MD immediately or go to the emergency department. Discharge instructions given to patient. Verbalizes understanding. Ambulated off unit per self with belongings.

## 2019-03-25 NOTE — PROGRESS NOTES
Patient assessed for the following post chemotherapy:    Dizziness   No  Lightheadedness  No      Acute nausea/vomiting No  Headache   No  Chest pain/pressure  No  Rash/itching   No  Shortness of breath  No    Patient declined to be kept for 20 minutes observation post infusion chemotherapy. Patient tolerated chemotherapy treatment Carboplatin /Etoposide without any complications. Last vital signs:   BP (!) 143/65   Pulse 84   Temp 98.1 °F (36.7 °C) (Oral)   Resp 18   Ht 5' 8\" (1.727 m)   Wt 142 lb 12.8 oz (64.8 kg)   SpO2 96%   BMI 21.71 kg/m²     Patient instructed if experience any of the above symptoms following today's infusion, he is to notify MD immediately or go to the emergency department. Discharge instructions given to patient. Verbalizes understanding. Ambulated off unit per self, accompanied by spouse, with belongings.

## 2019-03-25 NOTE — DISCHARGE INSTR - COC
Continuity of Care Form    Patient Name: Jm Menjivar   :  1950  MRN:  [id number]    Admit date:  3/25/2019  Discharge date:  ***    Code Status Order: Prior   Advance Directives:   [facility] Documentation     Date/Time Healthcare Directive Type of Healthcare Directive Copy in [address] Agent's Name Healthcare Agent's Phone Number    19 1038  No, patient does not have an advance directive for healthcare treatment -- -- -- -- --          Admitting Physician:  No admitting provider for patient encounter. PCP: Justen Jamil MD    Discharging Nurse: [facility] Unit/Room#: No information available for this encounter.   Discharging Unit Phone Number: ***    Emergency Contact:   Extended Emergency Contact Information  Primary Emergency Contact: Rosalba Vasquez [address] Phone: [phone]  Relation: Domestic Partner    Past Surgical History:  Past Surgical History:   Procedure Laterality Date    COLONOSCOPY      HERNIA REPAIR  80's    bilateral inguinal    KIDNEY SURGERY      stent placement    SC [address] INCL FLUOR GDNCE DX W/CELL WASHG SPX N/A 2018    BRONCHOSCOPY FLUOROSCOPY performed by Gabriel Dominguez MD at [facility]    SC OFFICE/OUTPT VISIT,PROCEDURE ONLY Right 2018    RIGHT INGUINAL HERNIA REPAIR performed by French Kaufman MD at [address] OFFICE/OUTPT [facility] N/A 2018    EXPLORATORY LAPAROSCOPY  OPEN PROCEDURE, SMALL BOWEL RESECTION performed by French Kaufman MD at [address]       Immunization History:   Immunization History   Administered Date(s) Administered    Influenza, Quadv, 6 mo and older, IM, PF (Flulaval, Fluarix) 2019       Active Problems:  Patient Active Problem List   Diagnosis Code    Incarcerated hernia K46.0    COPD (chronic obstructive pulmonary disease) ([facility] [address] J44.9    Hypertension I10    Severe malnutrition ([facility] [address] E43    Hilar mass R91.8  Tobacco abuse Z72.0    Recurrent unilateral inguinal hernia with obstruction and without gangrene K40.31    Lung mass R91.8    Hyponatremia E87.1    SOB (shortness of breath) R06.02    Mediastinal lymphadenopathy R59.0    Supraclavicular adenopathy R59.0    Small cell carcinoma (HCC) C80.1    Smoker F17.200       Isolation/Infection:   Isolation          No Isolation            Nurse Assessment:  Last Vital Signs: BP (!) 157/83   Pulse 77   Temp 98.1 °F (36.7 °C) (Oral)   Resp 16   Ht 5' 8\" (1.727 m)   Wt 142 lb 12.8 oz (64.8 kg)   SpO2 99%   BMI 21.71 kg/m²     Last documented pain score (0-10 scale):    Last Weight:   Wt Readings from Last 1 Encounters:   03/25/19 142 lb 12.8 oz (64.8 kg)     Mental Status:  {IP PT MENTAL STATUS:75870}    IV Access:  { STEPHANIE IV ACCESS:933167657}    Nursing Mobility/ADLs:  Walking   {CHP DME DISL:952349617}  Transfer  {P DME CIPK:013742361}  Bathing  {CHP DME LBAO:105352252}  Dressing  {CHP DME RTLA:626516354}  Toileting  {CHP DME GKTM:632383824}  Feeding  {P DME XNQA:272700313}  Med Admin  {P DME UCVV:757540428}  Med Delivery   { STEPHANIE MED Delivery:030778058}    Wound Care Documentation and Therapy:        Elimination:  Continence:   · Bowel: {YES / JT:62067}  · Bladder: {YES / QW:98412}  Urinary Catheter: {Urinary Catheter:079129455}   Colostomy/Ileostomy/Ileal Conduit: {YES / XP:49387}       Date of Last BM: ***  No intake or output data in the 24 hours ending 03/25/19 1536  No intake/output data recorded.     Safety Concerns:     [address] Safety Concerns:458576713}    Impairments/Disabilities:      [address] Impairments/Disabilities:908674720}    Nutrition Therapy:  Current Nutrition Therapy:   [address] Diet List:398163318}    Routes of Feeding: {[facility] DME Other Feedings:417121766}  Liquids: {Slp liquid thickness:00651}  Daily Fluid Restriction: {CHP DME Yes amt example:355805783}  Last Modified Barium Swallow with Video (Video Swallowing Test): {Done Not Done ZGCB:186957255}    Treatments at the Time of Hospital Discharge:   Respiratory Treatments: ***  Oxygen Therapy:  {Therapy; copd oxygen:86418}  Ventilator:    {[facility] Vent DSNT:794796676}    Rehab Therapies: {THERAPEUTIC INTERVENTION:9516521969}  Weight Bearing Status/Restrictions: { CC Weight Bearin}  Other Medical Equipment (for information only, NOT a DME order):  {EQUIPMENT:210851928}  Other Treatments: ***    Patient's personal belongings (please select all that are sent with patient):  {[facility] DME Belongings:055683315}    RN SIGNATURE:  {Esignature:376160710}    CASE MANAGEMENT/SOCIAL WORK SECTION    Inpatient Status Date: ***    Readmission Risk Assessment Score:  Readmission Risk              Risk of Unplanned Readmission:        0           Discharging to Facility/ Agency   · Name:   · Address:  · Phone:  · Fax:    Dialysis Facility (if applicable)   · Name:  · Address:  · Dialysis Schedule:  · Phone:  · Fax:    / signature: {Esignature:902802136}    PHYSICIAN SECTION    Prognosis: {Prognosis:5661599084}    Condition at Discharge: [facility] Patient Condition:963889229}    Rehab Potential (if transferring to Rehab): {Prognosis:6905118971}    Recommended Labs or Other Treatments After Discharge: ***    Physician Certification: I certify the above information and transfer of Tae Hoffmann  is necessary for the continuing treatment of the diagnosis listed and that he requires {Admit to Appropriate Level of Care:92645} for {GREATER/LESS:852783160} 30 days.      Update Admission H&P: {CHP DME Changes in JQAOW:584876936}    PHYSICIAN SIGNATURE:  {Esignature:543781281}

## 2019-03-25 NOTE — PLAN OF CARE
Problem: Intellectual/Education/Knowledge Deficit  Goal: Teaching initiated upon admission  Outcome: Met This Shift  Note:   Patient verbalizes understanding to verbal information given on Carboplatin /Etoposide ,action and possible side effects. Aware to call MD if develop complications. Goal: Written Disposition Instruction form completed  Outcome: Met This Shift  Intervention: Verbal/written education provided  Note:   Chemotherapy Teaching     What is Chemotherapy   Drug action ? Method of Administration ? Handouts given ? Side Effects  Nausea/vomiting ? Diarrhea ? Fatigue ? Signs / Symptoms of infection ? Neutropenia ? Thrombocytopenia ? Alopecia ? neuropathy ? [place] diet &  the importance of fluids ? Micellaneous  Importance of nutrition ? Importance of oral hygiene ? When to call the MD ?   Monitoring labs ? Use of supportive services ? Explanation of Drug Regimen / Frequency  Carboplatin/Etoposide:  D1C3     Comments  Verbalized understanding to drug,action,side effects and when to call MD         Problem: Discharge Planning  Goal: Knowledge of discharge instructions  Description  Knowledge of discharge instructions     Outcome: Met This Shift  Note:   Verbalized understanding of discharge instructions, follow-up appointments, and when to call the physician. Intervention: Interaction with patient/family and care team  Note:   Discuss understanding of discharge instructions,follow-up appointments, and when to call the physician. Care plan reviewed with patient and spouse. Patient and spouse verbalize understanding of the plan of care and contribute to goal setting.

## 2019-03-26 ENCOUNTER — HOSPITAL ENCOUNTER (OUTPATIENT)
Dept: INFUSION THERAPY | Age: 69
Discharge: HOME | End: 2019-03-26
Payer: MEDICARE

## 2019-03-26 VITALS
TEMPERATURE: 97.7 F | DIASTOLIC BLOOD PRESSURE: 66 MMHG | RESPIRATION RATE: 18 BRPM | SYSTOLIC BLOOD PRESSURE: 139 MMHG | WEIGHT: 141.8 LBS | HEIGHT: 68 IN | BODY MASS INDEX: 21.49 KG/M2 | HEART RATE: 80 BPM | OXYGEN SATURATION: 98 %

## 2019-03-26 DIAGNOSIS — R59.0 MEDIASTINAL LYMPHADENOPATHY: ICD-10-CM

## 2019-03-26 DIAGNOSIS — C80.1 SMALL CELL CARCINOMA (HCC): Primary | ICD-10-CM

## 2019-03-26 PROCEDURE — 96413 CHEMO IV INFUSION 1 HR: CPT

## 2019-03-26 PROCEDURE — 6360000002 HC RX W HCPCS: Performed by: INTERNAL MEDICINE

## 2019-03-26 PROCEDURE — 2580000003 HC RX 258: Performed by: INTERNAL MEDICINE

## 2019-03-26 PROCEDURE — 96367 TX/PROPH/DG ADDL SEQ IV INF: CPT

## 2019-03-26 PROCEDURE — 2709999900 HC NON-CHARGEABLE SUPPLY

## 2019-03-26 RX ORDER — HEPARIN SODIUM (PORCINE) LOCK FLUSH IV SOLN 100 UNIT/ML 100 UNIT/ML
500 SOLUTION INTRAVENOUS PRN
Status: CANCELLED | OUTPATIENT
Start: 2019-03-26

## 2019-03-26 RX ORDER — SODIUM CHLORIDE 9 MG/ML
INJECTION, SOLUTION INTRAVENOUS ONCE
Status: COMPLETED | OUTPATIENT
Start: 2019-03-26 | End: 2019-03-26

## 2019-03-26 RX ORDER — SODIUM CHLORIDE 0.9 % (FLUSH) 0.9 %
10 SYRINGE (ML) INJECTION PRN
Status: CANCELLED | OUTPATIENT
Start: 2019-03-26

## 2019-03-26 RX ORDER — SODIUM CHLORIDE 0.9 % (FLUSH) 0.9 %
20 SYRINGE (ML) INJECTION PRN
Status: CANCELLED | OUTPATIENT
Start: 2019-03-26

## 2019-03-26 RX ADMIN — ETOPOSIDE 160 MG: 20 INJECTION, SOLUTION, CONCENTRATE INTRAVENOUS at 08:55

## 2019-03-26 RX ADMIN — SODIUM CHLORIDE: 9 INJECTION, SOLUTION INTRAVENOUS at 08:25

## 2019-03-26 RX ADMIN — DEXAMETHASONE SODIUM PHOSPHATE 12 MG: 4 INJECTION, SOLUTION INTRAMUSCULAR; INTRAVENOUS at 08:30

## 2019-03-26 NOTE — PROGRESS NOTES
Patient assessed for the following post chemotherapy:    Dizziness   No  Lightheadedness  No      Acute nausea/vomiting No  Headache   No  Chest pain/pressure  No  Rash/itching   No  Shortness of breath  No    Patient kept for 20 minutes observation post infusion chemotherapy. Patient tolerated chemotherapy treatment -16 without any complications. Last vital signs:   /66   Pulse 80   Temp 97.7 °F (36.5 °C) (Oral)   Resp 18   Ht 5' 8\" (1.727 m)   Wt 141 lb 12.8 oz (64.3 kg)   SpO2 98%   BMI 21.56 kg/m²       Patient instructed if experience any of the above symptoms following today's infusion,he/she is to notify MD immediately or go to the emergency department. Discharge instructions given to patient. Verbalizes understanding. Ambulated off unit per self in stable condition with belongings.

## 2019-03-26 NOTE — ONCOLOGY
Chemotherapy Administration    Pre-assessment Data: Antineoplastic Agents  Other:   See toxicity flow sheet for assessment [x]     Physician Notification of Concerns Related to Chemotherapy Administration:   Physician Notified Berta Vidal / Time of Notification      Interventions:   Lab work assessed  [x]   Height / Weight verified for dose [x]   Current MAR reviewed [x]   Emergency drugs available as appropriate [x]   Anaphylaxis assessment completed [x]   Pre-medications administered as ordered [x]   Blood return noted upon initiation of chemotherapy [x]   Blood return noted each 1-2ml of a vesicant medication if given IV push []   Blood return noted each 2-3ml of a non-vesicant medication if given IV push []   Monitor for signs / symptoms of hypersensitivity reaction [x]   Chemotherapy orders (drug/dose/rate) verified by 2 Chemo certified RNs [x]   Monitor IV site and blood return throughout the infusion of the medication [x]   Document IV site checks on the IV assessment form [x]   Document chemotherapy teaching on the Patient Education tab [x]   Document patient verbalizes understanding of medications being administered2 day 2 -16 [x]   If IV infiltration, see ONS Guidelines []   Other:      []

## 2019-03-26 NOTE — PLAN OF CARE
Problem: Intellectual/Education/Knowledge Deficit  Goal: Teaching initiated upon admission  Outcome: Met This Shift  Note:   Patient verbalizes understanding to verbal information given on -16,action and possible side effects. Aware to call MD if develop complications. Goal: Written Disposition Instruction form completed  Outcome: Met This Shift  Note:   Chemotherapy Teaching     What is Chemotherapy   Drug action ? Method of Administration ? Handouts given ? Side Effects  Nausea/vomiting ? Diarrhea ? Fatigue ? Signs / Symptoms of infection ? Neutropenia ? Thrombocytopenia ? Alopecia ? neuropathy ? [place] diet &  the importance of fluids ? Micellaneous  Importance of nutrition ? Importance of oral hygiene ? When to call the MD ?   Monitoring labs ? Use of supportive services ? Explanation of Drug Regimen / Frequency  -16     Comments  Verbalized understanding to drug,action,side effects and when to call MD         Problem: Discharge Planning  Goal: Knowledge of discharge instructions  Description  Knowledge of discharge instructions     Outcome: Met This Shift  Note:   Verbalize understanding of discharge instructions, follow up appointments, and when to call Physician. Intervention: Interaction with patient/family and care team  Note:   Discuss understanding of discharge instructions, follow up appointments and when to call Physician. Care plan reviewed with patient. Patient verbalize understanding of the plan of care and contribute to goal setting.

## 2019-03-27 ENCOUNTER — HOSPITAL ENCOUNTER (OUTPATIENT)
Dept: INFUSION THERAPY | Age: 69
Discharge: HOME | End: 2019-03-27
Payer: MEDICARE

## 2019-03-27 VITALS
SYSTOLIC BLOOD PRESSURE: 136 MMHG | RESPIRATION RATE: 18 BRPM | WEIGHT: 144.6 LBS | HEART RATE: 69 BPM | HEIGHT: 68 IN | OXYGEN SATURATION: 98 % | BODY MASS INDEX: 21.92 KG/M2 | TEMPERATURE: 98.2 F | DIASTOLIC BLOOD PRESSURE: 60 MMHG

## 2019-03-27 DIAGNOSIS — C80.1 SMALL CELL CARCINOMA (HCC): Primary | ICD-10-CM

## 2019-03-27 DIAGNOSIS — R59.0 MEDIASTINAL LYMPHADENOPATHY: ICD-10-CM

## 2019-03-27 PROCEDURE — 2580000003 HC RX 258: Performed by: INTERNAL MEDICINE

## 2019-03-27 PROCEDURE — 2709999900 HC NON-CHARGEABLE SUPPLY

## 2019-03-27 PROCEDURE — 96413 CHEMO IV INFUSION 1 HR: CPT

## 2019-03-27 PROCEDURE — 6360000002 HC RX W HCPCS: Performed by: INTERNAL MEDICINE

## 2019-03-27 PROCEDURE — 96367 TX/PROPH/DG ADDL SEQ IV INF: CPT

## 2019-03-27 RX ORDER — SODIUM CHLORIDE 9 MG/ML
INJECTION, SOLUTION INTRAVENOUS ONCE
Status: COMPLETED | OUTPATIENT
Start: 2019-03-27 | End: 2019-03-27

## 2019-03-27 RX ADMIN — DEXAMETHASONE SODIUM PHOSPHATE 12 MG: 4 INJECTION, SOLUTION INTRAMUSCULAR; INTRAVENOUS at 08:52

## 2019-03-27 RX ADMIN — ETOPOSIDE 160 MG: 20 INJECTION, SOLUTION, CONCENTRATE INTRAVENOUS at 09:12

## 2019-03-27 RX ADMIN — SODIUM CHLORIDE: 9 INJECTION, SOLUTION INTRAVENOUS at 08:35

## 2019-03-27 NOTE — ONCOLOGY
Chemotherapy Administration    Pre-assessment Data: Antineoplastic Agents  Other:   See toxicity flow sheet for assessment [x]     Physician Notification of Concerns Related to Chemotherapy Administration:   Physician Notified Tana Donohue / Time of Notification      Interventions:   Lab work assessed  [x]   Height / Weight verified for dose [x]   Current MAR reviewed [x]   Emergency drugs available as appropriate [x]   Anaphylaxis assessment completed [x]   Pre-medications administered as ordered [x]   Blood return noted upon initiation of chemotherapy [x]   Blood return noted each 1-2ml of a vesicant medication if given IV push []   Blood return noted each 2-3ml of a non-vesicant medication if given IV push []   Monitor for signs / symptoms of hypersensitivity reaction [x]   Chemotherapy orders (drug/dose/rate) verified by 2 Chemo certified RNs [x]   Monitor IV site and blood return throughout the infusion of the medication [x]   Document IV site checks on the IV assessment form [x]   Document chemotherapy teaching on the Patient Education tab [x]   Document patient verbalizes understanding of medications being administered [x]   If IV infiltration, see ONS Guidelines []   Other:     Etoposide []

## 2019-03-27 NOTE — PROGRESS NOTES
Patient assessed for the following post chemotherapy:    Dizziness   No  Lightheadedness  No      Acute nausea/vomiting No  Headache   No  Chest pain/pressure  No  Rash/itching   No  Shortness of breath  No    Patient kept for 20 minutes observation post infusion chemotherapy. Patient tolerated chemotherapy treatment Etoposide without any complications. Last vital signs:   /60   Pulse 69   Temp 98.2 °F (36.8 °C) (Oral)   Resp 18   Ht 5' 8\" (1.727 m)   Wt 144 lb 9.6 oz (65.6 kg)   SpO2 98%   BMI 21.99 kg/m²     Patient instructed if experience any of the above symptoms following today's infusion, he is to notify MD immediately or go to the emergency department. Discharge instructions given to patient. Verbalizes understanding. Ambulated off unit per self, with belongings.

## 2019-03-27 NOTE — PLAN OF CARE
Problem: Intellectual/Education/Knowledge Deficit  Goal: Teaching initiated upon admission  Outcome: Met This Shift  Note:   Patient verbalizes understanding to verbal information given on Etoposide ,action and possible side effects. Aware to call MD if develop complications. Goal: Written Disposition Instruction form completed  Outcome: Met This Shift  Intervention: Verbal/written education provided  Note:   Chemotherapy Teaching     What is Chemotherapy   Drug action ? Method of Administration ? Handouts given ? Side Effects  Nausea/vomiting ? Diarrhea ? Fatigue ? Signs / Symptoms of infection ? Neutropenia ? Thrombocytopenia ? Alopecia ? neuropathy ? [place] diet &  the importance of fluids ? Micellaneous  Importance of nutrition ? Importance of oral hygiene ? When to call the MD ?   Monitoring labs ? Use of supportive services ? Explanation of Drug Regimen / Frequency  Etoposide:  D3C3     Comments  Verbalized understanding to drug,action,side effects and when to call MD         Problem: Discharge Planning  Goal: Knowledge of discharge instructions  Description  Knowledge of discharge instructions     Outcome: Met This Shift  Note:   Verbalized understanding of discharge instructions, follow-up appointments, and when to call the physician. Intervention: Interaction with patient/family and care team  Note:   Discuss understanding of discharge instructions,follow-up appointments, and when to call the physician. Care plan reviewed with patient and spouse. Patient and spouse verbalize understanding of the plan of care and contribute to goal setting.

## 2019-04-14 RX ORDER — SODIUM CHLORIDE 9 MG/ML
INJECTION, SOLUTION INTRAVENOUS CONTINUOUS
Status: CANCELLED | OUTPATIENT
Start: 2019-04-23

## 2019-04-14 RX ORDER — SODIUM CHLORIDE 9 MG/ML
INJECTION, SOLUTION INTRAVENOUS CONTINUOUS
Status: CANCELLED | OUTPATIENT
Start: 2019-04-22

## 2019-04-14 RX ORDER — PALONOSETRON 0.05 MG/ML
0.25 INJECTION, SOLUTION INTRAVENOUS ONCE
Status: CANCELLED | OUTPATIENT
Start: 2019-04-22

## 2019-04-14 RX ORDER — SODIUM CHLORIDE 9 MG/ML
INJECTION, SOLUTION INTRAVENOUS CONTINUOUS
Status: CANCELLED | OUTPATIENT
Start: 2019-04-24

## 2019-04-14 RX ORDER — SODIUM CHLORIDE 0.9 % (FLUSH) 0.9 %
10 SYRINGE (ML) INJECTION PRN
Status: CANCELLED | OUTPATIENT
Start: 2019-04-24

## 2019-04-14 RX ORDER — DIPHENHYDRAMINE HYDROCHLORIDE 50 MG/ML
50 INJECTION INTRAMUSCULAR; INTRAVENOUS ONCE
Status: CANCELLED | OUTPATIENT
Start: 2019-04-24

## 2019-04-14 RX ORDER — METHYLPREDNISOLONE SODIUM SUCCINATE 125 MG/2ML
125 INJECTION, POWDER, LYOPHILIZED, FOR SOLUTION INTRAMUSCULAR; INTRAVENOUS ONCE
Status: CANCELLED | OUTPATIENT
Start: 2019-04-23

## 2019-04-14 RX ORDER — HEPARIN SODIUM (PORCINE) LOCK FLUSH IV SOLN 100 UNIT/ML 100 UNIT/ML
500 SOLUTION INTRAVENOUS PRN
Status: CANCELLED | OUTPATIENT
Start: 2019-04-24

## 2019-04-14 RX ORDER — SODIUM CHLORIDE 9 MG/ML
INJECTION, SOLUTION INTRAVENOUS ONCE
Status: CANCELLED | OUTPATIENT
Start: 2019-04-24

## 2019-04-14 RX ORDER — SODIUM CHLORIDE 9 MG/ML
INJECTION, SOLUTION INTRAVENOUS ONCE
Status: CANCELLED | OUTPATIENT
Start: 2019-04-22

## 2019-04-14 RX ORDER — METHYLPREDNISOLONE SODIUM SUCCINATE 125 MG/2ML
125 INJECTION, POWDER, LYOPHILIZED, FOR SOLUTION INTRAMUSCULAR; INTRAVENOUS ONCE
Status: CANCELLED | OUTPATIENT
Start: 2019-04-22

## 2019-04-14 RX ORDER — SODIUM CHLORIDE 0.9 % (FLUSH) 0.9 %
5 SYRINGE (ML) INJECTION PRN
Status: CANCELLED | OUTPATIENT
Start: 2019-04-22

## 2019-04-14 RX ORDER — 0.9 % SODIUM CHLORIDE 0.9 %
10 VIAL (ML) INJECTION ONCE
Status: CANCELLED | OUTPATIENT
Start: 2019-04-22

## 2019-04-14 RX ORDER — SODIUM CHLORIDE 0.9 % (FLUSH) 0.9 %
10 SYRINGE (ML) INJECTION PRN
Status: CANCELLED | OUTPATIENT
Start: 2019-04-23

## 2019-04-14 RX ORDER — HEPARIN SODIUM (PORCINE) LOCK FLUSH IV SOLN 100 UNIT/ML 100 UNIT/ML
500 SOLUTION INTRAVENOUS PRN
Status: CANCELLED | OUTPATIENT
Start: 2019-04-23

## 2019-04-14 RX ORDER — SODIUM CHLORIDE 0.9 % (FLUSH) 0.9 %
5 SYRINGE (ML) INJECTION PRN
Status: CANCELLED | OUTPATIENT
Start: 2019-04-23

## 2019-04-14 RX ORDER — DIPHENHYDRAMINE HYDROCHLORIDE 50 MG/ML
50 INJECTION INTRAMUSCULAR; INTRAVENOUS ONCE
Status: CANCELLED | OUTPATIENT
Start: 2019-04-22

## 2019-04-14 RX ORDER — SODIUM CHLORIDE 0.9 % (FLUSH) 0.9 %
5 SYRINGE (ML) INJECTION PRN
Status: CANCELLED | OUTPATIENT
Start: 2019-04-24

## 2019-04-14 RX ORDER — DIPHENHYDRAMINE HYDROCHLORIDE 50 MG/ML
50 INJECTION INTRAMUSCULAR; INTRAVENOUS ONCE
Status: CANCELLED | OUTPATIENT
Start: 2019-04-23

## 2019-04-14 RX ORDER — HEPARIN SODIUM (PORCINE) LOCK FLUSH IV SOLN 100 UNIT/ML 100 UNIT/ML
500 SOLUTION INTRAVENOUS PRN
Status: CANCELLED | OUTPATIENT
Start: 2019-04-22

## 2019-04-14 RX ORDER — METHYLPREDNISOLONE SODIUM SUCCINATE 125 MG/2ML
125 INJECTION, POWDER, LYOPHILIZED, FOR SOLUTION INTRAMUSCULAR; INTRAVENOUS ONCE
Status: CANCELLED | OUTPATIENT
Start: 2019-04-24

## 2019-04-14 RX ORDER — 0.9 % SODIUM CHLORIDE 0.9 %
10 VIAL (ML) INJECTION ONCE
Status: CANCELLED | OUTPATIENT
Start: 2019-04-24

## 2019-04-14 RX ORDER — SODIUM CHLORIDE 9 MG/ML
INJECTION, SOLUTION INTRAVENOUS ONCE
Status: CANCELLED | OUTPATIENT
Start: 2019-04-23

## 2019-04-14 RX ORDER — SODIUM CHLORIDE 0.9 % (FLUSH) 0.9 %
10 SYRINGE (ML) INJECTION PRN
Status: CANCELLED | OUTPATIENT
Start: 2019-04-22

## 2019-04-14 RX ORDER — 0.9 % SODIUM CHLORIDE 0.9 %
10 VIAL (ML) INJECTION ONCE
Status: CANCELLED | OUTPATIENT
Start: 2019-04-23

## 2019-04-14 ASSESSMENT — ENCOUNTER SYMPTOMS
WHEEZING: 0
TROUBLE SWALLOWING: 0
BACK PAIN: 0
RECTAL PAIN: 0
SHORTNESS OF BREATH: 1
NAUSEA: 0
CONSTIPATION: 0
COUGH: 1
VOMITING: 0
ABDOMINAL DISTENTION: 0
EYE DISCHARGE: 0
SORE THROAT: 0
DIARRHEA: 0
CHEST TIGHTNESS: 0
FACIAL SWELLING: 0
BLOOD IN STOOL: 0
COLOR CHANGE: 0
ABDOMINAL PAIN: 0

## 2019-04-15 ENCOUNTER — HOSPITAL ENCOUNTER (OUTPATIENT)
Dept: INFUSION THERAPY | Age: 69
Discharge: HOME | End: 2019-04-15
Payer: MEDICARE

## 2019-04-15 ENCOUNTER — HOSPITAL ENCOUNTER (OUTPATIENT)
Dept: INTERVENTIONAL RADIOLOGY/VASCULAR | Age: 69
Discharge: HOME | End: 2019-04-15
Payer: MEDICARE

## 2019-04-15 ENCOUNTER — OFFICE VISIT (OUTPATIENT)
Dept: ONCOLOGY | Age: 69
End: 2019-04-15
Payer: MEDICARE

## 2019-04-15 VITALS
SYSTOLIC BLOOD PRESSURE: 146 MMHG | DIASTOLIC BLOOD PRESSURE: 64 MMHG | BODY MASS INDEX: 22.76 KG/M2 | OXYGEN SATURATION: 97 % | HEART RATE: 76 BPM | WEIGHT: 150.2 LBS | RESPIRATION RATE: 18 BRPM | TEMPERATURE: 97.8 F | HEIGHT: 68 IN

## 2019-04-15 VITALS
HEIGHT: 68 IN | RESPIRATION RATE: 16 BRPM | OXYGEN SATURATION: 99 % | TEMPERATURE: 97.3 F | DIASTOLIC BLOOD PRESSURE: 74 MMHG | HEART RATE: 78 BPM | WEIGHT: 150.2 LBS | BODY MASS INDEX: 22.76 KG/M2 | SYSTOLIC BLOOD PRESSURE: 140 MMHG

## 2019-04-15 DIAGNOSIS — Z51.11 ENCOUNTER FOR CHEMOTHERAPY MANAGEMENT: ICD-10-CM

## 2019-04-15 DIAGNOSIS — C80.1 SMALL CELL CARCINOMA (HCC): ICD-10-CM

## 2019-04-15 DIAGNOSIS — M79.89 PAIN AND SWELLING OF RIGHT LOWER LEG: Primary | ICD-10-CM

## 2019-04-15 DIAGNOSIS — M79.661 PAIN AND SWELLING OF RIGHT LOWER LEG: Primary | ICD-10-CM

## 2019-04-15 DIAGNOSIS — M79.661 PAIN AND SWELLING OF RIGHT LOWER LEG: ICD-10-CM

## 2019-04-15 DIAGNOSIS — M79.89 PAIN AND SWELLING OF RIGHT LOWER LEG: ICD-10-CM

## 2019-04-15 LAB
ALBUMIN SERPL-MCNC: 3.8 G/DL (ref 3.5–5.1)
ALP BLD-CCNC: 79 U/L (ref 38–126)
ALT SERPL-CCNC: 9 U/L (ref 11–66)
AST SERPL-CCNC: 13 U/L (ref 5–40)
BILIRUB SERPL-MCNC: 0.3 MG/DL (ref 0.3–1.2)
BILIRUBIN DIRECT: < 0.2 MG/DL (ref 0–0.3)
BUN, WHOLE BLOOD: 10 MG/DL (ref 8–26)
CHLORIDE, WHOLE BLOOD: 99 MEQ/L (ref 98–109)
CREATININE, WHOLE BLOOD: 0.6 MG/DL (ref 0.5–1.2)
GFR, ESTIMATED: > 90 ML/MIN/1.73M2
GLUCOSE, WHOLE BLOOD: 101 MG/DL (ref 70–108)
HCT VFR BLD CALC: 33.2 % (ref 42–52)
HEMOGLOBIN: 11.3 GM/DL (ref 14–18)
IONIZED CALCIUM, WHOLE BLOOD: 1.22 MMOL/L (ref 1.12–1.32)
MCH RBC QN AUTO: 33.1 PG (ref 27–31)
MCHC RBC AUTO-ENTMCNC: 34.1 GM/DL (ref 33–37)
MCV RBC AUTO: 97 FL (ref 80–94)
PDW BLD-RTO: 13.9 % (ref 11.5–14.5)
PLATELET # BLD: 199 THOU/MM3 (ref 130–400)
PMV BLD AUTO: 5.8 FL (ref 7.4–10.4)
POTASSIUM, WHOLE BLOOD: 4.3 MEQ/L (ref 3.5–4.9)
RBC # BLD: 3.42 MILL/MM3 (ref 4.7–6.1)
SEG NEUTROPHILS: 57 % (ref 43–75)
SEGMENTED NEUTROPHILS ABSOLUTE COUNT: 1.5 THOU/MM3 (ref 1.8–7.7)
SODIUM, WHOLE BLOOD: 138 MEQ/L (ref 138–146)
TOTAL CO2, WHOLE BLOOD: 30 MEQ/L (ref 23–33)
TOTAL PROTEIN: 6.6 G/DL (ref 6.1–8)
WBC # BLD: 2.6 THOU/MM3 (ref 4.8–10.8)

## 2019-04-15 PROCEDURE — 99214 OFFICE O/P EST MOD 30 MIN: CPT | Performed by: INTERNAL MEDICINE

## 2019-04-15 PROCEDURE — 36415 COLL VENOUS BLD VENIPUNCTURE: CPT

## 2019-04-15 PROCEDURE — 80076 HEPATIC FUNCTION PANEL: CPT

## 2019-04-15 PROCEDURE — 80047 BASIC METABLC PNL IONIZED CA: CPT

## 2019-04-15 PROCEDURE — 85027 COMPLETE CBC AUTOMATED: CPT

## 2019-04-15 PROCEDURE — 93971 EXTREMITY STUDY: CPT

## 2019-04-15 PROCEDURE — 99211 OFF/OP EST MAY X REQ PHY/QHP: CPT

## 2019-04-15 RX ORDER — TAMSULOSIN HYDROCHLORIDE 0.4 MG/1
0.4 CAPSULE ORAL DAILY
Qty: 90 CAPSULE | Refills: 2 | Status: CANCELLED | OUTPATIENT
Start: 2019-04-15

## 2019-04-15 NOTE — PROGRESS NOTES
Patient assessed for the following post office visit:    Dizziness   No  Lightheadedness  No      Acute nausea/vomiting No  Headache   No  Chest pain/pressure  No  Rash/itching   No  Shortness of breath  No    Patient tolerated office visit  without any complications. Last vital signs:   BP (!) 146/64   Pulse 76   Temp 97.8 °F (36.6 °C) (Oral)   Resp 18   Ht 5' 8\" (1.727 m)   Wt 150 lb 3.2 oz (68.1 kg)   SpO2 97%   BMI 22.84 kg/m²     Patient instructed if experience any of the above symptoms following today's infusion, he is to notify MD immediately or go to the emergency department. Discharge instructions given to patient. Verbalizes understanding. Ambulated off unit per self with belongings. Provided with samples of boost nutrition to take home.

## 2019-04-15 NOTE — PATIENT INSTRUCTIONS
1. Proceed with cycle #4 of carbo and etoposide next Monday, April 22, 2019. The dose of carbo is reduced to AUC 4. Neulasta on day 4  2. RTC to see me, for labs: CBC, BMP, LFTs in 5 weeks.   3.  Few days before RTC imaging studies

## 2019-04-15 NOTE — PLAN OF CARE
Problem: Discharge Planning  Goal: Knowledge of discharge instructions  Description  Knowledge of discharge instructions     Outcome: Met This Shift  Note:   Verbalized understanding of discharge instructions, follow-up appointments, and when to call the physician. Intervention: Interaction with patient/family and care team  Note:   Discuss understanding of discharge instructions,follow-up appointments, and when to call the physician. Care plan reviewed with patient. Patient verbalize understanding of the plan of care and contribute to goal setting.

## 2019-04-15 NOTE — PROGRESS NOTES
Dr. Mitch Spann stop back to see patient, reports he is not suppose to receive treatment today. Will follow up next Monday in clinic.

## 2019-04-15 NOTE — PROGRESS NOTES
[facility], [address]  [address]  Dept: [phone]  Dept Fax: [phone]: [phone]    Visit Date:4/15/2019     Elpidio Valadez is a 70 y.o. male who presents today for:   Chief Complaint   Patient presents with    Follow-up     Lung Cancer        HPI:   71 yo long term smoker and [facility] pt originally seen by Dr Bill Alfred on 9/7 for L hilar lung mass. He had bronch with washings  Pos for strep and enterobacter and fiberoptic exam (unremarkable). He had a course of antibiotics. and was referred for CT guided bx L hilar mass. He had a break due to medical insurance isues with [facility] and had lapse in f/u until he saw Dr Jacob Hooks and was trying to work out his medical care path but became more SOB. He went to ED at [facility] and had chest CT which showed large mediastinal lymphadenopathy. Path from brushings came back small cell lung ca. CT of the head on January 24, 2019 and showed normal appearance of the brain. CT of the abdomen on January 25, 2019 showed bilateral pleural metastases and effusions. In addition there was 2.3 cm low attenuation area in the left lobe liver suspicious for metastases in the right hepatic lobe there was 9 mm low-attenuation focus. Left adrenal gland was enlarged and an measured 20 mm in the largest dimension there was a 3.8 cm mass in the region of the left iliac chain worrisome for lymph node metastasis. probable left adrenal metastases and liver metastases. Due to presentation with SVC the patient started radiation treatment on January 25, 2019 and completed on 02/20/2019. On January 28, 2019 he received first cycle of chemotherapy with -16 and carboplatin. Overall, he tolerated it reasonably well, mild nausea. Interim history on April 15, 2019:  The patient presents after cycle #3 of pallaitive chemotherapy with carboplatin and -16 with new complaint of right leg swelling. Denies having any fevers.   Denies having any peripheral neuropathy, no hearing problems. His appetite is improved, he gained additional 6 pounds since last visit. His dysphagia and odynophagia is resolved. He is using Magic mouthwash, swish and swallow by mouth.     HPI   Past Medical History:   Diagnosis Date    Arthritis     COPD (chronic obstructive pulmonary disease) ([address]     Gastric reflux     Hyperlipidemia     Hypertension       Past Surgical History:   Procedure Laterality Date    COLONOSCOPY      HERNIA REPAIR  80's    bilateral inguinal    KIDNEY SURGERY      stent placement    [place] [address] INCL FLUOR GDNCE DX W/CELL WASHG SPX N/A 2018    BRONCHOSCOPY FLUOROSCOPY performed by Gadiel Buckley MD at [facility]    [place] OFFICE/OUTPT VISIT,PROCEDURE ONLY Right 2018    RIGHT INGUINAL HERNIA REPAIR performed by Linnea Parker MD at [address] [place] [address] OFFICE/OUTPT [facility] N/A 2018    EXPLORATORY LAPAROSCOPY  OPEN PROCEDURE, SMALL BOWEL RESECTION performed by Linnea Parker MD at [facility] History   Adopted: Yes      Social History     Tobacco Use    Smoking status: Former Smoker     Packs/day: 2.00     Years: 50.00     Pack years: 100.00     Types: Cigarettes     Start date: 1969     Last attempt to quit: 2019     Years since quittin.2    Smokeless tobacco: Never Used   Substance Use Topics    Alcohol use: Yes     Comment: once and awhile       Current Outpatient Medications   Medication Sig Dispense Refill    tamsulosin (FLOMAX) 0.4 MG capsule Take 1 capsule by mouth daily 30 capsule 0    Magic Mouthwash (MIRACLE MOUTHWASH) Take 5 mLs by mouth 4 times daily (before meals and nightly)  2    ipratropium-albuterol (DUONEB) 0.5-2.5 (3) MG/3ML SOLN nebulizer solution Inhale 3 mLs into the lungs every 4 hours 360 mL 11    ondansetron (ZOFRAN) 4 MG tablet Take 1 tablet by mouth every 8 hours as needed for Nausea or Vomiting 30 tablet 0    gabapentin (NEURONTIN) 600 MG tablet Take 1 tablet by Cardiovascular: Positive for chest pain. Negative for palpitations and leg swelling. Gastrointestinal: Negative for abdominal distention, abdominal pain, blood in stool, constipation, diarrhea, nausea, rectal pain and vomiting. Endocrine: Negative for cold intolerance, polydipsia and polyuria. Genitourinary: Negative for decreased urine volume, difficulty urinating, dysuria, flank pain, hematuria and urgency. Musculoskeletal: Negative for arthralgias, back pain, gait problem, joint swelling, myalgias and neck stiffness. Skin: Negative for color change, rash and wound. Neurological: Negative for dizziness, tremors, seizures, speech difficulty, weakness, light-headedness, numbness and headaches. Hematological: Negative for adenopathy. Does not bruise/bleed easily. Psychiatric/Behavioral: Negative for confusion and sleep disturbance. The patient is not nervous/anxious. Objective:   Physical Exam   Constitutional: He is oriented to person, place, and time. He appears well-developed and well-nourished. No distress. HENT:   Head: Normocephalic. Mouth/Throat: Oropharynx is clear and moist. No oropharyngeal exudate. Eyes: Pupils are equal, round, and reactive to light. EOM are normal. Right eye exhibits no discharge. Left eye exhibits no discharge. No scleral icterus. Neck: Normal range of motion. Neck supple. No JVD present. No tracheal deviation present. No thyromegaly present. Cardiovascular: Normal rate and normal heart sounds. Exam reveals no gallop and no friction rub. No murmur heard. Pulmonary/Chest: Effort normal and breath sounds normal. No stridor. No respiratory distress. He has no wheezes. He has no rales. He exhibits no tenderness. Abdominal: Soft. Bowel sounds are normal. He exhibits no distension and no mass. There is no tenderness. There is no rebound. Musculoskeletal: Normal range of motion. He exhibits no edema. Good range of motion in all four extremities. Lymphadenopathy:     He has no cervical adenopathy. Neurological: He is alert and oriented to person, place, and time. He has normal reflexes. No cranial nerve deficit. He exhibits normal muscle tone. Skin: Skin is warm. No rash noted. No erythema. Psychiatric: He has a normal mood and affect. His behavior is normal. Judgment and thought content normal.   Vitals reviewed. BP (!) 140/74 (Site: Left Upper Arm, Position: Sitting, Cuff Size: Small Adult)   Pulse 78   Temp 97.3 °F (36.3 °C) (Tympanic)   Resp 16   Ht 5' 8\" (1.727 m)   Wt 150 lb 3.2 oz (68.1 kg)   SpO2 99%   BMI 22.84 kg/m²      ECOG status is 2. Lab Results   Component Value Date    WBC 2.6 (L) 04/15/2019    HGB 11.3 (L) 04/15/2019    HCT 33.2 (L) 04/15/2019    MCV 97 (H) 04/15/2019     04/15/2019       Chemistry        Component Value Date/Time     04/15/2019 0919     02/04/2019 1704    K 4.3 04/15/2019 0919    K 4.9 02/04/2019 1704    CL 99 02/04/2019 1704    CO2 26 02/04/2019 1704    BUN 16 02/04/2019 1704    CREATININE 0.6 04/15/2019 0919    CREATININE 0.5 02/04/2019 1704        Component Value Date/Time    CALCIUM 9.5 02/04/2019 1704    ALKPHOS 79 04/15/2019 0919    AST 13 04/15/2019 0919    ALT 9 (L) 04/15/2019 0919    BILITOT 0.3 04/15/2019 0919            Imaging studies and labs:   CT of the chest on January 21, 2019 showed:  1. Massive mediastinal adenopathy as well as bilateral cervical lymphadenopathy. This results in compression of the left brachiocephalic vein with possible thrombus within the left brachiocephalic vein. Compression of the superior vena cava. Volume loss    in the left upper lobe. Scattered tiny opacities in the left upper lobe representing either focal atelectasis and/or partially solid nodules. 2. 18 mm pleural-based nodule posterior left lower lobe. 3. Healing fracture left 11th rib. 4. Small pericardial effusion.    These findings are all significantly worse compared to the prior exam     CT of the head on January 21, 2019 showed:  Normal CT appearance of the brain. CT of the abdomen on January 10 2/5 2019 showed:  Bilateral pleural metastasis and effusions developed. Probable left adrenal metastasis. Left external iliac adenopathy and probable liver metastasis. Biopsy on January 22, 2019 showed: Soft tissue, supraclavicular region, core needle biopsies:   Small cell undifferentiated carcinoma    Assessment/Plan: 1. Extensive stage Small Cell lung cancer. SCLC is staged as limited disease (confined to one hemithorax included in a single tolerable radiotherapy port) or extensive (beyond one hemithorax) disease. This distinction is important since treatment differs for patients with limited versus extensive stage disease. Patients with limited stage disease are treated with a combination of chemotherapy and radiation therapy. Patients with extensive stage small cell lung cancer are treated with systemic chemotherapy. Palliative radiation treatment can be added to alleviate the symptoms related to life-threatening organ involvement. As the patient presented with SVC syndrome he started radiation treatment  in the hospital on January 25, 2019, last dose of radiation treatment given late February 2019. On January 28, 2019 he received first cycle of palliative chemotherapy with carboplatin and etoposide in Dr. Uirel Osorio office. However, he wished to transfer his care to [facility]. 2.  Right leg swelling and pain. Concerning for right leg DVT. However the patient had STAT Doppler study that was negative  3. Pallative chemotherapy. The patient is afebrile, his vital signs are stable, his labs are within normal limits. He will proceed with cycle #4 of carbo etoposide next week. After cycle #4 he will have restaging CT scans. 3.  Dysphasia. Resolved, secondary to radiation treatment. Diagnosis Orders   1.  Pain and swelling of right lower leg  CT ABDOMEN PELVIS W IV CONTRAST Additional Contrast? None    CT Chest W Contrast    MRI Brain W WO Contrast   2. Small cell carcinoma (HCC)  CT ABDOMEN PELVIS W IV CONTRAST Additional Contrast? None    CT Chest W Contrast    MRI Brain W WO Contrast   3. Encounter for chemotherapy management  CT ABDOMEN PELVIS W IV CONTRAST Additional Contrast? None    CT Chest W Contrast    MRI Brain W WO Contrast        Plan:   Return in about 5 weeks (around 5/20/2019). Orders Placed:   Orders Placed This Encounter   Procedures    CT ABDOMEN PELVIS W IV CONTRAST Additional Contrast? None     Standing Status:   Future     Standing Expiration Date:   4/15/2020     Order Specific Question:   Additional Contrast?     Answer:   None    CT Chest W Contrast     Standing Status:   Future     Standing Expiration Date:   4/15/2020    MRI Brain W WO Contrast     Standing Status:   Future     Standing Expiration Date:   4/14/2020        Medications Prescribed:   No orders of the defined types were placed in this encounter. I spent 60 minutes face-to-face with the patient and his family. Greater than 50% of time was spent on counseling and coordinating his care. Face to face counseling included prognosis, risks, benefits of treatment, compliance and risk reduction.

## 2019-04-18 DIAGNOSIS — C80.1 SMALL CELL CARCINOMA (HCC): Primary | ICD-10-CM

## 2019-04-22 ENCOUNTER — HOSPITAL ENCOUNTER (OUTPATIENT)
Dept: INFUSION THERAPY | Age: 69
Discharge: HOME | End: 2019-04-22
Payer: MEDICARE

## 2019-04-22 VITALS
SYSTOLIC BLOOD PRESSURE: 117 MMHG | BODY MASS INDEX: 22.88 KG/M2 | HEIGHT: 68 IN | WEIGHT: 151 LBS | HEART RATE: 86 BPM | DIASTOLIC BLOOD PRESSURE: 61 MMHG | TEMPERATURE: 98.1 F | OXYGEN SATURATION: 95 % | RESPIRATION RATE: 16 BRPM

## 2019-04-22 DIAGNOSIS — R59.0 MEDIASTINAL LYMPHADENOPATHY: ICD-10-CM

## 2019-04-22 DIAGNOSIS — C80.1 SMALL CELL CARCINOMA (HCC): Primary | ICD-10-CM

## 2019-04-22 LAB
ALBUMIN SERPL-MCNC: 3.7 G/DL (ref 3.5–5.1)
ALP BLD-CCNC: 75 U/L (ref 38–126)
ALT SERPL-CCNC: 7 U/L (ref 11–66)
AST SERPL-CCNC: 12 U/L (ref 5–40)
BILIRUB SERPL-MCNC: 0.3 MG/DL (ref 0.3–1.2)
BILIRUBIN DIRECT: < 0.2 MG/DL (ref 0–0.3)
BUN, WHOLE BLOOD: 11 MG/DL (ref 8–26)
CHLORIDE, WHOLE BLOOD: 96 MEQ/L (ref 98–109)
CREATININE, WHOLE BLOOD: 0.6 MG/DL (ref 0.5–1.2)
GFR, ESTIMATED: > 90 ML/MIN/1.73M2
GLUCOSE, WHOLE BLOOD: 110 MG/DL (ref 70–108)
HCT VFR BLD CALC: 33.3 % (ref 42–52)
HEMOGLOBIN: 11.5 GM/DL (ref 14–18)
IONIZED CALCIUM, WHOLE BLOOD: 1.22 MMOL/L (ref 1.12–1.32)
MCH RBC QN AUTO: 33.3 PG (ref 27–31)
MCHC RBC AUTO-ENTMCNC: 34.6 GM/DL (ref 33–37)
MCV RBC AUTO: 96 FL (ref 80–94)
PDW BLD-RTO: 13.3 % (ref 11.5–14.5)
PLATELET # BLD: 193 THOU/MM3 (ref 130–400)
PMV BLD AUTO: 5.9 FL (ref 7.4–10.4)
POTASSIUM, WHOLE BLOOD: 4 MEQ/L (ref 3.5–4.9)
RBC # BLD: 3.45 MILL/MM3 (ref 4.7–6.1)
SEG NEUTROPHILS: 81 % (ref 43–75)
SEGMENTED NEUTROPHILS ABSOLUTE COUNT: 5.5 THOU/MM3 (ref 1.8–7.7)
SODIUM, WHOLE BLOOD: 136 MEQ/L (ref 138–146)
TOTAL CO2, WHOLE BLOOD: 30 MEQ/L (ref 23–33)
TOTAL PROTEIN: 6.4 G/DL (ref 6.1–8)
WBC # BLD: 6.8 THOU/MM3 (ref 4.8–10.8)

## 2019-04-22 PROCEDURE — 96417 CHEMO IV INFUS EACH ADDL SEQ: CPT

## 2019-04-22 PROCEDURE — 96375 TX/PRO/DX INJ NEW DRUG ADDON: CPT

## 2019-04-22 PROCEDURE — 2580000003 HC RX 258: Performed by: INTERNAL MEDICINE

## 2019-04-22 PROCEDURE — 2709999900 HC NON-CHARGEABLE SUPPLY

## 2019-04-22 PROCEDURE — 80047 BASIC METABLC PNL IONIZED CA: CPT

## 2019-04-22 PROCEDURE — 96413 CHEMO IV INFUSION 1 HR: CPT

## 2019-04-22 PROCEDURE — 96367 TX/PROPH/DG ADDL SEQ IV INF: CPT

## 2019-04-22 PROCEDURE — 36415 COLL VENOUS BLD VENIPUNCTURE: CPT

## 2019-04-22 PROCEDURE — 85027 COMPLETE CBC AUTOMATED: CPT

## 2019-04-22 PROCEDURE — 6360000002 HC RX W HCPCS: Performed by: INTERNAL MEDICINE

## 2019-04-22 PROCEDURE — 80076 HEPATIC FUNCTION PANEL: CPT

## 2019-04-22 RX ORDER — SODIUM CHLORIDE 9 MG/ML
INJECTION, SOLUTION INTRAVENOUS ONCE
Status: COMPLETED | OUTPATIENT
Start: 2019-04-22 | End: 2019-04-22

## 2019-04-22 RX ORDER — PALONOSETRON 0.05 MG/ML
0.25 INJECTION, SOLUTION INTRAVENOUS ONCE
Status: COMPLETED | OUTPATIENT
Start: 2019-04-22 | End: 2019-04-22

## 2019-04-22 RX ADMIN — ETOPOSIDE 160 MG: 20 INJECTION, SOLUTION, CONCENTRATE INTRAVENOUS at 11:41

## 2019-04-22 RX ADMIN — SODIUM CHLORIDE: 9 INJECTION, SOLUTION INTRAVENOUS at 09:25

## 2019-04-22 RX ADMIN — SODIUM CHLORIDE 150 MG: 9 INJECTION, SOLUTION INTRAVENOUS at 09:49

## 2019-04-22 RX ADMIN — CARBOPLATIN 400 MG: 10 INJECTION, SOLUTION INTRAVENOUS at 10:25

## 2019-04-22 RX ADMIN — PALONOSETRON 0.25 MG: 0.05 INJECTION, SOLUTION INTRAVENOUS at 09:47

## 2019-04-22 RX ADMIN — DEXAMETHASONE SODIUM PHOSPHATE 12 MG: 4 INJECTION, SOLUTION INTRA-ARTICULAR; INTRALESIONAL; INTRAMUSCULAR; INTRAVENOUS; SOFT TISSUE at 09:26

## 2019-04-22 NOTE — PLAN OF CARE
Problem: Intellectual/Education/Knowledge Deficit  Goal: Teaching initiated upon admission  Outcome: Met This Shift  Goal: Written Disposition Instruction form completed  Outcome: Met This Shift  Note:   Patient verbalizes understanding to verbal information given on Carboplatin /Etoposide ,action and possible side effects. Aware to call MD if develop complications. Intervention: Verbal/written education provided  Note:   Chemotherapy Teaching     What is Chemotherapy   Drug action ? Method of Administration ? Handouts given ? Side Effects  Nausea/vomiting ? Diarrhea ? Fatigue ? Signs / Symptoms of infection ? Neutropenia ? Thrombocytopenia ? Alopecia ? neuropathy ? [place] diet &  the importance of fluids ? Micellaneous  Importance of nutrition ? Importance of oral hygiene ? When to call the MD ?   Monitoring labs ? Use of supportive services ? Explanation of Drug Regimen / Frequency  Carboplatin/Etoposide:  D1C4     Comments  Verbalized understanding to drug,action,side effects and when to call MD         Problem: Discharge Planning  Goal: Knowledge of discharge instructions  Description  Knowledge of discharge instructions     Outcome: Met This Shift  Note:   Verbalized understanding of discharge instructions, follow-up appointments, and when to call the physician. Intervention: Interaction with patient/family and care team  Note:   Discuss understanding of discharge instructions,follow-up appointments, and when to call the physician. Care plan reviewed with patient and spouse. Patient and spouse verbalize understanding of the plan of care and contribute to goal setting.

## 2019-04-22 NOTE — ONCOLOGY
Chemotherapy Administration    Pre-assessment Data: Antineoplastic Agents  Other:   See toxicity flow sheet for assessment [x]     Physician Notification of Concerns Related to Chemotherapy Administration:   Physician Notified Squire Malady / Time of Notification      Interventions:   Lab work assessed  [x]   Height / Weight verified for dose [x]   Current MAR reviewed [x]   Emergency drugs available as appropriate [x]   Anaphylaxis assessment completed [x]   Pre-medications administered as ordered [x]   Blood return noted upon initiation of chemotherapy [x]   Blood return noted each 1-2ml of a vesicant medication if given IV push []   Blood return noted each 2-3ml of a non-vesicant medication if given IV push []   Monitor for signs / symptoms of hypersensitivity reaction [x]   Chemotherapy orders (drug/dose/rate) verified by 2 Chemo certified RNs [x]   Monitor IV site and blood return throughout the infusion of the medication [x]   Document IV site checks on the IV assessment form [x]   Document chemotherapy teaching on the Patient Education tab [x]   Document patient verbalizes understanding of medications being administered [x]   If IV infiltration, see ONS Guidelines []   Other:     Carboplatin/Etoposide []

## 2019-04-22 NOTE — PROGRESS NOTES
Patient assessed for the following post chemotherapy:    Dizziness   No  Lightheadedness  No      Acute nausea/vomiting No  Headache   No  Chest pain/pressure  No  Rash/itching   No  Shortness of breath  No    Patient kept for 20 minutes observation post infusion chemotherapy. Patient tolerated chemotherapy treatment Carboplatin /Etoposide without any complications. Last vital signs:   /61   Pulse 86   Temp 98.1 °F (36.7 °C) (Oral)   Resp 16   Ht 5' 8\" (1.727 m)   Wt 151 lb (68.5 kg)   SpO2 95%   BMI 22.96 kg/m²       Patient instructed if experience any of the above symptoms following today's infusion, he is to notify MD immediately or go to the emergency department. Discharge instructions given to patient. Verbalizes understanding. Ambulated off unit per self, accompanied by spouse, with belongings.

## 2019-04-23 ENCOUNTER — HOSPITAL ENCOUNTER (OUTPATIENT)
Dept: INFUSION THERAPY | Age: 69
Discharge: HOME | End: 2019-04-23
Payer: MEDICARE

## 2019-04-23 VITALS
TEMPERATURE: 97.6 F | DIASTOLIC BLOOD PRESSURE: 79 MMHG | OXYGEN SATURATION: 96 % | SYSTOLIC BLOOD PRESSURE: 137 MMHG | HEART RATE: 66 BPM | RESPIRATION RATE: 16 BRPM

## 2019-04-23 DIAGNOSIS — C80.1 SMALL CELL CARCINOMA (HCC): Primary | ICD-10-CM

## 2019-04-23 DIAGNOSIS — R59.0 MEDIASTINAL LYMPHADENOPATHY: ICD-10-CM

## 2019-04-23 PROCEDURE — 96367 TX/PROPH/DG ADDL SEQ IV INF: CPT

## 2019-04-23 PROCEDURE — 2709999900 HC NON-CHARGEABLE SUPPLY

## 2019-04-23 PROCEDURE — 2580000003 HC RX 258: Performed by: INTERNAL MEDICINE

## 2019-04-23 PROCEDURE — 96413 CHEMO IV INFUSION 1 HR: CPT

## 2019-04-23 PROCEDURE — 6360000002 HC RX W HCPCS: Performed by: INTERNAL MEDICINE

## 2019-04-23 RX ORDER — SODIUM CHLORIDE 9 MG/ML
INJECTION, SOLUTION INTRAVENOUS ONCE
Status: COMPLETED | OUTPATIENT
Start: 2019-04-23 | End: 2019-04-23

## 2019-04-23 RX ADMIN — DEXAMETHASONE SODIUM PHOSPHATE 12 MG: 4 INJECTION, SOLUTION INTRA-ARTICULAR; INTRALESIONAL; INTRAMUSCULAR; INTRAVENOUS; SOFT TISSUE at 12:25

## 2019-04-23 RX ADMIN — ETOPOSIDE 160 MG: 20 INJECTION, SOLUTION, CONCENTRATE INTRAVENOUS at 12:43

## 2019-04-23 RX ADMIN — SODIUM CHLORIDE: 9 INJECTION, SOLUTION INTRAVENOUS at 12:24

## 2019-04-23 NOTE — ONCOLOGY
Chemotherapy Administration    Pre-assessment Data: Antineoplastic Agents  Other:   See toxicity flow sheet for assessment [x]     Physician Notification of Concerns Related to Chemotherapy Administration:   Physician Notified Mook Amas / Time of Notification      Interventions:   Lab work assessed  [x]   Height / Weight verified for dose [x]   Current MAR reviewed [x]   Emergency drugs available as appropriate [x]   Anaphylaxis assessment completed [x]   Pre-medications administered as ordered [x]   Blood return noted upon initiation of chemotherapy [x]   Blood return noted each 1-2ml of a vesicant medication if given IV push []   Blood return noted each 2-3ml of a non-vesicant medication if given IV push []   Monitor for signs / symptoms of hypersensitivity reaction [x]   Chemotherapy orders (drug/dose/rate) verified by 2 Chemo certified RNs [x]   Monitor IV site and blood return throughout the infusion of the medication [x]   Document IV site checks on the IV assessment form [x]   Document chemotherapy teaching on the Patient Education tab [x]   Document patient verbalizes understanding of medications being administered [x]   If IV infiltration, see ONS Guidelines []   Other:      []

## 2019-04-23 NOTE — PLAN OF CARE
Problem: Intellectual/Education/Knowledge Deficit  Goal: Teaching initiated upon admission  Outcome: Met This Shift  Note:   Patient verbalizes understanding to verbal information given on Etoposide,action and possible side effects. Aware to call MD if develop complications. Intervention: Verbal/written education provided  Note:   Chemotherapy Teaching     What is Chemotherapy   Drug action ? Method of Administration ? Handouts given ? Side Effects  Nausea/vomiting ? Diarrhea ? Fatigue ? Signs / Symptoms of infection ? Neutropenia ? Thrombocytopenia ? Alopecia ? neuropathy ? [place] diet &  the importance of fluids ? Micellaneous  Importance of nutrition ? Importance of oral hygiene ? When to call the MD ?   Monitoring labs ? Use of supportive services ? Explanation of Drug Regimen / Frequency  Etoposide- C4d2     Comments  Verbalized understanding to drug,action,side effects and when to call MD         Problem: Discharge Planning  Goal: Knowledge of discharge instructions  Description  Knowledge of discharge instructions     Outcome: Met This Shift  Note:   Verbalized understanding of discharge instructions, follow-up appointments, and when to call the physician. Intervention: Discharge to appropriate level of care    Care plan reviewed with patient . Patient  verbalize understanding of the plan of care and contribute to goal setting. Note:   Discuss understanding of discharge instructions,follow-up appointments, and when to call the physician.

## 2019-04-23 NOTE — PROGRESS NOTES
Patient assessed for the following post chemotherapy:    Dizziness   No  Lightheadedness  No      Acute nausea/vomiting No  Headache   No  Chest pain/pressure  No  Rash/itching   No  Shortness of breath  No    Patient kept for 20 minutes observation post infusion chemotherapy. Patient tolerated chemotherapy treatment VP16 without any complications. Last vital signs:   /79   Pulse 66   Temp 97.6 °F (36.4 °C) (Oral)   Resp 16   SpO2 96%         Patient instructed if experience any of the above symptoms following today's infusion,he/she is to notify MD immediately or go to the emergency department. Discharge instructions given to patient. Verbalizes understanding. Ambulated off unit per self with belongings.

## 2019-04-24 ENCOUNTER — HOSPITAL ENCOUNTER (OUTPATIENT)
Dept: INFUSION THERAPY | Age: 69
Discharge: HOME | End: 2019-04-24
Payer: MEDICARE

## 2019-04-24 VITALS
DIASTOLIC BLOOD PRESSURE: 66 MMHG | TEMPERATURE: 97.8 F | SYSTOLIC BLOOD PRESSURE: 108 MMHG | HEART RATE: 67 BPM | RESPIRATION RATE: 16 BRPM | OXYGEN SATURATION: 96 %

## 2019-04-24 DIAGNOSIS — C80.1 SMALL CELL CARCINOMA (HCC): Primary | ICD-10-CM

## 2019-04-24 DIAGNOSIS — R59.0 MEDIASTINAL LYMPHADENOPATHY: ICD-10-CM

## 2019-04-24 PROCEDURE — 96413 CHEMO IV INFUSION 1 HR: CPT

## 2019-04-24 PROCEDURE — 2709999900 HC NON-CHARGEABLE SUPPLY

## 2019-04-24 PROCEDURE — 96367 TX/PROPH/DG ADDL SEQ IV INF: CPT

## 2019-04-24 PROCEDURE — 2580000003 HC RX 258: Performed by: INTERNAL MEDICINE

## 2019-04-24 PROCEDURE — 6360000002 HC RX W HCPCS: Performed by: INTERNAL MEDICINE

## 2019-04-24 RX ORDER — SODIUM CHLORIDE 0.9 % (FLUSH) 0.9 %
5 SYRINGE (ML) INJECTION PRN
Status: DISCONTINUED | OUTPATIENT
Start: 2019-04-24 | End: 2019-04-25 | Stop reason: HOSPADM

## 2019-04-24 RX ORDER — SODIUM CHLORIDE 9 MG/ML
INJECTION, SOLUTION INTRAVENOUS ONCE
Status: COMPLETED | OUTPATIENT
Start: 2019-04-24 | End: 2019-04-24

## 2019-04-24 RX ADMIN — ETOPOSIDE 160 MG: 20 INJECTION, SOLUTION, CONCENTRATE INTRAVENOUS at 12:20

## 2019-04-24 RX ADMIN — SODIUM CHLORIDE: 9 INJECTION, SOLUTION INTRAVENOUS at 11:58

## 2019-04-24 RX ADMIN — DEXAMETHASONE SODIUM PHOSPHATE 12 MG: 4 INJECTION, SOLUTION INTRA-ARTICULAR; INTRALESIONAL; INTRAMUSCULAR; INTRAVENOUS; SOFT TISSUE at 12:00

## 2019-04-24 NOTE — ONCOLOGY
Chemotherapy Administration    Pre-assessment Data: Antineoplastic Agents  Other:   See toxicity flow sheet for assessment [x]     Physician Notification of Concerns Related to Chemotherapy Administration:   Physician Notified Anne Showers / Time of Notification      Interventions:   Lab work assessed  [x]   Height / Weight verified for dose [x]   Current MAR reviewed [x]   Emergency drugs available as appropriate [x]   Anaphylaxis assessment completed [x]   Pre-medications administered as ordered [x]   Blood return noted upon initiation of chemotherapy [x]   Blood return noted each 1-2ml of a vesicant medication if given IV push []   Blood return noted each 2-3ml of a non-vesicant medication if given IV push []   Monitor for signs / symptoms of hypersensitivity reaction [x]   Chemotherapy orders (drug/dose/rate) verified by 2 Chemo certified RNs [x]   Monitor IV site and blood return throughout the infusion of the medication [x]   Document IV site checks on the IV assessment form [x]   Document chemotherapy teaching on the Patient Education tab [x]   Document patient verbalizes understanding of medications being administered [x]   If IV infiltration, see ONS Guidelines []   Other:      []

## 2019-04-24 NOTE — PLAN OF CARE
Problem: Intellectual/Education/Knowledge Deficit  Goal: Teaching initiated upon admission  Outcome: Met This Shift  Note:   Patient verbalizes understanding to verbal information given on VP16,action and possible side effects. Aware to call MD if develop complications. Goal: Written Disposition Instruction form completed  Outcome: Met This Shift  Intervention: Verbal/written education provided  Note:   Chemotherapy Teaching     What is Chemotherapy   Drug action ? Method of Administration ? Handouts given ? Side Effects  Nausea/vomiting ? Diarrhea ? Fatigue ? Signs / Symptoms of infection ? Neutropenia ? Thrombocytopenia ? Alopecia ? neuropathy ? [place] diet &  the importance of fluids ? Micellaneous  Importance of nutrition ? Importance of oral hygiene ? When to call the MD ?   Monitoring labs ? Use of supportive services ? Explanation of Drug Regimen / Frequency  VP16 - C4D3     Comments  Verbalized understanding to drug,action,side effects and when to call MD         Problem: Discharge Planning  Goal: Knowledge of discharge instructions  Description  Knowledge of discharge instructions     Outcome: Met This Shift  Note:   Verbalized understanding of discharge instructions, follow-up appointments, and when to call the physician. Intervention: Interaction with patient/family and care team  Note:   Discuss understanding of discharge instructions,follow-up appointments, and when to call the physician. Care plan reviewed with patient and spouse. Patient and spouse verbalize understanding of the plan of care and contribute to goal setting.

## 2019-04-24 NOTE — PROGRESS NOTES
Patient assessed for the following post chemotherapy:    Dizziness   No  Lightheadedness  No      Acute nausea/vomiting No  Headache   No  Chest pain/pressure  No  Rash/itching   No  Shortness of breath  No    Patient kept for 20 minutes observation post infusion chemotherapy. Patient tolerated chemotherapy treatment  16 without any complications. Last vital signs:   /66   Pulse 67   Temp 97.8 °F (36.6 °C) (Oral)   Resp 16   SpO2 96%         Patient instructed if experience any of the above symptoms following today's infusion,he/she is to notify MD immediately or go to the emergency department. Discharge instructions given to patient. Verbalizes understanding. Ambulated off unit per self with belongings.

## 2019-05-13 ENCOUNTER — HOSPITAL ENCOUNTER (INPATIENT)
Age: 69
LOS: 2 days | Discharge: HOME | DRG: 065 | End: 2019-05-15
Attending: INTERNAL MEDICINE | Admitting: INTERNAL MEDICINE
Payer: MEDICARE

## 2019-05-13 ENCOUNTER — HOSPITAL ENCOUNTER (OUTPATIENT)
Dept: CT IMAGING | Age: 69
Discharge: HOME | End: 2019-05-13
Payer: MEDICARE

## 2019-05-13 ENCOUNTER — HOSPITAL ENCOUNTER (OUTPATIENT)
Dept: MRI IMAGING | Age: 69
Discharge: HOME | End: 2019-05-13
Payer: MEDICARE

## 2019-05-13 DIAGNOSIS — M79.89 PAIN AND SWELLING OF RIGHT LOWER LEG: ICD-10-CM

## 2019-05-13 DIAGNOSIS — M79.661 PAIN AND SWELLING OF RIGHT LOWER LEG: ICD-10-CM

## 2019-05-13 DIAGNOSIS — Z51.11 ENCOUNTER FOR CHEMOTHERAPY MANAGEMENT: ICD-10-CM

## 2019-05-13 DIAGNOSIS — C80.1 SMALL CELL CARCINOMA (HCC): ICD-10-CM

## 2019-05-13 DIAGNOSIS — E83.42 HYPOMAGNESEMIA: ICD-10-CM

## 2019-05-13 DIAGNOSIS — I63.40 CEREBROVASCULAR ACCIDENT (CVA) DUE TO EMBOLISM OF CEREBRAL ARTERY (HCC): Primary | ICD-10-CM

## 2019-05-13 PROBLEM — I66.9 EMBOLIC CEREBROVASCULAR DISEASE: Status: ACTIVE | Noted: 2019-05-13

## 2019-05-13 LAB
ANION GAP SERPL CALCULATED.3IONS-SCNC: 12 MEQ/L (ref 8–16)
APTT: 31.2 SECONDS (ref 22–38)
BASOPHILS # BLD: 0.3 %
BASOPHILS ABSOLUTE: 0 THOU/MM3 (ref 0–0.1)
BUN BLDV-MCNC: 11 MG/DL (ref 7–22)
CALCIUM SERPL-MCNC: 9.7 MG/DL (ref 8.5–10.5)
CHLORIDE BLD-SCNC: 98 MEQ/L (ref 98–111)
CO2: 25 MEQ/L (ref 23–33)
CREAT SERPL-MCNC: 0.6 MG/DL (ref 0.4–1.2)
EKG ATRIAL RATE: 85 BPM
EKG P AXIS: 77 DEGREES
EKG P-R INTERVAL: 160 MS
EKG Q-T INTERVAL: 388 MS
EKG QRS DURATION: 122 MS
EKG QTC CALCULATION (BAZETT): 461 MS
EKG R AXIS: 24 DEGREES
EKG T AXIS: 65 DEGREES
EKG VENTRICULAR RATE: 85 BPM
EOSINOPHIL # BLD: 0 %
EOSINOPHILS ABSOLUTE: 0 THOU/MM3 (ref 0–0.4)
ERYTHROCYTE [DISTWIDTH] IN BLOOD BY AUTOMATED COUNT: 13.5 % (ref 11.5–14.5)
ERYTHROCYTE [DISTWIDTH] IN BLOOD BY AUTOMATED COUNT: 48.1 FL (ref 35–45)
GFR SERPL CREATININE-BSD FRML MDRD: > 90 ML/MIN/1.73M2
GLUCOSE BLD-MCNC: 109 MG/DL (ref 70–108)
HCT VFR BLD CALC: 34 % (ref 42–52)
HEMOGLOBIN: 11.9 GM/DL (ref 14–18)
IMMATURE GRANS (ABS): 0.02 THOU/MM3 (ref 0–0.07)
IMMATURE GRANULOCYTES: 0.7 %
INR BLD: 1 (ref 0.85–1.13)
LYMPHOCYTES # BLD: 23.2 %
LYMPHOCYTES ABSOLUTE: 0.7 THOU/MM3 (ref 1–4.8)
MCH RBC QN AUTO: 34.1 PG (ref 26–33)
MCHC RBC AUTO-ENTMCNC: 35 GM/DL (ref 32.2–35.5)
MCV RBC AUTO: 97.4 FL (ref 80–94)
MONOCYTES # BLD: 16.2 %
MONOCYTES ABSOLUTE: 0.5 THOU/MM3 (ref 0.4–1.3)
NUCLEATED RED BLOOD CELLS: 0 /100 WBC
OSMOLALITY CALCULATION: 270.1 MOSMOL/KG (ref 275–300)
PLATELET # BLD: 174 THOU/MM3 (ref 130–400)
PMV BLD AUTO: 8.8 FL (ref 9.4–12.4)
POTASSIUM SERPL-SCNC: 3.9 MEQ/L (ref 3.5–5.2)
RBC # BLD: 3.49 MILL/MM3 (ref 4.7–6.1)
SEG NEUTROPHILS: 59.6 %
SEGMENTED NEUTROPHILS ABSOLUTE COUNT: 1.8 THOU/MM3 (ref 1.8–7.7)
SODIUM BLD-SCNC: 135 MEQ/L (ref 135–145)
WBC # BLD: 3 THOU/MM3 (ref 4.8–10.8)

## 2019-05-13 PROCEDURE — 70553 MRI BRAIN STEM W/O & W/DYE: CPT

## 2019-05-13 PROCEDURE — 99284 EMERGENCY DEPT VISIT MOD MDM: CPT

## 2019-05-13 PROCEDURE — 85025 COMPLETE CBC W/AUTO DIFF WBC: CPT

## 2019-05-13 PROCEDURE — 1200000003 HC TELEMETRY R&B

## 2019-05-13 PROCEDURE — 36415 COLL VENOUS BLD VENIPUNCTURE: CPT

## 2019-05-13 PROCEDURE — 99220 PR INITIAL OBSERVATION CARE/DAY 70 MINUTES: CPT | Performed by: INTERNAL MEDICINE

## 2019-05-13 PROCEDURE — 6370000000 HC RX 637 (ALT 250 FOR IP): Performed by: INTERNAL MEDICINE

## 2019-05-13 PROCEDURE — 85730 THROMBOPLASTIN TIME PARTIAL: CPT

## 2019-05-13 PROCEDURE — 80048 BASIC METABOLIC PNL TOTAL CA: CPT

## 2019-05-13 PROCEDURE — G0378 HOSPITAL OBSERVATION PER HR: HCPCS

## 2019-05-13 PROCEDURE — 71260 CT THORAX DX C+: CPT

## 2019-05-13 PROCEDURE — A9579 GAD-BASE MR CONTRAST NOS,1ML: HCPCS | Performed by: INTERNAL MEDICINE

## 2019-05-13 PROCEDURE — 85610 PROTHROMBIN TIME: CPT

## 2019-05-13 PROCEDURE — 74177 CT ABD & PELVIS W/CONTRAST: CPT

## 2019-05-13 PROCEDURE — 93010 ELECTROCARDIOGRAM REPORT: CPT | Performed by: INTERNAL MEDICINE

## 2019-05-13 PROCEDURE — 93005 ELECTROCARDIOGRAM TRACING: CPT | Performed by: STUDENT IN AN ORGANIZED HEALTH CARE EDUCATION/TRAINING PROGRAM

## 2019-05-13 PROCEDURE — 6360000004 HC RX CONTRAST MEDICATION: Performed by: INTERNAL MEDICINE

## 2019-05-13 RX ORDER — ACETAMINOPHEN 325 MG/1
650 TABLET ORAL EVERY 4 HOURS PRN
Status: DISCONTINUED | OUTPATIENT
Start: 2019-05-13 | End: 2019-05-15 | Stop reason: HOSPADM

## 2019-05-13 RX ORDER — SODIUM CHLORIDE 0.9 % (FLUSH) 0.9 %
10 SYRINGE (ML) INJECTION PRN
Status: DISCONTINUED | OUTPATIENT
Start: 2019-05-13 | End: 2019-05-15 | Stop reason: HOSPADM

## 2019-05-13 RX ORDER — TAMSULOSIN HYDROCHLORIDE 0.4 MG/1
0.4 CAPSULE ORAL DAILY
Status: DISCONTINUED | OUTPATIENT
Start: 2019-05-14 | End: 2019-05-15 | Stop reason: HOSPADM

## 2019-05-13 RX ORDER — BUSPIRONE HYDROCHLORIDE 10 MG/1
10 TABLET ORAL 2 TIMES DAILY
Status: DISCONTINUED | OUTPATIENT
Start: 2019-05-14 | End: 2019-05-15 | Stop reason: HOSPADM

## 2019-05-13 RX ORDER — ATORVASTATIN CALCIUM 80 MG/1
80 TABLET, FILM COATED ORAL DAILY
Status: DISCONTINUED | OUTPATIENT
Start: 2019-05-14 | End: 2019-05-15 | Stop reason: HOSPADM

## 2019-05-13 RX ORDER — POTASSIUM CHLORIDE 20 MEQ/1
40 TABLET, EXTENDED RELEASE ORAL PRN
Status: DISCONTINUED | OUTPATIENT
Start: 2019-05-13 | End: 2019-05-15 | Stop reason: HOSPADM

## 2019-05-13 RX ORDER — POLYETHYLENE GLYCOL 3350 17 G/17G
17 POWDER, FOR SOLUTION ORAL DAILY
Status: DISCONTINUED | OUTPATIENT
Start: 2019-05-14 | End: 2019-05-15 | Stop reason: HOSPADM

## 2019-05-13 RX ORDER — SODIUM CHLORIDE 9 MG/ML
INJECTION, SOLUTION INTRAVENOUS CONTINUOUS
Status: DISCONTINUED | OUTPATIENT
Start: 2019-05-13 | End: 2019-05-15 | Stop reason: HOSPADM

## 2019-05-13 RX ORDER — ONDANSETRON 2 MG/ML
4 INJECTION INTRAMUSCULAR; INTRAVENOUS EVERY 6 HOURS PRN
Status: DISCONTINUED | OUTPATIENT
Start: 2019-05-13 | End: 2019-05-15 | Stop reason: HOSPADM

## 2019-05-13 RX ORDER — POTASSIUM CHLORIDE 7.45 MG/ML
10 INJECTION INTRAVENOUS PRN
Status: DISCONTINUED | OUTPATIENT
Start: 2019-05-13 | End: 2019-05-15 | Stop reason: HOSPADM

## 2019-05-13 RX ORDER — DOXAZOSIN MESYLATE 4 MG/1
2 TABLET ORAL DAILY
Status: DISCONTINUED | OUTPATIENT
Start: 2019-05-14 | End: 2019-05-14

## 2019-05-13 RX ORDER — SODIUM CHLORIDE 0.9 % (FLUSH) 0.9 %
10 SYRINGE (ML) INJECTION EVERY 12 HOURS SCHEDULED
Status: DISCONTINUED | OUTPATIENT
Start: 2019-05-13 | End: 2019-05-15 | Stop reason: HOSPADM

## 2019-05-13 RX ORDER — PANTOPRAZOLE SODIUM 40 MG/1
40 TABLET, DELAYED RELEASE ORAL
Status: DISCONTINUED | OUTPATIENT
Start: 2019-05-14 | End: 2019-05-15 | Stop reason: HOSPADM

## 2019-05-13 RX ORDER — IPRATROPIUM BROMIDE AND ALBUTEROL SULFATE 2.5; .5 MG/3ML; MG/3ML
1 SOLUTION RESPIRATORY (INHALATION) EVERY 4 HOURS
Status: DISCONTINUED | OUTPATIENT
Start: 2019-05-14 | End: 2019-05-15 | Stop reason: HOSPADM

## 2019-05-13 RX ADMIN — GADOTERIDOL 15 ML: 279.3 INJECTION, SOLUTION INTRAVENOUS at 13:49

## 2019-05-13 RX ADMIN — IOPAMIDOL 85 ML: 755 INJECTION, SOLUTION INTRAVENOUS at 14:33

## 2019-05-13 ASSESSMENT — ENCOUNTER SYMPTOMS
COUGH: 0
EYE DISCHARGE: 0
VOMITING: 0
SHORTNESS OF BREATH: 0
WHEEZING: 0
SORE THROAT: 0
DIARRHEA: 0
RHINORRHEA: 0
ABDOMINAL PAIN: 0
EYE REDNESS: 0
NAUSEA: 0
BACK PAIN: 0

## 2019-05-13 NOTE — ED NOTES
Sitting up in chair. No distress noted at this time. Call light in reach. Will monitor.       Isabella Goldman RN  05/13/19 1301

## 2019-05-13 NOTE — ED TRIAGE NOTES
Patient presents from CT. Patient states his PCP called and told him to come to the ED after he had a CT scan. He states he also had an MRI today. Patient denies any symptoms today. He states he was getting these scans done to check the progress of his cancer. Patient denies any symptoms today. Patient initially refusing vitals, stating he has to go out to his car before we do anything. Patient informed we would have to remove IV if he leaves and he states he wants to go. Patient did agree to let this RN get a set of vitals.

## 2019-05-14 ENCOUNTER — APPOINTMENT (OUTPATIENT)
Dept: CT IMAGING | Age: 69
DRG: 065 | End: 2019-05-14
Payer: MEDICARE

## 2019-05-14 PROBLEM — I63.9 ACUTE CVA (CEREBROVASCULAR ACCIDENT) (HCC): Status: ACTIVE | Noted: 2019-05-14

## 2019-05-14 LAB
ANION GAP SERPL CALCULATED.3IONS-SCNC: 13 MEQ/L (ref 8–16)
BASOPHILS # BLD: 0.4 %
BASOPHILS ABSOLUTE: 0 THOU/MM3 (ref 0–0.1)
BUN BLDV-MCNC: 11 MG/DL (ref 7–22)
CALCIUM SERPL-MCNC: 9.2 MG/DL (ref 8.5–10.5)
CHLORIDE BLD-SCNC: 102 MEQ/L (ref 98–111)
CHOLESTEROL, FASTING: 220 MG/DL (ref 100–199)
CO2: 23 MEQ/L (ref 23–33)
CREAT SERPL-MCNC: 0.5 MG/DL (ref 0.4–1.2)
EOSINOPHIL # BLD: 0 %
EOSINOPHILS ABSOLUTE: 0 THOU/MM3 (ref 0–0.4)
ERYTHROCYTE [DISTWIDTH] IN BLOOD BY AUTOMATED COUNT: 13.2 % (ref 11.5–14.5)
ERYTHROCYTE [DISTWIDTH] IN BLOOD BY AUTOMATED COUNT: 47.3 FL (ref 35–45)
GFR SERPL CREATININE-BSD FRML MDRD: > 90 ML/MIN/1.73M2
GLUCOSE BLD-MCNC: 90 MG/DL (ref 70–108)
HCT VFR BLD CALC: 31.4 % (ref 42–52)
HDLC SERPL-MCNC: 58 MG/DL
HEMOGLOBIN: 10.7 GM/DL (ref 14–18)
IMMATURE GRANS (ABS): 0.04 THOU/MM3 (ref 0–0.07)
IMMATURE GRANULOCYTES: 1.5 %
LDL CHOLESTEROL CALCULATED: 150 MG/DL
LV EF: 58 %
LVEF MODALITY: NORMAL
LYMPHOCYTES # BLD: 25.9 %
LYMPHOCYTES ABSOLUTE: 0.7 THOU/MM3 (ref 1–4.8)
MAGNESIUM: 1.9 MG/DL (ref 1.6–2.4)
MCH RBC QN AUTO: 32.9 PG (ref 26–33)
MCHC RBC AUTO-ENTMCNC: 34.1 GM/DL (ref 32.2–35.5)
MCV RBC AUTO: 96.6 FL (ref 80–94)
MONOCYTES # BLD: 18.5 %
MONOCYTES ABSOLUTE: 0.5 THOU/MM3 (ref 0.4–1.3)
NUCLEATED RED BLOOD CELLS: 0 /100 WBC
PLATELET # BLD: 172 THOU/MM3 (ref 130–400)
PLATELET ESTIMATE: ADEQUATE
PMV BLD AUTO: 8.8 FL (ref 9.4–12.4)
POTASSIUM REFLEX MAGNESIUM: 3.4 MEQ/L (ref 3.5–5.2)
RBC # BLD: 3.25 MILL/MM3 (ref 4.7–6.1)
SCAN OF BLOOD SMEAR: NORMAL
SEG NEUTROPHILS: 53.7 %
SEGMENTED NEUTROPHILS ABSOLUTE COUNT: 1.4 THOU/MM3 (ref 1.8–7.7)
SODIUM BLD-SCNC: 138 MEQ/L (ref 135–145)
TRIGLYCERIDE, FASTING: 61 MG/DL (ref 0–199)
WBC # BLD: 2.6 THOU/MM3 (ref 4.8–10.8)

## 2019-05-14 PROCEDURE — 83735 ASSAY OF MAGNESIUM: CPT

## 2019-05-14 PROCEDURE — 6370000000 HC RX 637 (ALT 250 FOR IP): Performed by: INTERNAL MEDICINE

## 2019-05-14 PROCEDURE — B24BZZ4 ULTRASONOGRAPHY OF HEART WITH AORTA, TRANSESOPHAGEAL: ICD-10-PCS | Performed by: INTERNAL MEDICINE

## 2019-05-14 PROCEDURE — 6360000002 HC RX W HCPCS: Performed by: INTERNAL MEDICINE

## 2019-05-14 PROCEDURE — 93320 DOPPLER ECHO COMPLETE: CPT

## 2019-05-14 PROCEDURE — 2709999900 HC NON-CHARGEABLE SUPPLY

## 2019-05-14 PROCEDURE — 93312 ECHO TRANSESOPHAGEAL: CPT

## 2019-05-14 PROCEDURE — 80048 BASIC METABOLIC PNL TOTAL CA: CPT

## 2019-05-14 PROCEDURE — 99223 1ST HOSP IP/OBS HIGH 75: CPT | Performed by: PSYCHIATRY & NEUROLOGY

## 2019-05-14 PROCEDURE — 99232 SBSQ HOSP IP/OBS MODERATE 35: CPT | Performed by: FAMILY MEDICINE

## 2019-05-14 PROCEDURE — 6370000000 HC RX 637 (ALT 250 FOR IP): Performed by: FAMILY MEDICINE

## 2019-05-14 PROCEDURE — 80061 LIPID PANEL: CPT

## 2019-05-14 PROCEDURE — 97162 PT EVAL MOD COMPLEX 30 MIN: CPT

## 2019-05-14 PROCEDURE — 85025 COMPLETE CBC W/AUTO DIFF WBC: CPT

## 2019-05-14 PROCEDURE — 36415 COLL VENOUS BLD VENIPUNCTURE: CPT

## 2019-05-14 PROCEDURE — 83090 ASSAY OF HOMOCYSTEINE: CPT

## 2019-05-14 PROCEDURE — 94760 N-INVAS EAR/PLS OXIMETRY 1: CPT

## 2019-05-14 PROCEDURE — 70498 CT ANGIOGRAPHY NECK: CPT

## 2019-05-14 PROCEDURE — 7100000000 HC PACU RECOVERY - FIRST 15 MIN: Performed by: INTERNAL MEDICINE

## 2019-05-14 PROCEDURE — 70496 CT ANGIOGRAPHY HEAD: CPT

## 2019-05-14 PROCEDURE — 2060000000 HC ICU INTERMEDIATE R&B

## 2019-05-14 PROCEDURE — 99222 1ST HOSP IP/OBS MODERATE 55: CPT | Performed by: PHYSICIAN ASSISTANT

## 2019-05-14 PROCEDURE — 2580000003 HC RX 258: Performed by: INTERNAL MEDICINE

## 2019-05-14 PROCEDURE — 97116 GAIT TRAINING THERAPY: CPT

## 2019-05-14 PROCEDURE — 6360000004 HC RX CONTRAST MEDICATION: Performed by: PSYCHIATRY & NEUROLOGY

## 2019-05-14 PROCEDURE — 6370000000 HC RX 637 (ALT 250 FOR IP)

## 2019-05-14 PROCEDURE — 94640 AIRWAY INHALATION TREATMENT: CPT

## 2019-05-14 PROCEDURE — 93325 DOPPLER ECHO COLOR FLOW MAPG: CPT

## 2019-05-14 RX ORDER — MIDAZOLAM HYDROCHLORIDE 1 MG/ML
INJECTION INTRAMUSCULAR; INTRAVENOUS PRN
Status: DISCONTINUED | OUTPATIENT
Start: 2019-05-14 | End: 2019-05-14 | Stop reason: ALTCHOICE

## 2019-05-14 RX ORDER — CLOPIDOGREL BISULFATE 75 MG/1
75 TABLET ORAL DAILY
Status: DISCONTINUED | OUTPATIENT
Start: 2019-05-14 | End: 2019-05-15 | Stop reason: HOSPADM

## 2019-05-14 RX ORDER — LABETALOL 20 MG/4 ML (5 MG/ML) INTRAVENOUS SYRINGE
10 EVERY 10 MIN PRN
Status: DISCONTINUED | OUTPATIENT
Start: 2019-05-14 | End: 2019-05-15 | Stop reason: HOSPADM

## 2019-05-14 RX ORDER — DOXAZOSIN MESYLATE 4 MG/1
2 TABLET ORAL DAILY
Status: DISCONTINUED | OUTPATIENT
Start: 2019-05-14 | End: 2019-05-14

## 2019-05-14 RX ORDER — FENTANYL CITRATE 50 UG/ML
INJECTION, SOLUTION INTRAMUSCULAR; INTRAVENOUS PRN
Status: DISCONTINUED | OUTPATIENT
Start: 2019-05-14 | End: 2019-05-14 | Stop reason: ALTCHOICE

## 2019-05-14 RX ORDER — ASPIRIN 81 MG/1
81 TABLET, CHEWABLE ORAL DAILY
Status: DISCONTINUED | OUTPATIENT
Start: 2019-05-14 | End: 2019-05-15 | Stop reason: HOSPADM

## 2019-05-14 RX ADMIN — ASPIRIN 81 MG 81 MG: 81 TABLET ORAL at 17:49

## 2019-05-14 RX ADMIN — Medication 2 PUFF: at 08:29

## 2019-05-14 RX ADMIN — BUSPIRONE HYDROCHLORIDE 10 MG: 10 TABLET ORAL at 09:28

## 2019-05-14 RX ADMIN — IPRATROPIUM BROMIDE AND ALBUTEROL SULFATE 3 ML: .5; 3 SOLUTION RESPIRATORY (INHALATION) at 12:20

## 2019-05-14 RX ADMIN — Medication 2 PUFF: at 20:37

## 2019-05-14 RX ADMIN — IPRATROPIUM BROMIDE AND ALBUTEROL SULFATE 3 ML: .5; 3 SOLUTION RESPIRATORY (INHALATION) at 04:40

## 2019-05-14 RX ADMIN — Medication 10 ML: at 00:56

## 2019-05-14 RX ADMIN — CLOPIDOGREL BISULFATE 75 MG: 75 TABLET ORAL at 17:49

## 2019-05-14 RX ADMIN — BUSPIRONE HYDROCHLORIDE 10 MG: 10 TABLET ORAL at 21:22

## 2019-05-14 RX ADMIN — IPRATROPIUM BROMIDE AND ALBUTEROL SULFATE 3 ML: .5; 3 SOLUTION RESPIRATORY (INHALATION) at 08:29

## 2019-05-14 RX ADMIN — ATORVASTATIN CALCIUM 80 MG: 80 TABLET, FILM COATED ORAL at 21:22

## 2019-05-14 RX ADMIN — TAMSULOSIN HYDROCHLORIDE 0.4 MG: 0.4 CAPSULE ORAL at 09:28

## 2019-05-14 RX ADMIN — IPRATROPIUM BROMIDE AND ALBUTEROL SULFATE 3 ML: .5; 3 SOLUTION RESPIRATORY (INHALATION) at 20:37

## 2019-05-14 RX ADMIN — PANTOPRAZOLE SODIUM 40 MG: 40 TABLET, DELAYED RELEASE ORAL at 09:28

## 2019-05-14 RX ADMIN — SODIUM CHLORIDE: 9 INJECTION, SOLUTION INTRAVENOUS at 00:56

## 2019-05-14 RX ADMIN — BUSPIRONE HYDROCHLORIDE 10 MG: 10 TABLET ORAL at 00:55

## 2019-05-14 RX ADMIN — POTASSIUM BICARBONATE 40 MEQ: 782 TABLET, EFFERVESCENT ORAL at 09:29

## 2019-05-14 RX ADMIN — IPRATROPIUM BROMIDE AND ALBUTEROL SULFATE 3 ML: .5; 3 SOLUTION RESPIRATORY (INHALATION) at 00:50

## 2019-05-14 RX ADMIN — IOPAMIDOL 80 ML: 755 INJECTION, SOLUTION INTRAVENOUS at 11:14

## 2019-05-14 RX ADMIN — ENOXAPARIN SODIUM 40 MG: 40 INJECTION SUBCUTANEOUS at 17:48

## 2019-05-14 RX ADMIN — SODIUM CHLORIDE: 9 INJECTION, SOLUTION INTRAVENOUS at 15:17

## 2019-05-14 ASSESSMENT — PAIN SCALES - GENERAL
PAINLEVEL_OUTOF10: 0
PAINLEVEL_OUTOF10: 0

## 2019-05-14 ASSESSMENT — PAIN - FUNCTIONAL ASSESSMENT: PAIN_FUNCTIONAL_ASSESSMENT: 0-10

## 2019-05-14 NOTE — PROGRESS NOTES
Patient is currently resting quietly with eyes closed. Did not disturb. No family present. Patient has a CARLOS scheduled for this afternoon. Palliative care will attempt to meet with patient tomorrow. Discussed with [facility].

## 2019-05-14 NOTE — ED NOTES
Patient resting in bed with even and unlabored respirations. Vitals obtained. Patient denies any further needs at this time.       Seng Ruff RN  05/13/19 0680

## 2019-05-14 NOTE — PROGRESS NOTES
Sedation/Analgesia History & Physical      Pt Name: Sonya Maria  MRN: [id number]  YOB: 1950  Provider Performing Procedure: Juliane Wiseman MD, Rolanda Arcos [facility]  Primary Care Physician: Elida Davison MD      PRE-PROCEDURE   DNR-CCA/DNR-CC []Yes [x]No      PLANNED PROCEDURE     []Cath  []PCI                []Pacemaker/AICD  [x]CARLOS             []Cardioversion []Peripheral angiography/PTA  []Other:        Consent:   The indication, risks and benefits of the procedure and possible therapeutic consequences and alternatives were discussed with the patient. The patient was given the opportunity to ask questions and to have them answered to his/her satisfaction. Risks of the procedure include but are not limited to the following: Bleeding, hematoma including retroperitoneal hematoma, infection, pain and discomfort, injury to the aorta and other blood vessels, rhythm disturbance, low blood pressure, myocardial infarction, stroke, kidney damage/failure, myocardial perforation, allergic reactions to sedatives/contrast material, loss of pulse/vascular compromise requiring surgery, aneurysm/pseudoaneurysm formation, possible loss of a limb/hand/leg, death. Alternatives to and omission of the suggested procedure were discussed. The patient had no further questions and wished to proceed; the consent form was signed.       Indications for the Procedure:     CVA, ?embolic source:    Plan: CARLOS            MEDICAL HISTORY        Past Medical History:   Diagnosis Date    Arthritis     COPD (chronic obstructive pulmonary disease) ([address]     Gastric reflux     Hyperlipidemia     Hypertension          Past Surgical History:   Procedure Laterality Date    COLONOSCOPY      HERNIA REPAIR  80's    bilateral inguinal    KIDNEY SURGERY      stent placement    ND BRNCC INCL FLUOR GDNCE DX W/CELL WASHG SPX N/A 9/7/2018    BRONCHOSCOPY FLUOROSCOPY performed by Jacinta Prince MD at [facility]    ID OFFICE/OUTPT VISIT,PROCEDURE ONLY Right 9/5/2018    RIGHT INGUINAL HERNIA REPAIR performed by Shana Street MD at [address]  OFFICE/OUTPT [facility] N/A 9/11/2018    EXPLORATORY LAPAROSCOPY  OPEN PROCEDURE, SMALL BOWEL RESECTION performed by Shana Street MD at [address] [place] [address] [place]   Allergen Reactions    Lisinopril Other (See Comments)     coughing         MEDICATIONS   Coumadin Use Last 5 Days [x]No []Yes  Antiplatelet drug therapy use last 5 days  []No [x]Yes  Other anticoagulant use last 5 days  [x]No []Yes      No current facility-administered medications on file prior to encounter. Current Outpatient Medications on File Prior to Encounter   Medication Sig Dispense Refill    tamsulosin (FLOMAX) 0.4 MG capsule Take 1 capsule by mouth daily 30 capsule 0    ipratropium-albuterol (DUONEB) 0.5-2.5 (3) MG/3ML SOLN nebulizer solution Inhale 3 mLs into the lungs every 4 hours 360 mL 11    busPIRone (BUSPAR) 10 MG tablet Take 10 mg by mouth 2 times daily      Ipratropium Bromide (ATROVENT IN) Inhale into the lungs 2 times daily       budesonide-formoterol (SYMBICORT) 160-4.5 MCG/ACT AERO Inhale 2 puffs into the lungs 2 times daily.  terazosin (HYTRIN) 2 MG capsule Take 2 mg by mouth nightly.  omeprazole (PRILOSEC) 20 MG capsule Take 40 mg by mouth daily       ranitidine (ZANTAC) 150 MG tablet Take 150 mg by mouth every morning.       atorvastatin (LIPITOR) 80 MG tablet Take 80 mg by mouth daily       ondansetron (ZOFRAN) 4 MG tablet Take 1 tablet by mouth every 8 hours as needed for Nausea or Vomiting 30 tablet 0    albuterol sulfate HFA (VENTOLIN HFA) 108 (90 Base) MCG/ACT inhaler Inhale 2 puffs into the lungs every 4 hours as needed for Wheezing or Shortness of Breath 1 Inhaler 11    acetaminophen (TYLENOL) 325 MG tablet Take 2 tablets by mouth every 4 hours as needed for Pain 120 tablet 3       Current Facility-Administered Medications   Medication Dose Route Frequency DO Luis        polyethylene glycol (GLYCOLAX) packet 17 g  17 g Oral Daily Mann Hurley DO        acetaminophen (TYLENOL) tablet 650 mg  650 mg Oral Q4H PRN Campbell Smith DO                 PHYSICAL:     /66   Pulse 80   Temp 97.3 °F (36.3 °C) (Oral)   Resp 12   Ht 5' 8\" (1.727 m)   Wt 150 lb (68 kg)   SpO2 95%   BMI 22.81 kg/m²     Heart:  [x]Regular rate and rhythm  []Other:    Lungs:  [x]Clear    []Other:    Abdomen: [x]Soft    []Other:    Mental Status: [x]Alert & Oriented  []Other:   Ext:                [x]No edema       []Other:         No results for input(s): CKTOTAL, CKMB, CKMBINDEX, TROPONINI in the last 72 hours.     Lab Results   Component Value Date    WBC 2.6 05/14/2019    RBC 3.25 05/14/2019    HGB 10.7 05/14/2019    HCT 31.4 05/14/2019    MCV 96.6 05/14/2019    MCH 32.9 05/14/2019    MCHC 34.1 05/14/2019    RDW 13.3 04/22/2019     05/14/2019    MPV 8.8 05/14/2019       Lab Results   Component Value Date     05/14/2019    K 3.4 05/14/2019     05/14/2019    CO2 23 05/14/2019    BUN 11 05/14/2019    LABALBU 3.7 04/22/2019    CREATININE 0.5 05/14/2019    CALCIUM 9.2 05/14/2019    LABGLOM >90 05/14/2019    GLUCOSE 90 05/14/2019       Lab Results   Component Value Date    ALKPHOS 75 04/22/2019    ALT 7 04/22/2019    AST 12 04/22/2019    PROT 6.4 04/22/2019    BILITOT 0.3 04/22/2019    BILIDIR <0.2 04/22/2019    LABALBU 3.7 04/22/2019       Lab Results   Component Value Date    MG 1.9 05/14/2019       No components found for: PTPATWAR, PTINRWAR    No results found for: LABA1C    Lab Results   Component Value Date    TRIG 84 09/11/2018    HDL 58 05/14/2019    LDLCALC 150 05/14/2019       No results found for: TSH         SEDATION  Planned agent:[x]Midazolam []Meperidine [x]Sublimaze []Morphine []Diazepam  []Other:         ASA Classification:  []1 [x]2 []3 []4 []5  Class 1: A normal healthy patient  Class 2: Pt with mild to moderate systemic disease  Class 3: Severe

## 2019-05-14 NOTE — PROGRESS NOTES
1633: Received to PACU post-CARLOS. Patient fully awake. Denies pain, nausea. No family present at bedside. 1636: Report called to unit. 1642: Transferred to unit in stable condition.

## 2019-05-14 NOTE — CONSULTS
LAPAROSCOPY  OPEN PROCEDURE, SMALL BOWEL RESECTION performed by Diana Swartz MD at [address] [address]: Allergies   Allergen Reactions    Lisinopril Other (See Comments)     coughing        Current Medications:     potassium bicarb-citric acid (EFFER-K) effervescent tablet 40 mEq Once   atorvastatin (LIPITOR) tablet 80 mg Daily   mometasone-formoterol (DULERA) 200-5 MCG/ACT inhaler 2 puff BID   busPIRone (BUSPAR) tablet 10 mg BID   ipratropium-albuterol (DUONEB) nebulizer solution 3 mL Q4H   pantoprazole (PROTONIX) tablet 40 mg QAM AC   tamsulosin (FLOMAX) capsule 0.4 mg Daily   doxazosin (CARDURA) tablet 2 mg Daily   sodium chloride flush 0.9 % injection 10 mL 2 times per day   sodium chloride flush 0.9 % injection 10 mL PRN   ondansetron (ZOFRAN) injection 4 mg Q6H PRN   enoxaparin (LOVENOX) injection 40 mg Daily   0.9 % sodium chloride infusion Continuous   magnesium sulfate 1 g in dextrose 5 % 100 mL IVPB PRN   potassium chloride (KLOR-CON M) extended release tablet 40 mEq PRN   Or    potassium bicarb-citric acid (EFFER-K) effervescent tablet 40 mEq PRN   Or    potassium chloride 10 mEq/100 mL IVPB (Peripheral Line) PRN   polyethylene glycol (GLYCOLAX) packet 17 g Daily   acetaminophen (TYLENOL) tablet 650 mg Q4H PRN        Social History:  Social History     Tobacco Use   Smoking Status Former Smoker    Packs/day: 2.00    Years: 50.00    Pack years: 100.00    Types: Cigarettes    Start date: 1969    Last attempt to quit: 2019    Years since quittin.2   Smokeless Tobacco Never Used     Social History     Substance and Sexual Activity   Alcohol Use Not Currently    Comment: Last drank 6 months ago      Social History     Substance and Sexual Activity   Drug Use No     Single    Family History:       Adopted: Yes       Review of Systems:  All systems reviewed are negative except what is mentioned in history of present illness.      Physical Exam:  BP (!) 122/58   Pulse 68   Temp 98.7 °F (37.1 °C) (Oral)   Resp 16   Ht 5' 8\" (1.727 m)   Wt 150 lb (68 kg)   SpO2 95%   BMI 22.81 kg/m²  I Body mass index is 22.81 kg/m². I Wt Readings from Last 1 Encounters:   05/13/19 150 lb (68 kg)           General appearance - alert, well appearing, and in no distress, oriented to person, place, and time and normal weight, he is awake laying in bed  Mental status -Level of Alertness: awake  Orientation: person, place, time  Memory: normal  Fund of Knowledge: normal  Attention/Concentration: normal  Language: normal. Mood is normal  Neck - supple, no significant adenopathy, carotids upstroke normal bilaterally, no carotid bruits. There is no LAP in the neck. There is no thyroid enlargement. Neurological -   Cranial Exvzho-JG-TBO:   Cranial nerve II: Normal. There is full visual fields  Cranial nerve III: Pupils: equal, round, reactive to light   Cranial nerves III, IV, VI: Extraocular Movements: intact   Cranial nerve V: Facial sensation: intact   Cranial nerve VII:Facial strength: intact   Cranial nerve VIII: Hearing: intact   Cranial nerve IX: Palate Elevation intact bilaterally  Cranial nerve XI: Shoulder shrug intact bilaterally  Cranial nerve XII: Tongue midline   neck supple without rigidity  DTR's are decreased?distal and symmetric  Babinski sign is negative? on bilaterally. Motor exam is 5/5 in the upper and lower extremities. Normal muscle tone. There is no muscle atrophy. There is no muscle fasciculation . Sensory is intact forlight touch? .   Coordination: finger to nose intact  Gait and station not tested  Abnormal movement none. proprioception normal   Skin - no rashes or lesions   Superficial temporal artery pulses are normal.   Musculoskeletal: Has no hand arthritis, no limitation of ROM in any of the four extremities. no joint tenderness, deformity or swelling. There is no leg edema. The Heart was regular in rate and rhythm.  No heart murmur  Chest- Clear         Labs:    CBC: Recent Labs     05/13/19  1526 05/14/19  0349   WBC 3.0* 2.6*   HGB 11.9* 10.7*    172   MCV 97.4* 96.6*   MCH 34.1* 32.9   MCHC 35.0 34.1     CMP:  Recent Labs     05/13/19  1526 05/14/19  0348    138   K 3.9 3.4*   CL 98 102   CO2 25 23   BUN 11 11   CREATININE 0.6 0.5   LABGLOM >90 >90   GLUCOSE 109* 90   CALCIUM 9.7 9.2      I reviewed the MRI brain and agree with interpretation. Results for orders placed during the hospital encounter of 05/13/19   MRI Brain W WO Contrast    Narrative PROCEDURE: MRI BRAIN W WO CONTRAST    INDICATION:Pain and swelling of right lower leg, Pain and swelling of right lower leg, Small cell carcinoma ([address] Encounter for chemotherapy management. Numbness and tingling in both hands. Staging for lung cancer. COMPARISON: CT head dated 1/24/2019. TECHNIQUE: Multiplanar and multiple spin echo T1 and T2-weighted images were obtained through the brain before and after the administration of intravenous contrast. 15 mL ProHance was injected in the right AC. FINDINGS:  There is mild to moderate temporal parietal predominant volume loss. There are multiple punctate to small foci of restricted diffusion scattered throughout both hemispheres of the supratentorial brain  There is also a punctate focus of restricted   diffusion in the left cerebellar hemisphere. There are associated focal areas of T2/FLAIR elongation. There are mild scattered focal areas of T2/FLAIR prolongation elsewhere in the subcortical deep white matter without corresponding areas of restricted   diffusion. Following contrast administration, there are no focal areas of abnormal parenchymal or meningeal enhancement identified. None of the foci of restricted diffusion enhance. The major vascular flow voids appear patent. Orbits are unremarkable. Paranasal sinuses and mastoid air cells are clear. Impression    1.  Scattered punctate foci of acute infarcts throughout the brain as evidence for embolic phenomenon. 2. No evidence of metastatic lesions to the brain. Findings were discussed with Dr. Jax Ellis via telephone at the time of interpretation. **This report has been created using voice recognition software. It may contain minor errors which are inherent in voice recognition technology. **      Final report electronically signed by Dr. Una Montelongo MD on 5/13/2019 2:11 PM        Results for orders placed during the hospital encounter of 07/19/18   CT HEAD WO CONTRAST    Narrative PROCEDURE: CT HEAD WO CONTRAST    CLINICAL INFORMATION: paresthesia. COMPARISON: No prior study. TECHNIQUE: Noncontrast 5 mm axial images were obtained through the brain. All CT scans at this facility use dose modulation, iterative reconstruction, and/or weight-based dosing when appropriate to reduce radiation dose to as low as reasonably achievable. FINDINGS:        There is no hemorrhage. There are no intra-or extra-axial collections. There is no hydrocephalus, midline shift or mass effect. The gray-white matter differentiation is preserved. The paranasal sinuses and mastoid air cells are normally aerated. There is no suspicious calvarial abnormality. Impression  No evidence of an acute process. **This report has been created using voice recognition software. It may contain minor errors which are inherent in voice recognition technology. **    Final report electronically signed by Dr. Anny Dangelo on 7/19/2018 9:06 PM         We reviewed the patient records and available information in the EHR       Impression:    Principal Problem:    Embolic stroke [facility])  Active Problems:    Small cell carcinoma ([facility] [address]    Acute CVA (cerebrovascular accident) [facility])  Resolved Problems:    * No resolved hospital problems.  *  This is a 70year old male with history of small cell lung cancer, who presents with abnormal MRI findings on diffusion sequence performed while the patient's being staged/screened for the cancer. He denies any symptoms. I reviewed his MRI brain showing Scattered punctate foci of acute infarcts throughout the brain. No evidence of metasttaic lesions. His exam is non focal. He will need to undergo further work up for bihemispheric embolic stroke including CTA head and neck and CARLOS to determine cause of stroke including marantic endocarditis. After detailed discussion with patient we agreed on the following plan. Recommendations:                                              Antiplatelets/anticoagulation. MRI brain with and without contrast was reviewed. Fasting lipid profile/ start Lipid lowering medication. Physical therapy, Occupational Therapy and Speech Therapy for Dysphagia/Languge  Modify Risk factors (example Hypertension, Diabetes, hyperlipidemia, Smoking Cessation)  CTA head & Neck  Cardiac echo CARLOS. 30 day event monitor (after discharge to evaluate for occult cardiac arrhythmia/cryptogenic stroke)  Permissive hypertension for up to one week. DVT prophylaxis. Homocysteine Level, Start Folic acid 1 mg Daily until results obtained. Patient and family questions were answered. Will follow please call if any questions. Consider repeat MRI brain in two weeks to ensure progression of infarct on MRI brain versus early metastasis. Case was discussed with primary service. All questions were answered. It was my pleasure to evaluate Nohemi Ignacio today. Please call with questions.       Electronically signed by Joni Martins MD on 5/14/2019 at 9:07 AM

## 2019-05-14 NOTE — FLOWSHEET NOTE
05/14/19 0817   Provider Notification   Reason for Communication Review case  (Cardio consult for CARLOS)   Provider Name Dr Sydnee Arizmendi   Provider Notification Physician   Method of Communication Secure Message   Response Waiting for response   Notification Time [id number]

## 2019-05-14 NOTE — PROGRESS NOTES
Accessed pt chart for potential admission into [facility]. Charge nurse updated this RN that pt is now being admitted to a stepdown unit.

## 2019-05-14 NOTE — CONSULTS
[facility]    Patient - Kayla Castelan   MRN -  [id number]   [facility]   - 1950      Date of Admission -  2019  2:33 PM  Date of evaluation -  2019  Room - 4A--A   Hospital Day - 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 male admitted for abnormal MRI of the brain findings. The patient is followed by Dr. Marylen Bonnet for extensive stage small cell lung cancer, and had imaging studies completed on 19 to evaluate after chemotherapy. Dr. Marylen Bonnet was called by radiologist due to abnormal MRI of the brain finding of scattered punctate foci of acute infarct throughout the brain with evidence of embolic phenomenon. He was sent to the ER for further evaluation. He was admitted under the Hospitalist service for further evaluation. Cardiology was consulted for CARLOS which was performed earlier today. Neurology was consulted as well and CT of the head and neck were completed today. Oncology consult was requested to follow-up on above. The patient is currently resting in bed as he just returned from CARLOS. He denies any lightheadedness, dizziness, vision changes, headaches, chest pain, shortness of breath, abdominal pain, nausea, vomiting, bowel changes or urinary changes. He denies any changes in gait or strength recently. Of note, CT of the chest completed on 19 showed dramatic improvement in the appearance of mediastinal adenopathy with almost complete resolution, sclerotic lesions in the T8 and L1 vertebral body consistent with osteoblastic metastasis.   CT of the abdomen and pelvis completed on 5/13/19 revealed interval improvement of previously identified liver lesions and pelvic and inguinal lymphadenopathy with a stable right inguinal lymph node. Interval development of multiple osteoblastic lesions in the lumbar spine and pelvis compatible with osteoblastic metastasis. These findings were reviewed with the patient today. The patient denies any new leg numbness or weakness. He denies bladder or bowel incontinence. Oncology History    Per Dr. Maureen Carrera note on 4/15/19:   71 yo long term smoker and [facility] pt originally seen by Dr Ian Ho on 9/7 for L hilar lung mass. He had bronch with washings  Pos for strep and enterobacter and fiberoptic exam (unremarkable). He had a course of antibiotics. and was referred for CT guided bx L hilar mass. He had a break due to medical insurance isues with [facility] and had lapse in f/u until he saw Dr Edgar Terrell and was trying to work out his medical care path but became more SOB. Justen Campbell went to ED at [facility] and had chest CT which showed large mediastinal lymphadenopathy. Path from brushings came back small cell lung ca. CT of the head on January 24, 2019 and showed normal appearance of the brain. CT of the abdomen on January 25, 2019 showed bilateral pleural metastases and effusions. In addition there was 2.3 cm low attenuation area in the left lobe liver suspicious for metastases in the right hepatic lobe there was 9 mm low-attenuation focus. Left adrenal gland was enlarged and an measured 20 mm in the largest dimension there was a 3.8 cm mass in the region of the left iliac chain worrisome for lymph node metastasis. probable left adrenal metastases and liver metastases. Due to presentation with SVC the patient started radiation treatment on January 25, 2019 and completed on 02/20/2019. On January 28, 2019 he received first cycle of chemotherapy with -16 and carboplatin. Overall, he tolerated it reasonably well, mild nausea.     He has received four cycles of carboplatin and etoposide with four dose on 4/22/19.    Meds    Current Medications    potassium replacement protocol   Other RX Placeholder    aspirin  81 mg Oral Daily    clopidogrel  75 mg Oral Daily    atorvastatin  80 mg Oral Daily    mometasone-formoterol  2 puff Inhalation BID    busPIRone  10 mg Oral BID    ipratropium-albuterol  1 vial Inhalation Q4H    pantoprazole  40 mg Oral QAM AC    tamsulosin  0.4 mg Oral Daily    sodium chloride flush  10 mL Intravenous 2 times per day    enoxaparin  40 mg Subcutaneous Daily    polyethylene glycol  17 g Oral Daily     labetalol, sodium chloride flush, ondansetron, magnesium sulfate, potassium chloride **OR** potassium alternative oral replacement **OR** potassium chloride, acetaminophen  IV Drips/Infusions   sodium chloride 60 mL/hr at 05/14/19 1517     Past Medical History         Diagnosis Date    Arthritis     COPD (chronic obstructive pulmonary disease) (HCC)     Gastric reflux     Hyperlipidemia     Hypertension       Past Surgical History           Procedure Laterality Date    COLONOSCOPY      HERNIA REPAIR  80's    bilateral inguinal    KIDNEY SURGERY      stent placement    [facility] INCL FLUOR GDNCE DX W/CELL WASHG SPX N/A 9/7/2018    BRONCHOSCOPY FLUOROSCOPY performed by Carolyn Steven MD at [facility]    [facility] OFFICE[facility]PT VISIT[facility]PROCEDURE ONLY Right 9/5/2018    RIGHT INGUINAL HERNIA REPAIR performed by Sang Motta MD at [address] OFFICE/OUTPT [facility] N/A 9/11/2018    EXPLORATORY LAPAROSCOPY  OPEN PROCEDURE, SMALL BOWEL RESECTION performed by Sang Motta MD at [facility];  Allergies    Lisinopril  Social History     Social History     Socioeconomic History    Marital status: Single     Spouse name: Not on file    Number of children: 0    Years of education: Not on file    Highest education level: Not on file   Occupational History    Not on file calcification at the carotid bifurcations bilaterally without hemodynamically significant stenosis by NASA criteria. Cervical segments of the internal carotid arteries are otherwise patent without focal stenosis. The vertebral arteries are codominant. They're patent throughout their course without focal stenosis. There is streak artifact from dental amalgam which partially obscures oral and perioral soft tissues. Given this caveat, mucosal surfaces of the aerodigestive tract are symmetric without focal nodular thickening or visualized mass. No cervical lymphadenopathy is identified. The parotid, submandibular and thyroid glands are unremarkable. There are prominent degenerative changes of the cervical spine most pronounced at C5-6 where disc bulge with rim osteophytes causes moderate spinal canal stenosis and severe bilateral neural foraminal stenosis. There are emphysematous changes in the visualized portions of the lungs. There is AP window adenopathy. Please correlate with CT chest dated 5/13/2019. There is bronchial wall thickening in the visualized portions of lungs. CTA head: There is a mural calcification in the petrous, cavernous and clinoid segments of the internal carotid arteries, right greater than left. There are areas of up to moderate stenosis on the right. CTA NECK: 1. Mild mural plaque at the carotid bifurcations without hemodynamically significant stenosis by NASA criteria. 2. Area of moderate stenosis in the left subclavian artery. **This report has been created using voice recognition software. It may contain minor errors which are inherent in voice recognition technology. ** Final report electronically signed by Dr. Keya Coles MD on 5/14/2019 11:52 AM    Ct Chest W Contrast    Result Date: 5/13/2019  PROCEDURE: CT CHEST W CONTRAST CLINICAL INFORMATION: Pain and swelling of right lower leg, Pain and swelling of right lower leg, Small cell carcinoma ([address] Encounter for chemotherapy management . COMPARISON: 1/21/2019 TECHNIQUE: 2-D multiplanar post contrast images of the chest, Isovue-370 IV contrast All CT scans at this facility use dose modulation, iterative reconstruction, and/or weight-based dosing when appropriate to reduce radiation dose to as low as reasonably achievable. FINDINGS: Dramatically decrease in mediastinal adenopathy. There is soft tissue density material in the mediastinum without the well-defined individual lymph nodes. There is soft tissue fullness in the aortopulmonary window currently measuring 2.7 x 3.1 cm this compares to conglomerate mass at this area measuring up to 10 cm previously. anterior pericardial effusion measuring 13 mm widest portion. No axillary lymphadenopathy. Biapical fibrotic scarring and emphysematous blebs. Paraseptal emphysematous changes in the medial superior mediastinum. Saber shaped trachea the upper portion. Calcified granuloma left upper lobe. No lung masses are currently identified. No airspace consolidations or pleural effusions. Upper abdomen This is reported on the separate same day exam. Bones Large sclerotic lesion involving the T8 vertebral body. This is new since the prior exam. Almost the entire L1 vertebral body is sclerotic. 1. Dramatic improvement in the appearance of mediastinal adenopathy with almost complete resolution. Details in the above report 2. Sclerotic lesions in the T8 and L1 vertebral body consistent with osteoblastic metastases **This report has been created using voice recognition software. It may contain minor errors which are inherent in voice recognition technology. ** Final report electronically signed by Dr. Treva Mann on 5/13/2019 3:57 PM    Ct Abdomen Pelvis W Iv Contrast Additional Contrast? None    Result Date: 5/13/2019  PROCEDURE: CT ABDOMEN PELVIS W IV CONTRAST CLINICAL INFORMATION: Pain and swelling of right lower leg, Pain and swelling of right lower leg, Small cell carcinoma ([address] Encounter for chemotherapy management . COMPARISON: 1/25/2019 TECHNIQUE: 2-D multiplanar post contrast images of the abdomen and pelvis All CT scans at this facility use dose modulation, iterative reconstruction, and/or weight-based dosing when appropriate to reduce radiation dose to as low as reasonably achievable. FINDINGS: Lung bases This is reported on the dedicated same day exam. Abdomen pelvis: Low-density lesion in the right lobe of the liver, is slightly decreased compared the prior exam now measuring 5.9 mm compared to 9.5 on the previous left lobe lesion, is now very subtle and measures 9.8 mm previously 21 mm. Spleen is normal. Pancreas is normal. Gallbladder is distended. Marked decrease in size of the left adrenal gland with a nodular appearance of the main body of the adrenal measuring 5.9 mm. Right hydronephrosis which is markedly decreased compared to the prior exam. Several fluid distended loops of small bowel in the midabdomen. Transition appears to be at the postsurgical site in the mid lower abdomen image 40. There is no bowel obstruction there is marked diverticulosis. Interval resolution of previously seen left iliac chain adenopathy stable right inguinal lymphadenopathy node measuring 2.4 x 1.6 cm Contrast in the urinary bladder. Bones Numerous osteoblastic lesions throughout the lower lumbar spine and pelvis. L1 posterior elements of L2 most of L4 left iliac wing portions of the sacrum and most of the right iliac wing and right acetabulum. 1. Improvement in previously identified liver lesions and pelvic and inguinal lymphadenopathy with a stable right inguinal lymph node. 2. 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 **This report has been created using voice recognition software. It may contain minor errors which are inherent in voice recognition technology. ** Final report electronically signed by Dr. Katy Hess on 5/13/2019 4:17 PM    Vl Dup Lower Extremity Venous Right    Result Date: 4/15/2019  PROCEDURE: VL DUP LOWER EXTREMITY VENOUS RIGHT CLINICAL INFORMATION: Right leg pain and swelling TECHNIQUE: High-resolution duplex ultrasound of the right lower extremity was performed. The common femoral, femoral, popliteal and calf vein segments were studied to the ankle. COMPARISON: None FINDINGS: There is normal compressibility with no evidence of thrombus. Flow study shows normal color flow, augmentation and phasic response. No evidence of deep venous thrombosis in the right lower extremity. Final report electronically signed by Dr. Tyrone Steen on 4/15/2019 10:57 AM    Mri Brain W Wo Contrast    Result Date: 5/13/2019  PROCEDURE: MRI BRAIN W WO CONTRAST INDICATION:Pain and swelling of right lower leg, Pain and swelling of right lower leg, Small cell carcinoma ([address] Encounter for chemotherapy management. Numbness and tingling in both hands. Staging for lung cancer. COMPARISON: CT head dated 1/24/2019. TECHNIQUE: Multiplanar and multiple spin echo T1 and T2-weighted images were obtained through the brain before and after the administration of intravenous contrast. 15 mL ProHance was injected in the right AC. FINDINGS: There is mild to moderate temporal parietal predominant volume loss. There are multiple punctate to small foci of restricted diffusion scattered throughout both hemispheres of the supratentorial brain  There is also a punctate focus of restricted diffusion in the left cerebellar hemisphere. There are associated focal areas of T2/FLAIR elongation. There are mild scattered focal areas of T2/FLAIR prolongation elsewhere in the subcortical deep white matter without corresponding areas of restricted diffusion. Following contrast administration, there are no focal areas of abnormal parenchymal or meningeal enhancement identified. None of the foci of restricted diffusion enhance.  The major vascular flow voids appear Mrozek    Electronically signed by   Crow Hollingsworth PA-C on 5/14/2019 at 5:06 PM

## 2019-05-14 NOTE — PROGRESS NOTES
RN spoke with  at [facility] for CARLOS. Test scheduled for 1530 with Dr Sydnee Arizmendi.

## 2019-05-14 NOTE — PROGRESS NOTES
In chart for potential admission to [address] [place] [address]. Reviewed results and clarified admission to obs with Dr. Dahiana Cleveland and he stated pt should go to stepdown. Will update patient flow center and house supervisor.

## 2019-05-14 NOTE — PROGRESS NOTES
Hospitalist Progress Note    Patient:  Ana Palma      Unit/Bed:4A-06/006-A    YOB: 1950    MRN: [id number]       Acct: [de-identified]     PCP: Eugene Butterfield MD    Date of Admission: 5/13/2019    Chief Complaint:   Chief Complaint   Patient presents with   [facility] Other     PCP told to come in after imaging today         Hospital Course:     Please see H/P for details. In summary, this is a 70 y.o. Male, w PMH of stage IV lung cancer, s/p radiation therapy, now on chemotherapy, last chemo was 4/27/19 per patient, follows Dr. Jose Gutierrez, had routine brain MRI for staging for lung cancer on 5/13/19 which showed scattered punctate foci of acute infarcts throughout the brain as evidence for embolic phenomenon and per H/P, patient presented to [facility] as he was advised to go to ER for further work-up for the acute infarct throughout the brain per brain MRI. Patient is asymptomatic. Patient was admitted under Hospital Medicine service. Neurology was consulted. Subjective:     Patient seen and examined. Pt states he feels fine. He denies focal weakness/numbness, HA, blurry vision, speech changes. He does report chronic B/L hands numbness x past 8 months due to arthritis but no changes with this numbness. He also reports B/L LE swelling x past 2 months. Pt had venous duples US B/L LE on 4/15/19, (-) DVT.        Medications:  Reviewed    Infusion Medications    sodium chloride 60 mL/hr at 05/14/19 0056     Scheduled Medications    potassium replacement protocol   Other RX Placeholder    aspirin  81 mg Oral Daily    clopidogrel  75 mg Oral Daily    atorvastatin  80 mg Oral Daily    mometasone-formoterol  2 puff Inhalation BID    busPIRone  10 mg Oral BID    ipratropium-albuterol  1 vial Inhalation Q4H    pantoprazole  40 mg Oral QAM AC    tamsulosin  0.4 mg Oral Daily    sodium chloride flush  10 mL Intravenous 2 times per day    enoxaparin  40 mg Subcutaneous Daily    polyethylene glycol  17 g Oral Daily     PRN Meds: sodium chloride flush, ondansetron, magnesium sulfate, potassium chloride **OR** potassium alternative oral replacement **OR** potassium chloride, acetaminophen      Intake/Output Summary (Last 24 hours) at 5/14/2019 1040  Last data filed at 5/14/2019 0432  Gross per 24 hour   Intake 207 ml   Output 300 ml   Net -93 ml       Diet:  DIET GENERAL;    Exam:  /65   Pulse 91   Temp 98 °F (36.7 °C) (Oral)   Resp 18   Ht 5' 8\" (1.727 m)   Wt 150 lb (68 kg)   SpO2 96%   BMI 22.81 kg/m²     General appearance: alert, not in acute distress. HEENT: Pupils equal, round, and reactive to light. Conjunctivae clear. Clear oral mucosa. Neck: Supple, with full range of motion. Respiratory:  Normal respiratory effort. Clear to auscultation, bilaterally without Rales/Wheezes/Rhonchi. Cardiovascular: normal rate, regular rhythm with normal S1/S2 without murmurs, rubs or gallops. Abdomen: Soft, non-tender, non-distended with normal bowel sounds. Musculoskeletal: passive and active ROM x 4 extremities. Neurological exam reveals alert, oriented x 3, normal speech, no focal findings or movement disorder noted. Exam of extremities: peripheral pulses normal, no pedal or leg edema, no clubbing or cyanosis      Labs:   Recent Labs     05/13/19  1526 05/14/19  0349   WBC 3.0* 2.6*   HGB 11.9* 10.7*   HCT 34.0* 31.4*    172     Recent Labs     05/13/19  1526 05/14/19  0348    138   K 3.9 3.4*   CL 98 102   CO2 25 23   BUN 11 11   CREATININE 0.6 0.5   CALCIUM 9.7 9.2     No results for input(s): AST, ALT, BILIDIR, BILITOT, ALKPHOS in the last 72 hours. Recent Labs     05/13/19  1526   INR 1.00     No results for input(s): Felicia Massimo in the last 72 hours.     Urinalysis:      Lab Results   Component Value Date    NITRU NEGATIVE 01/21/2019    WBCUA NONE SEEN 01/21/2019    BACTERIA NONE 01/21/2019    RBCUA 3-5 01/21/2019    BLOODU TRACE 01/21/2019 consulted       Electronically signed by Gilbert Wood MD on 5/14/2019 at 10:40 AM

## 2019-05-14 NOTE — PROGRESS NOTES
[facility]  INPATIENT PHYSICAL THERAPY  EVALUATION  [facility] 4A - 4A-06/006-A    Time In: 1350  Time Out: 0053  Timed Code Treatment Minutes: 9 Minutes  Minutes: 24          Date: 2019  Patient Name: Karol Hayes,  Gender:  male        MRN: [id number]  : 1950  (70 y.o.)      Referring Practitioner: Dr. Chary Workman  Diagnosis: embolic cerebrovascular disease  Additional Pertinent Hx: Karol Hayes is a 70 y.o. male who presents to the Emergency Department for an evaluation. Patient had an MRI done at 1230. Results were called to Dr. Liss Ball. Patient was sent back to have a CT scan done. He states he completed that and was advised to come into the ED for further work up. Patient is seen by Dr. Liss Ball for stage IV lung cancer. He is currently undergoing chemotherapy and will begin another round on 19. He reports that he has bilateral posterior calf pain and had an US completed around 19 which was negative for DVT. Patient states he was told he may need to be placed on anticoagulants and possible admission. He denies fever, chills, headache, weakness, vision changes, speech changes, emesis, shortness of breath, or chest pain.       Past Medical History:   Diagnosis Date    Arthritis     COPD (chronic obstructive pulmonary disease) (HCC)     Gastric reflux     Hyperlipidemia     Hypertension      Past Surgical History:   Procedure Laterality Date    COLONOSCOPY      HERNIA REPAIR  80's    bilateral inguinal    KIDNEY SURGERY      stent placement    [place] [address] INCL FLUOR GDNCE DX W/CELL WASHG SPX N/A 2018    BRONCHOSCOPY FLUOROSCOPY performed by Arnaud Shah MD at [facility]    [place] OFFICE/OUTPT VISIT,PROCEDURE ONLY Right 2018    RIGHT INGUINAL HERNIA REPAIR performed by Sasha Machado MD at [address] OFFICE/OUTPT [facility] N/A 2018    EXPLORATORY LAPAROSCOPY  OPEN PROCEDURE, SMALL BOWEL RESECTION performed by Sasha Machado MD at [place] [facility]       Restrictions/Precautions:  General Precautions    Subjective:  Chart Reviewed: Yes  Patient assessed for rehabilitation services?: Yes  Family / Caregiver Present: No  Subjective: pleasant and cooperative, c/o fatigue    General:    Pain:  Denies. Pre Treatment Pain Screening  Pain at present: 0    Social/Functional History:    Lives With: Significant other  Type of Home: House  Home Layout: Two level, 1/2 bath on main level(bedroom on first floor, full bath on second floor)  Home Access: Stairs to enter without rails  Entrance Stairs - Number of Steps: 5  Home Equipment: Cane, 4 wheeled walker, Rolling walker(no AD used PTA)   Ambulation Assistance: Independent  Transfer Assistance: Independent      Objective:       RLE AROM: WFL  RLE General AROM: tendency to supinate due to chronic ankle pain from OA per pt         LLE AROM : WFL      Strength RLE: WFL    Strength LLE: WFL      Sensation  Overall Sensation Status: Impaired(per pt BLE medial knee some numbness)      Balance  Sitting - Static: Good  Sitting - Dynamic: Good  Standing - Static: Good  Standing - Dynamic: Fair    Rolling to Left: Modified independent  Rolling to Right: Modified independent  Supine to Sit: Modified independent  Sit to Supine: Modified independent  Scooting: Modified independent    Transfers  Sit to Stand: Modified independent  Stand to sit: Modified independent       Ambulation 1  Surface: level tile  Device: No Device  Assistance: Supervision  Quality of Gait: min unsteady, supinated at right foot per pt due to chronic ankle OA and as PTA, no LOB, per pt feels he is at baseline, NBOS, mild path deviations  Distance: 10'x1, see below for inc distance      Exercises:  Comments: none       Activity Tolerance:  Activity Tolerance: Patient Tolerated treatment well    Treatment Initiated: amb 110'x2 without AD and MOD I, see above for deviations.   Negotiate 3 steps, no HR, nonreciprocal, min unsteady, no LOB, per pt is baseline. Assessment:  Assessment: per pt is at baseline and feels no further need for PT at this time, pt with chronic OA right ankle and min unsteady,   Prognosis: Good    Clinical Presentation: Moderate - Evolving with Changing Characteristics:   per pt is at baseline and feels no further need for PT at this time, pt with chronic OA right ankle and min unsteady    Decision Making: High Complexitybased on patient assessment and decision making process of determining plan of care and establishing reasonable expectations for measurable functional outcomes    REQUIRES PT FOLLOW UP: No  No Skilled PT: At baseline function    Discharge Recommendations:  Discharge Recommendations: Home independently    Patient Education:  Patient Education: POC    Equipment Recommendations:  Equipment Needed: No    Safety:  Type of devices: All fall risk precautions in place, Bed alarm in place, Call light within reach, Nurse notified, Left in bed    Plan:  Times per week: NA    Goals:  Patient goals : return home  Short term goals  Time Frame for Short term goals: NA  Long term goals  Time Frame for Long term goals : NA    Evaluation Complexity: Based on the findings of patient history, examination, clinical presentation, and decision making during this evaluation, the evaluation of Kayla Castelan  is of medium complexity.          AM-PAC Inpatient Mobility Raw Score : 24  AM-PAC Inpatient T-Scale Score : 61.14  Mobility Inpatient CMS 0-100% Score: 0  Mobility Inpatient CMS G-Code Modifier : [facility]

## 2019-05-14 NOTE — FLOWSHEET NOTE
05/14/19 1129   Provider Notification   Reason for Communication Review case  (Onc consult)   Provider Name Dr Luis Sole   Provider Notification Physician   Method of Communication Call  (message left with office staff)   Response Waiting for response   Notification Time [phone]

## 2019-05-14 NOTE — CARE COORDINATION
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 [I63.9] Status: IP  Admit order signed?: yes  PMH:  has a past medical history of Arthritis, COPD (chronic obstructive pulmonary disease) ([facility] Gastric reflux, Hyperlipidemia, and Hypertension. Procedure: CARLOS pending  Pertinent abnormal Imaging:MRI Brain W WO Contrast   1. Scattered punctate foci of acute infarcts throughout the brain as evidence for embolic phenomenon. 2. No evidence of metastatic lesions to the brain. Medications:  Scheduled Meds:   doxazosin  2 mg Oral Daily    atorvastatin  80 mg Oral Daily    mometasone-formoterol  2 puff Inhalation BID    busPIRone  10 mg Oral BID    ipratropium-albuterol  1 vial Inhalation Q4H    pantoprazole  40 mg Oral QAM AC    tamsulosin  0.4 mg Oral Daily    sodium chloride flush  10 mL Intravenous 2 times per day    enoxaparin  40 mg Subcutaneous Daily    polyethylene glycol  17 g Oral Daily     Continuous Infusions:   sodium chloride 60 mL/hr at 05/14/19 0056      Pertinent Info/Orders/Treatment Plan:  IV fluids, Neurology consult, Neurovascular checks, telemetry, Cardiology consult, PT/OT. Diet: DIET GENERAL;   Smoking status:  reports that he quit smoking about 3 months ago. His smoking use included cigarettes. He started smoking about 50 years ago. He has a 100.00 pack-year smoking history.  He has never used smokeless tobacco.   PCP: Eugene Butterfield MD  Readmission: no  Readmission Risk Score: 21%    Discharge Planning  Current Residence:  Private Residence  Living Arrangements:  Spouse/Significant Other   Support Systems:  Spouse/Significant Other  Current Services PTA:     Potential Assistance Needed:  N/A  Potential Assistance Purchasing Medications:  No  Does patient want to participate in local refill/ meds to beds program?  No  Type of Home Care Services:  None  Patient expects to be discharged to:  home with SO  Expected Discharge date:  05/16/19  Follow Up Appointment: Best Day/ Time: Monday PM(mid mornings are best)    Discharge Plan: Met with Abdulaziz Garcia. He currently lives at home with his significant other. Plan is to return home at discharge. Denies need for DME or HH.      Evaluation: no

## 2019-05-15 VITALS
SYSTOLIC BLOOD PRESSURE: 133 MMHG | HEIGHT: 68 IN | OXYGEN SATURATION: 96 % | BODY MASS INDEX: 22.42 KG/M2 | DIASTOLIC BLOOD PRESSURE: 63 MMHG | WEIGHT: 147.9 LBS | RESPIRATION RATE: 18 BRPM | HEART RATE: 82 BPM | TEMPERATURE: 98.2 F

## 2019-05-15 PROBLEM — I63.9 EMBOLIC STROKE (HCC): Status: ACTIVE | Noted: 2019-05-13

## 2019-05-15 LAB
ANION GAP SERPL CALCULATED.3IONS-SCNC: 12 MEQ/L (ref 8–16)
AVERAGE GLUCOSE: 69 MG/DL (ref 70–126)
BASOPHILS # BLD: 0.3 %
BASOPHILS ABSOLUTE: 0 THOU/MM3 (ref 0–0.1)
BUN BLDV-MCNC: 10 MG/DL (ref 7–22)
CALCIUM SERPL-MCNC: 8.9 MG/DL (ref 8.5–10.5)
CHLORIDE BLD-SCNC: 100 MEQ/L (ref 98–111)
CO2: 23 MEQ/L (ref 23–33)
CREAT SERPL-MCNC: 0.5 MG/DL (ref 0.4–1.2)
EOSINOPHIL # BLD: 0 %
EOSINOPHILS ABSOLUTE: 0 THOU/MM3 (ref 0–0.4)
ERYTHROCYTE [DISTWIDTH] IN BLOOD BY AUTOMATED COUNT: 13.2 % (ref 11.5–14.5)
ERYTHROCYTE [DISTWIDTH] IN BLOOD BY AUTOMATED COUNT: 47.3 FL (ref 35–45)
FERRITIN: 317 NG/ML (ref 22–322)
FOLATE: 16.9 NG/ML (ref 4.8–24.2)
GFR SERPL CREATININE-BSD FRML MDRD: > 90 ML/MIN/1.73M2
GLUCOSE BLD-MCNC: 92 MG/DL (ref 70–108)
HBA1C MFR BLD: 4.3 % (ref 4.4–6.4)
HCT VFR BLD CALC: 32.3 % (ref 42–52)
HEMOGLOBIN: 11.1 GM/DL (ref 14–18)
HOMOCYSTEINE, TOTAL: 7 UMOL/L
IMMATURE GRANS (ABS): 0.02 THOU/MM3 (ref 0–0.07)
IMMATURE GRANULOCYTES: 0.6 %
IRON: 47 UG/DL (ref 65–195)
LYMPHOCYTES # BLD: 22.7 %
LYMPHOCYTES ABSOLUTE: 0.8 THOU/MM3 (ref 1–4.8)
MAGNESIUM: 1.8 MG/DL (ref 1.6–2.4)
MCH RBC QN AUTO: 33.4 PG (ref 26–33)
MCHC RBC AUTO-ENTMCNC: 34.4 GM/DL (ref 32.2–35.5)
MCV RBC AUTO: 97.3 FL (ref 80–94)
MONOCYTES # BLD: 14.9 %
MONOCYTES ABSOLUTE: 0.5 THOU/MM3 (ref 0.4–1.3)
NUCLEATED RED BLOOD CELLS: 0 /100 WBC
PLATELET # BLD: 173 THOU/MM3 (ref 130–400)
PMV BLD AUTO: 8.7 FL (ref 9.4–12.4)
POTASSIUM SERPL-SCNC: 4.1 MEQ/L (ref 3.5–5.2)
RBC # BLD: 3.32 MILL/MM3 (ref 4.7–6.1)
SEG NEUTROPHILS: 61.5 %
SEGMENTED NEUTROPHILS ABSOLUTE COUNT: 2.1 THOU/MM3 (ref 1.8–7.7)
SODIUM BLD-SCNC: 135 MEQ/L (ref 135–145)
TOTAL IRON BINDING CAPACITY: 229 UG/DL (ref 171–450)
VITAMIN B-12: 292 PG/ML (ref 211–911)
WBC # BLD: 3.4 THOU/MM3 (ref 4.8–10.8)

## 2019-05-15 PROCEDURE — 83735 ASSAY OF MAGNESIUM: CPT

## 2019-05-15 PROCEDURE — 99232 SBSQ HOSP IP/OBS MODERATE 35: CPT | Performed by: PSYCHIATRY & NEUROLOGY

## 2019-05-15 PROCEDURE — 6360000002 HC RX W HCPCS: Performed by: INTERNAL MEDICINE

## 2019-05-15 PROCEDURE — 82728 ASSAY OF FERRITIN: CPT

## 2019-05-15 PROCEDURE — 6370000000 HC RX 637 (ALT 250 FOR IP): Performed by: INTERNAL MEDICINE

## 2019-05-15 PROCEDURE — 99232 SBSQ HOSP IP/OBS MODERATE 35: CPT | Performed by: PHYSICIAN ASSISTANT

## 2019-05-15 PROCEDURE — 83036 HEMOGLOBIN GLYCOSYLATED A1C: CPT

## 2019-05-15 PROCEDURE — 85025 COMPLETE CBC W/AUTO DIFF WBC: CPT

## 2019-05-15 PROCEDURE — 83540 ASSAY OF IRON: CPT

## 2019-05-15 PROCEDURE — 97165 OT EVAL LOW COMPLEX 30 MIN: CPT

## 2019-05-15 PROCEDURE — 83550 IRON BINDING TEST: CPT

## 2019-05-15 PROCEDURE — 94640 AIRWAY INHALATION TREATMENT: CPT

## 2019-05-15 PROCEDURE — 80048 BASIC METABOLIC PNL TOTAL CA: CPT

## 2019-05-15 PROCEDURE — 97535 SELF CARE MNGMENT TRAINING: CPT

## 2019-05-15 PROCEDURE — 99239 HOSP IP/OBS DSCHRG MGMT >30: CPT | Performed by: FAMILY MEDICINE

## 2019-05-15 PROCEDURE — 82607 VITAMIN B-12: CPT

## 2019-05-15 PROCEDURE — 6370000000 HC RX 637 (ALT 250 FOR IP): Performed by: FAMILY MEDICINE

## 2019-05-15 PROCEDURE — 36415 COLL VENOUS BLD VENIPUNCTURE: CPT

## 2019-05-15 PROCEDURE — 92523 SPEECH SOUND LANG COMPREHEN: CPT

## 2019-05-15 PROCEDURE — 2580000003 HC RX 258: Performed by: INTERNAL MEDICINE

## 2019-05-15 PROCEDURE — 82746 ASSAY OF FOLIC ACID SERUM: CPT

## 2019-05-15 RX ORDER — ASCORBIC ACID 500 MG
500 TABLET ORAL DAILY
Status: DISCONTINUED | OUTPATIENT
Start: 2019-05-15 | End: 2019-05-15 | Stop reason: HOSPADM

## 2019-05-15 RX ORDER — LANOLIN ALCOHOL/MO/W.PET/CERES
1000 CREAM (GRAM) TOPICAL DAILY
Status: DISCONTINUED | OUTPATIENT
Start: 2019-05-15 | End: 2019-05-15 | Stop reason: HOSPADM

## 2019-05-15 RX ORDER — ATORVASTATIN CALCIUM 80 MG/1
80 TABLET, FILM COATED ORAL DAILY
Qty: 30 TABLET | Refills: 0 | Status: ON HOLD | OUTPATIENT
Start: 2019-05-15 | End: 2019-08-12 | Stop reason: HOSPADM

## 2019-05-15 RX ORDER — ASPIRIN 81 MG/1
81 TABLET, CHEWABLE ORAL DAILY
Qty: 30 TABLET | Refills: 0 | Status: SHIPPED | OUTPATIENT
Start: 2019-05-16

## 2019-05-15 RX ORDER — FERROUS SULFATE 325(65) MG
325 TABLET ORAL 2 TIMES DAILY WITH MEALS
Status: DISCONTINUED | OUTPATIENT
Start: 2019-05-15 | End: 2019-05-15 | Stop reason: HOSPADM

## 2019-05-15 RX ORDER — CLOPIDOGREL BISULFATE 75 MG/1
75 TABLET ORAL DAILY
Qty: 21 TABLET | Refills: 0 | Status: SHIPPED | OUTPATIENT
Start: 2019-05-16 | End: 2019-07-29 | Stop reason: ALTCHOICE

## 2019-05-15 RX ORDER — FOLIC ACID 1 MG/1
1 TABLET ORAL DAILY
Qty: 30 TABLET | Refills: 0 | Status: SHIPPED | OUTPATIENT
Start: 2019-05-16

## 2019-05-15 RX ORDER — ASCORBIC ACID 500 MG
500 TABLET ORAL DAILY
Qty: 30 TABLET | Refills: 0 | Status: SHIPPED | OUTPATIENT
Start: 2019-05-16 | End: 2019-07-11 | Stop reason: ALTCHOICE

## 2019-05-15 RX ORDER — FOLIC ACID 1 MG/1
1 TABLET ORAL DAILY
Status: DISCONTINUED | OUTPATIENT
Start: 2019-05-15 | End: 2019-05-15 | Stop reason: HOSPADM

## 2019-05-15 RX ORDER — FERROUS SULFATE 325(65) MG
325 TABLET ORAL 2 TIMES DAILY WITH MEALS
Qty: 30 TABLET | Refills: 0 | Status: SHIPPED | OUTPATIENT
Start: 2019-05-15 | End: 2019-07-11 | Stop reason: ALTCHOICE

## 2019-05-15 RX ADMIN — IPRATROPIUM BROMIDE AND ALBUTEROL SULFATE 3 ML: .5; 3 SOLUTION RESPIRATORY (INHALATION) at 08:48

## 2019-05-15 RX ADMIN — MAGNESIUM GLUCONATE 500 MG ORAL TABLET 400 MG: 500 TABLET ORAL at 09:03

## 2019-05-15 RX ADMIN — FERROUS SULFATE TAB 325 MG (65 MG ELEMENTAL FE) 325 MG: 325 (65 FE) TAB at 09:03

## 2019-05-15 RX ADMIN — ENOXAPARIN SODIUM 40 MG: 40 INJECTION SUBCUTANEOUS at 09:03

## 2019-05-15 RX ADMIN — POLYETHYLENE GLYCOL 3350 17 G: 17 POWDER, FOR SOLUTION ORAL at 09:03

## 2019-05-15 RX ADMIN — ASPIRIN 81 MG 81 MG: 81 TABLET ORAL at 09:03

## 2019-05-15 RX ADMIN — CLOPIDOGREL BISULFATE 75 MG: 75 TABLET ORAL at 09:03

## 2019-05-15 RX ADMIN — FOLIC ACID 1 MG: 1 TABLET ORAL at 09:03

## 2019-05-15 RX ADMIN — Medication 1000 MCG: at 09:03

## 2019-05-15 RX ADMIN — IPRATROPIUM BROMIDE AND ALBUTEROL SULFATE 3 ML: .5; 3 SOLUTION RESPIRATORY (INHALATION) at 12:10

## 2019-05-15 RX ADMIN — BUSPIRONE HYDROCHLORIDE 10 MG: 10 TABLET ORAL at 09:03

## 2019-05-15 RX ADMIN — TAMSULOSIN HYDROCHLORIDE 0.4 MG: 0.4 CAPSULE ORAL at 09:03

## 2019-05-15 RX ADMIN — SODIUM CHLORIDE: 9 INJECTION, SOLUTION INTRAVENOUS at 03:36

## 2019-05-15 RX ADMIN — Medication 2 PUFF: at 08:48

## 2019-05-15 RX ADMIN — Medication 500 MG: at 09:03

## 2019-05-15 RX ADMIN — PANTOPRAZOLE SODIUM 40 MG: 40 TABLET, DELAYED RELEASE ORAL at 06:22

## 2019-05-15 ASSESSMENT — PAIN SCALES - GENERAL: PAINLEVEL_OUTOF10: 0

## 2019-05-15 NOTE — PROGRESS NOTES
[facility]    Patient - Nohemi Ignacio   MRN -  [id number]   Acct # - [de-identified]   - 1950      Date of Admission -  2019  2:33 PM  Date of evaluation -  5/15/2019  Room - 4A--A   Hospital Day - [facility] Julia Rod MD Primary Care Physician - Shola Grant MD       Reason for Consult    Lung cancer on chemo  [address] Problems    Diagnosis Date Noted    Acute CVA (cerebrovascular accident) [facility]) [I63.9]     Embolic stroke ([facility] [V12.2] 2019    Small cell carcinoma ([facility] [C80.1] 2019     HPI/Subjective   Nohemi Ignacio is a 70 y.o. male admitted for abnormal MRI of the brain findings. The patient is followed by Dr. Nicolle Trujillo for extensive stage small cell lung cancer, and had imaging studies completed on 19 to evaluate after chemotherapy. Dr. Nicolle Trujillo was called by radiologist due to abnormal MRI of the brain finding of scattered punctate foci of acute infarct throughout the brain with evidence of embolic phenomenon. He was sent to the ER for further evaluation. He was admitted under the Hospitalist service for further evaluation. Cardiology was consulted for CARLOS which was performed earlier today. Neurology was consulted as well and CT of the head and neck were completed today. Oncology consult was requested to follow-up on above. Of note, CT of the chest completed on 19 showed dramatic improvement in the appearance of mediastinal adenopathy with almost complete resolution, sclerotic lesions in the T8 and L1 vertebral body consistent with osteoblastic metastasis. CT of the abdomen and pelvis completed on 19 revealed interval improvement of previously identified liver lesions and pelvic and inguinal lymphadenopathy with a stable right inguinal lymph node.   Interval development of multiple osteoblastic lesions in the lumbar spine and pelvis compatible with osteoblastic metastasis. These findings were reviewed with the patient today. The patient denies any new leg numbness or weakness. He denies bladder or bowel incontinence. Over the last 24 hours:   Patient is resting in bed, he offers no new complaints. Denies vision changes, headaches, memory changes, new extremity weakness. No chest pain, shortness of breath, cough or abdominal pain. Oncology History   Per Dr. Venus Santamaria note on 4/15/19:   69 yo long term smoker and [facility] pt originally seen by Dr Maliha Jose on 9/7 for L hilar lung mass. He had bronch with washings  Pos for strep and enterobacter and fiberoptic exam (unremarkable). He had a course of antibiotics. and was referred for CT guided bx L hilar mass. He had a break due to medical insurance isues with [facility] and had lapse in f/u until he saw Dr Maggi Tejeda and was trying to work out his medical care path but became more SOB. Zion Blackmon went to ED at [facility] and had chest CT which showed large mediastinal lymphadenopathy. Path from brushings came back small cell lung ca. CT of the head on January 24, 2019 and showed normal appearance of the brain.  CT of the abdomen on January 25, 2019 showed bilateral pleural metastases and effusions.  In addition there was 2.3 cm low attenuation area in the left lobe liver suspicious for metastases in the right hepatic lobe there was 9 mm low-attenuation focus.  Left adrenal gland was enlarged and an measured 20 mm in the largest dimension there was a 3.8 cm mass in the region of the left iliac chain worrisome for lymph node metastasis.  probable left adrenal metastases and liver metastases.  Due to presentation with SVC the patient started radiation treatment on January 25, 2019 and completed on 02/20/2019.  On January 28, 2019 he received first cycle of chemotherapy with -16 and carboplatin. Overall, he tolerated it reasonably well, mild nausea.     He has received four cycles of carboplatin and etoposide with four dose on 4/22/19.      Meds Current Medications    folic acid  1 mg Oral Daily    magnesium oxide  400 mg Oral Daily    ferrous sulfate  325 mg Oral BID WC    vitamin C  500 mg Oral Daily    vitamin B-12  1,000 mcg Oral Daily    potassium replacement protocol   Other RX Placeholder    aspirin  81 mg Oral Daily    clopidogrel  75 mg Oral Daily    atorvastatin  80 mg Oral Daily    mometasone-formoterol  2 puff Inhalation BID    busPIRone  10 mg Oral BID    ipratropium-albuterol  1 vial Inhalation Q4H    pantoprazole  40 mg Oral QAM AC    tamsulosin  0.4 mg Oral Daily    sodium chloride flush  10 mL Intravenous 2 times per day    enoxaparin  40 mg Subcutaneous Daily    polyethylene glycol  17 g Oral Daily     labetalol, sodium chloride flush, ondansetron, magnesium sulfate, potassium chloride **OR** potassium alternative oral replacement **OR** potassium chloride, acetaminophen  IV Drips/Infusions   sodium chloride 60 mL/hr at 05/15/19 0336     Past Medical History         Diagnosis Date    Arthritis     COPD (chronic obstructive pulmonary disease) (HCC)     Gastric reflux     Hyperlipidemia     Hypertension       Past Surgical History           Procedure Laterality Date    COLONOSCOPY      HERNIA REPAIR  80's    bilateral inguinal    KIDNEY SURGERY      stent placement    IA [facility] INCL FLUOR GDNCE DX W/CELL WASHG SPX N/A 9/7/2018    BRONCHOSCOPY FLUOROSCOPY performed by Alexus Dawson MD at [facility]    IA OFFICE/OUTPT VISIT,PROCEDURE ONLY Right 9/5/2018    RIGHT INGUINAL HERNIA REPAIR performed by Ruiz Valerio MD at [address] OFFICE/OUTPT VISIT,PROCEDURE ONLY N/A 9/11/2018    EXPLORATORY LAPAROSCOPY  OPEN PROCEDURE, SMALL BOWEL RESECTION performed by Ruiz Valerio MD at [address] TRANSESOPHAGEAL ECHOCARDIOGRAM Left 5/14/2019    TRANSESOPHAGEAL ECHOCARDIOGRAM performed by Flavia Wyatt MD at [address];  Allergies    Lisinopril  Social History     Social History appearance: No apparent distress, chronically ill appearing, and cooperative. HEENT: Pupils equal, round, and reactive to light. Conjunctivae/corneas clear. Oral mucosa moist.   Neck: Supple, with full range of motion. Trachea midline. Respiratory:  Normal respiratory effort. Clear to auscultation, bilaterally without Rales/Wheezes/Rhonchi  Cardiovascular: Regular rate and rhythm with normal S1/S2   Abdomen: Soft, non-tender, non-distended with active bowel sounds. Musculoskeletal: No clubbing, cyanosis or edema bilaterally. Full range of motion without deformity. Bilateral UE and LE strength symmetric. Skin: Skin color, texture, turgor normal.  No rashes or lesions. Neurologic:  Neurovascularly intact without any focal sensory/motor deficits. Cranial nerves: II-XII intact, grossly non-focal.  Psychiatric: Alert and oriented       Labs   CBC  Recent Labs     05/13/19  1526 05/14/19  0349 05/15/19  0349   WBC 3.0* 2.6* 3.4*   RBC 3.49* 3.25* 3.32*   HGB 11.9* 10.7* 11.1*   HCT 34.0* 31.4* 32.3*   MCV 97.4* 96.6* 97.3*   MCH 34.1* 32.9 33.4*   MCHC 35.0 34.1 34.4    172 173   MPV 8.8* 8.8* 8.7*      BMP  Recent Labs     05/13/19  1526 05/14/19  0348 05/15/19  0349    138 135   K 3.9 3.4* 4.1   CL 98 102 100   CO2 25 23 23   BUN 11 11 10   CREATININE 0.6 0.5 0.5   GLUCOSE 109* 90 92   MG  --  1.9 1.8   CALCIUM 9.7 9.2 8.9     LFT  No results for input(s): AST, ALT, ALB, BILITOT, ALKPHOS, LIPASE in the last 72 hours. Invalid input(s): AMYLASE  INR  Recent Labs     05/13/19  1526   INR 1.00     PTT  Recent Labs     05/13/19  1526   APTT 31.2       Radiology        Cta Head W Wo Contrast    Result Date: 5/14/2019  PROCEDURE: CTA HEAD W WO CONTRAST, CTA NECK W WO CONTRAST CLINICAL INFORMATION: STROKE . Multiple punctate infarcts on recent MR brain. COMPARISON: MRI brain dated 5/13/2019.  TECHNIQUE: Helical CT from the aortic arch through the head in arterial phase during intravenous administration of 80 mL Isovue-370 injected in the right forearm with multiplanar reformatted images to include volumetric maximum intensity projection sequences. FINDINGS: CTA HEAD: There is mural plaque in the petrous, cavernous and clinoid segments of the internal carotid arteries bilaterally. There are areas of up to moderate stenosis in the right petrous and cavernous segments and areas of mild stenosis on the left. No aneurysmal dilatation is identified in the intracranial segments of the internal carotid arteries. The bilateral middle cerebral and anterior cerebral arteries are patent without focal abnormality identified. The basilar artery is patent without focal stenosis or visualized aneurysm. The bilateral posterior cerebral arteries are patent without focal abnormality identified. No focal areas of abnormal parenchymal enhancement are identified. CTA NECK: There is mural calcification in the aortic arch. There is mural calcification of the takeoffs of the brachiocephalic and left subclavian arteries without flow-limiting stenosis. There is an area of mild stenosis more distally in the left subclavian artery. The common carotid arteries are patent without focal stenosis. There is mural plaque with some calcification at the carotid bifurcations bilaterally without hemodynamically significant stenosis by NASA criteria. Cervical segments of the internal carotid arteries are otherwise patent without focal stenosis. The vertebral arteries are codominant. They're patent throughout their course without focal stenosis. There is streak artifact from dental amalgam which partially obscures oral and perioral soft tissues. Given this caveat, mucosal surfaces of the aerodigestive tract are symmetric without focal nodular thickening or visualized mass. No cervical lymphadenopathy is identified. The parotid, submandibular and thyroid glands are unremarkable.  There are prominent degenerative changes of the cervical spine most pronounced at C5-6 where disc bulge with rim osteophytes causes moderate spinal canal stenosis and severe bilateral neural foraminal stenosis. There are emphysematous changes in the visualized portions of the lungs. There is AP window adenopathy. Please correlate with CT chest dated 5/13/2019. There is bronchial wall thickening in the visualized portions of lungs. CTA head: There is a mural calcification in the petrous, cavernous and clinoid segments of the internal carotid arteries, right greater than left. There are areas of up to moderate stenosis on the right. CTA NECK: 1. Mild mural plaque at the carotid bifurcations without hemodynamically significant stenosis by NASA criteria. 2. Area of moderate stenosis in the left subclavian artery. **This report has been created using voice recognition software. It may contain minor errors which are inherent in voice recognition technology. ** Final report electronically signed by Dr. Sue Gilbert MD on 5/14/2019 11:52 AM    Cta Neck W Wo Contrast    Result Date: 5/14/2019  PROCEDURE: CTA HEAD W WO CONTRAST, CTA NECK W WO CONTRAST CLINICAL INFORMATION: STROKE . Multiple punctate infarcts on recent MR brain. COMPARISON: MRI brain dated 5/13/2019. TECHNIQUE: Helical CT from the aortic arch through the head in arterial phase during intravenous administration of 80 mL Isovue-370 injected in the right forearm with multiplanar reformatted images to include volumetric maximum intensity projection sequences. FINDINGS: CTA HEAD: There is mural plaque in the petrous, cavernous and clinoid segments of the internal carotid arteries bilaterally. There are areas of up to moderate stenosis in the right petrous and cavernous segments and areas of mild stenosis on the left. No aneurysmal dilatation is identified in the intracranial segments of the internal carotid arteries.  The bilateral middle cerebral and anterior cerebral arteries are patent without focal abnormality identified. The basilar artery is patent without focal stenosis or visualized aneurysm. The bilateral posterior cerebral arteries are patent without focal abnormality identified. No focal areas of abnormal parenchymal enhancement are identified. CTA NECK: There is mural calcification in the aortic arch. There is mural calcification of the takeoffs of the brachiocephalic and left subclavian arteries without flow-limiting stenosis. There is an area of mild stenosis more distally in the left subclavian artery. The common carotid arteries are patent without focal stenosis. There is mural plaque with some calcification at the carotid bifurcations bilaterally without hemodynamically significant stenosis by NASA criteria. Cervical segments of the internal carotid arteries are otherwise patent without focal stenosis. The vertebral arteries are codominant. They're patent throughout their course without focal stenosis. There is streak artifact from dental amalgam which partially obscures oral and perioral soft tissues. Given this caveat, mucosal surfaces of the aerodigestive tract are symmetric without focal nodular thickening or visualized mass. No cervical lymphadenopathy is identified. The parotid, submandibular and thyroid glands are unremarkable. There are prominent degenerative changes of the cervical spine most pronounced at C5-6 where disc bulge with rim osteophytes causes moderate spinal canal stenosis and severe bilateral neural foraminal stenosis. There are emphysematous changes in the visualized portions of the lungs. There is AP window adenopathy. Please correlate with CT chest dated 5/13/2019. There is bronchial wall thickening in the visualized portions of lungs. CTA head: There is a mural calcification in the petrous, cavernous and clinoid segments of the internal carotid arteries, right greater than left. There are areas of up to moderate stenosis on the right. CTA NECK: 1.  Mild mural plaque at the carotid bifurcations without hemodynamically significant stenosis by NASA criteria. 2. Area of moderate stenosis in the left subclavian artery. **This report has been created using voice recognition software. It may contain minor errors which are inherent in voice recognition technology. ** Final report electronically signed by Dr. Yousif Arenas MD on 5/14/2019 11:52 AM    Ct Chest W Contrast    Result Date: 5/13/2019  PROCEDURE: CT CHEST W CONTRAST CLINICAL INFORMATION: Pain and swelling of right lower leg, Pain and swelling of right lower leg, Small cell carcinoma ([address] Encounter for chemotherapy management . COMPARISON: 1/21/2019 TECHNIQUE: 2-D multiplanar post contrast images of the chest, Isovue-370 IV contrast All CT scans at this facility use dose modulation, iterative reconstruction, and/or weight-based dosing when appropriate to reduce radiation dose to as low as reasonably achievable. FINDINGS: Dramatically decrease in mediastinal adenopathy. There is soft tissue density material in the mediastinum without the well-defined individual lymph nodes. There is soft tissue fullness in the aortopulmonary window currently measuring 2.7 x 3.1 cm this compares to conglomerate mass at this area measuring up to 10 cm previously. anterior pericardial effusion measuring 13 mm widest portion. No axillary lymphadenopathy. Biapical fibrotic scarring and emphysematous blebs. Paraseptal emphysematous changes in the medial superior mediastinum. Saber shaped trachea the upper portion. Calcified granuloma left upper lobe. No lung masses are currently identified. No airspace consolidations or pleural effusions. Upper abdomen This is reported on the separate same day exam. Bones Large sclerotic lesion involving the T8 vertebral body. This is new since the prior exam. Almost the entire L1 vertebral body is sclerotic. 1. Dramatic improvement in the appearance of mediastinal adenopathy with almost complete resolution. Details in the above report 2. Sclerotic lesions in the T8 and L1 vertebral body consistent with osteoblastic metastases **This report has been created using voice recognition software. It may contain minor errors which are inherent in voice recognition technology. ** Final report electronically signed by Dr. Misha Berg on 5/13/2019 3:57 PM    Ct Abdomen Pelvis W Iv Contrast Additional Contrast? None    Result Date: 5/13/2019  PROCEDURE: CT ABDOMEN PELVIS W IV CONTRAST CLINICAL INFORMATION: Pain and swelling of right lower leg, Pain and swelling of right lower leg, Small cell carcinoma ([address] Encounter for chemotherapy management . COMPARISON: 1/25/2019 TECHNIQUE: 2-D multiplanar post contrast images of the abdomen and pelvis All CT scans at this facility use dose modulation, iterative reconstruction, and/or weight-based dosing when appropriate to reduce radiation dose to as low as reasonably achievable. FINDINGS: Lung bases This is reported on the dedicated same day exam. Abdomen pelvis: Low-density lesion in the right lobe of the liver, is slightly decreased compared the prior exam now measuring 5.9 mm compared to 9.5 on the previous left lobe lesion, is now very subtle and measures 9.8 mm previously 21 mm. Spleen is normal. Pancreas is normal. Gallbladder is distended. Marked decrease in size of the left adrenal gland with a nodular appearance of the main body of the adrenal measuring 5.9 mm. Right hydronephrosis which is markedly decreased compared to the prior exam. Several fluid distended loops of small bowel in the midabdomen. Transition appears to be at the postsurgical site in the mid lower abdomen image 40. There is no bowel obstruction there is marked diverticulosis. Interval resolution of previously seen left iliac chain adenopathy stable right inguinal lymphadenopathy node measuring 2.4 x 1.6 cm Contrast in the urinary bladder.  Bones Numerous osteoblastic lesions throughout the lower lumbar spine and Paranasal sinuses and mastoid air cells are clear. 1. Scattered punctate foci of acute infarcts throughout the brain as evidence for embolic phenomenon. 2. No evidence of metastatic lesions to the brain. Findings were discussed with Dr. Del Fulton via telephone at the time of interpretation. **This report has been created using voice recognition software. It may contain minor errors which are inherent in voice recognition technology. ** Final report electronically signed by Dr. Refugio Guerrero MD on 5/13/2019 2:11 PM      Assessment/Recommendations    1. Extensive Stage Small Cell Lung Cancer - followed by Dr. Del Fulton, oncology history as above. He has completed four cycles of carboplatin and etoposide. Imaging studies on 5/13/19 showed mixed response with improvement in mediastinal adenopathy, liver lesions and pelvic adenopathy; however, new osteoblastic lesions in thoracic and lumbar spine, pelvis. These findings were discussed with the patient. He has no new weakness or incontinence. Discussed plan per Dr. Del Fulton is to change therapy to second line treatment with combination of chemotherapy and immunotherapy as an outpatient. Patient expressed understanding and wanted to proceed with different treatment at discharge. Discussed with patient plan to start Xgeva for new skeletal metastasis and that he would require dental clearance prior to beginning this medication. - to be followed as outpatient. 2. Acute Infarcts concerning for embolic phenomenon - noted on MRI of brain 5/13/19. Patient is asymptomatic. Cardiology and Neurology following. CARLOS on 5/14/19: no evidence of thrombus or PFO/ASD. CTA of head/neck on 5/14/19: moderate stenosis noted which is to be followed as outpatient. PT/OT/ST following. Will be followed closely as outpatient to monitor for possible early metastasis. Neurology recommends repeating MRI in two weeks. 3. Leukopenia - WBC 3400, ANC 2091. Afebrile, no infectious symptoms.  Related to recent chemotherapy. Continue to monitor. 4. Macrocytic Anemia - Hgb 11.1, Hct 32.3, MCV 97.3. Persistent anemia since 9/2018, with most recent baseline 10-11's. Likely related to chemotherapy. Monitor. Patient possibly to be discharged today. Okay for discharge from Oncology standpoint. Follow up with Dr. Shala Waldron scheduled on 5/20/19 at 09:15AM. Will check CBC, BMP, LFT at follow up appt. Case discussed with nurse and patient/family/Attending Hospitalist Dr. Yeni [address] and Palliative Care. Questions and concerns addressed.   Plan made in collaboration with Dr. Shala Waldron    Electronically signed by   Pamela Carrera PA-C on 5/15/2019 at 12:03 PM

## 2019-05-15 NOTE — PROGRESS NOTES
Hospitalist Progress Note    Patient:  Willy Medrano      Unit/Bed:4A-06/006-A    YOB: 1950    MRN: [id number]       Acct: [de-identified]     PCP: Geraldean Baumgarten, MD    Date of Admission: 5/13/2019    Chief Complaint:   Chief Complaint   Patient presents with   [facility] Other     PCP told to come in after imaging today         Hospital Course:     Please see H/P for details. In summary, this is a 70 y.o. Male, w PMH of stage IV lung cancer, s/p radiation therapy, now on chemotherapy, last chemo was 4/27/19 per patient, follows Dr. Magaly Castro, had routine brain MRI for staging for lung cancer on 5/13/19 which showed scattered punctate foci of acute infarcts throughout the brain as evidence for embolic phenomenon and per H/P, patient presented to [facility] as he was advised to go to ER for further work-up for the acute infarct throughout the brain per brain MRI. Patient is asymptomatic. Patient was admitted under Hospital Medicine service. Neurology was consulted. He does report chronic B/L hands numbness x past 8 months due to arthritis but no changes with this numbness. He also reports B/L LE swelling x past 2 months. Pt had venous duples US B/L LE on 4/15/19, (-) DVT. Subjective:     Patient seen and examined. Pt states he feels fine. He denies focal weakness/numbness, HA, blurry vision, speech changes, dizziness. He does report chronic B/L hands numbness x past 8 months due to arthritis but no changes with this numbness. He also reports B/L LE swelling x past 2 months. He denies sx of GI bleed.          Medications:  Reviewed    Infusion Medications    sodium chloride 60 mL/hr at 05/15/19 0428     Scheduled Medications    folic acid  1 mg Oral Daily    potassium replacement protocol   Other RX Placeholder    aspirin  81 mg Oral Daily    clopidogrel  75 mg Oral Daily    atorvastatin  80 mg Oral Daily    mometasone-formoterol  2 puff Inhalation BID    busPIRone  10 mg Oral BID    ipratropium-albuterol  1 vial Inhalation Q4H    pantoprazole  40 mg Oral QAM AC    tamsulosin  0.4 mg Oral Daily    sodium chloride flush  10 mL Intravenous 2 times per day    enoxaparin  40 mg Subcutaneous Daily    polyethylene glycol  17 g Oral Daily     PRN Meds: labetalol, sodium chloride flush, ondansetron, magnesium sulfate, potassium chloride **OR** potassium alternative oral replacement **OR** potassium chloride, acetaminophen      Intake/Output Summary (Last 24 hours) at 5/15/2019 0734  Last data filed at 5/15/2019 0336  Gross per 24 hour   Intake 2949.14 ml   Output --   Net 2949.14 ml       Diet:  DIET GENERAL;    Exam:  /77   Pulse 83   Temp 98.1 °F (36.7 °C) (Oral)   Resp 18   Ht 5' 8\" (1.727 m)   Wt 147 lb 14.4 oz (67.1 kg)   SpO2 95%   BMI 22.49 kg/m²     General appearance: alert, not in acute distress. HEENT: Pupils equal, round, and reactive to light. Conjunctivae clear. Clear oral mucosa. Neck: Supple, with full range of motion. Respiratory:  Normal respiratory effort. Clear to auscultation, bilaterally without Rales/Wheezes/Rhonchi. Cardiovascular: normal rate, regular rhythm with normal S1/S2 without murmurs, rubs or gallops. Abdomen: Soft, non-tender, non-distended with normal bowel sounds. Musculoskeletal: passive and active ROM x 4 extremities. Neurological exam reveals alert, oriented x 3, normal speech, no focal findings or movement disorder noted.   Exam of extremities: peripheral pulses normal, no pedal or leg edema, no clubbing or cyanosis      Labs:   Recent Labs     05/13/19  1526 05/14/19  0349 05/15/19  0349   WBC 3.0* 2.6* 3.4*   HGB 11.9* 10.7* 11.1*   HCT 34.0* 31.4* 32.3*    172 173     Recent Labs     05/13/19  1526 05/14/19  0348 05/15/19  0349    138 135   K 3.9 3.4* 4.1   CL 98 102 100   CO2 25 23 23   BUN 11 11 10   CREATININE 0.6 0.5 0.5   CALCIUM 9.7 9.2 8.9     No results for input(s): AST, ALT, BILIDIR, BILITOT, ALKPHOS in the last 72 hours. Recent Labs     05/13/19  1526   INR 1.00     No results for input(s): Paramjit Carcamo in the last 72 hours. Urinalysis:      Lab Results   Component Value Date    NITRU NEGATIVE 01/21/2019    WBCUA NONE SEEN 01/21/2019    BACTERIA NONE 01/21/2019    RBCUA 3-5 01/21/2019    BLOODU TRACE 01/21/2019    GLUCOSEU NEGATIVE 01/21/2019       Radiology:  CTA Neck W WO Contrast   Final Result    CTA head: There is a mural calcification in the petrous, cavernous and clinoid segments of the internal carotid arteries, right greater than left. There are areas of up to moderate stenosis on the right. CTA NECK:   1. Mild mural plaque at the carotid bifurcations without hemodynamically significant stenosis by NASA criteria. 2. Area of moderate stenosis in the left subclavian artery. **This report has been created using voice recognition software. It may contain minor errors which are inherent in voice recognition technology. **      Final report electronically signed by Dr. Willa Shah MD on 5/14/2019 11:52 AM      CTA Head W WO Contrast   Final Result    CTA head: There is a mural calcification in the petrous, cavernous and clinoid segments of the internal carotid arteries, right greater than left. There are areas of up to moderate stenosis on the right. CTA NECK:   1. Mild mural plaque at the carotid bifurcations without hemodynamically significant stenosis by NASA criteria. 2. Area of moderate stenosis in the left subclavian artery. **This report has been created using voice recognition software. It may contain minor errors which are inherent in voice recognition technology. **      Final report electronically signed by Dr. Willa Shah MD on 5/14/2019 11:52 AM              Assessment/Plan: This is a 70 y.o. Male    1.  Acute embolic stroke     -brain MRI on 5/13/19 showed Scattered punctate foci of acute infarcts throughout the brain as evidence for embolic phenomenon  -neuro on-board. Appreciate neuro input  -CTA head/neck ordered by neurologist, result reviewed   -PT/OT  -ST eval  -start asa and plavix  -cont lipitor as prescribed   -s/p CARLOS 5/14/19: no thrombus or PFO  -reviewed lipid panel, , total cholesterol 220. A1C low   -permissive HTN for 1 week per neuro recs. DC Cardura 5/14. Started labetalol IV prn 5/14 ( please see order for details)   -start folic acid until homocysteine level result  -homocysteine in process     2. Small cell lung cancer, stage IV, w bone mets    -follows Dr. Mona Hay  -chest CT on 5/13/19 showed Sclerotic lesions in the T8 and L1 vertebral body consistent with osteoblastic metastases. Per pt, this bone mets is new to him.   -hema/onco on-board. Appreciate onco input    3. Mild hypokalemia, possibly from hypomagnesemia, resolved     -potassium replacement protocol   -BMP in am  -replace magnesium as well    4. Mild hypomagnesemia     -start magnesium 400 mg po daily   -check magnesium level in am      5. Chronic neutropenia, suspect due to chemotherapy, improving    -WBC 3.4 today from 2.6 yesterday. [address] 1396 on 5/14, now nl 2091 cells/uL  -hema/onco on-board. Appreciate onco input    6. Chronic normocytic anemia, likely related to chemotherapy, improving    -Hgb 11.1 today from 10.7 yesterday. Baseline Hgb 11-13 since 9/2018  -denies sx of bleeding  - iron low, B12 low nl and folate nl  -start ferrous sulfate and vit C and vit B12  -CBC in am     7. B/L LE swelling reported by pt    -normal physical exam, no pedal or leg edema noted. 8. Coronary atherosclerosis     -cont statin    9. moderate stenosis on the Rt ICA and moderate stenosis in Lt subclavian artery    -neuro on-board. Appreciate neuro input  -f/u in OP per DR. Cervantes    10.  Mild hypoglycemia, etiology unclear, possibly due to being NPO yesterday    -cont BMP monitoring  -encouraged PO intake         Anticipated Discharge in : pending Diet: DIET GENERAL;    DVT prophylaxis: [] Lovenox                                 [] SCDs                                 [] SQ Heparin                                 [] Encourage ambulation           [] Already on Anticoagulation     Disposition:    [] Home       [] TCU       [] Rehab       [] Psych       [] SNF       [] [place]       [x] Other- Plan as above. Change to inpatient status        Code Status: Full Code    PT/OT Eval Status: consulted       Electronically signed by Moises Callahan MD on 5/15/2019 at 7:34 AM       Addendum:    I received a message from [employer] that Dr. Linda Lopes is okay for DC and repeat brain MRI in 2 weeks. I spoke w YOVANY Gan ( onco) this morning, she's also okay for DC. Discussed plan to pt. Pt agreed w plan.      Electronically signed by Moises Callahan MD on 5/15/2019 at 1:47 PM

## 2019-05-15 NOTE — PROGRESS NOTES
[facility]'s Palliative Care           Progress Note    Patient Name:  Ana Palma  Medical Record Number:  [id number]  YOB: 1950    Date:  5/15/2019  Time:  1:28 PM  Completed By:  Monica Mckeon RN    Reason for Palliative Care Evaluation:  Code status    Advance Directives:none on file  [] [facility] DNR Form  [] Living Will  [] Medical POA    Current Code Status  [x] Full Resuscitation  [] DNR-Comfort Care-Arrest  [] DNR-Comfort Care  [] Limited   [] No CPR   [] No shock   [] No ET intubation/reintubation   [] No resuscitative medications   [] Other limitation:    Performance Status:  70    Family/Patient Discussion:  Ashleigh Nunez PAC on the unit and this RN accompanied her into the room. Palliative care introduced. After Jc Smart updated the patient as to the medical plans in place, did discuss advance directives with the patient. The patient states that he has a medical POA and living will that was completed in [facility]. Patient is receptive to attempts to be made to obtain those copies for his medical record here. Did discuss code status levels and what each one entails. Patient shares that he has only 1 sister in [place] and that they are not very close. Patient shares that he has suffered many losses of close friends/family recently. Patient does share that alcohol used to be a problem but that he has not had any alcohol over the past year. Much emotional support provided. Plan/Follow-Up:  Phone call made to medical records to obtain advance directives from the [facility]. Updated Abner Mohan RN.     Monica Mckeon RN  5/15/2019,  1:28 PM

## 2019-05-15 NOTE — PROGRESS NOTES
NEUROLOGY INPATIENT PROGRESS NOTE    Patient- Justine Preston    MRN -  [id number]   [facility]      - 1950    70 y.o. Subjective: The patient is seen as follow up for stroke. Patient is alert at this time. Kalyani Cuellar He denies any new symptoms, he underwent testing, I'm going over the results with him. Objective:   /63   Pulse 78   Temp 98.3 °F (36.8 °C) (Oral)   Resp 18   Ht 5' 8\" (1.727 m)   Wt 147 lb 14.4 oz (67.1 kg)   SpO2 95%   BMI 22.49 kg/m²       Intake/Output Summary (Last 24 hours) at 5/15/2019 1455  Last data filed at 5/15/2019 0336  Gross per 24 hour   Intake 2402.14 ml   Output --   Net 2402.14 ml       Physical Exam:  General:  Awake, alert, not in distress. Language is intact. HEENT: pink conjunctiva, unicteric sclera, moist oral mucosa. EOMI,   Neck: There is no carotid bruits. The Neck is supple. Neuro: CN 2-12 grossly intact with no focal deficits. Power 5/5 Throughout symmetric, Reflexes are symmetric. Long tracts are intact. Cerebellar exam is Intact. Sensory exam is intact to light touch. Gait is not tested. No abnormal movement. Osteo: There is no LROM of any of the 4 extremity joints, no joint tenderness. ROS:    Cardiac: no chest pain. No palpitations.   Renal : no flank pain, no hematuria  Skin: no rash    Medications:     folic acid  1 mg Oral Daily    magnesium oxide  400 mg Oral Daily    ferrous sulfate  325 mg Oral BID WC    vitamin C  500 mg Oral Daily    vitamin B-12  1,000 mcg Oral Daily    potassium replacement protocol   Other RX Placeholder    aspirin  81 mg Oral Daily    clopidogrel  75 mg Oral Daily    atorvastatin  80 mg Oral Daily    mometasone-formoterol  2 puff Inhalation BID    busPIRone  10 mg Oral BID    ipratropium-albuterol  1 vial Inhalation Q4H    pantoprazole  40 mg Oral QAM AC    tamsulosin  0.4 mg Oral Daily    sodium chloride flush  10 mL Intravenous 2 times per day    enoxaparin  40 mg Subcutaneous Daily    polyethylene glycol  17 g Oral Daily       Data:   CBC:   Recent Labs     05/13/19  1526 05/14/19  0349 05/15/19  0349   WBC 3.0* 2.6* 3.4*   HGB 11.9* 10.7* 11.1*    172 173     BMP:    Recent Labs     05/13/19  1526 05/14/19  0348 05/15/19  0349    138 135   K 3.9 3.4* 4.1   CL 98 102 100   CO2 25 23 23   BUN 11 11 10   CREATININE 0.6 0.5 0.5   GLUCOSE 109* 90 92     CARLOS:  Summary   Probe easily inserted by Cardiologist.  [place] performed without complications.   The study included complete 2D imaging, complete spectral doppler, and   color doppler.   The transesophageal approach was used.   Left ventricular size and systolic function is normal. Ejection fraction   was estimated at 55-60%. LV wall thickness is within normal limits.   No evidence of thrombus within left atrium or IBIS   No evidence of PFO/ASD visualized on color doppler or agitated saline   bubble study.   Trivial aortic regurgitation is noted.   Mild mitral regurgitation is present.   Mild tricuspid regurgitation.   Plaque noted in the ascending, transverse and descending aorta. Reviewed   Results for orders placed during the hospital encounter of 05/13/19   CTA Head W WO Contrast    Narrative PROCEDURE: CTA HEAD W WO CONTRAST, CTA [address]: STROKE . Multiple punctate infarcts on recent MR brain. COMPARISON: MRI brain dated 5/13/2019. TECHNIQUE: Helical CT from the aortic arch through the head in arterial phase during intravenous administration of 80 mL Isovue-370 injected in the right forearm with multiplanar reformatted images to include volumetric maximum intensity projection   sequences. FINDINGS:     CTA HEAD:  There is mural plaque in the petrous, cavernous and clinoid segments of the internal carotid arteries bilaterally.  There are areas of up to moderate stenosis in the right petrous and cavernous segments and areas of mild stenosis on the left. No   aneurysmal dilatation is identified in the intracranial segments of the internal carotid arteries. The bilateral middle cerebral and anterior cerebral arteries are patent without focal abnormality identified. The basilar artery is patent without focal   stenosis or visualized aneurysm. The bilateral posterior cerebral arteries are patent without focal abnormality identified. No focal areas of abnormal parenchymal enhancement are identified. CTA NECK:  There is mural calcification in the aortic arch. There is mural calcification of the takeoffs of the brachiocephalic and left subclavian arteries without flow-limiting stenosis. There is an area of mild stenosis more distally in the left subclavian   artery. The common carotid arteries are patent without focal stenosis. There is mural plaque with some calcification at the carotid bifurcations bilaterally without hemodynamically significant stenosis by NASA criteria. Cervical segments of the internal   carotid arteries are otherwise patent without focal stenosis. The vertebral arteries are codominant. They're patent throughout their course without focal stenosis. There is streak artifact from dental amalgam which partially obscures oral and perioral soft tissues. Given this caveat, mucosal surfaces of the aerodigestive tract are symmetric without focal nodular thickening or visualized mass. No cervical   lymphadenopathy is identified. The parotid, submandibular and thyroid glands are unremarkable. There are prominent degenerative changes of the cervical spine most pronounced at C5-6 where disc bulge with rim osteophytes causes moderate spinal canal   stenosis and severe bilateral neural foraminal stenosis. There are emphysematous changes in the visualized portions of the lungs. There is AP window adenopathy. Please correlate with CT chest dated 5/13/2019. There is bronchial wall thickening in the   visualized portions of lungs.       Impression  CTA head:  There is a mural calcification in the petrous, cavernous and clinoid segments of the internal carotid arteries, right greater than left. There are areas of up to moderate stenosis on the right. CTA NECK:  1. Mild mural plaque at the carotid bifurcations without hemodynamically significant stenosis by NASA criteria. 2. Area of moderate stenosis in the left subclavian artery. **This report has been created using voice recognition software. It may contain minor errors which are inherent in voice recognition technology. **    Final report electronically signed by Dr. Vasu Owen MD on 5/14/2019 11:52 AM     Results for orders placed during the hospital encounter of 05/13/19   CTA Neck W WO Contrast    Narrative PROCEDURE: CTA HEAD W WO CONTRAST, CTA [address] INFORMATION: STROKE . Multiple punctate infarcts on recent MR brain. COMPARISON: MRI brain dated 5/13/2019. TECHNIQUE: Helical CT from the aortic arch through the head in arterial phase during intravenous administration of 80 mL Isovue-370 injected in the right forearm with multiplanar reformatted images to include volumetric maximum intensity projection   sequences. FINDINGS:     CTA HEAD:  There is mural plaque in the petrous, cavernous and clinoid segments of the internal carotid arteries bilaterally. There are areas of up to moderate stenosis in the right petrous and cavernous segments and areas of mild stenosis on the left. No   aneurysmal dilatation is identified in the intracranial segments of the internal carotid arteries. The bilateral middle cerebral and anterior cerebral arteries are patent without focal abnormality identified. The basilar artery is patent without focal   stenosis or visualized aneurysm. The bilateral posterior cerebral arteries are patent without focal abnormality identified. No focal areas of abnormal parenchymal enhancement are identified.     CTA NECK:  There is mural voice recognition software. It may contain minor errors which are inherent in voice recognition technology. **      Final report electronically signed by Dr. Jere Peralta MD on 5/13/2019 2:11 PM       Results for orders placed during the hospital encounter of 07/19/18   CT HEAD WO CONTRAST    Narrative PROCEDURE: CT HEAD WO CONTRAST    CLINICAL INFORMATION: paresthesia. COMPARISON: No prior study. TECHNIQUE: Noncontrast 5 mm axial images were obtained through the brain. All CT scans at this facility use dose modulation, iterative reconstruction, and/or weight-based dosing when appropriate to reduce radiation dose to as low as reasonably achievable. FINDINGS:        There is no hemorrhage. There are no intra-or extra-axial collections. There is no hydrocephalus, midline shift or mass effect. The gray-white matter differentiation is preserved. The paranasal sinuses and mastoid air cells are normally aerated. There is no suspicious calvarial abnormality. Impression  No evidence of an acute process. **This report has been created using voice recognition software. It may contain minor errors which are inherent in voice recognition technology. **    Final report electronically signed by Dr. Robert Shabazz on 7/19/2018 9:06 PM         Assessment:  Principal Problem:    Embolic stroke ([address]  Active Problems:    Small cell carcinoma ([address]    Acute CVA (cerebrovascular accident) ([address]  Resolved Problems:    * No resolved hospital problems. *  The patient is stable neurologically. He denies any new symptoms, he has no deficits appreciated on exam.  I reviewed his MRI brain, discussed with neuroradiology, he will need to undergo a follow-up MRI brain with contrast in 1-2 weeks to ensure maturation off infarct versus early metastasis suspected given his history of cancer. The patient underwent a transesophageal echo that shows no evidence of cardioembolic source.   CTA head and neck were reviewed with neuroradiology. It is my belief that the infarcts identified on MRI brain are incidental, he has no neurologic deficits at this time. I explained this in detail to the patient. Case discussed with the primary service, recommendations are as follows    Plan:    1. Continue antiplatelets. 2. Repeat MRI brain with and without contrast in 1-2 weeks as discussed above. 3. Lipid lowering medications. 4. Modified risk factors for stroke. 5. DVT prophylaxis. 6. Follow-up as outpatient. 7. Discussed with the primary service.   8. Please call if any questions    Homer Corley MD, 5/15/2019 2:55 PM

## 2019-05-15 NOTE — PROGRESS NOTES
Drew Zuleta 59  INPATIENT OCCUPATIONAL THERAPY  [facility] NEUROSCIENCES 4A  EVALUATION    Time:  Time In: 1183  Time Out: 2324  Timed Code Treatment Minutes: 10 Minutes  Minutes: 20          Date: 5/15/2019  Patient Name: Riya Nino,   Gender: male      MRN: [id number]  : 1950  (70 y.o.)  Referring Practitioner: Dr. Melanie Martínez  Diagnosis: embolic CVA  Additional Pertinent Hx: Per ER note on 19:  70 y.o. male who presents to the Emergency Department for an evaluation. Patient had an MRI done at 1230. Results were called to Dr. Navin Sanchez. Patient was sent back to have a CT scan done. He states he completed that and was advised to come into the ED for further work up. Patient is seen by Dr. Navin Sanchez for stage IV lung cancer. He is currently undergoing chemotherapy and will begin another round on 19. He reports that he has bilateral posterior calf pain and had an US completed around 19 which was negative for DVT.     Restrictions/Precautions:  General Precautions          Past Medical History:   Diagnosis Date    Arthritis     COPD (chronic obstructive pulmonary disease) (HCC)     Gastric reflux     Hyperlipidemia     Hypertension      Past Surgical History:   Procedure Laterality Date    COLONOSCOPY      HERNIA REPAIR  80's    bilateral inguinal    KIDNEY SURGERY      stent placement    [facility] INCL FLUOR GDNCE DX W/CELL WASHG SPX N/A 2018    BRONCHOSCOPY FLUOROSCOPY performed by Sha Conteh MD at  [facility]    NV OFFICE/OUTPT VISIT,PROCEDURE ONLY Right 2018    RIGHT INGUINAL HERNIA REPAIR [facility] OFF[facility]/OUT[facility] VISIT,PROCEDURE ONLY N/A 2018    EXPLORATORY LAPAROSCOPY  OPEN PROCEDURE, SMALL BOWEL RESECTION performed by Daniel Lira MD at [address] [place] [address] TRANSESOPHAGEAL ECHOCARDIOGRAM Left 2019    TRANSESOPHAGEAL ECHOCARDIOGRAM performed by Sukhdev Farley MD at  [facility]           Subjective  Chart Reviewed: Yes(orders, progress notes)  Patient assessed for rehabilitation services?: Yes  Family / Caregiver Present: No    Subjective: cooperative, talkative    General:       Vision: Within Functional Limits(has glasses)    Hearing: Within functional limits         Pain:  Pain Assessment  Patient Currently in Pain: No       Social/Functional History:  Lives With: Significant other  Type of Home: House  Home Layout: Two level, 1/2 bath on main level(full bath on 2nd floor; bedroom on 1st level-toilet on 1st level)  Home Access: Stairs to enter without rails  Entrance Stairs - Number of Steps: 5  Home Equipment: Cane, 4 wheeled walker, Rolling walker     Bathroom Shower/Tub: Tub/Shower unit((on 2nd level))  Bathroom Toilet: Standard       ADL Assistance: Independent  Homemaking Assistance: Needs assistance(sharies duties with wife)       Ambulation Assistance: Independent  Transfer Assistance: Independent    Active : Yes     Additional Comments: Pt reports he was indep without AD PLOF. Pt reports he does the grocery shopping. Objective      Overall Cognitive Status: WFL    Sensation  Overall Sensation Status: Impaired(Pt reports numbness in L hand elbow to fingers)               LUE AROM (degrees)  LUE AROM : WFL          RUE AROM (degrees)  RUE AROM : WFL       LUE Strength  Gross LUE Strength: WFL              RUE Strength  Gross RUE Strength: WFL         **difficulty with making full fist with LUE (Pt reports d/t old injury); reports some difficulty with RUE & dropping things at home.      ADL  Grooming: Supervision     Bed mobility  Supine to Sit: Modified independent  Sit to Supine: Modified independent    Transfers  Sit to stand: Modified independent  Stand to sit: Modified independent    Balance  Sitting Balance: Independent  Standing Balance: Supervision           Functional Mobility  Functional - Mobility Device: No device  Activity: To/from bathroom  Assist Level: Supervision           Activity

## 2019-05-15 NOTE — PLAN OF CARE
Problem: Falls - Risk of:  Goal: Will remain free from falls  Description  Will remain free from falls  5/15/2019 0045 by Laura Young RN  Note:   Fall precautions maintained this shift. Nonskid socks on, call light within reach, bed alarm on, 2/4 side rails raised. No falls this shift. Problem: HEMODYNAMIC STATUS  Goal: Patient has stable vital signs and fluid balance  5/15/2019 0045 by Laura Young RN  Note:   VSS this shift. Problem: ACTIVITY INTOLERANCE/IMPAIRED MOBILITY  Goal: Mobility/activity is maintained at optimum level for patient  5/15/2019 0045 by Laura Young RN  Note:   Pt ambulates with standby assist.      Problem: COMMUNICATION IMPAIRMENT  Goal: Ability to express needs and understand communication  5/15/2019 0045 by Laura Young RN  Note:   Pt able to express needs appropriately this shift. Problem: Discharge Planning:  Goal: Participates in care planning  Description  Participates in care planning  5/15/2019 0045 by Laura Young RN  Note:   Pt from home. Plans to return once medically stable. Problem: Skin Integrity:  Goal: Absence of new skin breakdown  Description  Absence of new skin breakdown  Note:   Pt turns self in bed. No new evidence of skin breakdown. Care plan reviewed with patient. Patient verbalize understanding of the plan of care and contribute to goal setting.
Problem: Falls - Risk of:  Goal: Will remain free from falls  Description  Will remain free from falls  Outcome: Ongoing  Note:   Call light in reach, bed in lowest position, and bed alarm activated. Education given on use of call light before ambulation and when in need of assistance. Patient expressed understanding. Hourly visual checks performed and charted. Toileting offered to patient. No falls this shift, at any time. Arm band and falling star in place. Will continue to monitor. Problem: Neurological  Goal: Maximum potential motor/sensory/cognitive function  Outcome: Ongoing  Note:   At baseline. Problem: HEMODYNAMIC STATUS  Goal: Patient has stable vital signs and fluid balance  Outcome: Ongoing  Note:   Vitals:    05/14/19 1218   BP: (!) 141/67   Pulse: 81   Resp: 16   Temp: 97.3 °F (36.3 °C)   SpO2: 98%          Problem: ACTIVITY INTOLERANCE/IMPAIRED MOBILITY  Goal: Mobility/activity is maintained at optimum level for patient  Outcome: Ongoing  Note:   Standby assist.     Problem: COMMUNICATION IMPAIRMENT  Goal: Ability to express needs and understand communication  Outcome: Ongoing  Note:   Able to verbalize needs. Problem: Discharge Planning:  Goal: Participates in care planning  Description  Participates in care planning  Outcome: Ongoing  Note:   Plan for dc home with s/o when cleared. Problem: Cardiac Output - Decreased:  Goal: Hemodynamic stability will improve  Description  Hemodynamic stability will improve  Outcome: Ongoing    Care plan reviewed with patient. Patient verbalizes understanding of the plan of care and contributed to goal setting.
Problem: Impaired respiratory status  Goal: Lung sounds clear or within normal limits for patient  Outcome: Ongoing   Patient clear/diminished t/o. Continue with Duoneb/Dulera to help achieve clear lung sounds.
with discharge planning. Problem: Discharge Planning:  Goal: Participates in care planning  Description  Participates in care planning  5/15/2019 0951 by Demetrio Houser RN  Outcome: Ongoing  Note:   Discharge planning in process and discussed with patient and family. Social work consulted for any additional needs. Care manager aware of discharge needs. Problem: Cardiac Output - Decreased:  Goal: Hemodynamic stability will improve  Description  Hemodynamic stability will improve  Outcome: Ongoing  Note:   Vitals:    05/15/19 0845   BP: 120/72   Pulse: 82   Resp: 16   Temp: 98.4 °F (36.9 °C)   SpO2: 96%          Problem: Skin Integrity:  Goal: Will show no infection signs and symptoms  Description  Will show no infection signs and symptoms  Outcome: Ongoing  Note:   Patient remains afebrile at this time. Patient shows no other sign or symptom of infection this shift. Problem: Skin Integrity:  Goal: Absence of new skin breakdown  Description  Absence of new skin breakdown  5/15/2019 0951 by Demetrio Houser RN  Outcome: Ongoing  Note:   No signs of new skin breakdown. Skin warm, dry, and intact. Mucous membranes pink and moist.  Assistance with turns/ambulation provided PRN. Will continue to monitor. Care plan reviewed with patient. Patient verbalizes understanding of the plan of care and contributed to goal setting.

## 2019-05-15 NOTE — DISCHARGE SUMMARY
Hospital Medicine Discharge Summary      Patient Identification:   Duane Bejarano   : 1950  MRN: [id number]   Account: [de-identified]      Patient's PCP: Jose Harmon MD    Admit Date: 2019     Discharge Date:   5/15/2019     Admitting Physician: Dennis Regalado DO     Discharge Physician: Lelia Rubin MD     Discharge Diagnoses:      1. Acute embolic stroke    2. Small cell lung cancer, stage IV, w bone mets   3. Mild hypokalemia, possibly from hypomagnesemia, resolved    4. Mild hypomagnesemia   5. Chronic neutropenia, suspect due to chemotherapy, improving  6. Chronic normocytic anemia, likely related to chemotherapy, improving   7. B/L LE swelling reported by patient  8. Coronary atherosclerosis    9. moderate stenosis on the Rt ICA and moderate stenosis in Lt subclavian artery  10. Mild hypoglycemia, etiology unclear, possibly due to being NPO on 19        The patient was seen and examined on day of discharge and this discharge summary is in conjunction with any daily progress note from day of discharge. Hospital Course:     Please see H/P for details. In summary, Duane Bejarano is a 70 y.o. Male, w PMH of stage IV lung cancer, s/p radiation therapy, now on chemotherapy, last chemo was 19 per patient, follows Dr. Navjot Urena, had routine brain MRI for staging for lung cancer on 19 which showed scattered punctate foci of acute infarcts throughout the brain as evidence for embolic phenomenon and per H/P, patient presented to [address] [place] [address] as he was advised to go to ER for further work-up for the acute infarct throughout the brain per brain MRI. Patient is asymptomatic. Patient was admitted under Hospital Medicine service. Neurology was consulted. He does report chronic B/L hands numbness x past 8 months due to arthritis but no changes with this numbness. He also reports B/L LE swelling x past 2 months. Pt had venous duples US B/L LE on 4/15/19, (-) DVT. nl  -started ferrous sulfate and vit C and vit B12       7. B/L LE swelling reported by patient     -normal physical exam, no pedal or leg edema noted.      8. Coronary atherosclerosis      -cont statin     9. moderate stenosis on the Rt ICA and moderate stenosis in Lt subclavian artery     -neuro on-board. Appreciate neuro input  -f/u in OP per Dr. Gadiel Sierra     10. Mild hypoglycemia, etiology unclear, possibly due to being NPO on 5/14/19      -encouraged PO intake            Exam:     Vitals:  Vitals:    05/15/19 0017 05/15/19 0336 05/15/19 0845 05/15/19 1223   BP: 112/60 126/77 120/72 128/63   Pulse: 80 83 82 78   Resp: 18 18 16 18   Temp: 98.5 °F (36.9 °C) 98.1 °F (36.7 °C) 98.4 °F (36.9 °C) 98.3 °F (36.8 °C)   TempSrc: Oral Oral Oral Oral   SpO2: 93% 95% 96% 95%   Weight:  147 lb 14.4 oz (67.1 kg)     Height:         Weight: Weight: 147 lb 14.4 oz (67.1 kg)     24 hour intake/output:    Intake/Output Summary (Last 24 hours) at 5/15/2019 1355  Last data filed at 5/15/2019 0336  Gross per 24 hour   Intake 2949.14 ml   Output --   Net 2949.14 ml       General appearance: alert, not in acute distress. HEENT: Pupils equal, round, and reactive to light. Conjunctivae clear. Clear oral mucosa. Neck: Supple, with full range of motion. Respiratory:  Normal respiratory effort. Clear to auscultation, bilaterally without Rales/Wheezes/Rhonchi. Cardiovascular: normal rate, regular rhythm with normal S1/S2 without murmurs, rubs or gallops. Abdomen: Soft, non-tender, non-distended with normal bowel sounds. Musculoskeletal: passive and active ROM x 4 extremities. Neurological exam reveals alert, oriented x 3, normal speech, no focal findings or movement disorder noted. Exam of extremities: peripheral pulses normal, no pedal or leg edema, no clubbing or cyanosis          Labs:  For convenience and continuity at follow-up the following most recent labs are provided:      CBC:    Lab Results   Component Value Date    WBC CARDIOLOGY  PALLIATIVE CARE EVAL  IP CONSULT TO HEM/ONC    Disposition:    [x] Home       [] TCU       [] Rehab       [] Psych       [] SNF       [] [place]       [] Other-    Condition at Discharge: Stable    Code Status:  Full Code     Patient Instructions:    Discharge lab work: BMP and magnesium in 1 week. Brain MRI in 2 weeks   Activity: activity as tolerated  Diet: DIET GENERAL;      Follow-up visits:   Davina Lara MD  [place], [address]  [phone]      will contact you with appointment information    Tricia Lal MD  [address]  [phone]               Discharge Medications:      Talat Jones   Home Medication Instructions BRF:[id number]    Printed on:05/15/19 5514   Medication Information                      acetaminophen (TYLENOL) 325 MG tablet  Take 2 tablets by mouth every 4 hours as needed for Pain             albuterol sulfate HFA (VENTOLIN HFA) 108 (90 Base) MCG/ACT inhaler  Inhale 2 puffs into the lungs every 4 hours as needed for Wheezing or Shortness of Breath             aspirin 81 MG chewable tablet  Take 1 tablet by mouth daily             atorvastatin (LIPITOR) 80 MG tablet  Take 1 tablet by mouth daily             budesonide-formoterol (SYMBICORT) 160-4.5 MCG/ACT AERO  Inhale 2 puffs into the lungs 2 times daily.              busPIRone (BUSPAR) 10 MG tablet  Take 10 mg by mouth 2 times daily             clopidogrel (PLAVIX) 75 MG tablet  Take 1 tablet by mouth daily for 21 days             ferrous sulfate 325 (65 Fe) MG tablet  Take 1 tablet by mouth 2 times daily (with meals)             folic acid (FOLVITE) 1 MG tablet  Take 1 tablet by mouth daily             Ipratropium Bromide (ATROVENT IN)  Inhale into the lungs 2 times daily              ipratropium-albuterol (DUONEB) 0.5-2.5 (3) MG/3ML SOLN nebulizer solution  Inhale 3 mLs into the lungs every 4 hours             magnesium oxide (MAG-OX) 400 (241.3 Mg) MG TABS tablet  Take 1 tablet by mouth daily for 10 days             omeprazole (PRILOSEC) 20 MG capsule  Take 40 mg by mouth daily              ondansetron (ZOFRAN) 4 MG tablet  Take 1 tablet by mouth every 8 hours as needed for Nausea or Vomiting             ranitidine (ZANTAC) 150 MG tablet  Take 150 mg by mouth every morning. tamsulosin (FLOMAX) 0.4 MG capsule  Take 1 capsule by mouth daily             terazosin (HYTRIN) 2 MG capsule  Take 2 mg by mouth nightly. vitamin B-12 1000 MCG tablet  Take 1 tablet by mouth daily             vitamin C (VITAMIN C) 500 MG tablet  Take 1 tablet by mouth daily                 Time Spent on discharge is more than 30 minutes in the examination, evaluation, counseling and review of medications and discharge plan. Signed: Thank you Tray Callahan MD for the opportunity to be involved in this patient's care.     Electronically signed by Radha Ogden MD on 5/15/2019 at 1:55 PM

## 2019-05-15 NOTE — PROGRESS NOTES
[facility] THERAPY MISSED TREATMENT NOTE  [facility] NEUROSCIENCES 4A      Date: 5/15/2019  Patient Name: Silverio Ospina        MRN: [id number]    : 1950  (70 y.o.)    REASON FOR MISSED TREATMENT:    Pt currently with nursing needs in room. Will check back as schedule allows.     Chandni [facility] M.S. [id number]

## 2019-05-15 NOTE — PROGRESS NOTES
[facility] NEUROSCIENCES 4A  Speech - Language - Cognitive Evaluation    SLP Individual Minutes  Time In: 4840  Time Out: 1150  Minutes: 13  Timed Code Treatment Minutes: 0 Minutes       Date: 5/15/2019  Patient Name: Nicole Villeda      MRN: [id number]    : 1950  (70 y.o.)  Gender: male   Referring Physician:  Dr Braydon Wayne  Diagnosis: embolic CVA  Secondary Diagnosis:  cognitive impairment  History of Present Illness/Injury: Per ER note on 19:  70 y.o. male who presents to the Emergency Department for an evaluation. Patient had an MRI done at 1230. Results were called to Dr. Hubert Jones. Patient was sent back to have a CT scan done. He states he completed that and was advised to come into the ED for further work up. Patient is seen by Dr. Hubert Jones for stage IV lung cancer. He is currently undergoing chemotherapy and will begin another round on 19. Speech to assess communication and cognition and establish POC  Past Medical History:   Diagnosis Date    Arthritis     COPD (chronic obstructive pulmonary disease) ([facility] [address]     Gastric reflux     Hyperlipidemia     Hypertension        Precautions: falls  Pain:  0/10    Subjective:  Pt seen at bedside. Alert and cooperative  SOCIAL HISTORY: Pt lies in [place] with SO. Retried. Independent prior to hospitalization. ORAL MOTOR:  WFL    SPEECH / VOICE:  No dysphonia or dysarthria    LANGUAGE:  Receptive:  WFL, follows all commands    Expressive:  WFL, no paraphasia or anomia    COGNITION:  [place] Cognitive Assessment (MOCA) version 7.3 was completed. Pt scored 26/30. Normal is greater than or equal to 26/30.   Orientation: 6/6  Short term recall: 4/5  Divergent namin/minute, normal 11/minute  Problem solvin/2  Reasoning: verbal 2/2 visual 3/5  Thought organization: WFL  Insight: WFL  Attention: WFL  Numerical relations: 2/3 serial subtraction    IMPRESSIONS: Pt presents with essentially normal communication

## 2019-05-15 NOTE — PROGRESS NOTES
CLINICAL PHARMACY: DISCHARGE MED RECONCILIATION/REVIEW    [facility]) Select Patient?: No  Total # of Interventions Recommended: 0     Total # Interventions Accepted: 0  Intervention Severity:   - Level 1 Intervention Present?: No   - Level 2 #: 0   - Level 3 #: 0   Time Spent (min): 25    Additional Documentation:    Discharge medication reconciliation reviewed and complete.     Karlee Jason PharmD, BCPS  5/15/2019  2:50 PM

## 2019-05-17 DIAGNOSIS — C80.1 SMALL CELL CARCINOMA (HCC): Primary | ICD-10-CM

## 2019-05-19 ASSESSMENT — ENCOUNTER SYMPTOMS
ABDOMINAL PAIN: 0
BACK PAIN: 0
CHEST TIGHTNESS: 0
EYE DISCHARGE: 0
ABDOMINAL DISTENTION: 0
FACIAL SWELLING: 0
VOMITING: 0
TROUBLE SWALLOWING: 0
DIARRHEA: 0
CONSTIPATION: 0
COUGH: 1
NAUSEA: 0
WHEEZING: 0
RECTAL PAIN: 0
SHORTNESS OF BREATH: 1
COLOR CHANGE: 0
BLOOD IN STOOL: 0
SORE THROAT: 0

## 2019-05-20 ENCOUNTER — OFFICE VISIT (OUTPATIENT)
Dept: ONCOLOGY | Age: 69
End: 2019-05-20
Payer: MEDICARE

## 2019-05-20 ENCOUNTER — HOSPITAL ENCOUNTER (OUTPATIENT)
Dept: INFUSION THERAPY | Age: 69
Discharge: HOME | End: 2019-05-20
Payer: MEDICARE

## 2019-05-20 VITALS
DIASTOLIC BLOOD PRESSURE: 57 MMHG | TEMPERATURE: 98.1 F | OXYGEN SATURATION: 96 % | RESPIRATION RATE: 16 BRPM | WEIGHT: 154.6 LBS | SYSTOLIC BLOOD PRESSURE: 112 MMHG | HEART RATE: 86 BPM | BODY MASS INDEX: 23.43 KG/M2 | HEIGHT: 68 IN

## 2019-05-20 VITALS
TEMPERATURE: 98.1 F | HEART RATE: 86 BPM | WEIGHT: 154.6 LBS | HEIGHT: 68 IN | RESPIRATION RATE: 16 BRPM | SYSTOLIC BLOOD PRESSURE: 112 MMHG | BODY MASS INDEX: 23.43 KG/M2 | DIASTOLIC BLOOD PRESSURE: 57 MMHG | OXYGEN SATURATION: 96 %

## 2019-05-20 DIAGNOSIS — E83.42 HYPOMAGNESEMIA: ICD-10-CM

## 2019-05-20 DIAGNOSIS — E87.1 HYPONATREMIA: ICD-10-CM

## 2019-05-20 DIAGNOSIS — J44.1 CHRONIC OBSTRUCTIVE PULMONARY DISEASE WITH ACUTE EXACERBATION (HCC): ICD-10-CM

## 2019-05-20 DIAGNOSIS — C79.51 BONE METASTASES (HCC): ICD-10-CM

## 2019-05-20 DIAGNOSIS — C78.7 LIVER METASTASIS (HCC): ICD-10-CM

## 2019-05-20 DIAGNOSIS — C80.1 SMALL CELL CARCINOMA (HCC): Primary | ICD-10-CM

## 2019-05-20 LAB
ALBUMIN SERPL-MCNC: 4.1 G/DL (ref 3.5–5.1)
ALP BLD-CCNC: 79 U/L (ref 38–126)
ALT SERPL-CCNC: 14 U/L (ref 11–66)
AST SERPL-CCNC: 22 U/L (ref 5–40)
BILIRUB SERPL-MCNC: 0.3 MG/DL (ref 0.3–1.2)
BILIRUBIN DIRECT: < 0.2 MG/DL (ref 0–0.3)
BUN, WHOLE BLOOD: 9 MG/DL (ref 8–26)
CHLORIDE, WHOLE BLOOD: 86 MEQ/L (ref 98–109)
CREATININE, WHOLE BLOOD: 0.5 MG/DL (ref 0.5–1.2)
GFR, ESTIMATED: > 90 ML/MIN/1.73M2
GLUCOSE, WHOLE BLOOD: 111 MG/DL (ref 70–108)
HCT VFR BLD CALC: 34.5 % (ref 42–52)
HEMOGLOBIN: 11.9 GM/DL (ref 14–18)
IONIZED CALCIUM, WHOLE BLOOD: 1.15 MMOL/L (ref 1.12–1.32)
MAGNESIUM: 1.7 MG/DL (ref 1.6–2.4)
MCH RBC QN AUTO: 32.5 PG (ref 27–31)
MCHC RBC AUTO-ENTMCNC: 34.5 GM/DL (ref 33–37)
MCV RBC AUTO: 94 FL (ref 80–94)
PDW BLD-RTO: 13 % (ref 11.5–14.5)
PLATELET # BLD: 180 THOU/MM3 (ref 130–400)
PMV BLD AUTO: 5.8 FL (ref 7.4–10.4)
POTASSIUM, WHOLE BLOOD: 3.8 MEQ/L (ref 3.5–4.9)
RBC # BLD: 3.66 MILL/MM3 (ref 4.7–6.1)
SEG NEUTROPHILS: 83 % (ref 43–75)
SEGMENTED NEUTROPHILS ABSOLUTE COUNT: 4.7 THOU/MM3 (ref 1.8–7.7)
SODIUM, WHOLE BLOOD: 125 MEQ/L (ref 138–146)
TOTAL CO2, WHOLE BLOOD: 27 MEQ/L (ref 23–33)
TOTAL PROTEIN: 6.7 G/DL (ref 6.1–8)
WBC # BLD: 5.7 THOU/MM3 (ref 4.8–10.8)

## 2019-05-20 PROCEDURE — 96360 HYDRATION IV INFUSION INIT: CPT

## 2019-05-20 PROCEDURE — 99215 OFFICE O/P EST HI 40 MIN: CPT | Performed by: INTERNAL MEDICINE

## 2019-05-20 PROCEDURE — 80047 BASIC METABLC PNL IONIZED CA: CPT

## 2019-05-20 PROCEDURE — 2580000003 HC RX 258: Performed by: INTERNAL MEDICINE

## 2019-05-20 PROCEDURE — 99211 OFF/OP EST MAY X REQ PHY/QHP: CPT

## 2019-05-20 PROCEDURE — 85027 COMPLETE CBC AUTOMATED: CPT

## 2019-05-20 PROCEDURE — 2709999900 HC NON-CHARGEABLE SUPPLY

## 2019-05-20 PROCEDURE — 83735 ASSAY OF MAGNESIUM: CPT

## 2019-05-20 PROCEDURE — 36415 COLL VENOUS BLD VENIPUNCTURE: CPT

## 2019-05-20 PROCEDURE — 80076 HEPATIC FUNCTION PANEL: CPT

## 2019-05-20 RX ORDER — 0.9 % SODIUM CHLORIDE 0.9 %
500 INTRAVENOUS SOLUTION INTRAVENOUS ONCE
Status: CANCELLED | OUTPATIENT
Start: 2019-05-20

## 2019-05-20 RX ORDER — SODIUM CHLORIDE 0.9 % (FLUSH) 0.9 %
5 SYRINGE (ML) INJECTION PRN
Status: CANCELLED | OUTPATIENT
Start: 2019-05-20

## 2019-05-20 RX ORDER — HEPARIN SODIUM (PORCINE) LOCK FLUSH IV SOLN 100 UNIT/ML 100 UNIT/ML
500 SOLUTION INTRAVENOUS PRN
Status: CANCELLED | OUTPATIENT
Start: 2019-05-20

## 2019-05-20 RX ORDER — SODIUM CHLORIDE 0.9 % (FLUSH) 0.9 %
10 SYRINGE (ML) INJECTION PRN
Status: CANCELLED | OUTPATIENT
Start: 2019-05-20

## 2019-05-20 RX ORDER — 0.9 % SODIUM CHLORIDE 0.9 %
500 INTRAVENOUS SOLUTION INTRAVENOUS ONCE
Status: COMPLETED | OUTPATIENT
Start: 2019-05-20 | End: 2019-05-20

## 2019-05-20 RX ADMIN — SODIUM CHLORIDE 500 ML: 9 INJECTION, SOLUTION INTRAVENOUS at 11:10

## 2019-05-20 NOTE — PATIENT INSTRUCTIONS
1.  Hydration with 500 MLS of normal saline today  2. Patient will return to clinic on Wednesday, May 22, 2019 to have a BMP checked. Lizette Pandey will check him for hyponatremia. 3.  HE will return to clinic on May 24, 2019 at 10:20 am to [address] [place] [address] , for labs: BMP and to review results of brain MRI.   4.  The patient was instructed to get dental clearance for Zometa infusion

## 2019-05-20 NOTE — PROGRESS NOTES
Patient assessed for the following post IV hyrdation:    Dizziness   No  Lightheadedness  No      Acute nausea/vomiting No  Headache   No  Chest pain/pressure  No  Rash/itching   No  Shortness of breath  No    Patient kept for 20 minutes observation post infusion normal saline. Patient tolerated IV hydration treatment without any complications. Last vital signs:   BP (!) 112/57   Pulse 86   Temp 98.1 °F (36.7 °C) (Oral)   Resp 16   Ht 5' 8\" (1.727 m)   Wt 154 lb 9.6 oz (70.1 kg)   SpO2 96%   BMI 23.51 kg/m²     Patient instructed if experience any of the above symptoms following today's infusion, he is to notify MD immediately or go to the emergency department. Discharge instructions given to patient. Verbalizes understanding. Ambulated off unit per self, accompanied by spouse, with belongings.

## 2019-05-20 NOTE — PROGRESS NOTES
phenomenon. Patient was admitted under Hospital Medicine service. Neurology was consulted. The patient was stable neurologically. He denies any new symptoms, he has no deficits appreciated on exam.  Recommendation was to proceed with short term follow-up MRI brain with contrast in 1-2 weeks to ensure maturation off infarct versus early metastasis suspected given his history of cancer. The patient underwent a transesophageal echo that shows no evidence of cardioembolic source. Denies having any fevers. Denies having any peripheral neuropathy, no hearing problems. His appetite is improved, he gained additional 6 pounds since last visit. His dysphagia and odynophagia is resolved. He is using Magic mouthwash, swish and swallow by mouth.     HPI   Past Medical History:   Diagnosis Date    Arthritis     COPD (chronic obstructive pulmonary disease) ([address]     Gastric reflux     Hyperlipidemia     Hypertension       Past Surgical History:   Procedure Laterality Date    COLONOSCOPY      HERNIA REPAIR  80's    bilateral inguinal    KIDNEY SURGERY      stent placement    SD [address] INCL FLUOR GDNCE DX W/CELL WASHG SPX N/A 2018    BRONCHOSCOPY FLUOROSCOPY performed by Cely Villalta MD at  [facility]    SD OFFICE/OUTPT VISIT,PROCEDURE ONLY Right 2018    RIGHT INGUINAL HERNIA REPAIR performed by Noreen Smith MD at [facility] OFFICE/OUTPT [address] [place] [address] N/A 2018    EXPLORATORY LAPAROSCOPY  OPEN PROCEDURE, SMALL BOWEL RESECTION performed by Noreen Smith MD at [facility] TRANSESOPHAGEAL ECHOCARDIOGRAM Left 2019    TRANSESOPHAGEAL ECHOCARDIOGRAM performed by Eriberto Gan MD at  [facility]      Family History   Adopted: Yes      Social History     Tobacco Use    Smoking status: Former Smoker     Packs/day: 2.00     Years: 50.00     Pack years: 100.00     Types: Cigarettes     Start date: 1969     Last attempt to quit: 2019     Years since quittin.3    Smokeless tobacco: Never Used   Substance Use Topics    Alcohol use: Not Currently     Comment: Last drank 6 months ago       Current Outpatient Medications   Medication Sig Dispense Refill    aspirin 81 MG chewable tablet Take 1 tablet by mouth daily 30 tablet 0    atorvastatin (LIPITOR) 80 MG tablet Take 1 tablet by mouth daily 30 tablet 0    ferrous sulfate 325 (65 Fe) MG tablet Take 1 tablet by mouth 2 times daily (with meals) 30 tablet 0    folic acid (FOLVITE) 1 MG tablet Take 1 tablet by mouth daily 30 tablet 0    vitamin B-12 1000 MCG tablet Take 1 tablet by mouth daily 30 tablet 0    magnesium oxide (MAG-OX) 400 (241.3 Mg) MG TABS tablet Take 1 tablet by mouth daily for 10 days 10 tablet 0    clopidogrel (PLAVIX) 75 MG tablet Take 1 tablet by mouth daily for 21 days 21 tablet 0    vitamin C (VITAMIN C) 500 MG tablet Take 1 tablet by mouth daily 30 tablet 0    tamsulosin (FLOMAX) 0.4 MG capsule Take 1 capsule by mouth daily 30 capsule 0    ipratropium-albuterol (DUONEB) 0.5-2.5 (3) MG/3ML SOLN nebulizer solution Inhale 3 mLs into the lungs every 4 hours 360 mL 11    ondansetron (ZOFRAN) 4 MG tablet Take 1 tablet by mouth every 8 hours as needed for Nausea or Vomiting 30 tablet 0    albuterol sulfate HFA (VENTOLIN HFA) 108 (90 Base) MCG/ACT inhaler Inhale 2 puffs into the lungs every 4 hours as needed for Wheezing or Shortness of Breath 1 Inhaler 11    acetaminophen (TYLENOL) 325 MG tablet Take 2 tablets by mouth every 4 hours as needed for Pain 120 tablet 3    busPIRone (BUSPAR) 10 MG tablet Take 10 mg by mouth 2 times daily      Ipratropium Bromide (ATROVENT IN) Inhale into the lungs 2 times daily       budesonide-formoterol (SYMBICORT) 160-4.5 MCG/ACT AERO Inhale 2 puffs into the lungs 2 times daily.  terazosin (HYTRIN) 2 MG capsule Take 2 mg by mouth nightly.       omeprazole (PRILOSEC) 20 MG capsule Take 40 mg by mouth daily       ranitidine (ZANTAC) 150 MG tablet Take 150 mg by mouth every morning.  dexamethasone (DECADRON) 2 MG tablet Take 1 tablet by mouth every 8 hours 45 tablet 0     No current facility-administered medications for this visit. Allergies   Allergen Reactions    Lisinopril Other (See Comments)     coughing      Health Maintenance   Topic Date Due    Hepatitis C screen  1950    DTaP/Tdap/Td vaccine (1 - Tdap) 10/13/1968    Shingles Vaccine (1 of 2) 10/13/1999    Colon cancer screen colonoscopy  10/13/1999    Pneumococcal 65+ years Vaccine (2 of 2 - PPSV23) 05/13/2018    Low dose CT lung screening  05/13/2020    Lipid screen  05/14/2024    Flu vaccine  Completed    AAA screen  Completed        Subjective:   Review of Systems   Constitutional: Positive for activity change, appetite change and fatigue. Negative for fever. HENT: Negative for congestion, dental problem, facial swelling, hearing loss, mouth sores, nosebleeds, sore throat, tinnitus and trouble swallowing. Eyes: Negative for discharge and visual disturbance. Respiratory: Positive for cough and shortness of breath. Negative for chest tightness and wheezing. Cardiovascular: Positive for chest pain. Negative for palpitations and leg swelling. Gastrointestinal: Negative for abdominal distention, abdominal pain, blood in stool, constipation, diarrhea, nausea, rectal pain and vomiting. Endocrine: Negative for cold intolerance, polydipsia and polyuria. Genitourinary: Negative for decreased urine volume, difficulty urinating, dysuria, flank pain, hematuria and urgency. Musculoskeletal: Negative for arthralgias, back pain, gait problem, joint swelling, myalgias and neck stiffness. Skin: Negative for color change, rash and wound. Neurological: Negative for dizziness, tremors, seizures, speech difficulty, weakness, light-headedness, numbness and headaches. Hematological: Negative for adenopathy. Does not bruise/bleed easily.    Psychiatric/Behavioral: Negative for confusion and sleep disturbance. The patient is not nervous/anxious. Objective:   Physical Exam   Constitutional: He is oriented to person, place, and time. He appears well-developed and well-nourished. No distress. HENT:   Head: Normocephalic. Mouth/Throat: Oropharynx is clear and moist. No oropharyngeal exudate. Eyes: Pupils are equal, round, and reactive to light. EOM are normal. Right eye exhibits no discharge. Left eye exhibits no discharge. No scleral icterus. Neck: Normal range of motion. Neck supple. No JVD present. No tracheal deviation present. No thyromegaly present. Cardiovascular: Normal rate and normal heart sounds. Exam reveals no gallop and no friction rub. No murmur heard. Pulmonary/Chest: Effort normal and breath sounds normal. No stridor. No respiratory distress. He has no wheezes. He has no rales. He exhibits no tenderness. Abdominal: Soft. Bowel sounds are normal. He exhibits no distension and no mass. There is no tenderness. There is no rebound. Musculoskeletal: Normal range of motion. He exhibits no edema. Good range of motion in all four extremities. Lymphadenopathy:     He has no cervical adenopathy. Neurological: He is alert and oriented to person, place, and time. He has normal reflexes. No cranial nerve deficit. He exhibits normal muscle tone. Skin: Skin is warm. No rash noted. No erythema. Psychiatric: He has a normal mood and affect. His behavior is normal. Judgment and thought content normal.   Vitals reviewed. BP (!) 112/57 (Site: Left Upper Arm, Position: Sitting, Cuff Size: Large Adult)   Pulse 86   Temp 98.1 °F (36.7 °C) (Oral)   Resp 16   Ht 5' 8\" (1.727 m)   Wt 154 lb 9.6 oz (70.1 kg)   SpO2 96%   BMI 23.51 kg/m²      ECOG status is 2.   Lab Results   Component Value Date    WBC 5.7 05/20/2019    HGB 11.9 (L) 05/20/2019    HCT 34.5 (L) 05/20/2019    MCV 94 05/20/2019     05/20/2019       Chemistry        Component Value Date/Time    NA 1.Extensive stage Small Cell lung cancer. SCLC is staged as limited disease (confined to one hemithorax included in a single tolerable radiotherapy port) or extensive (beyond one hemithorax) disease. This distinction is important since treatment differs for patients with limited versus extensive stage disease. Patients with limited stage disease are treated with a combination of chemotherapy and radiation therapy. Patients with extensive stage small cell lung cancer are treated with systemic chemotherapy. Palliative radiation treatment can be added to alleviate the symptoms related to life-threatening organ involvement. As the patient presented with SVC syndrome he started radiation treatment  in the hospital on January 25, 2019, last dose of radiation treatment given late February 2019. On January 28, 2019 he received first cycle of palliative chemotherapy with carboplatin and etoposide in Dr. Luis Cho office. However, he transfered his care to [address] [place] [address]. Cycle #4 of carbo and etoposide given on April 22,2019. Patient had set of imaging studies on May 13, 2019. CT of the chest showed dramatic improvement in the appearance of mediastinal adenopathy with almost complete resolution. There was also an  improvement in previously identified liver lesions and pelvic and inguinal lymphadenopathy. However his imaging studies showed new diffuse osteoblastic skeletal metastases. Brain MRI suspicious for early brain metastases. The patient will have short-term follow-up of brain MRI on May 24, 2019. The patient was instructed to get dental clearance for Zometa infusion. 2.  Hyponatremia. His sodium is 125 today. Patient will receive 500 MLS of normal saline. We'll recheck his labs in today. Next I discussed with the patient his treatment options.  Immunotherapy with Nivolumab with or without Ipilumab represents favored approach for most patients with extensive SCLC after progression on initial chemotherapy. The FDA has granted accelerated approved to single-agent Nivo for the treatment of patients with SCLC with disease progression following platinum-based chemotherapy and one other line of therapy. However, the second-line therapy with the combination of Ipilumab plus nivolumab showed better response rates and survival results observed in a phase II study. Diagnosis Orders   1. Small cell carcinoma ([facility]     2. Chronic obstructive pulmonary disease with acute exacerbation ([facility]     3. Hyponatremia     4. Liver metastasis ([facility]     5. Bone metastases ([facility]          Plan:   Return in about 4 days (around 5/24/2019). I spent 40 minutes face-to-face with the patient discussing findings of imaging studies and an further management. Greater than 50% of time was spent on counseling and coordinating her care. Face to face counseling included prognosis, risks, benefits of treatment.

## 2019-05-20 NOTE — PLAN OF CARE
Problem: Intellectual/Education/Knowledge Deficit  Goal: Teaching initiated upon admission  Outcome: Met This Shift  Note:   Verbalizes understanding to signs and symptoms of dehydration and when to call the Physician. Goal: Written Disposition Instruction form completed  Outcome: Met This Shift  Intervention: Verbal/written education provided  Note:   Patient received 500 ml IV infusion over 1 hour. Patient drank nothing while in OP oncology. Encouraged patient to drink 48 to 64 ounces of fluids everyday. Problem: Discharge Planning  Goal: Knowledge of discharge instructions  Description  Knowledge of discharge instructions     Outcome: Met This Shift  Note:   Verbalized understanding of discharge instructions, follow-up appointments, and when to call the physician. Intervention: Interaction with patient/family and care team  Note:   Discuss understanding of discharge instructions,follow-up appointments, and when to call the physician. Care plan reviewed with patient and spouse. Patient and spouse verbalize understanding of the plan of care and contribute to goal setting.

## 2019-05-21 DIAGNOSIS — C80.1 SMALL CELL CARCINOMA (HCC): Primary | ICD-10-CM

## 2019-05-22 ENCOUNTER — HOSPITAL ENCOUNTER (OUTPATIENT)
Dept: INFUSION THERAPY | Age: 69
Discharge: HOME | End: 2019-05-22
Payer: MEDICARE

## 2019-05-22 VITALS
HEART RATE: 86 BPM | BODY MASS INDEX: 22.94 KG/M2 | RESPIRATION RATE: 16 BRPM | HEIGHT: 68 IN | SYSTOLIC BLOOD PRESSURE: 130 MMHG | TEMPERATURE: 98.5 F | DIASTOLIC BLOOD PRESSURE: 70 MMHG | OXYGEN SATURATION: 97 % | WEIGHT: 151.4 LBS

## 2019-05-22 DIAGNOSIS — R59.0 MEDIASTINAL LYMPHADENOPATHY: ICD-10-CM

## 2019-05-22 DIAGNOSIS — C80.1 SMALL CELL CARCINOMA (HCC): ICD-10-CM

## 2019-05-22 DIAGNOSIS — R91.8 HILAR MASS: ICD-10-CM

## 2019-05-22 DIAGNOSIS — Z72.0 TOBACCO ABUSE: ICD-10-CM

## 2019-05-22 DIAGNOSIS — J44.9 CHRONIC OBSTRUCTIVE PULMONARY DISEASE, UNSPECIFIED COPD TYPE (HCC): ICD-10-CM

## 2019-05-22 DIAGNOSIS — R91.8 LUNG MASS: ICD-10-CM

## 2019-05-22 DIAGNOSIS — R59.0 SUPRACLAVICULAR ADENOPATHY: ICD-10-CM

## 2019-05-22 LAB
BUN, WHOLE BLOOD: 11 MG/DL (ref 8–26)
CHLORIDE, WHOLE BLOOD: 95 MEQ/L (ref 98–109)
CREATININE, WHOLE BLOOD: 0.5 MG/DL (ref 0.5–1.2)
GFR, ESTIMATED: > 90 ML/MIN/1.73M2
GLUCOSE, WHOLE BLOOD: 93 MG/DL (ref 70–108)
IONIZED CALCIUM, WHOLE BLOOD: 1.21 MMOL/L (ref 1.12–1.32)
POTASSIUM, WHOLE BLOOD: 4.3 MEQ/L (ref 3.5–4.9)
SODIUM, WHOLE BLOOD: 132 MEQ/L (ref 138–146)
TOTAL CO2, WHOLE BLOOD: 26 MEQ/L (ref 23–33)

## 2019-05-22 PROCEDURE — 99211 OFF/OP EST MAY X REQ PHY/QHP: CPT

## 2019-05-22 PROCEDURE — 80047 BASIC METABLC PNL IONIZED CA: CPT

## 2019-05-22 NOTE — PROGRESS NOTES
Patient understands to continue with sodium pills. Discharge instructions given to patient-verbalizes understanding. Ambulated off unit per self with belongings.

## 2019-05-22 NOTE — PLAN OF CARE
Problem: Intellectual/Education/Knowledge Deficit  Goal: Teaching initiated upon admission  Outcome: Met This Shift  Note:   Patient and spouse understands sodium level is better- not requiring Iv hydration fluids today. Aware to continue sodium pills. Goal: Written Disposition Instruction form completed  Outcome: Met This Shift  Intervention: Verbal/written education provided  Note:   Review lab results     Problem: Discharge Planning  Goal: Knowledge of discharge instructions  Description  Knowledge of discharge instructions     Outcome: Met This Shift  Note:   Verbalized understanding of discharge instructions, follow-up appointments, and when to call the physician. Intervention: Interaction with patient/family and care team  Note:   Discuss understanding of discharge instructions,follow-up appointments, and when to call the physician. Care plan reviewed with patient and spouse. Patient and spouse verbalize understanding of the plan of care and contribute to goal setting.

## 2019-05-23 ENCOUNTER — HOSPITAL ENCOUNTER (OUTPATIENT)
Dept: MRI IMAGING | Age: 69
Discharge: HOME | End: 2019-05-23
Payer: MEDICARE

## 2019-05-23 DIAGNOSIS — I63.40 CEREBROVASCULAR ACCIDENT (CVA) DUE TO EMBOLISM OF CEREBRAL ARTERY (HCC): ICD-10-CM

## 2019-05-23 DIAGNOSIS — C80.1 SMALL CELL CARCINOMA (HCC): Primary | ICD-10-CM

## 2019-05-23 DIAGNOSIS — E87.1 HYPONATREMIA: ICD-10-CM

## 2019-05-23 PROCEDURE — 6360000004 HC RX CONTRAST MEDICATION: Performed by: PSYCHIATRY & NEUROLOGY

## 2019-05-23 PROCEDURE — 70553 MRI BRAIN STEM W/O & W/DYE: CPT

## 2019-05-23 PROCEDURE — A9579 GAD-BASE MR CONTRAST NOS,1ML: HCPCS | Performed by: PSYCHIATRY & NEUROLOGY

## 2019-05-23 RX ADMIN — GADOTERIDOL 13 ML: 279.3 INJECTION, SOLUTION INTRAVENOUS at 09:14

## 2019-05-24 ENCOUNTER — OFFICE VISIT (OUTPATIENT)
Dept: ONCOLOGY | Age: 69
End: 2019-05-24
Payer: MEDICARE

## 2019-05-24 ENCOUNTER — TELEPHONE (OUTPATIENT)
Dept: NEUROLOGY | Age: 69
End: 2019-05-24

## 2019-05-24 ENCOUNTER — HOSPITAL ENCOUNTER (OUTPATIENT)
Dept: INFUSION THERAPY | Age: 69
Discharge: HOME | End: 2019-05-24
Payer: MEDICARE

## 2019-05-24 VITALS
HEART RATE: 77 BPM | OXYGEN SATURATION: 99 % | TEMPERATURE: 98.4 F | DIASTOLIC BLOOD PRESSURE: 81 MMHG | HEIGHT: 68 IN | WEIGHT: 152.6 LBS | SYSTOLIC BLOOD PRESSURE: 177 MMHG | RESPIRATION RATE: 16 BRPM | BODY MASS INDEX: 23.13 KG/M2

## 2019-05-24 DIAGNOSIS — C79.31 BRAIN METASTASIS (HCC): ICD-10-CM

## 2019-05-24 DIAGNOSIS — C80.1 SMALL CELL CARCINOMA (HCC): ICD-10-CM

## 2019-05-24 DIAGNOSIS — C80.1 SMALL CELL CARCINOMA (HCC): Primary | ICD-10-CM

## 2019-05-24 LAB
BUN, WHOLE BLOOD: 11 MG/DL (ref 8–26)
CHLORIDE, WHOLE BLOOD: 94 MEQ/L (ref 98–109)
CREATININE, WHOLE BLOOD: 0.5 MG/DL (ref 0.5–1.2)
GFR, ESTIMATED: > 90 ML/MIN/1.73M2
GLUCOSE, WHOLE BLOOD: 91 MG/DL (ref 70–108)
IONIZED CALCIUM, WHOLE BLOOD: 1.22 MMOL/L (ref 1.12–1.32)
POTASSIUM, WHOLE BLOOD: 4.4 MEQ/L (ref 3.5–4.9)
SODIUM, WHOLE BLOOD: 132 MEQ/L (ref 138–146)
TOTAL CO2, WHOLE BLOOD: 28 MEQ/L (ref 23–33)

## 2019-05-24 PROCEDURE — 99214 OFFICE O/P EST MOD 30 MIN: CPT | Performed by: PHYSICIAN ASSISTANT

## 2019-05-24 PROCEDURE — 80047 BASIC METABLC PNL IONIZED CA: CPT

## 2019-05-24 PROCEDURE — 99211 OFF/OP EST MAY X REQ PHY/QHP: CPT

## 2019-05-24 NOTE — TELEPHONE ENCOUNTER
Patient notified and verbalized understanding.  Patient has appointment with Dr Raheem Begum office today at 10:20 am.

## 2019-05-24 NOTE — TELEPHONE ENCOUNTER
----- Message from Jax Duran MD sent at 5/24/2019  7:35 AM EDT -----  Please let patient know he needs to follow up for the MRI Brain results with Dr Jae Hankins. [facility] We will share results with him.     Jax Duran MD 7:32 AM

## 2019-05-24 NOTE — PROGRESS NOTES
[facility], [address] [facility] [address]  [address] [address]  Dept: [phone]  Dept Fax: [phone]: [phone]      Visit Date:5/24/2019     Mitch Robins is a 70 y.o. male who presents today for:   Chief Complaint   Patient presents with    Follow-up     SCLC    Results     RV Scan        HPI:   Mitch Robins is a 70 y.o. male followed by Dr. Brisa Escalante for small cell lung cancer. Per Dr. Crissy Phillip note on 5/20/19: 69 yo long term smoker and [facility] pt originally seen by Dr Nieves Gore on 9/7 for L hilar lung mass. He had bronch with washings  Pos for strep and enterobacter and fiberoptic exam (unremarkable). He had a course of antibiotics. and was referred for CT guided bx L hilar mass. He had a break due to medical insurance isues with [facility] and had lapse in f/u until he saw Dr Gavin Jurado and was trying to work out his medical care path but became more SOB. [facility] went to ED at [facility] and had chest CT which showed large mediastinal lymphadenopathy. Path from brushings came back small cell lung ca. CT of the head on January 24, 2019 and showed normal appearance of the brain. CT of the abdomen on January 25, 2019 showed bilateral pleural metastases and effusions. In addition there was 2.3 cm low attenuation area in the left lobe liver suspicious for metastases in the right hepatic lobe there was 9 mm low-attenuation focus. Left adrenal gland was enlarged and an measured 20 mm in the largest dimension there was a 3.8 cm mass in the region of the left iliac chain worrisome for lymph node metastasis. probable left adrenal metastases and liver metastases. Due to presentation with SVC the patient started radiation treatment on January 25, 2019 and completed on 02/20/2019. On January 28, 2019 he received first cycle of chemotherapy with -16 and carboplatin. Overall, he tolerated it reasonably well, mild nausea.  After 4 cycles of -16 and carboplatin, the patient underwent staging studies which showed scattered punctate foci of acute infarct throughout the brain with evidence of embolic phenomenon on MRI of the brain on 5/13/19. He was admitted to [address] [place] [address] for further evaluation. He underwent embolic workup and had negative CARLOS on 5/15/19. Neurology was consulted and recommended repeat MRI in 2 weeks to show stability of a new brain lesions. Interval History 5/24/2019:   The patient is here for follow-up evaluation and to review MRI of the brain from 5/23/19. MRI showed enlarging focal areas of restricted diffusion throughout the brain. These likely represent metastatic lesions given interval increase in size compared to prior exam, these are unusual in that they do not enhance. Few new punctate foci of restricted diffusion in the right basal ganglion cerebellum was evidence for new metastatic lesions. The patient remains asymptomatic from these lesions. He denies headache, lightheadedness, dizziness, blurry vision, visual deficits, weakness, numbness, or changes in balance.      HPI   Past Medical History:   Diagnosis Date    Arthritis     COPD (chronic obstructive pulmonary disease) ([facility]     Gastric reflux     Hyperlipidemia     Hypertension       Past Surgical History:   Procedure Laterality Date    COLONOSCOPY      HERNIA REPAIR  80's    bilateral inguinal    KIDNEY SURGERY      stent placement    [facility] [address] INCL FLUOR GDNCE DX W/CELL WASHG SPX N/A 9/7/2018    BRONCHOSCOPY FLUOROSCOPY performed by Frankie Friedman MD at [facility]    [facility] OFFICE/OUTPT VISIT,PROCEDURE ONLY Right 9/5/2018    RIGHT INGUINAL HERNIA REPAIR performed by Shana Street MD at [address] OFFICE/OUTPT [facility] N/A 9/11/2018    EXPLORATORY LAPAROSCOPY  OPEN PROCEDURE, SMALL BOWEL RESECTION performed by Shana Street MD at [address] [facility] TRANSESOPHAGEAL ECHOCARDIOGRAM Left 5/14/2019    TRANSESOPHAGEAL ECHOCARDIOGRAM performed by Agnes Hancock MD at [facility] Family History   Adopted: Yes      Social History     Tobacco Use    Smoking status: Former Smoker     Packs/day: 2.00     Years: 50.00     Pack years: 100.00     Types: Cigarettes     Start date: 1969     Last attempt to quit: 2019     Years since quittin.3    Smokeless tobacco: Never Used   Substance Use Topics    Alcohol use: Not Currently     Comment: Last drank 6 months ago       Current Outpatient Medications   Medication Sig Dispense Refill    aspirin 81 MG chewable tablet Take 1 tablet by mouth daily 30 tablet 0    atorvastatin (LIPITOR) 80 MG tablet Take 1 tablet by mouth daily 30 tablet 0    ferrous sulfate 325 (65 Fe) MG tablet Take 1 tablet by mouth 2 times daily (with meals) 30 tablet 0    folic acid (FOLVITE) 1 MG tablet Take 1 tablet by mouth daily 30 tablet 0    vitamin B-12 1000 MCG tablet Take 1 tablet by mouth daily 30 tablet 0    magnesium oxide (MAG-OX) 400 (241.3 Mg) MG TABS tablet Take 1 tablet by mouth daily for 10 days 10 tablet 0    clopidogrel (PLAVIX) 75 MG tablet Take 1 tablet by mouth daily for 21 days 21 tablet 0    vitamin C (VITAMIN C) 500 MG tablet Take 1 tablet by mouth daily 30 tablet 0    tamsulosin (FLOMAX) 0.4 MG capsule Take 1 capsule by mouth daily 30 capsule 0    ipratropium-albuterol (DUONEB) 0.5-2.5 (3) MG/3ML SOLN nebulizer solution Inhale 3 mLs into the lungs every 4 hours 360 mL 11    ondansetron (ZOFRAN) 4 MG tablet Take 1 tablet by mouth every 8 hours as needed for Nausea or Vomiting 30 tablet 0    albuterol sulfate HFA (VENTOLIN HFA) 108 (90 Base) MCG/ACT inhaler Inhale 2 puffs into the lungs every 4 hours as needed for Wheezing or Shortness of Breath 1 Inhaler 11    acetaminophen (TYLENOL) 325 MG tablet Take 2 tablets by mouth every 4 hours as needed for Pain 120 tablet 3    busPIRone (BUSPAR) 10 MG tablet Take 10 mg by mouth 2 times daily      Ipratropium Bromide (ATROVENT IN) Inhale into the lungs 2 times daily       budesonide-formoterol (SYMBICORT) 160-4.5 MCG/ACT AERO Inhale 2 puffs into the lungs 2 times daily.  terazosin (HYTRIN) 2 MG capsule Take 2 mg by mouth nightly.  omeprazole (PRILOSEC) 20 MG capsule Take 40 mg by mouth daily       ranitidine (ZANTAC) 150 MG tablet Take 150 mg by mouth every morning. No current facility-administered medications for this visit. Allergies   Allergen Reactions    Lisinopril Other (See Comments)     coughing      Health Maintenance   Topic Date Due    Hepatitis C screen  1950    DTaP/Tdap/Td vaccine (1 - Tdap) 10/13/1968    Shingles Vaccine (1 of 2) 10/13/1999    Colon cancer screen colonoscopy  10/13/1999    Pneumococcal 65+ years Vaccine (2 of 2 - PPSV23) 05/13/2018    Low dose CT lung screening  05/13/2020    Lipid screen  05/14/2024    Flu vaccine  Completed    AAA screen  Completed       Review of Systems:   Review of Systems   Pertinent review of systems noted in HPI, all other ROS negative. Objective:   Physical Exam   BP (!) 177/81 (Site: Left Upper Arm, Position: Sitting, Cuff Size: Medium Adult)   Pulse 77   Temp 98.4 °F (36.9 °C) (Oral)   Resp 16   Ht 5' 7.99\" (1.727 m)   Wt 152 lb 9.6 oz (69.2 kg)   SpO2 99%   BMI 23.21 kg/m²    General appearance: No apparent distress, chronically ill appearing, and cooperative. HEENT: Pupils equal, round, and reactive to light. Conjunctivae/corneas clear. Oral mucosa moist.   Neck: Supple, with full range of motion. Trachea midline. Respiratory:  Normal respiratory effort. Clear to auscultation, bilaterally without Rales/Wheezes/Rhonchi. Cardiovascular: Regular rate and rhythm with normal S1/S2   Abdomen: Soft, non-tender, non-distended with active bowel sounds. Musculoskeletal: No clubbing, cyanosis or edema bilaterally. Full range of motion without deformity. Skin: Skin color, texture, turgor normal.  No rashes or lesions.   Neurologic:  Neurovascularly intact without any focal sensory/motor deficits. Cranial nerves: II-XII intact, grossly non-focal.  Psychiatric: Alert and oriented, thought content appropriate      Imaging Studies and Labs:   CBC:   Lab Results   Component Value Date    WBC 5.7 05/20/2019    HGB 11.9 (L) 05/20/2019    HCT 34.5 (L) 05/20/2019    MCV 94 05/20/2019     05/20/2019     BMP:   Lab Results   Component Value Date     05/22/2019     05/15/2019    K 4.3 05/22/2019    K 4.1 05/15/2019    K 3.4 05/14/2019     05/15/2019    CO2 23 05/15/2019    BUN 10 05/15/2019    CREATININE 0.5 05/22/2019    CREATININE 0.5 05/15/2019    GLUCOSE 92 05/15/2019    CALCIUM 8.9 05/15/2019      LFT:   Lab Results   Component Value Date    ALT 14 05/20/2019    AST 22 05/20/2019    ALKPHOS 79 05/20/2019    BILITOT 0.3 05/20/2019      Narrative   PROCEDURE: CT CHEST W CONTRAST       CLINICAL INFORMATION: Pain and swelling of right lower leg, Pain and swelling of right lower leg, Small cell carcinoma ([address] Encounter for chemotherapy management .       COMPARISON: 1/21/2019       TECHNIQUE: 2-D multiplanar post contrast images of the chest, Isovue-370 IV contrast   All CT scans at this facility use dose modulation, iterative reconstruction, and/or weight-based dosing when appropriate to reduce radiation dose to as low as reasonably achievable.       FINDINGS: Dramatically decrease in mediastinal adenopathy. There is soft tissue density material in the mediastinum without the well-defined individual lymph nodes. There is soft tissue fullness in the aortopulmonary window currently measuring 2.7 x 3.1    cm this compares to conglomerate mass at this area measuring up to 10 cm previously. anterior pericardial effusion measuring 13 mm widest portion. No axillary lymphadenopathy. Biapical fibrotic scarring and emphysematous blebs. Paraseptal emphysematous changes in the medial superior mediastinum. Saber shaped trachea the upper portion.  Calcified granuloma left upper distended loops of small bowel in the midabdomen. Transition appears to be at the postsurgical site in the mid lower abdomen image 40. There is no bowel obstruction there is marked diverticulosis. Interval resolution of previously seen left iliac chain adenopathy stable right inguinal lymphadenopathy node measuring 2.4 x 1.6 cm   Contrast in the urinary bladder.       Bones       Numerous osteoblastic lesions throughout the lower lumbar spine and pelvis. L1 posterior elements of L2 most of L4 left iliac wing portions of the sacrum and most of the right iliac wing and right acetabulum.           Impression   1. Improvement in previously identified liver lesions and pelvic and inguinal lymphadenopathy with a stable right inguinal lymph node. 2. 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               **This report has been created using voice recognition software.  It may contain minor errors which are inherent in voice recognition technology. **       Final report electronically signed by Dr. Robin Dunn on 5/13/2019 4:17 PM     Narrative   PROCEDURE: MRI BRAIN W [address]       INDICATION:Cerebrovascular accident (CVA) due to embolism of cerebral artery ([address] General weakness. History of lung cancer.       COMPARISON: 5/13/2019.       TECHNIQUE: Multiplanar and multiple spin echo T1 and T2-weighted images were obtained through the brain before and after the administration of intravenous contrast.       FINDINGS:   Redemonstration of multiple focal areas of restricted diffusion in the supratentorial brain in both hemispheres. These have increased in size in the interval with a index lesion in the left frontal corona radiata measuring 9 mm in greatest axial    dimension on the current exam compared to 6 mm on prior. A right perirolandic lesion measures 5 mm on the current exam compared to 4 mm on prior.  A right basal ganglia cycle of palliative chemotherapy with carboplatin and etoposide in Dr. Brown Minium office. However, he wished to transfer his care to [facility]. He has completed four cycles of carbo and etoposide. Once patient has completed radiation for brain metastasis, he will return to see Dr. Shereen Wiggins for consideration of other systemic treatment options. Patient instructed to obtain dental clearance for Zometa as patient had new osteoblastic lesions in thoracic and lumbar spine and pelvis on CT of the chest/abdomen and pelvis. 2. New Brain Metastasis   Reviewed findings at length with the patient and his significant other.   -Will refer patient to Radiation Oncology, Dr. Shen Suárez, whom patient is established with. Discussed with Dr. Shen Suárez and Dr. Sammie Alba (covering for Dr. Shereen Wiggins), will not begin oral steroids at this time as patient is not symptomatic from brain metastasis. Steroids will be started at beginning of radiation treatment. Return to clinic with Dr. Shereen Wiggins once radiation is completed. Labs on RTC: CBC, BMP, LFT      All patient questions answered. Pt voiced understanding. Patient agreed with treatment plan. Follow up as directed. Patient instructed to call for questions or concerns.       Electronically signed by   Taylor Gibbs PA-C

## 2019-05-29 ENCOUNTER — HOSPITAL ENCOUNTER (OUTPATIENT)
Dept: RADIATION ONCOLOGY | Age: 69
End: 2019-05-29
Attending: RADIOLOGY
Payer: MEDICARE

## 2019-05-29 ENCOUNTER — APPOINTMENT (OUTPATIENT)
Dept: RADIATION ONCOLOGY | Age: 69
End: 2019-05-29
Attending: RADIOLOGY
Payer: MEDICARE

## 2019-05-30 ENCOUNTER — HOSPITAL ENCOUNTER (OUTPATIENT)
Dept: CT IMAGING | Age: 69
Discharge: HOME | End: 2019-05-30
Payer: MEDICARE

## 2019-05-30 ENCOUNTER — APPOINTMENT (OUTPATIENT)
Dept: RADIATION ONCOLOGY | Age: 69
End: 2019-05-30
Attending: RADIOLOGY
Payer: MEDICARE

## 2019-05-30 ENCOUNTER — HOSPITAL ENCOUNTER (OUTPATIENT)
Dept: RADIATION ONCOLOGY | Age: 69
Discharge: HOME | End: 2019-05-30
Attending: RADIOLOGY
Payer: MEDICARE

## 2019-05-30 DIAGNOSIS — C79.31 SECONDARY MALIGNANT NEOPLASM OF BRAIN (HCC): ICD-10-CM

## 2019-05-30 PROCEDURE — 77334 RADIATION TREATMENT AID(S): CPT | Performed by: RADIOLOGY

## 2019-05-30 PROCEDURE — 77290 THER RAD SIMULAJ FIELD CPLX: CPT | Performed by: RADIOLOGY

## 2019-05-30 PROCEDURE — 3209999900 CT GUIDE RADIATION THERAPY NO CHARGE

## 2019-05-30 NOTE — PROGRESS NOTES
RADIATION ONCOLOGY FOLLOW-UP:         Patient:  Duane Bejarano  YOB: 1950    Primary Oncologist:  Dr. Capo Owen  PCP:  Jose Harmon MD  Referring Provider: Ketan Mcdonald - Cnp  [place] [address] [facility], [address]     PROBLEM LIST:       FOLLOWING Dx:  C34.12, small cell lung cancer, left upper lobe origin T4 N3 M1b, Stage IV   C79.31, secondary malignancy of brain, related to diagnosis above    HISTORY:       Mr. Marti Balderas presented with a history of a known left hilar mass and was involved with evaluation when admitted through the emergency room because of worsening shortness of breath. CT-guided biopsy on 1/21/2019 of the left supraclavicular lymph node confirmed a small cell lung cancer. he went on to complete con current chemotherapy and radiation. Chemotherapy included carboplatin and -16. Due to the bulkiness of his presenting disease, he completed his radiation using a hypo-fractionated protocol but delivered 15 fractions of 267 cGy each for a total composite tumor dose of 4005 cGy. He completed his radiation on 2/14/2019 and has been followed closely by medical oncology for ongoing chemotherapy. He has some mild strokelike symptoms and had completed CTA of the head noted small infarct changes but no mass effect initially. As symptoms progressed he completed serial MRIs of the brain with the most recent study demonstrating increasing size of the abnormalities. He is seen to discuss radiotherapy as an intervention.     Patient Active Problem List   Diagnosis    Incarcerated hernia    COPD (chronic obstructive pulmonary disease) ([address]    Hypertension    Severe malnutrition ([address]    Hilar mass    Tobacco abuse    Recurrent unilateral inguinal hernia with obstruction and without gangrene    Lung mass    Hyponatremia    SOB (shortness of breath)    Mediastinal lymphadenopathy    Supraclavicular adenopathy    Small cell carcinoma ([address]    Smoker    [facility]olic stroke [facility]    Acute CVA (cerebrovascular accident) ([facility]    Embolic cerebrovascular disease    Hyponatremia       REVIEW OF SYSTEMS:     CONSTITUTIONAL:  Generally feels \"OK\". More tired in general.  Appetite fair. Energy level stable. HEENT:  No sores in the mouth, sore throat, changes in voice quality. CARDIOVASCULAR:  No chest pain or palpitations. RESPIRATORY:  No cough, SOB or hemoptysis. GI: No N/V/D/C. No blood per rectum. :  No urinary frequency, dysuria or urgency. MUSCULOSKELETAL:   No specific bone or joint aches. No edema. NEUROLOGIC:  Denies HA, dizziness or focal weakness. No sensory changes. NB: Fiancé states that he is more lethargic and \"wobbly when he walks\"      CURRENT OUTPATIENT MEDICATIONS:  No outpatient medications have been marked as taking for the 5/30/19 encounter [facility] Encounter) with Ana Ramirez MD.     ALLERGIES:  Allergies   Allergen Reactions    Lisinopril Other (See Comments)     coughing       LAB RESULTS:   Lab Results   Component Value Date    GLUCOSE 92 05/15/2019    GLUCOSE 90 05/14/2019    GLUCOSE 109 05/13/2019      RADIOLOGY RESULTS: Cta Head W Wo Contrast    Result Date: 5/14/2019  PROCEDURE: CTA HEAD W WO CONTRAST, CTA NECK W WO CONTRAST CLINICAL INFORMATION: STROKE . Multiple punctate infarcts on recent MR brain. CTA head: There is a mural calcification in the petrous, cavernous and clinoid segments of the internal carotid arteries, right greater than left. There are areas of up to moderate stenosis on the right. CTA NECK: 1. Mild mural plaque at the carotid bifurcations without hemodynamically significant stenosis by NASA criteria. 2. Area of moderate stenosis in the left subclavian artery. Cta Neck W Wo Contrast    Result Date: 5/14/2019  PROCEDURE: CTA HEAD W WO CONTRAST, CTA NECK W WO CONTRAST CLINICAL INFORMATION: STROKE . Multiple punctate infarcts on recent MR brain. CTA head:  There is a mural calcification in the petrous, cavernous and clinoid segments of the internal carotid arteries, right greater than left. There are areas of up to moderate stenosis on the right. CTA NECK: 1. Mild mural plaque at the carotid bifurcations without hemodynamically significant stenosis by NASA criteria. 2. Area of moderate stenosis in the left subclavian artery. Ct Chest W Contrast    Result Date: 5/13/2019  PROCEDURE: CT CHEST W CONTRAST CLINICAL INFORMATION: Pain and swelling of right lower leg, Pain and swelling of right lower leg, Small cell carcinoma ([address] Encounter for chemotherapy management . COMPARISON: 1/21/2019 TECHNIQUE: 2-D multiplanar post contrast images of the chest, Isovue-370 IV contrast All CT scans at this facility use dose modulation, iterative reconstruction, and/or weight-based dosing when appropriate to reduce radiation dose to as low as reasonably achievable. FINDINGS: Dramatically decrease in mediastinal adenopathy. There is soft tissue density material in the mediastinum without the well-defined individual lymph nodes. There is soft tissue fullness in the aortopulmonary window currently measuring 2.7 x 3.1 cm this compares to conglomerate mass at this area measuring up to 10 cm previously. anterior pericardial effusion measuring 13 mm widest portion. No axillary lymphadenopathy. Biapical fibrotic scarring and emphysematous blebs. Paraseptal emphysematous changes in the medial superior mediastinum. Saber shaped trachea the upper portion. Calcified granuloma left upper lobe. No lung masses are currently identified. No airspace consolidations or pleural effusions. Upper abdomen This is reported on the separate same day exam. Bones Large sclerotic lesion involving the T8 vertebral body. This is new since the prior exam. Almost the entire L1 vertebral body is sclerotic. 1. Dramatic improvement in the appearance of mediastinal adenopathy with almost complete resolution.  Details in the above report 2. Sclerotic lesions in the T8 and L1 vertebral body consistent with osteoblastic metastases     Ct Abdomen Pelvis W Iv Contrast Additional Contrast? None    Result Date: 5/13/2019  PROCEDURE: CT ABDOMEN PELVIS W IV CONTRAST CLINICAL INFORMATION: Pain and swelling of right lower leg, Pain and swelling of right lower leg, Small cell carcinoma ([address] Encounter for chemotherapy management . 1. Improvement in previously identified liver lesions and pelvic and inguinal lymphadenopathy with a stable right inguinal lymph node. 2. 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 Mri Brain W Wo Contrast    Result Date: 5/23/2019  PROCEDURE: MRI BRAIN W WO CONTRAST INDICATION:Cerebrovascular accident (CVA) due to embolism of cerebral artery ([address] General weakness. History of lung cancer. COMPARISON: 5/13/2019.      1. Enlarging focal areas of restricted diffusion throughout the brain. These likely represent metastatic lesions given interval increase in size compared to prior exam. These are unusual in that they do not enhance. 2. Few new punctate foci of restricted diffusion in the right basal ganglia and cerebellum as evidence for new metastatic lesions. Mri Brain W Wo Contrast    Result Date: 5/13/2019  PROCEDURE: MRI BRAIN W WO CONTRAST INDICATION:Pain and swelling of right lower leg, Pain and swelling of right lower leg, Small cell carcinoma ([address] Encounter for chemotherapy management. Numbness and tingling in both hands. Staging for lung cancer. 1. Scattered punctate foci of acute infarcts throughout the brain as evidence for embolic phenomenon. 2. No evidence of metastatic lesions to the brain. Findings were discussed with Dr. Bradford Ty via telephone at the time of interpretation. PHYSICAL EXAM:       Recorded and reviewed in departmental EMR.   Afebrile, normotensive  POx(room air):   %    ECOG performance:  1-2   Pain Score(base 10):   0    General appearance: No acute distress, alert and oriented to person, place, time. Speech is fluent, and mentation is clear, they display an appropriate affect. Head: Normocephalic, no facial asymmetry, extraocular movements intact and concordant, sclera anicteric, nasal passages unobstructed, oral mucosa moist without lesion or exudate, tongue midline with good mobility. Neck: No palpable adenopathy in the neck bilaterally levels 1 through 5, supraclavicular or axillary chains  Lungs: Clear to auscultation bilaterally but with distant breath sounds and diminished respiratory effort, no focal wheeze, rhonchi, rales. Heart: Regular rhythm,   Abdomen: Active bowel sounds present. Soft, non-tender; no masses,  no organomegaly  Musculoskeletal: No reproducible spinal point tenderness from level of the cervical vertebrae through the sacrum. No bony point tenderness to percussion. No costovertebral angle tenderness  Extremities: without cyanosis, (+) clubbing, edema lower extremities; normal ROM  Skin: No jaundice, fresh rash, purpura or petechiae      ASSESSMENT AND PLAN: Clare Lala has excellent treatment response from his presenting lung cancer; however, he has been found with diffuse metastatic involvement of his brain. After review of palliative treatment for his intracranial disease, he has consented to palliative whole brain radiotherapy. He will complete his treatment planning simulation later on this date with a plan to begin palliative treatment on Marva 3. Palliative dose will be standard 3000 cGy in 10 fractions. He will be started on Decadron prophylactically with planned to begin quick taper upon completion of his radiation. Thank you for the opportunity to remain involved with sharing in [address] [place] [address]. SIGNATURES:  SIGNATURE:    Florian Barton.  Joni Morrison  CC: Marisela Gibbs MD, John Alcazar, Aprn - Cnp  [id number] [place] [facility], [address]     Electronically signed by Juancarlos Rooney. Stefan Hess MD on 5/30/19 at 4:21 PM    ATTESTATION  (CPT 38434): Over 30 minutes were spent in consultation with greater than 50% of the time spent in face-to-face examination, explanation of clinical and diagnostic findings and on-going management.

## 2019-05-31 ENCOUNTER — HOSPITAL ENCOUNTER (OUTPATIENT)
Dept: RADIATION ONCOLOGY | Age: 69
End: 2019-05-31
Attending: RADIOLOGY
Payer: MEDICARE

## 2019-05-31 PROCEDURE — 77307 TELETHX ISODOSE PLAN CPLX: CPT | Performed by: RADIOLOGY

## 2019-05-31 RX ORDER — DEXAMETHASONE 2 MG/1
2 TABLET ORAL EVERY 8 HOURS
Qty: 45 TABLET | Refills: 0 | Status: SHIPPED | OUTPATIENT
Start: 2019-05-31 | End: 2019-07-11 | Stop reason: ALTCHOICE

## 2019-06-03 ENCOUNTER — HOSPITAL ENCOUNTER (OUTPATIENT)
Dept: RADIATION ONCOLOGY | Age: 69
Discharge: HOME | End: 2019-06-03
Attending: RADIOLOGY
Payer: MEDICARE

## 2019-06-03 PROCEDURE — 77336 RADIATION PHYSICS CONSULT: CPT | Performed by: RADIOLOGY

## 2019-06-03 PROCEDURE — 77334 RADIATION TREATMENT AID(S): CPT | Performed by: RADIOLOGY

## 2019-06-03 PROCEDURE — 77412 RADIATION TX DELIVERY LVL 3: CPT | Performed by: RADIOLOGY

## 2019-06-03 PROCEDURE — 77280 THER RAD SIMULAJ FIELD SMPL: CPT | Performed by: RADIOLOGY

## 2019-06-04 ENCOUNTER — APPOINTMENT (OUTPATIENT)
Dept: RADIATION ONCOLOGY | Age: 69
End: 2019-06-04
Attending: RADIOLOGY
Payer: MEDICARE

## 2019-06-05 ENCOUNTER — HOSPITAL ENCOUNTER (OUTPATIENT)
Dept: RADIATION ONCOLOGY | Age: 69
Discharge: HOME | End: 2019-06-05
Attending: RADIOLOGY
Payer: MEDICARE

## 2019-06-05 ENCOUNTER — HOSPITAL ENCOUNTER (OUTPATIENT)
Age: 69
Discharge: HOME | End: 2019-06-05
Payer: MEDICARE

## 2019-06-05 VITALS
WEIGHT: 146.16 LBS | SYSTOLIC BLOOD PRESSURE: 176 MMHG | TEMPERATURE: 97.2 F | BODY MASS INDEX: 22.23 KG/M2 | DIASTOLIC BLOOD PRESSURE: 81 MMHG | RESPIRATION RATE: 18 BRPM | HEART RATE: 95 BPM | OXYGEN SATURATION: 96 %

## 2019-06-05 LAB
BASOPHILS # BLD: 0.5 %
BASOPHILS ABSOLUTE: 0 THOU/MM3 (ref 0–0.1)
EOSINOPHIL # BLD: 0 %
EOSINOPHILS ABSOLUTE: 0 THOU/MM3 (ref 0–0.4)
ERYTHROCYTE [DISTWIDTH] IN BLOOD BY AUTOMATED COUNT: 13.4 % (ref 11.5–14.5)
ERYTHROCYTE [DISTWIDTH] IN BLOOD BY AUTOMATED COUNT: 47.8 FL (ref 35–45)
HCT VFR BLD CALC: 41.2 % (ref 42–52)
HEMOGLOBIN: 13.9 GM/DL (ref 14–18)
IMMATURE GRANS (ABS): 0.07 THOU/MM3 (ref 0–0.07)
IMMATURE GRANULOCYTES: 1.1 %
LYMPHOCYTES # BLD: 10.5 %
LYMPHOCYTES ABSOLUTE: 0.6 THOU/MM3 (ref 1–4.8)
MCH RBC QN AUTO: 32.5 PG (ref 26–33)
MCHC RBC AUTO-ENTMCNC: 33.7 GM/DL (ref 32.2–35.5)
MCV RBC AUTO: 96.3 FL (ref 80–94)
MONOCYTES # BLD: 7.7 %
MONOCYTES ABSOLUTE: 0.5 THOU/MM3 (ref 0.4–1.3)
NUCLEATED RED BLOOD CELLS: 0 /100 WBC
PLATELET # BLD: 273 THOU/MM3 (ref 130–400)
PMV BLD AUTO: 8.4 FL (ref 9.4–12.4)
RBC # BLD: 4.28 MILL/MM3 (ref 4.7–6.1)
SEG NEUTROPHILS: 80.2 %
SEGMENTED NEUTROPHILS ABSOLUTE COUNT: 4.9 THOU/MM3 (ref 1.8–7.7)
WBC # BLD: 6.1 THOU/MM3 (ref 4.8–10.8)

## 2019-06-05 PROCEDURE — 36415 COLL VENOUS BLD VENIPUNCTURE: CPT

## 2019-06-05 PROCEDURE — 85025 COMPLETE CBC W/AUTO DIFF WBC: CPT

## 2019-06-05 PROCEDURE — 77412 RADIATION TX DELIVERY LVL 3: CPT | Performed by: RADIOLOGY

## 2019-06-05 NOTE — PROGRESS NOTES
[facility]  6/5/2019    Treatment Area: Brain    Current Total Dose(cGy): 600  Current Fraction: 2  Final/Cumulative Rx. Dose (cGy): 3000    Patient was seen today for weekly visit. Wt Readings from Last 3 Encounters:   06/05/19 146 lb 2.6 oz (66.3 kg)   05/24/19 152 lb 9.6 oz (69.2 kg)   05/22/19 151 lb 6.4 oz (68.7 kg)     BP (!) 176/81   Pulse 95   Temp 97.2 °F (36.2 °C) (Tympanic)   Resp 18   Wt 146 lb 2.6 oz (66.3 kg)   SpO2 96%   BMI 22.23 kg/m²   Lab Results   Component Value Date    WBC 5.7 05/20/2019     05/20/2019       Comfort Alteration  Fatigue:Able to perform daily activities with rest periods    Pain Location: N/A  Pain Intensity (Current): 0 No Pain  Pain Treatment: No treatment scheduled      Emotional Alteration:   Coping: effective    Nutritional Alteration  Anorexia: none   Nausea: No nausea noted  Vomiting: No vomiting   Dyspepsia/Heartburn: None      Skin Alteration   Skin reaction: No changes noted  Alopecia: Mild hair loss    Mucous Membrane Alteration  Mucositis XRT Related: None  Pharynx and Esophagus- Acute: No change over baseline  Voice Changes: Normal    CNS Alteration  Level of Consciousness: Alert, responds briskly, appropriately with minimal stimulus  Seizure Activity: None  Insomnia: Normal   Orientation: Oriented in 3 spheres of person, place, and time  Speech Impairment: No  Ataxia: Reports feels weak (seeing physical therapy)    Additional Comments: ROSA M Ndiaye to discuss skin care with patient today.     Dali LUTHERN, RN

## 2019-06-06 ENCOUNTER — HOSPITAL ENCOUNTER (OUTPATIENT)
Dept: RADIATION ONCOLOGY | Age: 69
Discharge: HOME | End: 2019-06-06
Attending: RADIOLOGY
Payer: MEDICARE

## 2019-06-06 PROCEDURE — 77412 RADIATION TX DELIVERY LVL 3: CPT | Performed by: RADIOLOGY

## 2019-06-07 ENCOUNTER — HOSPITAL ENCOUNTER (OUTPATIENT)
Dept: RADIATION ONCOLOGY | Age: 69
Discharge: HOME | End: 2019-06-07
Attending: RADIOLOGY
Payer: MEDICARE

## 2019-06-07 ENCOUNTER — TELEPHONE (OUTPATIENT)
Dept: INFUSION THERAPY | Age: 69
End: 2019-06-07

## 2019-06-07 DIAGNOSIS — C79.51 BONE METASTASES (HCC): ICD-10-CM

## 2019-06-07 PROCEDURE — 77412 RADIATION TX DELIVERY LVL 3: CPT | Performed by: RADIOLOGY

## 2019-06-07 NOTE — TELEPHONE ENCOUNTER
Spoke with Dean Staff and he had dental appointment. He states teeth need to be extracted and will cost over $3900. Patient states he is waiting to hear back from someone for possible financial assistance on June 18th. Dr. Nicci Bojorquez was informed of this.

## 2019-06-10 ENCOUNTER — HOSPITAL ENCOUNTER (OUTPATIENT)
Dept: RADIATION ONCOLOGY | Age: 69
Discharge: HOME | End: 2019-06-10
Attending: RADIOLOGY
Payer: MEDICARE

## 2019-06-10 PROCEDURE — 77412 RADIATION TX DELIVERY LVL 3: CPT | Performed by: RADIOLOGY

## 2019-06-11 ENCOUNTER — HOSPITAL ENCOUNTER (OUTPATIENT)
Dept: RADIATION ONCOLOGY | Age: 69
Discharge: HOME | End: 2019-06-11
Attending: RADIOLOGY
Payer: MEDICARE

## 2019-06-11 ENCOUNTER — HOSPITAL ENCOUNTER (OUTPATIENT)
Age: 69
Discharge: HOME | End: 2019-06-11
Payer: MEDICARE

## 2019-06-11 LAB — GLUCOSE BLD-MCNC: 90 MG/DL (ref 70–108)

## 2019-06-11 PROCEDURE — 77412 RADIATION TX DELIVERY LVL 3: CPT | Performed by: RADIOLOGY

## 2019-06-11 PROCEDURE — 77417 THER RADIOLOGY PORT IMAGE(S): CPT | Performed by: RADIOLOGY

## 2019-06-11 PROCEDURE — 36415 COLL VENOUS BLD VENIPUNCTURE: CPT

## 2019-06-11 PROCEDURE — 82947 ASSAY GLUCOSE BLOOD QUANT: CPT

## 2019-06-12 ENCOUNTER — CLINICAL DOCUMENTATION (OUTPATIENT)
Dept: NUTRITION | Age: 69
End: 2019-06-12

## 2019-06-12 ENCOUNTER — HOSPITAL ENCOUNTER (OUTPATIENT)
Dept: RADIATION ONCOLOGY | Age: 69
Discharge: HOME | End: 2019-06-12
Attending: RADIOLOGY
Payer: MEDICARE

## 2019-06-12 PROCEDURE — 77412 RADIATION TX DELIVERY LVL 3: CPT | Performed by: RADIOLOGY

## 2019-06-13 ENCOUNTER — APPOINTMENT (OUTPATIENT)
Dept: RADIATION ONCOLOGY | Age: 69
End: 2019-06-13
Attending: RADIOLOGY
Payer: MEDICARE

## 2019-06-13 DIAGNOSIS — R73.02 GLUCOSE INTOLERANCE (IMPAIRED GLUCOSE TOLERANCE): ICD-10-CM

## 2019-06-13 DIAGNOSIS — C79.31 SECONDARY CANCER OF BRAIN (HCC): Primary | ICD-10-CM

## 2019-06-14 ENCOUNTER — HOSPITAL ENCOUNTER (OUTPATIENT)
Dept: RADIATION ONCOLOGY | Age: 69
Discharge: HOME | End: 2019-06-14
Attending: RADIOLOGY
Payer: MEDICARE

## 2019-06-14 PROCEDURE — 77412 RADIATION TX DELIVERY LVL 3: CPT | Performed by: RADIOLOGY

## 2019-06-17 ENCOUNTER — HOSPITAL ENCOUNTER (OUTPATIENT)
Dept: RADIATION ONCOLOGY | Age: 69
Discharge: HOME | End: 2019-06-17
Attending: RADIOLOGY
Payer: MEDICARE

## 2019-06-17 ENCOUNTER — HOSPITAL ENCOUNTER (OUTPATIENT)
Age: 69
Discharge: HOME | End: 2019-06-17
Payer: MEDICARE

## 2019-06-17 ENCOUNTER — TELEPHONE (OUTPATIENT)
Dept: INFUSION THERAPY | Age: 69
End: 2019-06-17

## 2019-06-17 DIAGNOSIS — R73.02 GLUCOSE INTOLERANCE (IMPAIRED GLUCOSE TOLERANCE): ICD-10-CM

## 2019-06-17 DIAGNOSIS — C79.31 SECONDARY CANCER OF BRAIN (HCC): ICD-10-CM

## 2019-06-17 LAB — GLUCOSE BLD-MCNC: 132 MG/DL (ref 70–108)

## 2019-06-17 PROCEDURE — 82947 ASSAY GLUCOSE BLOOD QUANT: CPT

## 2019-06-17 PROCEDURE — 36415 COLL VENOUS BLD VENIPUNCTURE: CPT

## 2019-06-17 PROCEDURE — 77412 RADIATION TX DELIVERY LVL 3: CPT | Performed by: RADIOLOGY

## 2019-06-17 PROCEDURE — 77336 RADIATION PHYSICS CONSULT: CPT | Performed by: RADIOLOGY

## 2019-06-17 NOTE — TELEPHONE ENCOUNTER
Patient spoke with me and states he will find out in the next few days if their is assistance for his teeth extraction from [facility]. Patient states if not he still wants to proceed with his Zometa infusion and will assume the risk associated with it.

## 2019-06-18 ENCOUNTER — HOSPITAL ENCOUNTER (OUTPATIENT)
Dept: RADIATION ONCOLOGY | Age: 69
Discharge: HOME | End: 2019-06-18
Attending: RADIOLOGY
Payer: MEDICARE

## 2019-06-18 ENCOUNTER — APPOINTMENT (OUTPATIENT)
Dept: RADIATION ONCOLOGY | Age: 69
End: 2019-06-18
Attending: RADIOLOGY
Payer: MEDICARE

## 2019-06-18 ENCOUNTER — CLINICAL DOCUMENTATION (OUTPATIENT)
Dept: NUTRITION | Age: 69
End: 2019-06-18

## 2019-06-18 PROCEDURE — 77412 RADIATION TX DELIVERY LVL 3: CPT | Performed by: RADIOLOGY

## 2019-07-03 ENCOUNTER — HOSPITAL ENCOUNTER (EMERGENCY)
Age: 69
Discharge: HOME | End: 2019-07-03
Attending: INTERNAL MEDICINE
Payer: MEDICARE

## 2019-07-03 ENCOUNTER — APPOINTMENT (OUTPATIENT)
Dept: CT IMAGING | Age: 69
End: 2019-07-03
Payer: MEDICARE

## 2019-07-03 VITALS
OXYGEN SATURATION: 93 % | TEMPERATURE: 98.5 F | SYSTOLIC BLOOD PRESSURE: 132 MMHG | BODY MASS INDEX: 23.04 KG/M2 | WEIGHT: 152 LBS | HEART RATE: 80 BPM | HEIGHT: 68 IN | RESPIRATION RATE: 16 BRPM | DIASTOLIC BLOOD PRESSURE: 64 MMHG

## 2019-07-03 DIAGNOSIS — E83.39 HYPOPHOSPHATEMIA: ICD-10-CM

## 2019-07-03 DIAGNOSIS — K59.00 CONSTIPATION, UNSPECIFIED CONSTIPATION TYPE: Primary | ICD-10-CM

## 2019-07-03 DIAGNOSIS — R10.9 ABDOMINAL PAIN, UNSPECIFIED ABDOMINAL LOCATION: ICD-10-CM

## 2019-07-03 LAB
ALBUMIN SERPL-MCNC: 3.8 G/DL (ref 3.5–5.1)
ALP BLD-CCNC: 83 U/L (ref 38–126)
ALT SERPL-CCNC: 12 U/L (ref 11–66)
ANION GAP SERPL CALCULATED.3IONS-SCNC: 12 MEQ/L (ref 8–16)
AST SERPL-CCNC: 15 U/L (ref 5–40)
BASOPHILS # BLD: 0.4 %
BASOPHILS ABSOLUTE: 0 THOU/MM3 (ref 0–0.1)
BILIRUB SERPL-MCNC: 0.6 MG/DL (ref 0.3–1.2)
BILIRUBIN DIRECT: < 0.2 MG/DL (ref 0–0.3)
BILIRUBIN URINE: NEGATIVE
BLOOD, URINE: NEGATIVE
BUN BLDV-MCNC: 7 MG/DL (ref 7–22)
CALCIUM SERPL-MCNC: 9.5 MG/DL (ref 8.5–10.5)
CHARACTER, URINE: CLEAR
CHLORIDE BLD-SCNC: 97 MEQ/L (ref 98–111)
CO2: 24 MEQ/L (ref 23–33)
COLOR: YELLOW
CREAT SERPL-MCNC: 0.5 MG/DL (ref 0.4–1.2)
EKG ATRIAL RATE: 98 BPM
EKG P AXIS: 85 DEGREES
EKG P-R INTERVAL: 160 MS
EKG Q-T INTERVAL: 342 MS
EKG QRS DURATION: 86 MS
EKG QTC CALCULATION (BAZETT): 436 MS
EKG R AXIS: -31 DEGREES
EKG T AXIS: 78 DEGREES
EKG VENTRICULAR RATE: 98 BPM
EOSINOPHIL # BLD: 0 %
EOSINOPHILS ABSOLUTE: 0 THOU/MM3 (ref 0–0.4)
ERYTHROCYTE [DISTWIDTH] IN BLOOD BY AUTOMATED COUNT: 13.6 % (ref 11.5–14.5)
ERYTHROCYTE [DISTWIDTH] IN BLOOD BY AUTOMATED COUNT: 46.6 FL (ref 35–45)
GFR SERPL CREATININE-BSD FRML MDRD: > 90 ML/MIN/1.73M2
GLUCOSE BLD-MCNC: 113 MG/DL (ref 70–108)
GLUCOSE URINE: NEGATIVE MG/DL
GROUP A STREP CULTURE, REFLEX: NEGATIVE
HCT VFR BLD CALC: 38.4 % (ref 42–52)
HEMOGLOBIN: 13.6 GM/DL (ref 14–18)
IMMATURE GRANS (ABS): 0.04 THOU/MM3 (ref 0–0.07)
IMMATURE GRANULOCYTES: 0.8 %
KETONES, URINE: NEGATIVE
LACTIC ACID: 0.8 MMOL/L (ref 0.5–2.2)
LEUKOCYTE ESTERASE, URINE: NEGATIVE
LIPASE: 82.8 U/L (ref 5.6–51.3)
LYMPHOCYTES # BLD: 12 %
LYMPHOCYTES ABSOLUTE: 0.6 THOU/MM3 (ref 1–4.8)
MAGNESIUM: 1.8 MG/DL (ref 1.6–2.4)
MCH RBC QN AUTO: 33.2 PG (ref 26–33)
MCHC RBC AUTO-ENTMCNC: 35.4 GM/DL (ref 32.2–35.5)
MCV RBC AUTO: 93.7 FL (ref 80–94)
MONOCYTES # BLD: 8.7 %
MONOCYTES ABSOLUTE: 0.4 THOU/MM3 (ref 0.4–1.3)
NITRITE, URINE: NEGATIVE
NUCLEATED RED BLOOD CELLS: 0 /100 WBC
OSMOLALITY CALCULATION: 265.2 MOSMOL/KG (ref 275–300)
PH UA: 7 (ref 5–9)
PHOSPHORUS: 2.3 MG/DL (ref 2.4–4.7)
PLATELET # BLD: 167 THOU/MM3 (ref 130–400)
PMV BLD AUTO: 8.7 FL (ref 9.4–12.4)
POTASSIUM SERPL-SCNC: 3.6 MEQ/L (ref 3.5–5.2)
PROTEIN UA: NEGATIVE
RBC # BLD: 4.1 MILL/MM3 (ref 4.7–6.1)
REFLEX THROAT C + S: NORMAL
SEG NEUTROPHILS: 78.1 %
SEGMENTED NEUTROPHILS ABSOLUTE COUNT: 3.8 THOU/MM3 (ref 1.8–7.7)
SODIUM BLD-SCNC: 133 MEQ/L (ref 135–145)
SPECIFIC GRAVITY, URINE: 1.02 (ref 1–1.03)
TOTAL PROTEIN: 6.8 G/DL (ref 6.1–8)
TROPONIN T: < 0.01 NG/ML
TROPONIN T: < 0.01 NG/ML
UROBILINOGEN, URINE: 0.2 EU/DL (ref 0–1)
WBC # BLD: 4.9 THOU/MM3 (ref 4.8–10.8)

## 2019-07-03 PROCEDURE — 85025 COMPLETE CBC W/AUTO DIFF WBC: CPT

## 2019-07-03 PROCEDURE — 2580000003 HC RX 258: Performed by: INTERNAL MEDICINE

## 2019-07-03 PROCEDURE — 96375 TX/PRO/DX INJ NEW DRUG ADDON: CPT

## 2019-07-03 PROCEDURE — 93005 ELECTROCARDIOGRAM TRACING: CPT | Performed by: INTERNAL MEDICINE

## 2019-07-03 PROCEDURE — 36415 COLL VENOUS BLD VENIPUNCTURE: CPT

## 2019-07-03 PROCEDURE — 80053 COMPREHEN METABOLIC PANEL: CPT

## 2019-07-03 PROCEDURE — 83690 ASSAY OF LIPASE: CPT

## 2019-07-03 PROCEDURE — 83605 ASSAY OF LACTIC ACID: CPT

## 2019-07-03 PROCEDURE — 6360000004 HC RX CONTRAST MEDICATION: Performed by: INTERNAL MEDICINE

## 2019-07-03 PROCEDURE — 6360000002 HC RX W HCPCS: Performed by: INTERNAL MEDICINE

## 2019-07-03 PROCEDURE — 87070 CULTURE OTHR SPECIMN AEROBIC: CPT

## 2019-07-03 PROCEDURE — 96374 THER/PROPH/DIAG INJ IV PUSH: CPT

## 2019-07-03 PROCEDURE — 84484 ASSAY OF TROPONIN QUANT: CPT

## 2019-07-03 PROCEDURE — 87880 STREP A ASSAY W/OPTIC: CPT

## 2019-07-03 PROCEDURE — 84100 ASSAY OF PHOSPHORUS: CPT

## 2019-07-03 PROCEDURE — 74177 CT ABD & PELVIS W/CONTRAST: CPT

## 2019-07-03 PROCEDURE — 93010 ELECTROCARDIOGRAM REPORT: CPT | Performed by: INTERNAL MEDICINE

## 2019-07-03 PROCEDURE — 83735 ASSAY OF MAGNESIUM: CPT

## 2019-07-03 PROCEDURE — 81003 URINALYSIS AUTO W/O SCOPE: CPT

## 2019-07-03 PROCEDURE — 82248 BILIRUBIN DIRECT: CPT

## 2019-07-03 PROCEDURE — 99285 EMERGENCY DEPT VISIT HI MDM: CPT

## 2019-07-03 RX ORDER — DICYCLOMINE HYDROCHLORIDE 10 MG/ML
20 INJECTION INTRAMUSCULAR ONCE
Status: COMPLETED | OUTPATIENT
Start: 2019-07-03 | End: 2019-07-03

## 2019-07-03 RX ORDER — 0.9 % SODIUM CHLORIDE 0.9 %
1000 INTRAVENOUS SOLUTION INTRAVENOUS ONCE
Status: COMPLETED | OUTPATIENT
Start: 2019-07-03 | End: 2019-07-03

## 2019-07-03 RX ORDER — ONDANSETRON 2 MG/ML
4 INJECTION INTRAMUSCULAR; INTRAVENOUS ONCE
Status: COMPLETED | OUTPATIENT
Start: 2019-07-03 | End: 2019-07-03

## 2019-07-03 RX ORDER — POLYETHYLENE GLYCOL 3350 17 G/17G
17 POWDER, FOR SOLUTION ORAL DAILY
Qty: 1 BOTTLE | Refills: 0 | Status: SHIPPED | OUTPATIENT
Start: 2019-07-03 | End: 2019-07-10

## 2019-07-03 RX ADMIN — DICYCLOMINE HYDROCHLORIDE 20 MG: 20 INJECTION, SOLUTION INTRAMUSCULAR at 11:14

## 2019-07-03 RX ADMIN — ONDANSETRON 4 MG: 2 INJECTION INTRAMUSCULAR; INTRAVENOUS at 11:14

## 2019-07-03 RX ADMIN — IOPAMIDOL 80 ML: 755 INJECTION, SOLUTION INTRAVENOUS at 12:01

## 2019-07-03 RX ADMIN — SODIUM CHLORIDE 1000 ML: 9 INJECTION, SOLUTION INTRAVENOUS at 11:14

## 2019-07-03 ASSESSMENT — PAIN DESCRIPTION - FREQUENCY
FREQUENCY: CONTINUOUS

## 2019-07-03 ASSESSMENT — ENCOUNTER SYMPTOMS
BLOOD IN STOOL: 0
VOMITING: 1
SORE THROAT: 0
DIARRHEA: 0
COUGH: 0
RHINORRHEA: 0
SHORTNESS OF BREATH: 0
BACK PAIN: 0
EYE DISCHARGE: 0
NAUSEA: 1
EYE REDNESS: 0
CONSTIPATION: 1
ABDOMINAL PAIN: 1
WHEEZING: 0

## 2019-07-03 ASSESSMENT — PAIN DESCRIPTION - ORIENTATION
ORIENTATION: LOWER

## 2019-07-03 ASSESSMENT — PAIN DESCRIPTION - LOCATION
LOCATION: ABDOMEN
LOCATION: ABDOMEN

## 2019-07-03 ASSESSMENT — PAIN DESCRIPTION - PAIN TYPE
TYPE: ACUTE PAIN

## 2019-07-03 ASSESSMENT — PAIN DESCRIPTION - DESCRIPTORS
DESCRIPTORS: CRAMPING

## 2019-07-03 ASSESSMENT — PAIN SCALES - GENERAL
PAINLEVEL_OUTOF10: 4
PAINLEVEL_OUTOF10: 8
PAINLEVEL_OUTOF10: 4

## 2019-07-03 NOTE — ED PROVIDER NOTES
dysuria, flank pain, frequency, hematuria and urgency. Musculoskeletal: Negative for arthralgias, back pain, joint swelling and neck pain. Skin: Negative for pallor and rash. Allergic/Immunologic: Negative for environmental allergies. Neurological: Negative for dizziness, syncope, weakness, light-headedness, numbness and headaches. Hematological: Negative for adenopathy. Psychiatric/Behavioral: Negative for confusion and suicidal ideas. The patient is not nervous/anxious. PAST MEDICAL HISTORY    has a past medical history of Arthritis, COPD (chronic obstructive pulmonary disease) ([address] Gastric reflux, Hyperlipidemia, and Hypertension. SURGICAL HISTORY      has a past surgical history that includes Kidney surgery; hernia repair (80's); pr office/outpt visit,procedure only (Right, 9/5/2018); pr [facility] incl fluor gdnce dx w/cell washg spx (N/A, 9/7/2018); pr office/outpt visit,procedure only (N/A, 9/11/2018); Colonoscopy; and transesophageal echocardiogram (Left, 5/14/2019). CURRENT MEDICATIONS       Previous Medications    ACETAMINOPHEN (TYLENOL) 325 MG TABLET    Take 2 tablets by mouth every 4 hours as needed for Pain    ALBUTEROL SULFATE HFA (VENTOLIN HFA) 108 (90 BASE) MCG/ACT INHALER    Inhale 2 puffs into the lungs every 4 hours as needed for Wheezing or Shortness of Breath    ASPIRIN 81 MG CHEWABLE TABLET    Take 1 tablet by mouth daily    ATORVASTATIN (LIPITOR) 80 MG TABLET    Take 1 tablet by mouth daily    BUDESONIDE-FORMOTEROL (SYMBICORT) 160-4.5 MCG/ACT AERO    Inhale 2 puffs into the lungs 2 times daily.     BUSPIRONE (BUSPAR) 10 MG TABLET    Take 10 mg by mouth 2 times daily    CLOPIDOGREL (PLAVIX) 75 MG TABLET    Take 1 tablet by mouth daily for 21 days    DEXAMETHASONE (DECADRON) 2 MG TABLET    Take 1 tablet by mouth every 8 hours    FERROUS SULFATE 325 (65 FE) MG TABLET    Take 1 tablet by mouth 2 times daily (with meals)    FOLIC ACID (FOLVITE) 1 MG TABLET    Take 1 tablet by mouth daily    IPRATROPIUM BROMIDE (ATROVENT IN)    Inhale into the lungs 2 times daily     IPRATROPIUM-ALBUTEROL (DUONEB) 0.5-2.5 (3) MG/3ML SOLN NEBULIZER SOLUTION    Inhale 3 mLs into the lungs every 4 hours    MAGNESIUM OXIDE (MAG-OX) 400 (241.3 MG) MG TABS TABLET    Take 1 tablet by mouth daily for 10 days    OMEPRAZOLE (PRILOSEC) 20 MG CAPSULE    Take 40 mg by mouth daily     ONDANSETRON (ZOFRAN) 4 MG TABLET    Take 1 tablet by mouth every 8 hours as needed for Nausea or Vomiting    RANITIDINE (ZANTAC) 150 MG TABLET    Take 150 mg by mouth every morning. TAMSULOSIN (FLOMAX) 0.4 MG CAPSULE    Take 1 capsule by mouth daily    TERAZOSIN (HYTRIN) 2 MG CAPSULE    Take 2 mg by mouth nightly. VITAMIN B-12 1000 MCG TABLET    Take 1 tablet by mouth daily    VITAMIN C (VITAMIN C) 500 MG TABLET    Take 1 tablet by mouth daily       ALLERGIES     is allergic to lisinopril. FAMILY HISTORY     is adopted. family history is not on file. He was adopted. SOCIAL HISTORY      reports that he quit smoking about 5 months ago. His smoking use included cigarettes. He started smoking about 50 years ago. He has a 100.00 pack-year smoking history. He has never used smokeless tobacco. He reports that he drank alcohol. He reports that he does not use drugs. PHYSICAL EXAM     INITIAL VITALS:  height is 5' 8\" (1.727 m) and weight is 152 lb (68.9 kg). His oral temperature is 98.5 °F (36.9 °C). His blood pressure is 132/64 and his pulse is 80. His respiration is 16 and oxygen saturation is 93%. Physical Exam   Constitutional: He is oriented to person, place, and time. He appears well-developed and well-nourished. No distress. HENT:   Head: Normocephalic and atraumatic. Right Ear: External ear normal.   Left Ear: External ear normal.   Eyes: Conjunctivae are normal.   Neck: Normal range of motion. Neck supple. No thyromegaly present. Cardiovascular: Normal rate, regular rhythm and normal heart sounds.    No murmur

## 2019-07-05 LAB — THROAT/NOSE CULTURE: NORMAL

## 2019-07-11 ENCOUNTER — OFFICE VISIT (OUTPATIENT)
Dept: ONCOLOGY | Age: 69
End: 2019-07-11
Payer: MEDICARE

## 2019-07-11 ENCOUNTER — HOSPITAL ENCOUNTER (OUTPATIENT)
Dept: INFUSION THERAPY | Age: 69
Discharge: HOME | End: 2019-07-11
Payer: MEDICARE

## 2019-07-11 VITALS
SYSTOLIC BLOOD PRESSURE: 125 MMHG | TEMPERATURE: 97.1 F | OXYGEN SATURATION: 95 % | HEART RATE: 89 BPM | WEIGHT: 150.2 LBS | RESPIRATION RATE: 18 BRPM | DIASTOLIC BLOOD PRESSURE: 79 MMHG | HEIGHT: 68 IN | BODY MASS INDEX: 22.76 KG/M2

## 2019-07-11 DIAGNOSIS — C79.51 BONE METASTASES (HCC): ICD-10-CM

## 2019-07-11 DIAGNOSIS — G89.3 CANCER RELATED PAIN: ICD-10-CM

## 2019-07-11 DIAGNOSIS — C79.51 BONE METASTASES (HCC): Primary | ICD-10-CM

## 2019-07-11 DIAGNOSIS — C79.31 BRAIN METASTASIS (HCC): ICD-10-CM

## 2019-07-11 DIAGNOSIS — C80.1 SMALL CELL CARCINOMA (HCC): ICD-10-CM

## 2019-07-11 LAB
ALBUMIN SERPL-MCNC: 3.8 G/DL (ref 3.5–5.1)
ALP BLD-CCNC: 80 U/L (ref 38–126)
ALT SERPL-CCNC: 11 U/L (ref 11–66)
AST SERPL-CCNC: 14 U/L (ref 5–40)
BASINOPHIL, AUTOMATED: 0 % (ref 0–3)
BILIRUB SERPL-MCNC: 0.4 MG/DL (ref 0.3–1.2)
BILIRUBIN DIRECT: < 0.2 MG/DL (ref 0–0.3)
BUN, WHOLE BLOOD: 7 MG/DL (ref 8–26)
CHLORIDE, WHOLE BLOOD: 100 MEQ/L (ref 98–109)
CREATININE, WHOLE BLOOD: 0.6 MG/DL (ref 0.5–1.2)
EOSINOPHILS RELATIVE PERCENT: 2 % (ref 0–4)
GFR, ESTIMATED: > 90 ML/MIN/1.73M2
GLUCOSE, WHOLE BLOOD: 101 MG/DL (ref 70–108)
HCT VFR BLD CALC: 34.7 % (ref 42–52)
HEMOGLOBIN: 12.2 GM/DL (ref 14–18)
IONIZED CALCIUM, WHOLE BLOOD: 1.2 MMOL/L (ref 1.12–1.32)
LYMPHOCYTES # BLD: 10 % (ref 15–47)
MCH RBC QN AUTO: 33.3 PG (ref 27–31)
MCHC RBC AUTO-ENTMCNC: 35.2 GM/DL (ref 33–37)
MCV RBC AUTO: 95 FL (ref 80–94)
MONOCYTES: 9 % (ref 0–12)
PDW BLD-RTO: 12.5 % (ref 11.5–14.5)
PLATELET # BLD: 214 THOU/MM3 (ref 130–400)
PMV BLD AUTO: 6.3 FL (ref 7.4–10.4)
POTASSIUM, WHOLE BLOOD: 3.3 MEQ/L (ref 3.5–4.9)
RBC # BLD: 3.67 MILL/MM3 (ref 4.7–6.1)
SEG NEUTROPHILS: 79 % (ref 43–75)
SODIUM, WHOLE BLOOD: 140 MEQ/L (ref 138–146)
TOTAL CO2, WHOLE BLOOD: 28 MEQ/L (ref 23–33)
TOTAL PROTEIN: 6.5 G/DL (ref 6.1–8)
WBC # BLD: 4.6 THOU/MM3 (ref 4.8–10.8)

## 2019-07-11 PROCEDURE — 85025 COMPLETE CBC W/AUTO DIFF WBC: CPT

## 2019-07-11 PROCEDURE — 80076 HEPATIC FUNCTION PANEL: CPT

## 2019-07-11 PROCEDURE — 80047 BASIC METABLC PNL IONIZED CA: CPT

## 2019-07-11 PROCEDURE — 99214 OFFICE O/P EST MOD 30 MIN: CPT | Performed by: INTERNAL MEDICINE

## 2019-07-11 PROCEDURE — 99211 OFF/OP EST MAY X REQ PHY/QHP: CPT

## 2019-07-11 PROCEDURE — 36415 COLL VENOUS BLD VENIPUNCTURE: CPT

## 2019-07-11 RX ORDER — SODIUM CHLORIDE 0.9 % (FLUSH) 0.9 %
10 SYRINGE (ML) INJECTION PRN
Status: CANCELLED | OUTPATIENT
Start: 2019-07-29

## 2019-07-11 RX ORDER — HEPARIN SODIUM (PORCINE) LOCK FLUSH IV SOLN 100 UNIT/ML 100 UNIT/ML
500 SOLUTION INTRAVENOUS PRN
Status: CANCELLED | OUTPATIENT
Start: 2019-07-29

## 2019-07-11 RX ORDER — METHYLPREDNISOLONE SODIUM SUCCINATE 125 MG/2ML
125 INJECTION, POWDER, LYOPHILIZED, FOR SOLUTION INTRAMUSCULAR; INTRAVENOUS ONCE
Status: CANCELLED | OUTPATIENT
Start: 2019-07-29

## 2019-07-11 RX ORDER — METHADONE HYDROCHLORIDE 5 MG/1
5 TABLET ORAL EVERY 8 HOURS PRN
Qty: 90 TABLET | Refills: 0 | Status: ON HOLD | OUTPATIENT
Start: 2019-07-11 | End: 2019-08-12 | Stop reason: HOSPADM

## 2019-07-11 RX ORDER — SODIUM CHLORIDE 9 MG/ML
INJECTION, SOLUTION INTRAVENOUS CONTINUOUS
Status: CANCELLED | OUTPATIENT
Start: 2019-07-29

## 2019-07-11 RX ORDER — SODIUM CHLORIDE 9 MG/ML
20 INJECTION, SOLUTION INTRAVENOUS ONCE
Status: CANCELLED | OUTPATIENT
Start: 2019-07-29

## 2019-07-11 RX ORDER — SODIUM CHLORIDE 0.9 % (FLUSH) 0.9 %
5 SYRINGE (ML) INJECTION PRN
Status: CANCELLED | OUTPATIENT
Start: 2019-07-29

## 2019-07-11 RX ORDER — DIPHENHYDRAMINE HYDROCHLORIDE 50 MG/ML
50 INJECTION INTRAMUSCULAR; INTRAVENOUS ONCE
Status: CANCELLED | OUTPATIENT
Start: 2019-07-29

## 2019-07-11 RX ORDER — 0.9 % SODIUM CHLORIDE 0.9 %
10 VIAL (ML) INJECTION ONCE
Status: CANCELLED | OUTPATIENT
Start: 2019-07-29

## 2019-07-11 ASSESSMENT — ENCOUNTER SYMPTOMS
ABDOMINAL DISTENTION: 0
FACIAL SWELLING: 0
BACK PAIN: 1
EYE DISCHARGE: 0
VOMITING: 0
ABDOMINAL PAIN: 0
CHEST TIGHTNESS: 0
DIARRHEA: 0
COUGH: 0
SHORTNESS OF BREATH: 1
CONSTIPATION: 0
COLOR CHANGE: 0
BLOOD IN STOOL: 0
TROUBLE SWALLOWING: 0
RECTAL PAIN: 0
SORE THROAT: 0
NAUSEA: 0
WHEEZING: 0

## 2019-07-11 NOTE — PROGRESS NOTES
further evaluation. He underwent embolic workup and had negative CARLOS on 5/15/19. Neurology was consulted and recommended repeat MRI in 2 weeks to show stability of a new brain lesions. MRI of the brain on 5/23/19 showed enlarging focal areas of restricted diffusion throughout the brain. These likely represent metastatic lesions given interval increase in size compared to prior exam, these are unusual in that they do not enhance. Interval History 7/11/2019:   The patient is here for follow-up evaluation after he completed radiation treatment to the brain on 06/27/2019. He presented to the emergency room on July 3, 2019 with abdominal pain. He reports associated nausea, vomiting, constipation, decreased appetite, and decreased urination  He denies headache, lightheadedness, dizziness, blurry vision, visual deficits, weakness, numbness, or changes in balance.      HPI   Past Medical History:   Diagnosis Date    Arthritis     [address] (chronic obstructive pulmonary disease) ([facility] [facility]     Gastric reflux     Hyperlipidemia     Hypertension       Past Surgical History:   Procedure Laterality Date    COLONOSCOPY      HERNIA REPAIR  80's    bilateral inguinal    KIDNEY SURGERY      stent placement    [place] [address] INCL FLUOR GDNCE DX W/CELL WASHG SPX N/A 9/7/2018    BRONCHOSCOPY FLUOROSCOPY performed by Nahomy Hedrick MD at [facility]    [place] OFFICE/OUTPT VISIT,PROCEDURE ONLY Right 9/5/2018    RIGHT INGUINAL HERNIA REPAIR performed by Lorena Olmos MD at [facility] OFFICE/OUTPT [facility] N/A 9/11/2018    EXPLORATORY LAPAROSCOPY  OPEN PROCEDURE, SMALL BOWEL RESECTION performed by Lorena Olmos MD at [facility] TRANSESOPHAGEAL ECHOCARDIOGRAM Left 5/14/2019    TRANSESOPHAGEAL ECHOCARDIOGRAM performed by Pam Dey MD at [facility]      Family History   Adopted: Yes      Social History     Tobacco Use    Smoking status: Former Smoker     Packs/day: 2.00     Years: 50.00     Pack Pertinent review of systems noted in HPI, all other ROS negative. Objective:   Physical Exam   /79 (Site: Left Upper Arm, Position: Sitting, Cuff Size: Large Adult)   Pulse 89   Temp 97.1 °F (36.2 °C) (Oral)   Resp 18   Ht 5' 7.99\" (1.727 m)   Wt 150 lb 3.2 oz (68.1 kg)   SpO2 95%   BMI 22.84 kg/m²    General appearance: No apparent distress, chronically ill appearing, and cooperative. HEENT: Pupils equal, round, and reactive to light. Conjunctivae/corneas clear. Oral mucosa moist.   Neck: Supple, with full range of motion. Trachea midline. Respiratory:  Normal respiratory effort. Clear to auscultation, bilaterally without Rales/Wheezes/Rhonchi. Cardiovascular: Regular rate and rhythm with normal S1/S2   Abdomen: Soft, non-tender, non-distended with active bowel sounds. Musculoskeletal: No clubbing, cyanosis or edema bilaterally. Full range of motion without deformity. Skin: Skin color, texture, turgor normal.  No rashes or lesions. Neurologic:  Neurovascularly intact without any focal sensory/motor deficits. Cranial nerves: II-XII intact, grossly non-focal.  Psychiatric: Alert and oriented, thought content appropriate      Imaging Studies and Labs:   CBC:   Lab Results   Component Value Date    WBC 4.6 (L) 07/11/2019    HGB 12.2 (L) 07/11/2019    HCT 34.7 (L) 07/11/2019    MCV 95 (H) 07/11/2019     07/11/2019     BMP:   Lab Results   Component Value Date     07/11/2019     07/03/2019    K 3.3 07/11/2019    K 3.6 07/03/2019    K 3.4 05/14/2019    CL 97 07/03/2019    CO2 24 07/03/2019    BUN 7 07/03/2019    CREATININE 0.6 07/11/2019    CREATININE 0.5 07/03/2019    GLUCOSE 113 07/03/2019    CALCIUM 9.5 07/03/2019      LFT:   Lab Results   Component Value Date    ALT 11 07/11/2019    AST 14 07/11/2019    ALKPHOS 80 07/11/2019    BILITOT 0.4 07/11/2019      CT of the abdomen on July 3, 2019 showed:    1.  Multiple bony sclerotic lesions concerning for metastatic disease which objective response in 21% of patients receiving both nivolumab and ipilimumab, with three-month PFS rates of 30 %, and three-month OS rates of 65 %. Next , we discussed that treatment is associated with immune-related adverse events that typically are transient but occasionally can be severe or fatal. The most common and important irAEs are dermatologic, diarrhea/colitis, hepatotoxicity, and endocrinopathies, although other sites can also be affected  2. Brain Metastasis. Status post whole brain radiation treatment. The patient is off steroids. He denies having any headaches. 3.  Abdominal and  back pain. He takes up to 4 g of Tylenol at home. I convince him to try methadone. Prescription sent to the pharmacy. OARS prescription monitoring report for this patient was reviewed today. No signs of potential drug abuse or divergent fashion identified.        Electronically signed by   Abdulaziz Connelly MD

## 2019-07-17 ENCOUNTER — HOSPITAL ENCOUNTER (OUTPATIENT)
Dept: NUCLEAR MEDICINE | Age: 69
Discharge: HOME | End: 2019-07-17
Payer: MEDICARE

## 2019-07-17 DIAGNOSIS — G89.3 CANCER RELATED PAIN: ICD-10-CM

## 2019-07-17 DIAGNOSIS — C79.51 BONE METASTASES (HCC): ICD-10-CM

## 2019-07-17 PROCEDURE — 78306 BONE IMAGING WHOLE BODY: CPT

## 2019-07-17 PROCEDURE — A9503 TC99M MEDRONATE: HCPCS | Performed by: INTERNAL MEDICINE

## 2019-07-17 PROCEDURE — 3430000000 HC RX DIAGNOSTIC RADIOPHARMACEUTICAL: Performed by: INTERNAL MEDICINE

## 2019-07-17 RX ORDER — TC 99M MEDRONATE 20 MG/10ML
24 INJECTION, POWDER, LYOPHILIZED, FOR SOLUTION INTRAVENOUS
Status: COMPLETED | OUTPATIENT
Start: 2019-07-17 | End: 2019-07-17

## 2019-07-17 RX ADMIN — TC 99M MEDRONATE 24 MILLICURIE: 20 INJECTION, POWDER, LYOPHILIZED, FOR SOLUTION INTRAVENOUS at 10:40

## 2019-07-17 NOTE — PROGRESS NOTES
New chemotherapy validation note:    Diagnosis for chemotherapy: SCLC    Regimen ordered: Opdivo/Yervoy followed by [id number] [facility]    Reference or literature used for validation: NCCN     Date literature or guideline last updated 2019     Deviation from literature or guideline used: None    Summary of any verbal or telephone information obtained: n/a

## 2019-07-18 ENCOUNTER — TELEPHONE (OUTPATIENT)
Dept: ONCOLOGY | Age: 69
End: 2019-07-18

## 2019-07-23 ENCOUNTER — HOSPITAL ENCOUNTER (OUTPATIENT)
Dept: CT IMAGING | Age: 69
Discharge: HOME | End: 2019-07-23
Payer: MEDICARE

## 2019-07-23 DIAGNOSIS — C79.51 BONE METASTASES (HCC): ICD-10-CM

## 2019-07-23 DIAGNOSIS — G89.3 CANCER RELATED PAIN: ICD-10-CM

## 2019-07-23 PROCEDURE — 6360000004 HC RX CONTRAST MEDICATION: Performed by: INTERNAL MEDICINE

## 2019-07-23 PROCEDURE — 71260 CT THORAX DX C+: CPT

## 2019-07-23 RX ADMIN — IOPAMIDOL 85 ML: 755 INJECTION, SOLUTION INTRAVENOUS at 07:19

## 2019-07-24 DIAGNOSIS — C79.51 BONE METASTASES (HCC): Primary | ICD-10-CM

## 2019-07-28 RX ORDER — SODIUM CHLORIDE 9 MG/ML
INJECTION, SOLUTION INTRAVENOUS CONTINUOUS
Status: CANCELLED | OUTPATIENT
Start: 2019-07-29

## 2019-07-28 RX ORDER — METHYLPREDNISOLONE SODIUM SUCCINATE 125 MG/2ML
125 INJECTION, POWDER, LYOPHILIZED, FOR SOLUTION INTRAMUSCULAR; INTRAVENOUS ONCE
Status: CANCELLED | OUTPATIENT
Start: 2019-07-29

## 2019-07-28 RX ORDER — SODIUM CHLORIDE 0.9 % (FLUSH) 0.9 %
5 SYRINGE (ML) INJECTION PRN
Status: CANCELLED | OUTPATIENT
Start: 2019-07-29

## 2019-07-28 RX ORDER — SODIUM CHLORIDE 9 MG/ML
20 INJECTION, SOLUTION INTRAVENOUS ONCE
Status: CANCELLED | OUTPATIENT
Start: 2019-07-29

## 2019-07-28 RX ORDER — MEPERIDINE HYDROCHLORIDE 50 MG/ML
12.5 INJECTION INTRAMUSCULAR; INTRAVENOUS; SUBCUTANEOUS ONCE
Status: CANCELLED | OUTPATIENT
Start: 2019-07-29

## 2019-07-28 RX ORDER — SODIUM CHLORIDE 0.9 % (FLUSH) 0.9 %
10 SYRINGE (ML) INJECTION PRN
Status: CANCELLED | OUTPATIENT
Start: 2019-07-29

## 2019-07-28 RX ORDER — HEPARIN SODIUM (PORCINE) LOCK FLUSH IV SOLN 100 UNIT/ML 100 UNIT/ML
500 SOLUTION INTRAVENOUS PRN
Status: CANCELLED | OUTPATIENT
Start: 2019-07-29

## 2019-07-28 RX ORDER — DIPHENHYDRAMINE HYDROCHLORIDE 50 MG/ML
50 INJECTION INTRAMUSCULAR; INTRAVENOUS ONCE
Status: CANCELLED | OUTPATIENT
Start: 2019-07-29

## 2019-07-28 ASSESSMENT — ENCOUNTER SYMPTOMS
EYE DISCHARGE: 0
NAUSEA: 0
SHORTNESS OF BREATH: 1
WHEEZING: 0
BLOOD IN STOOL: 0
ABDOMINAL PAIN: 0
DIARRHEA: 0
COLOR CHANGE: 0
CONSTIPATION: 0
COUGH: 0
ABDOMINAL DISTENTION: 0
VOMITING: 0
FACIAL SWELLING: 0
RECTAL PAIN: 0
BACK PAIN: 1
SORE THROAT: 0
CHEST TIGHTNESS: 0
TROUBLE SWALLOWING: 0

## 2019-07-29 ENCOUNTER — OFFICE VISIT (OUTPATIENT)
Dept: ONCOLOGY | Age: 69
End: 2019-07-29
Payer: MEDICARE

## 2019-07-29 ENCOUNTER — HOSPITAL ENCOUNTER (OUTPATIENT)
Dept: INFUSION THERAPY | Age: 69
Discharge: HOME | End: 2019-07-29
Payer: MEDICARE

## 2019-07-29 VITALS
TEMPERATURE: 98 F | BODY MASS INDEX: 21.19 KG/M2 | HEIGHT: 68 IN | OXYGEN SATURATION: 95 % | DIASTOLIC BLOOD PRESSURE: 84 MMHG | HEART RATE: 85 BPM | WEIGHT: 139.8 LBS | SYSTOLIC BLOOD PRESSURE: 130 MMHG | RESPIRATION RATE: 20 BRPM

## 2019-07-29 VITALS
WEIGHT: 139.8 LBS | OXYGEN SATURATION: 98 % | DIASTOLIC BLOOD PRESSURE: 70 MMHG | BODY MASS INDEX: 21.19 KG/M2 | RESPIRATION RATE: 18 BRPM | TEMPERATURE: 98 F | HEART RATE: 73 BPM | SYSTOLIC BLOOD PRESSURE: 140 MMHG | HEIGHT: 68 IN

## 2019-07-29 DIAGNOSIS — J44.1 CHRONIC OBSTRUCTIVE PULMONARY DISEASE WITH ACUTE EXACERBATION (HCC): ICD-10-CM

## 2019-07-29 DIAGNOSIS — C79.51 BONE METASTASES (HCC): Primary | ICD-10-CM

## 2019-07-29 DIAGNOSIS — C80.1 SMALL CELL CARCINOMA (HCC): ICD-10-CM

## 2019-07-29 DIAGNOSIS — R59.0 MEDIASTINAL LYMPHADENOPATHY: ICD-10-CM

## 2019-07-29 DIAGNOSIS — C78.7 LIVER METASTASIS (HCC): ICD-10-CM

## 2019-07-29 DIAGNOSIS — C79.31 BRAIN METASTASIS (HCC): ICD-10-CM

## 2019-07-29 DIAGNOSIS — G89.3 CANCER RELATED PAIN: ICD-10-CM

## 2019-07-29 DIAGNOSIS — Z51.11 ENCOUNTER FOR CHEMOTHERAPY MANAGEMENT: ICD-10-CM

## 2019-07-29 LAB
ALBUMIN SERPL-MCNC: 4.2 G/DL (ref 3.5–5.1)
ALP BLD-CCNC: 94 U/L (ref 38–126)
ALT SERPL-CCNC: 8 U/L (ref 11–66)
AST SERPL-CCNC: 13 U/L (ref 5–40)
BILIRUB SERPL-MCNC: 0.7 MG/DL (ref 0.3–1.2)
BILIRUBIN DIRECT: < 0.2 MG/DL (ref 0–0.3)
BUN, WHOLE BLOOD: 9 MG/DL (ref 8–26)
CHLORIDE, WHOLE BLOOD: 100 MEQ/L (ref 98–109)
CREATININE, WHOLE BLOOD: 0.6 MG/DL (ref 0.5–1.2)
GFR, ESTIMATED: > 90 ML/MIN/1.73M2
GLUCOSE, WHOLE BLOOD: 116 MG/DL (ref 70–108)
HCT VFR BLD CALC: 39 % (ref 42–52)
HEMOGLOBIN: 13.5 GM/DL (ref 14–18)
IONIZED CALCIUM, WHOLE BLOOD: 1.2 MMOL/L (ref 1.12–1.32)
MCH RBC QN AUTO: 32.7 PG (ref 27–31)
MCHC RBC AUTO-ENTMCNC: 34.7 GM/DL (ref 33–37)
MCV RBC AUTO: 94 FL (ref 80–94)
PDW BLD-RTO: 12.4 % (ref 11.5–14.5)
PLATELET # BLD: 295 THOU/MM3 (ref 130–400)
PMV BLD AUTO: 5.8 FL (ref 7.4–10.4)
POTASSIUM, WHOLE BLOOD: 4 MEQ/L (ref 3.5–4.9)
RBC # BLD: 4.14 MILL/MM3 (ref 4.7–6.1)
SEG NEUTROPHILS: 82 % (ref 43–75)
SEGMENTED NEUTROPHILS ABSOLUTE COUNT: 5.1 THOU/MM3 (ref 1.8–7.7)
SODIUM, WHOLE BLOOD: 138 MEQ/L (ref 138–146)
TOTAL CO2, WHOLE BLOOD: 27 MEQ/L (ref 23–33)
TOTAL PROTEIN: 7.3 G/DL (ref 6.1–8)
WBC # BLD: 6.2 THOU/MM3 (ref 4.8–10.8)

## 2019-07-29 PROCEDURE — 36415 COLL VENOUS BLD VENIPUNCTURE: CPT

## 2019-07-29 PROCEDURE — 99211 OFF/OP EST MAY X REQ PHY/QHP: CPT

## 2019-07-29 PROCEDURE — 80076 HEPATIC FUNCTION PANEL: CPT

## 2019-07-29 PROCEDURE — 96417 CHEMO IV INFUS EACH ADDL SEQ: CPT

## 2019-07-29 PROCEDURE — 96367 TX/PROPH/DG ADDL SEQ IV INF: CPT

## 2019-07-29 PROCEDURE — 85027 COMPLETE CBC AUTOMATED: CPT

## 2019-07-29 PROCEDURE — 6360000002 HC RX W HCPCS: Performed by: INTERNAL MEDICINE

## 2019-07-29 PROCEDURE — 2709999900 HC NON-CHARGEABLE SUPPLY

## 2019-07-29 PROCEDURE — 99215 OFFICE O/P EST HI 40 MIN: CPT | Performed by: INTERNAL MEDICINE

## 2019-07-29 PROCEDURE — 96415 CHEMO IV INFUSION ADDL HR: CPT

## 2019-07-29 PROCEDURE — 2580000003 HC RX 258: Performed by: INTERNAL MEDICINE

## 2019-07-29 PROCEDURE — 96413 CHEMO IV INFUSION 1 HR: CPT

## 2019-07-29 PROCEDURE — 80047 BASIC METABLC PNL IONIZED CA: CPT

## 2019-07-29 RX ORDER — ZINC OXIDE 216 MG/ML
1 LOTION TOPICAL 3 TIMES DAILY
Qty: 90 CAN | Refills: 0 | Status: SHIPPED | OUTPATIENT
Start: 2019-07-29

## 2019-07-29 RX ORDER — OLANZAPINE 5 MG/1
5 TABLET ORAL NIGHTLY
Qty: 30 TABLET | Refills: 3 | Status: ON HOLD | OUTPATIENT
Start: 2019-07-29 | End: 2019-09-12

## 2019-07-29 RX ORDER — SODIUM CHLORIDE 9 MG/ML
20 INJECTION, SOLUTION INTRAVENOUS ONCE
Status: COMPLETED | OUTPATIENT
Start: 2019-07-29 | End: 2019-07-29

## 2019-07-29 RX ADMIN — SODIUM CHLORIDE 20 ML/HR: 9 INJECTION, SOLUTION INTRAVENOUS at 11:57

## 2019-07-29 RX ADMIN — SODIUM CHLORIDE 68 MG: 9 INJECTION, SOLUTION INTRAVENOUS at 11:57

## 2019-07-29 RX ADMIN — ZOLEDRONIC ACID 4 MG: 4 INJECTION, SOLUTION, CONCENTRATE INTRAVENOUS at 14:16

## 2019-07-29 RX ADMIN — SODIUM CHLORIDE 200 MG: 0.9 INJECTION, SOLUTION INTRAVENOUS at 12:40

## 2019-07-29 ASSESSMENT — PAIN SCALES - GENERAL: PAINLEVEL_OUTOF10: 5

## 2019-07-29 NOTE — PLAN OF CARE
Care plan reviewed with patient. Patient  verbalized understanding of the plan of care and contribute to goal setting. Problem: Intellectual/Education/Knowledge Deficit  Goal: Teaching initiated upon admission  Outcome: Met This Shift  Note:   Patient verbalizes understanding to verbal information given on Opdivo, Yervoy and Zometa,action and possible side effects. Aware to call MD if develop complications. Intervention: Verbal/written education provided  Note:   Chemotherapy Teaching     What is Chemotherapy   Drug action ? Method of Administration ? Handouts given ? Side Effects  Nausea/vomiting ? Diarrhea ? Fatigue ? Signs / Symptoms of infection ? Neutropenia ? Thrombocytopenia ? Alopecia ? neuropathy ? [place] diet &  the importance of fluids ? Micellaneous  Importance of nutrition ? Importance of oral hygiene ? When to call the MD ?   Monitoring labs ? Use of supportive services ? Explanation of Drug Regimen / Frequency  Yervoy, Opdivo and Zometa     Comments  Verbalized understanding to drug,action,side effects and when to call MD         Problem: Discharge Planning  Goal: Knowledge of discharge instructions  Description  Knowledge of discharge instructions     Outcome: Met This Shift  Note:   Verbalized understanding of discharge instructions, follow ups and when to call doctor   Intervention: Discharge to appropriate level of care  Note:   Discuss discharge instructions, follow ups and when to call doctor. Problem: Falls - Risk of:  Goal: Will remain free from falls  Description  Will remain free from falls  Outcome: Met This Shift  Note:   Free from falls while in infusion center.  Verbalized understanding of fall prevention and to ask for assistance with ambulation   Intervention: Assess risk factors for falls  Description  Assess risk factors for falls  Note:   Assess for fall risk, instruct to ask for assistance with ambulation

## 2019-07-29 NOTE — PROGRESS NOTES
GLUCOSE 113 07/03/2019    CALCIUM 9.5 07/03/2019      LFT:   Lab Results   Component Value Date    ALT 8 (L) 07/29/2019    AST 13 07/29/2019    ALKPHOS 94 07/29/2019    BILITOT 0.7 07/29/2019      CT of the abdomen on July 3, 2019 showed:    1. Multiple bony sclerotic lesions concerning for metastatic disease which have become more pronounced when compared to prior study compatible with metastatic disease. .       2. Interval increase in hepatic lobe lesions particularly right posterior hepatic lobe lesion. Abdominal MRI is advised if not already performed. Findings concerning for metastatic disease.       3. Thickening and nodularity of the left adrenal gland similar in appearance to prior study. 4. Extensive colonic diverticulosis without evidence without evidence for diverticulitis at this time. 4. Right hydronephrosis without obstructing ureteral stone. Correlation with urinalysis is advised. Similar findings were seen on prior CT dated January 25, 2015. CT of the chest on July 23, 2019 showed:  1. Moderate pericardial effusion is present.       2. Minimal pleural fluid is demonstrated at the posterior medial left lung base which is new from the prior examination.       3. There is a small pleural-based nodule demonstrated on axial image 47 measuring approximately 5 x 3 mm. Previously this measured approximately 1.9 x 0.9 cm in January 2019. However this appears slightly more prominent when compared to prior examination    from 5/13/2019 at which time it was not definitely seen.       4. There is a 3.2 x 2.7 cm low-attenuation lesion within the posterior right hepatic lobe on axial image 62. Previously this measured 2.4 x 2 cm. This has increased in size from the prior examination and is concerning for progression of metastatic    disease. There is an ill-defined low-attenuation lesion within the left hepatic lobe on axial image 60 measuring approximately 2. Tree by 2.9 cm.  Previously this measured showed progression of disease in the liver and the bones. Patient completed metastatic work-up. He had CT of the chest bone scan that did confirm evidence of disease progression. His performance status is 2/3 today. The patient still wants to try second line of palliative treatment. We will proceed with combination of nivolumab and ipi. [facility] the randomized portion of the phase II study that enrolled more than 240 patients treated with  the combination of Nivolumab with Ipililumab, showed an objective response in 21% of patients receiving both nivolumab and ipilimumab, with three-month PFS rates of 30 %, and three-month OS rates of 65 %. Next , we discussed that treatment is associated with immune-related adverse events that typically are transient but occasionally can be severe or fatal. The most common and important irAEs are dermatologic, diarrhea/colitis, hepatotoxicity, and endocrinopathies, although other sites can also be affected. He was instructed to call us immediately with uncontrolled diarrhea skin rash and worsening shortness of breath  2. Brain Metastasis. Status post whole brain radiation treatment. The patient is off steroids. He denies having any headaches. 3.  Abdominal and  back pain. Not well controlled with methadone, however the patient takes only 1 tablet daily. I had a lengthy discussion that before I would change his pain regimen he has to take it as prescribed every 6-8 hours. 4.  Nausea and poor appetite. Prescription sent to the pharmacy for Zyprexa. The patient was instructed to use supplements like boost.  4.  Extensive bone metastasis.   The patient will receive first dose of Zometa today       Electronically signed by   Delmi Aguilar MD

## 2019-07-29 NOTE — PROGRESS NOTES
Chemotherapy Administration    Pre-assessment Data: Antineoplastic Agents  Other:   See toxicity flow sheet for assessment [x]     Physician Notification of Concerns Related to Chemotherapy Administration:   Physician Notified Tala Willis / Time of Notification      Interventions:   Lab work assessed  [x]   Height / Weight verified for dose [x]   Current MAR reviewed [x]   Emergency drugs available as appropriate [x]   Anaphylaxis assessment completed [x]   Pre-medications administered as ordered [x]   Blood return noted upon initiation of chemotherapy [x]   Blood return noted each 1-2ml of a vesicant medication if given IV push []   Blood return noted each 2-3ml of a non-vesicant medication if given IV push []   Monitor for signs / symptoms of hypersensitivity reaction [x]   Chemotherapy orders (drug/dose/rate) verified by 2 Chemo certified RNs [x]   Monitor IV site and blood return throughout the infusion of the medication [x]   Document IV site checks on the IV assessment form [x]   Document chemotherapy teaching on the Patient Education tab [x]   Document patient verbalizes understanding of medications being administered [x]   If IV infiltration, see ONS Guidelines []   Other:      []

## 2019-07-29 NOTE — PROGRESS NOTES
Patient assessed for the following post chemotherapy:    Dizziness   No  Lightheadedness  No      Acute nausea/vomiting No  Headache   No  Chest pain/pressure  No  Rash/itching   No  Shortness of breath  No    Patient kept for 20 minutes observation post infusion chemotherapy. Patient tolerated chemotherapy treatment Yervoy, Opdivo and Zometa without any complications. Last vital signs:   BP (!) 140/70   Pulse 73   Temp 98 °F (36.7 °C) (Oral)   Resp 18   Ht 5' 7.99\" (1.727 m)   Wt 139 lb 12.8 oz (63.4 kg)   SpO2 98%   BMI 21.26 kg/m²       Patient instructed if experience any of the above symptoms following today's infusion,he/she is to notify MD immediately or go to the emergency department. Discharge instructions given to patient. Verbalizes understanding. Ambulated off unit per self with belongings.

## 2019-08-01 ENCOUNTER — TELEPHONE (OUTPATIENT)
Dept: INFUSION THERAPY | Age: 69
End: 2019-08-01

## 2019-08-05 ENCOUNTER — HOSPITAL ENCOUNTER (OUTPATIENT)
Dept: RADIATION ONCOLOGY | Age: 69
Discharge: HOME | DRG: 438 | End: 2019-08-05
Attending: RADIOLOGY
Payer: MEDICARE

## 2019-08-05 VITALS
SYSTOLIC BLOOD PRESSURE: 119 MMHG | TEMPERATURE: 97.9 F | OXYGEN SATURATION: 95 % | HEART RATE: 110 BPM | RESPIRATION RATE: 16 BRPM | WEIGHT: 140.43 LBS | BODY MASS INDEX: 21.36 KG/M2 | DIASTOLIC BLOOD PRESSURE: 63 MMHG

## 2019-08-05 PROCEDURE — 99212 OFFICE O/P EST SF 10 MIN: CPT | Performed by: NURSE PRACTITIONER

## 2019-08-05 ASSESSMENT — PAIN SCALES - GENERAL: PAINLEVEL_OUTOF10: 6

## 2019-08-05 ASSESSMENT — PAIN DESCRIPTION - ONSET: ONSET: ON-GOING

## 2019-08-05 ASSESSMENT — PAIN DESCRIPTION - LOCATION: LOCATION: BACK

## 2019-08-05 ASSESSMENT — PAIN DESCRIPTION - PAIN TYPE: TYPE: CHRONIC PAIN

## 2019-08-05 ASSESSMENT — PAIN DESCRIPTION - FREQUENCY: FREQUENCY: CONTINUOUS

## 2019-08-05 ASSESSMENT — PAIN DESCRIPTION - ORIENTATION: ORIENTATION: LOWER

## 2019-08-05 ASSESSMENT — PAIN DESCRIPTION - PROGRESSION: CLINICAL_PROGRESSION: NOT CHANGED

## 2019-08-05 NOTE — PROGRESS NOTES
metastasis, probable left adrenal metastases and liver metastases.      Due to presentation with SVC the patient started radiation treatment on January 25, 2019 and completed on 02/20/2019.  On January 28, 2019 he received first cycle of chemotherapy with -16 and carboplatin. Maddison Nails did have improvement with his breathing and a decrease in the chest pain after completion of radiation. Maddison Nails had some stroke like symptoms and underwent CTA head which showed initially showed small infarct but no mass effect. Symptoms continued to progress and he underwent serial brain MRIs with the most recent study demonstrating increasing size of the abnormalities. He is seen to discuss radiotherapy as an intervention. Maddison Nails was agreeable to the radiation. He tolerated whole brain treatment without any unexpected side effects. INTERVAL HISTORY: Maddison Nails returns to [address] [place] [address] for a post treatment follow up after undergoing radiation to left hilar mass and more recently to the whole brain for metastatic disease. Since completing this last round of radiation he presented to the ER on 7/3/19 with abdominal pain and CT of the abdomen showed progression of disease in the liver and bones. He met with medical oncology and metastatic workup was completed with bone scan and CT chest, which did confirm evidence of disease progression. After review of the scan results with Dr. Slime Minaya he has decided to proceed with immunotherapy and zometa. Patient states he started treatment 7/29/19. Today he denies headaches, vision or hearing changes, difficulty with speech, lightheadedness, dizziness, changes with balance or seizure like activity. He admits to continued fatigue and weakness. He would like to have a wheelchair to assist him at home.      LAB RESULTS:   CBC:   Lab Results   Component Value Date    WBC 6.2 07/29/2019    RBC 4.14 07/29/2019    HGB 13.5 07/29/2019    HCT 39.0 07/29/2019    MCV 94 07/29/2019    MCH 32.7 07/29/2019 tablet by mouth daily 30 tablet 0    vitamin B-12 1000 MCG tablet Take 1 tablet by mouth daily 30 tablet 0    tamsulosin (FLOMAX) 0.4 MG capsule Take 1 capsule by mouth daily 30 capsule 0    ipratropium-albuterol (DUONEB) 0.5-2.5 (3) MG/3ML SOLN nebulizer solution Inhale 3 mLs into the lungs every 4 hours 360 mL 11    ondansetron (ZOFRAN) 4 MG tablet Take 1 tablet by mouth every 8 hours as needed for Nausea or Vomiting 30 tablet 0    albuterol sulfate HFA (VENTOLIN HFA) 108 (90 Base) MCG/ACT inhaler Inhale 2 puffs into the lungs every 4 hours as needed for Wheezing or Shortness of Breath 1 Inhaler 11    acetaminophen (TYLENOL) 325 MG tablet Take 2 tablets by mouth every 4 hours as needed for Pain 120 tablet 3    busPIRone (BUSPAR) 10 MG tablet Take 10 mg by mouth 2 times daily      Ipratropium Bromide (ATROVENT IN) Inhale into the lungs 2 times daily       budesonide-formoterol (SYMBICORT) 160-4.5 MCG/ACT AERO Inhale 2 puffs into the lungs 2 times daily.  terazosin (HYTRIN) 2 MG capsule Take 2 mg by mouth nightly.  omeprazole (PRILOSEC) 20 MG capsule Take 40 mg by mouth daily       ranitidine (ZANTAC) 150 MG tablet Take 150 mg by mouth every morning. No current facility-administered medications for this encounter. ROS: As noted in the HPI and Interval history, otherwise negative. EXAMINATION:   GENERAL: Jarocho Ball is a pleasant adult male who is alert and oriented x3 in no acute distress. VITAL SIGNS:  Weight 63.7kg, temperature 36.6 celsius, pulse 110, /63, respirations 16, pulse ox 95% room air. HEENT: Normocephalic, atraumatic, PERRL, EOMI. Ears, nose and lips are within normal limits on external examination. Oropharynx unremarkable. NECK: Without palpable adenopathy  LYMPH: Without supraclavicular or axillary adenopathy. LUNGS: Clear without adventitious sounds. Respirations easy and unlabored. HEART: Regular rate and rhythm.    ABDOMEN: Soft, non-tender,

## 2019-08-08 ENCOUNTER — HOSPITAL ENCOUNTER (INPATIENT)
Age: 69
LOS: 4 days | Discharge: HOME HEALTH | DRG: 438 | End: 2019-08-12
Attending: FAMILY MEDICINE | Admitting: INTERNAL MEDICINE
Payer: MEDICARE

## 2019-08-08 ENCOUNTER — APPOINTMENT (OUTPATIENT)
Dept: CT IMAGING | Age: 69
DRG: 438 | End: 2019-08-08
Payer: MEDICARE

## 2019-08-08 DIAGNOSIS — R10.10 PAIN OF UPPER ABDOMEN: Primary | ICD-10-CM

## 2019-08-08 DIAGNOSIS — C78.7 LIVER METASTASES (HCC): ICD-10-CM

## 2019-08-08 DIAGNOSIS — K59.00 CONSTIPATION, UNSPECIFIED CONSTIPATION TYPE: ICD-10-CM

## 2019-08-08 DIAGNOSIS — C34.92 MALIGNANT NEOPLASM OF LEFT LUNG, UNSPECIFIED PART OF LUNG (HCC): ICD-10-CM

## 2019-08-08 LAB
ALBUMIN SERPL-MCNC: 3.6 G/DL (ref 3.5–5.1)
ALP BLD-CCNC: 91 U/L (ref 38–126)
ALT SERPL-CCNC: 8 U/L (ref 11–66)
ANION GAP SERPL CALCULATED.3IONS-SCNC: 13 MEQ/L (ref 8–16)
AST SERPL-CCNC: 13 U/L (ref 5–40)
BASOPHILS # BLD: 0.5 %
BASOPHILS ABSOLUTE: 0 THOU/MM3 (ref 0–0.1)
BILIRUB SERPL-MCNC: 0.6 MG/DL (ref 0.3–1.2)
BILIRUBIN DIRECT: < 0.2 MG/DL (ref 0–0.3)
BUN BLDV-MCNC: 7 MG/DL (ref 7–22)
CALCIUM IONIZED: 1.11 MMOL/L (ref 1.12–1.32)
CALCIUM SERPL-MCNC: 8.9 MG/DL (ref 8.5–10.5)
CHLORIDE BLD-SCNC: 99 MEQ/L (ref 98–111)
CO2: 25 MEQ/L (ref 23–33)
CREAT SERPL-MCNC: 0.4 MG/DL (ref 0.4–1.2)
EOSINOPHIL # BLD: 2.4 %
EOSINOPHILS ABSOLUTE: 0.1 THOU/MM3 (ref 0–0.4)
ERYTHROCYTE [DISTWIDTH] IN BLOOD BY AUTOMATED COUNT: 14.4 % (ref 11.5–14.5)
ERYTHROCYTE [DISTWIDTH] IN BLOOD BY AUTOMATED COUNT: 48.3 FL (ref 35–45)
GFR SERPL CREATININE-BSD FRML MDRD: > 90 ML/MIN/1.73M2
GLUCOSE BLD-MCNC: 128 MG/DL (ref 70–108)
HCT VFR BLD CALC: 36.1 % (ref 42–52)
HEMOCCULT STL QL: NEGATIVE
HEMOGLOBIN: 12.1 GM/DL (ref 14–18)
IMMATURE GRANS (ABS): 0.03 THOU/MM3 (ref 0–0.07)
IMMATURE GRANULOCYTES: 1 %
LIPASE: 506.9 U/L (ref 5.6–51.3)
LYMPHOCYTES # BLD: 13 %
LYMPHOCYTES ABSOLUTE: 0.5 THOU/MM3 (ref 1–4.8)
MAGNESIUM: 1.8 MG/DL (ref 1.6–2.4)
MCH RBC QN AUTO: 30.9 PG (ref 26–33)
MCHC RBC AUTO-ENTMCNC: 33.5 GM/DL (ref 32.2–35.5)
MCV RBC AUTO: 92.1 FL (ref 80–94)
MONOCYTES # BLD: 15.6 %
MONOCYTES ABSOLUTE: 0.6 THOU/MM3 (ref 0.4–1.3)
NUCLEATED RED BLOOD CELLS: 0 /100 WBC
OSMOLALITY CALCULATION: 273.4 MOSMOL/KG (ref 275–300)
PHOSPHORUS: 0.7 MG/DL (ref 2.4–4.7)
PLATELET # BLD: 226 THOU/MM3 (ref 130–400)
PMV BLD AUTO: 8.4 FL (ref 9.4–12.4)
POTASSIUM SERPL-SCNC: 3.8 MEQ/L (ref 3.5–5.2)
RBC # BLD: 3.92 MILL/MM3 (ref 4.7–6.1)
SEG NEUTROPHILS: 67.8 %
SEGMENTED NEUTROPHILS ABSOLUTE COUNT: 2.8 THOU/MM3 (ref 1.8–7.7)
SODIUM BLD-SCNC: 137 MEQ/L (ref 135–145)
TOTAL PROTEIN: 6.9 G/DL (ref 6.1–8)
WBC # BLD: 4.1 THOU/MM3 (ref 4.8–10.8)

## 2019-08-08 PROCEDURE — 1200000000 HC SEMI PRIVATE

## 2019-08-08 PROCEDURE — 80076 HEPATIC FUNCTION PANEL: CPT

## 2019-08-08 PROCEDURE — 83735 ASSAY OF MAGNESIUM: CPT

## 2019-08-08 PROCEDURE — 94640 AIRWAY INHALATION TREATMENT: CPT

## 2019-08-08 PROCEDURE — 6370000000 HC RX 637 (ALT 250 FOR IP): Performed by: NURSE PRACTITIONER

## 2019-08-08 PROCEDURE — 2580000003 HC RX 258: Performed by: NURSE PRACTITIONER

## 2019-08-08 PROCEDURE — 74176 CT ABD & PELVIS W/O CONTRAST: CPT

## 2019-08-08 PROCEDURE — 82272 OCCULT BLD FECES 1-3 TESTS: CPT

## 2019-08-08 PROCEDURE — 2709999900 HC NON-CHARGEABLE SUPPLY

## 2019-08-08 PROCEDURE — 6360000002 HC RX W HCPCS: Performed by: NURSE PRACTITIONER

## 2019-08-08 PROCEDURE — 82330 ASSAY OF CALCIUM: CPT

## 2019-08-08 PROCEDURE — 2580000003 HC RX 258: Performed by: FAMILY MEDICINE

## 2019-08-08 PROCEDURE — 85025 COMPLETE CBC W/AUTO DIFF WBC: CPT

## 2019-08-08 PROCEDURE — 36415 COLL VENOUS BLD VENIPUNCTURE: CPT

## 2019-08-08 PROCEDURE — 99223 1ST HOSP IP/OBS HIGH 75: CPT | Performed by: NURSE PRACTITIONER

## 2019-08-08 PROCEDURE — 94760 N-INVAS EAR/PLS OXIMETRY 1: CPT

## 2019-08-08 PROCEDURE — 80048 BASIC METABOLIC PNL TOTAL CA: CPT

## 2019-08-08 PROCEDURE — 83690 ASSAY OF LIPASE: CPT

## 2019-08-08 PROCEDURE — 99285 EMERGENCY DEPT VISIT HI MDM: CPT

## 2019-08-08 PROCEDURE — 2500000003 HC RX 250 WO HCPCS: Performed by: NURSE PRACTITIONER

## 2019-08-08 PROCEDURE — 84100 ASSAY OF PHOSPHORUS: CPT

## 2019-08-08 PROCEDURE — 6370000000 HC RX 637 (ALT 250 FOR IP): Performed by: FAMILY MEDICINE

## 2019-08-08 RX ORDER — SODIUM CHLORIDE 9 MG/ML
INJECTION, SOLUTION INTRAVENOUS CONTINUOUS
Status: DISCONTINUED | OUTPATIENT
Start: 2019-08-08 | End: 2019-08-09

## 2019-08-08 RX ORDER — OLANZAPINE 5 MG/1
5 TABLET ORAL NIGHTLY
Status: DISCONTINUED | OUTPATIENT
Start: 2019-08-08 | End: 2019-08-12 | Stop reason: HOSPADM

## 2019-08-08 RX ORDER — POLYETHYLENE GLYCOL 3350 17 G/17G
17 POWDER, FOR SOLUTION ORAL DAILY PRN
Status: DISCONTINUED | OUTPATIENT
Start: 2019-08-08 | End: 2019-08-09

## 2019-08-08 RX ORDER — BUSPIRONE HYDROCHLORIDE 10 MG/1
10 TABLET ORAL 2 TIMES DAILY
Status: DISCONTINUED | OUTPATIENT
Start: 2019-08-08 | End: 2019-08-12 | Stop reason: HOSPADM

## 2019-08-08 RX ORDER — ACETAMINOPHEN 325 MG/1
650 TABLET ORAL EVERY 4 HOURS PRN
Status: DISCONTINUED | OUTPATIENT
Start: 2019-08-08 | End: 2019-08-12 | Stop reason: HOSPADM

## 2019-08-08 RX ORDER — 0.9 % SODIUM CHLORIDE 0.9 %
500 INTRAVENOUS SOLUTION INTRAVENOUS ONCE
Status: COMPLETED | OUTPATIENT
Start: 2019-08-08 | End: 2019-08-08

## 2019-08-08 RX ORDER — FAMOTIDINE 20 MG/1
20 TABLET, FILM COATED ORAL DAILY
Status: DISCONTINUED | OUTPATIENT
Start: 2019-08-08 | End: 2019-08-12 | Stop reason: HOSPADM

## 2019-08-08 RX ORDER — IBUPROFEN 800 MG/1
800 TABLET ORAL EVERY 8 HOURS PRN
Status: DISCONTINUED | OUTPATIENT
Start: 2019-08-08 | End: 2019-08-09

## 2019-08-08 RX ORDER — BISACODYL 10 MG
10 SUPPOSITORY, RECTAL RECTAL DAILY
Status: DISCONTINUED | OUTPATIENT
Start: 2019-08-08 | End: 2019-08-12 | Stop reason: HOSPADM

## 2019-08-08 RX ORDER — METHADONE HYDROCHLORIDE 10 MG/1
5 TABLET ORAL EVERY 8 HOURS PRN
Status: DISCONTINUED | OUTPATIENT
Start: 2019-08-08 | End: 2019-08-12

## 2019-08-08 RX ORDER — DOXAZOSIN MESYLATE 4 MG/1
2 TABLET ORAL DAILY
Status: DISCONTINUED | OUTPATIENT
Start: 2019-08-08 | End: 2019-08-12 | Stop reason: HOSPADM

## 2019-08-08 RX ORDER — FOLIC ACID 1 MG/1
1 TABLET ORAL DAILY
Status: DISCONTINUED | OUTPATIENT
Start: 2019-08-08 | End: 2019-08-12 | Stop reason: HOSPADM

## 2019-08-08 RX ORDER — SODIUM CHLORIDE 0.9 % (FLUSH) 0.9 %
10 SYRINGE (ML) INJECTION PRN
Status: DISCONTINUED | OUTPATIENT
Start: 2019-08-08 | End: 2019-08-12 | Stop reason: HOSPADM

## 2019-08-08 RX ORDER — ASPIRIN 81 MG/1
81 TABLET, CHEWABLE ORAL DAILY
Status: DISCONTINUED | OUTPATIENT
Start: 2019-08-08 | End: 2019-08-12 | Stop reason: HOSPADM

## 2019-08-08 RX ORDER — PANTOPRAZOLE SODIUM 40 MG/1
40 TABLET, DELAYED RELEASE ORAL
Status: DISCONTINUED | OUTPATIENT
Start: 2019-08-09 | End: 2019-08-12 | Stop reason: HOSPADM

## 2019-08-08 RX ORDER — TAMSULOSIN HYDROCHLORIDE 0.4 MG/1
0.4 CAPSULE ORAL DAILY
Status: DISCONTINUED | OUTPATIENT
Start: 2019-08-09 | End: 2019-08-12 | Stop reason: HOSPADM

## 2019-08-08 RX ORDER — ATORVASTATIN CALCIUM 80 MG/1
80 TABLET, FILM COATED ORAL DAILY
Status: DISCONTINUED | OUTPATIENT
Start: 2019-08-08 | End: 2019-08-11

## 2019-08-08 RX ORDER — IPRATROPIUM BROMIDE AND ALBUTEROL SULFATE 2.5; .5 MG/3ML; MG/3ML
1 SOLUTION RESPIRATORY (INHALATION) EVERY 4 HOURS
Status: DISCONTINUED | OUTPATIENT
Start: 2019-08-08 | End: 2019-08-12 | Stop reason: HOSPADM

## 2019-08-08 RX ORDER — ALBUTEROL SULFATE 90 UG/1
2 AEROSOL, METERED RESPIRATORY (INHALATION) EVERY 4 HOURS PRN
Status: DISCONTINUED | OUTPATIENT
Start: 2019-08-08 | End: 2019-08-12 | Stop reason: HOSPADM

## 2019-08-08 RX ORDER — SENNA AND DOCUSATE SODIUM 50; 8.6 MG/1; MG/1
1 TABLET, FILM COATED ORAL 2 TIMES DAILY
Status: DISCONTINUED | OUTPATIENT
Start: 2019-08-08 | End: 2019-08-12 | Stop reason: HOSPADM

## 2019-08-08 RX ORDER — LACTULOSE 10 G/15ML
10 SOLUTION ORAL ONCE
Status: COMPLETED | OUTPATIENT
Start: 2019-08-08 | End: 2019-08-08

## 2019-08-08 RX ORDER — ONDANSETRON 2 MG/ML
4 INJECTION INTRAMUSCULAR; INTRAVENOUS EVERY 6 HOURS PRN
Status: DISCONTINUED | OUTPATIENT
Start: 2019-08-08 | End: 2019-08-12 | Stop reason: HOSPADM

## 2019-08-08 RX ORDER — SODIUM CHLORIDE 0.9 % (FLUSH) 0.9 %
10 SYRINGE (ML) INJECTION EVERY 12 HOURS SCHEDULED
Status: DISCONTINUED | OUTPATIENT
Start: 2019-08-08 | End: 2019-08-12 | Stop reason: HOSPADM

## 2019-08-08 RX ADMIN — IPRATROPIUM BROMIDE AND ALBUTEROL SULFATE 3 ML: .5; 3 SOLUTION RESPIRATORY (INHALATION) at 21:12

## 2019-08-08 RX ADMIN — BUSPIRONE HYDROCHLORIDE 10 MG: 10 TABLET ORAL at 21:30

## 2019-08-08 RX ADMIN — POTASSIUM PHOSPHATE, MONOBASIC AND POTASSIUM PHOSPHATE, DIBASIC 22 MMOL: 224; 236 INJECTION, SOLUTION INTRAVENOUS at 21:31

## 2019-08-08 RX ADMIN — ACETAMINOPHEN 650 MG: 325 TABLET ORAL at 17:34

## 2019-08-08 RX ADMIN — OLANZAPINE 5 MG: 5 TABLET, FILM COATED ORAL at 21:30

## 2019-08-08 RX ADMIN — IBUPROFEN 800 MG: 800 TABLET, FILM COATED ORAL at 21:30

## 2019-08-08 RX ADMIN — SODIUM CHLORIDE: 9 INJECTION, SOLUTION INTRAVENOUS at 13:39

## 2019-08-08 RX ADMIN — SODIUM CHLORIDE: 9 INJECTION, SOLUTION INTRAVENOUS at 17:35

## 2019-08-08 RX ADMIN — Medication 2 PUFF: at 21:13

## 2019-08-08 RX ADMIN — LACTULOSE 10 G: 20 SOLUTION ORAL at 13:55

## 2019-08-08 RX ADMIN — ATORVASTATIN CALCIUM 80 MG: 80 TABLET, FILM COATED ORAL at 18:03

## 2019-08-08 RX ADMIN — SENNOSIDES AND DOCUSATE SODIUM 1 TABLET: 8.6; 5 TABLET ORAL at 21:30

## 2019-08-08 RX ADMIN — ENOXAPARIN SODIUM 40 MG: 40 INJECTION SUBCUTANEOUS at 17:36

## 2019-08-08 RX ADMIN — BISACODYL 10 MG: 10 SUPPOSITORY RECTAL at 18:03

## 2019-08-08 RX ADMIN — SODIUM CHLORIDE 500 ML: 9 INJECTION, SOLUTION INTRAVENOUS at 11:31

## 2019-08-08 RX ADMIN — IPRATROPIUM BROMIDE AND ALBUTEROL SULFATE 3 ML: .5; 3 SOLUTION RESPIRATORY (INHALATION) at 17:04

## 2019-08-08 ASSESSMENT — PAIN DESCRIPTION - PROGRESSION
CLINICAL_PROGRESSION: NOT CHANGED

## 2019-08-08 ASSESSMENT — PAIN DESCRIPTION - DESCRIPTORS
DESCRIPTORS: CONSTANT
DESCRIPTORS: CONSTANT

## 2019-08-08 ASSESSMENT — PAIN DESCRIPTION - PAIN TYPE
TYPE: ACUTE PAIN
TYPE: ACUTE PAIN

## 2019-08-08 ASSESSMENT — PAIN DESCRIPTION - ORIENTATION: ORIENTATION: MID;LOWER

## 2019-08-08 ASSESSMENT — PAIN SCALES - GENERAL
PAINLEVEL_OUTOF10: 5
PAINLEVEL_OUTOF10: 8
PAINLEVEL_OUTOF10: 8
PAINLEVEL_OUTOF10: 3

## 2019-08-08 ASSESSMENT — ENCOUNTER SYMPTOMS
SHORTNESS OF BREATH: 0
CONSTIPATION: 1
ABDOMINAL PAIN: 1

## 2019-08-08 ASSESSMENT — PAIN - FUNCTIONAL ASSESSMENT
PAIN_FUNCTIONAL_ASSESSMENT: PREVENTS OR INTERFERES SOME ACTIVE ACTIVITIES AND ADLS
PAIN_FUNCTIONAL_ASSESSMENT: PREVENTS OR INTERFERES SOME ACTIVE ACTIVITIES AND ADLS

## 2019-08-08 ASSESSMENT — PAIN DESCRIPTION - LOCATION
LOCATION: ABDOMEN;BACK
LOCATION: BACK;ABDOMEN

## 2019-08-08 NOTE — FLOWSHEET NOTE
08/08/19 1808   Encounter Summary   Services provided to: Patient   Referral/Consult From: Nurse   Support System Spouse; Family members   Continue Visiting Yes  (8/8)   Complexity of Encounter Moderate   Length of Encounter 45 minutes   Spiritual Assessment Completed Yes   Advance Care Planning Yes   Grief and Life Adjustment   Type Adjustment to illness   Assessment Approachable   Intervention Discussed illness/injury and it's impact; Discussed relationship with God;Discussed death; End of life care;Explored feelings, thoughts, concerns;Prayer   Outcome Expressed gratitude;Engaged in conversation;New perspective;Encouraged; Hopeful;Receptive     Advance Directive Consult: Patient's wife was not available and he asked that we wait until tomorrow to complete. Left Advance Directive booklet with his nurse and relayed to her to contact [facility] when the wife is available to complete the information. Assessment: Patient is a 70 yr old male who has been in and out of the hospital several times as a result of his cancer diagnosis. He was sharing with me about the troubles he has encountered with constipation. We discussed options of food for him to eat and I pointed out the cook book located in the cancer center resource center (maybe his wife could pick one up). He was also \"very concerned about the methadone being used as he is a recovering alcoholic and doesn't want to deal with the addiction process again. \" This staff tried to explain the purpose of the meds when dealing with recreation vs. Terminal illness and pain control. He still indicated a disapproval \"because it is causing him to sleep so much and be constipated. \" It was suggested that he explain this again to his doctor asking for alternative options. Plan: This staff offered to assist in getting the recipe book if his wife is not able to obtain one.  Continued support and encouragement would benefit this patient and his wife in dealing with the

## 2019-08-08 NOTE — ED PROVIDER NOTES
[facility]  eMERGENCY dEPARTMENT eNCOUnter          CHIEF COMPLAINT       Chief Complaint   Patient presents with    Abdominal Pain       Nurses Notes reviewed and I agree except as noted in the HPI. HISTORY OF PRESENT ILLNESS    Katlyn Armenta is a 70 y.o. male who presents to the Emergency Department with a history of hernia repair, CVA, COPD, HTN, and COPD for the evaluation of lower abdominal pain and lower back pain that has been intermittent for the past month. Patient states the severity fluctuates throughout the day. He reports that 4 days ago he had 2 days where he could not eat anything due to nausea and no appetite. Patient states the past 2 days he has been able to eat a little but still does not have much of an appetite. This morning the patient had an episode of emesis that was \"milky phlegm\". He also reports no bowel movement in the past 4 days. Patient reports that he has lung cancer that is metastatic. He completed radiation on 7/27/19 and began chemotherapy on 8/5/19. He states that he has been increasingly fatigue and weak since he began treatment for his cancer. Patient denies fever, urinary symptoms, shortness of breath, or chest pain. No further complaints at initial evaluation. Location/Symptom: lower abdominal pain and lower back pain  Timing/Onset: month ago  Context/Setting: patient has a history of metastatic lung cancer. He completed radiation 7/27/19 and began chemotherapy 8/5/19. His symptoms have worsened since beginning treatment. Quality: Colicky on and off for 1 month  Some nausea, vomited this morning  Duration: intermittent  Modifying Factors: none  Severity: 8/10    The HPI was provided by the patient. REVIEW OF SYSTEMS     Review of Systems   Constitutional: Negative for chills and fever. HENT: Negative for congestion. Respiratory: Negative for shortness of breath. Cardiovascular: Negative for chest pain.    Gastrointestinal: Positive for abdominal pain and constipation. Genitourinary: Negative for dysuria. Musculoskeletal:        Back pain, sacral region leg pain   Skin: Negative for rash. Neurological: Negative for weakness and numbness. Hematological: Negative for adenopathy. Psychiatric/Behavioral: Negative for confusion. PAST MEDICALHISTORY    has a past medical history of Arthritis, Cancer ([facility] [address] COPD (chronic obstructive pulmonary disease) ([facility] [address] Gastric reflux, Hyperlipidemia, and Hypertension. SURGICAL HISTORY    has a past surgical history that includes Kidney surgery; hernia repair (80's); pr office/outpt visit,procedure only (Right, 9/5/2018); pr [facility] incl fluor gdnce dx w/cell washg spx (N/A, 9/7/2018); pr office/outpt visit,procedure only (N/A, 9/11/2018); Colonoscopy; and transesophageal echocardiogram (Left, 5/14/2019). CURRENT MEDICATIONS       Previous Medications    ACETAMINOPHEN (TYLENOL) 325 MG TABLET    Take 2 tablets by mouth every 4 hours as needed for Pain    ALBUTEROL SULFATE HFA (VENTOLIN HFA) 108 (90 BASE) MCG/ACT INHALER    Inhale 2 puffs into the lungs every 4 hours as needed for Wheezing or Shortness of Breath    ASPIRIN 81 MG CHEWABLE TABLET    Take 1 tablet by mouth daily    ATORVASTATIN (LIPITOR) 80 MG TABLET    Take 1 tablet by mouth daily    BUDESONIDE-FORMOTEROL (SYMBICORT) 160-4.5 MCG/ACT AERO    Inhale 2 puffs into the lungs 2 times daily. BUSPIRONE (BUSPAR) 10 MG TABLET    Take 10 mg by mouth 2 times daily    FOLIC ACID (FOLVITE) 1 MG TABLET    Take 1 tablet by mouth daily    IPRATROPIUM BROMIDE (ATROVENT IN)    Inhale into the lungs 2 times daily     IPRATROPIUM-ALBUTEROL (DUONEB) 0.5-2.5 (3) MG/3ML SOLN NEBULIZER SOLUTION    Inhale 3 mLs into the lungs every 4 hours    METHADONE (DOLOPHINE) 5 MG TABLET    Take 1 tablet by mouth every 8 hours as needed for Pain for up to 30 days.     NUTRITIONAL SUPPLEMENT LIQD    Take 1 Bottle by mouth 3 times daily    OLANZAPINE (ZYPREXA) 5 MG TABLET    Take 1 tablet by mouth nightly    OMEPRAZOLE (PRILOSEC) 20 MG CAPSULE    Take 40 mg by mouth daily     ONDANSETRON (ZOFRAN) 4 MG TABLET    Take 1 tablet by mouth every 8 hours as needed for Nausea or Vomiting    RANITIDINE (ZANTAC) 150 MG TABLET    Take 150 mg by mouth every morning. TAMSULOSIN (FLOMAX) 0.4 MG CAPSULE    Take 1 capsule by mouth daily    TERAZOSIN (HYTRIN) 2 MG CAPSULE    Take 2 mg by mouth nightly. VITAMIN B-12 1000 MCG TABLET    Take 1 tablet by mouth daily       ALLERGIES     is allergic to lisinopril. FAMILY HISTORY     is adopted. family history is not on file. He was adopted. SOCIAL HISTORY      reports that he has been smoking cigarettes. He started smoking about 50 years ago. He has a 100.00 pack-year smoking history. He has never used smokeless tobacco. He reports that he drank alcohol. He reports that he does not use drugs. PHYSICAL EXAM     INITIAL VITALS:  height is 5' 8\" (1.727 m) and weight is 139 lb (63 kg). His oral temperature is 98 °F (36.7 °C). His blood pressure is 141/80 (abnormal) and his pulse is 101. His respiration is 16 and oxygen saturation is 93%. Physical Exam   Constitutional: He is oriented to person, place, and time. Alert, GCS 15   HENT:   Head: Normocephalic and atraumatic. Eyes: Pupils are equal, round, and reactive to light. EOM are normal. No scleral icterus. Neck: Normal range of motion. Neck supple. No JVD present. Cardiovascular: Normal rate and regular rhythm. Pulmonary/Chest: Effort normal and breath sounds normal.   Abdominal: Soft. Bowel sounds are normal. There is tenderness. There is no rebound and no guarding. Some mild lower abdominal tenderness without rebound or guarding   Genitourinary: Rectum normal and penis normal. Rectal exam shows guaiac negative stool. Genitourinary Comments: Brown stool in the rectum. Occult blood negative. Musculoskeletal: Normal range of motion. He exhibits no edema. Neurological: He is alert and oriented to person, place, and time. He exhibits normal muscle tone. Skin: Skin is warm and dry. Psychiatric: He has a normal mood and affect. His behavior is normal.   Nursing note and vitals reviewed. DIFFERENTIALDIAGNOSIS:     Abdominal pain with constipation, reassess for any bowel obstruction    Check renal function, hydration    Sacral pain possible bony mets    DIAGNOSTIC RESULTS       RADIOLOGY: non-plain film images(s) such as CT, Ultrasound and MRI are read by the radiologist.    CT ABDOMEN PELVIS WO CONTRAST Additional Contrast? None   Final Result   A liver mass has increased in size as have bilateral adrenal masses. Moderate right hydronephrosis persists without visible cause. **This report has been created using voice recognition software. It may contain minor errors which are inherent in voice recognition technology. **      Final report electronically signed by Dr. Paula Carrizales on 8/8/2019 1:13 PM          LABS:   Labs Reviewed   CBC WITH AUTO DIFFERENTIAL - Abnormal; Notable for the following components:       Result Value    WBC 4.1 (*)     RBC 3.92 (*)     Hemoglobin 12.1 (*)     Hematocrit 36.1 (*)     RDW-SD 48.3 (*)     MPV 8.4 (*)     Lymphocytes # 0.5 (*)     All other components within normal limits   BASIC METABOLIC PANEL - Abnormal; Notable for the following components:    Glucose 128 (*)     All other components within normal limits   HEPATIC FUNCTION PANEL - Abnormal; Notable for the following components:    ALT 8 (*)     All other components within normal limits   LIPASE - Abnormal; Notable for the following components:    Lipase 506.9 (*)     All other components within normal limits   PHOSPHORUS - Abnormal; Notable for the following components:    Phosphorus 0.7 (*)     All other components within normal limits   CALCIUM, IONIZED - Abnormal; Notable for the following components:    Calcium, Ion 1.11 (*)     All other components within normal limits   OSMOLALITY - Abnormal; Notable for the following components:    Osmolality Calc 273.4 (*)     All other components within normal limits   MAGNESIUM   BLOOD OCCULT STOOL SCREEN #1   ANION GAP   GLOMERULAR FILTRATION RATE, ESTIMATED       EMERGENCY DEPARTMENT COURSE:   Vitals:    Vitals:    08/08/19 1115 08/08/19 1214 08/08/19 1316   BP: 133/76 135/76 (!) 141/80   Pulse: 108 101    Resp: 14 16    Temp: 98 °F (36.7 °C)     TempSrc: Oral     SpO2: 93% 93% 93%   Weight: 139 lb (63 kg)     Height: 5' 8\" (1.727 m)         11:12 AM: The patient was seen and evaluated in a timely fashion. Nursing notes reviewed    IV hydration    Lipase elevated 507    BUN 7, creatinine 0.4    Rectal exam was negative for any impaction    CT abdomen pelvis did show increased stool consistent with constipation  Also liver mets that were increased in size  There were adrenal masses consistent with mets as well  Pancreas did not appear abnormal  He does have bony mets including S1, L4, L1 as well as the right iliac wing    Will admit for further evaluation of his abdominal pain and vomiting        CRITICAL CARE:   none     CONSULTS:  Gwendolyn CNP    PROCEDURES:  none     FINAL IMPRESSION      1. Pain of upper abdomen    2. Malignant neoplasm of left lung, unspecified part of lung ([address]    3. Liver metastases ([address]    4.  Constipation, unspecified constipation type          DISPOSITION/PLAN   Admit      PATIENT REFERRED TO:  Robbi Yañez MD  [facility] [phone]            DISCHARGE MEDICATIONS:  New Prescriptions    No medications on file       (Please note that portions of this note were completed with a voice recognition program.Efforts were made to edit thedictations but occasionally words are mis-transcribed.)    Scribe:  Mitzi Santos 8/8/19 11:12 AM Scribing forand in the presence Panda Oliver MD.    Signed by: Marcelo Edmonds, 08/08/19 1:39 PM    Provider:  I personally performed the services described in the documentation, reviewed and edited the documentation which was dictated to the scribe in my presence, and it accurately records mywords and actions.     Asad Galindo MD 8/8/19 1:39 PM                  Asad Galindo MD  08/08/19 8546

## 2019-08-08 NOTE — ED TRIAGE NOTES
Pt c/o lower abdominal pain and lower back pain for over 1 month. Pt's last BM 4 days ago, is receiving chemo for lung CA.

## 2019-08-08 NOTE — PROGRESS NOTES
Pt admitted to  [phone] from ED and via cart/stretcher. Complaints: Shortness of breath. IV IV push only, no IV fluids infusing into the antecubital right, condition patent l. IV site free of s/s of infection or infiltration. Vital signs obtained. Assessment and data collection initiated. Oriented to room. All questions answered with no further questions at this time. Fall prevention and safety brochure discussed with patient. The best day to schedule a follow up Dr appointment is:  Monday a.mIain Duckworth RN 8/8/2019 7:07 PM

## 2019-08-08 NOTE — ED NOTES
Pt to restroom via wheelchair with standby assist.  Pt sob on exertion.        Chantelle Mckeon RN  08/08/19 1002

## 2019-08-08 NOTE — H&P
VISIT,PROCEDURE ONLY Right 9/5/2018    RIGHT INGUINAL HERNIA REPAIR performed by Brian Hancock MD at [address] OFFICE/OUTPT [facility] N/A 9/11/2018    EXPLORATORY LAPAROSCOPY  OPEN PROCEDURE, SMALL BOWEL RESECTION performed by Brian Hancock MD at [address] TRANSESOPHAGEAL ECHOCARDIOGRAM Left 5/14/2019    TRANSESOPHAGEAL ECHOCARDIOGRAM performed by Mini Adair MD at [facility]       Medications Prior to Admission:      Prior to Admission medications    Medication Sig Start Date End Date Taking? Authorizing Provider   OLANZapine (ZYPREXA) 5 MG tablet Take 1 tablet by mouth nightly 7/29/19 8/28/19  Leland Arango MD   NUTRITIONAL SUPPLEMENT LIQD Take 1 Bottle by mouth 3 times daily 7/29/19   Leland Arango MD   methadone (DOLOPHINE) 5 MG tablet Take 1 tablet by mouth every 8 hours as needed for Pain for up to 30 days.  7/11/19 8/10/19  Leland Arango MD   aspirin 81 MG chewable tablet Take 1 tablet by mouth daily 5/16/19   Clifford Calero MD   atorvastatin (LIPITOR) 80 MG tablet Take 1 tablet by mouth daily 5/15/19   Clifford Calero MD   folic acid (FOLVITE) 1 MG tablet Take 1 tablet by mouth daily 5/16/19   Clifford Calero MD   vitamin B-12 1000 MCG tablet Take 1 tablet by mouth daily 5/16/19   Clifford Calero MD   tamsulosin (FLOMAX) 0.4 MG capsule Take 1 capsule by mouth daily 3/25/19   Leland Arango MD   ipratropium-albuterol (DUONEB) 0.5-2.5 (3) MG/3ML SOLN nebulizer solution Inhale 3 mLs into the lungs every 4 hours 2/20/19   CARLOS Santos CNP   ondansetron (ZOFRAN) 4 MG tablet Take 1 tablet by mouth every 8 hours as needed for Nausea or Vomiting 1/26/19   Norris Davis MD   albuterol sulfate HFA (VENTOLIN HFA) 108 (90 Base) MCG/ACT inhaler Inhale 2 puffs into the lungs every 4 hours as needed for Wheezing or Shortness of Breath 1/24/19 1/24/20  CARLOS Bergeron CNP   acetaminophen (TYLENOL) 325 MG tablet Take 2 tablets by mouth every 4 hours as needed for Pain 9/17/18 Ayah Wright MD   busPIRone (BUSPAR) 10 MG tablet Take 10 mg by mouth 2 times daily    Historical Provider, MD   Ipratropium Bromide (ATROVENT IN) Inhale into the lungs 2 times daily     Historical Provider, MD   budesonide-formoterol (SYMBICORT) 160-4.5 MCG/ACT AERO Inhale 2 puffs into the lungs 2 times daily. Historical Provider, MD   terazosin (HYTRIN) 2 MG capsule Take 2 mg by mouth nightly. Historical Provider, MD   omeprazole (PRILOSEC) 20 MG capsule Take 40 mg by mouth daily     Historical Provider, MD   ranitidine (ZANTAC) 150 MG tablet Take 150 mg by mouth every morning. Historical Provider, MD       Allergies:  Lisinopril    Social History:      TOBACCO:   reports that he has been smoking cigarettes. He started smoking about 50 years ago. He has a 100.00 pack-year smoking history. He has never used smokeless tobacco.  ETOH:   reports that he drank alcohol. Family History:      Positive as follows:         Adopted: Yes       REVIEW OF SYSTEMS:     Constitutional: ROS: positive for  - fatigue, malaise and weight loss  Head: no headache, no head injury, no migraine   Eyes ROS: denies blurred/double vision  Ears ROS: no hearing difficulty, no tinnitus  Mouth and Throat ROS: no ulceration, dysphagia, dental caries  Psychological ROS: no depression, no anxiety, no panic attacks, denies suicide/homicide ideation  Endocrine ROS: denies polyuria, polydypsia, no heat or cold intolerance  Respiratory ROS: no cough, shortness of breath, or wheezing  Cardiovascular ROS: no chest pain or dyspnea on exertion  Gastrointestinal ROS: positive for - abdominal pain, appetite loss, constipation and nausea/vomiting  Genito-Urinary ROS: denies dysuria, frequency, urgency; denies hematuria  Musculoskeletal ROS: negative  Neurological ROS: no syncope, no seizures, no numbness or tingling of hands, no numbness or tingling of feet, no paresis  Dermatology: no skin rash, no eczema  Endocrine: no polyuria, polydypsia, no heat/cold intolerance  Hematology: denies bruising easily, denies bleeding problems, denies clotting disorders    PHYSICAL EXAM:    BP (!) 141/80   Pulse 101   Temp 98 °F (36.7 °C) (Oral)   Resp 16   Ht 5' 8\" (1.727 m)   Wt 139 lb (63 kg)   SpO2 93%   BMI 21.13 kg/m²     General appearance:  No apparent distress, appears older than stated age and cooperative. Chronically ill appearing  HEENT:  Normal cephalic, atraumatic without obvious deformity. Pupils equal, round, and reactive to light. Conjunctivae/corneas clear. Oral mucous membranes very dry  Neck: Supple, with full range of motion. No jugular venous distention. Trachea midline. Respiratory:  Normal respiratory effort. Diminished to auscultation bilaterally without Rales/Wheezes/Rhonchi. Cardiovascular:  Regular rate and rhythm with normal S1/S2 without murmurs, rubs or gallops. Abdomen: Soft, (+) tender across lower abd, non-distended with normal bowel sounds. Musculoskeletal:  No clubbing, cyanosis or edema bilaterally. Full range of motion without deformity. (+) low back pain  Skin: Skin color, texture, turgor normal.    Neurologic:  Neurovascularly intact without any focal sensory/motor deficits.  Cranial nerves: II-XII intact, grossly non-focal.  Psychiatric:  Alert and oriented, thought content appropriate  Capillary Refill: Brisk,< 3 seconds   Peripheral Pulses: +2 palpable, equal bilaterally       Labs:     Recent Labs     08/08/19  1126   WBC 4.1*   HGB 12.1*   HCT 36.1*        Recent Labs     08/08/19  1126      K 3.8   CL 99   CO2 25   BUN 7   CREATININE 0.4   CALCIUM 8.9   PHOS 0.7*     Recent Labs     08/08/19  1126   AST 13   ALT 8*   BILIDIR <0.2   BILITOT 0.6   ALKPHOS 91     Urinalysis:      Lab Results   Component Value Date    NITRU NEGATIVE 07/03/2019    WBCUA NONE SEEN 01/21/2019    BACTERIA NONE 01/21/2019    RBCUA 3-5 01/21/2019    BLOODU NEGATIVE 07/03/2019    GLUCOSEU NEGATIVE 07/03/2019       Radiology:     CT ABDOMEN PELVIS WO CONTRAST Additional Contrast? None   Final Result   A liver mass has increased in size as have bilateral adrenal masses. Moderate right hydronephrosis persists without visible cause. **This report has been created using voice recognition software. It may contain minor errors which are inherent in voice recognition technology. **      Final report electronically signed by Dr. Fred Jade on 8/8/2019 1:13 PM        ASSESSMENT/PLAN:    1. Weakness--likely 2nd to disease process  2. Constipation--FOB (-); add Senna, Miralax; given Lactulose in ED; add Ducolax UT  3. Acute pancreatitis--pancreas unremarkable on CT; add IVF; not symptomatic except for N/V; no pain in that area  4. Hypophosphatemia--replace per protocol; check in AM  5. 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 Moderate right hydronephrosis   7. Tobacco abuse--cessation cousneling        Thank you Laura Gutierrez MD for the opportunity to be involved in this patient's care.     Electronically signed by CARLOS Goldstein CNP on 8/8/2019 at 1:54 PM

## 2019-08-09 ENCOUNTER — APPOINTMENT (OUTPATIENT)
Dept: ULTRASOUND IMAGING | Age: 69
DRG: 438 | End: 2019-08-09
Payer: MEDICARE

## 2019-08-09 LAB
ANION GAP SERPL CALCULATED.3IONS-SCNC: 11 MEQ/L (ref 8–16)
BASOPHILS # BLD: 0.7 %
BASOPHILS ABSOLUTE: 0 THOU/MM3 (ref 0–0.1)
BUN BLDV-MCNC: 4 MG/DL (ref 7–22)
CALCIUM SERPL-MCNC: 7.6 MG/DL (ref 8.5–10.5)
CHLORIDE BLD-SCNC: 102 MEQ/L (ref 98–111)
CO2: 22 MEQ/L (ref 23–33)
CREAT SERPL-MCNC: 0.3 MG/DL (ref 0.4–1.2)
EOSINOPHIL # BLD: 8.1 %
EOSINOPHILS ABSOLUTE: 0.2 THOU/MM3 (ref 0–0.4)
ERYTHROCYTE [DISTWIDTH] IN BLOOD BY AUTOMATED COUNT: 14.6 % (ref 11.5–14.5)
ERYTHROCYTE [DISTWIDTH] IN BLOOD BY AUTOMATED COUNT: 51.3 FL (ref 35–45)
GFR SERPL CREATININE-BSD FRML MDRD: > 90 ML/MIN/1.73M2
GLUCOSE BLD-MCNC: 105 MG/DL (ref 70–108)
HCT VFR BLD CALC: 33.9 % (ref 42–52)
HEMOGLOBIN: 10.8 GM/DL (ref 14–18)
IMMATURE GRANS (ABS): 0.04 THOU/MM3 (ref 0–0.07)
IMMATURE GRANULOCYTES: 1 %
LIPASE: 430.4 U/L (ref 5.6–51.3)
LIPASE: 590.9 U/L (ref 5.6–51.3)
LYMPHOCYTES # BLD: 19 %
LYMPHOCYTES ABSOLUTE: 0.5 THOU/MM3 (ref 1–4.8)
MAGNESIUM: 1.6 MG/DL (ref 1.6–2.4)
MCH RBC QN AUTO: 30.5 PG (ref 26–33)
MCHC RBC AUTO-ENTMCNC: 31.9 GM/DL (ref 32.2–35.5)
MCV RBC AUTO: 95.8 FL (ref 80–94)
MONOCYTES # BLD: 18.3 %
MONOCYTES ABSOLUTE: 0.5 THOU/MM3 (ref 0.4–1.3)
NUCLEATED RED BLOOD CELLS: 0 /100 WBC
PHOSPHORUS: 1.2 MG/DL (ref 2.4–4.7)
PLATELET # BLD: 197 THOU/MM3 (ref 130–400)
PMV BLD AUTO: 8.2 FL (ref 9.4–12.4)
POTASSIUM REFLEX MAGNESIUM: 3.4 MEQ/L (ref 3.5–5.2)
RBC # BLD: 3.54 MILL/MM3 (ref 4.7–6.1)
SEG NEUTROPHILS: 52.5 %
SEGMENTED NEUTROPHILS ABSOLUTE COUNT: 1.5 THOU/MM3 (ref 1.8–7.7)
SODIUM BLD-SCNC: 135 MEQ/L (ref 135–145)
TRIGL SERPL-MCNC: 103 MG/DL (ref 0–199)
WBC # BLD: 2.8 THOU/MM3 (ref 4.8–10.8)

## 2019-08-09 PROCEDURE — 2580000003 HC RX 258: Performed by: INTERNAL MEDICINE

## 2019-08-09 PROCEDURE — 2580000003 HC RX 258: Performed by: NURSE PRACTITIONER

## 2019-08-09 PROCEDURE — 76705 ECHO EXAM OF ABDOMEN: CPT

## 2019-08-09 PROCEDURE — 2500000003 HC RX 250 WO HCPCS: Performed by: NURSE PRACTITIONER

## 2019-08-09 PROCEDURE — 99233 SBSQ HOSP IP/OBS HIGH 50: CPT | Performed by: INTERNAL MEDICINE

## 2019-08-09 PROCEDURE — 1200000000 HC SEMI PRIVATE

## 2019-08-09 PROCEDURE — 2709999900 HC NON-CHARGEABLE SUPPLY

## 2019-08-09 PROCEDURE — 6360000002 HC RX W HCPCS: Performed by: NURSE PRACTITIONER

## 2019-08-09 PROCEDURE — 84100 ASSAY OF PHOSPHORUS: CPT

## 2019-08-09 PROCEDURE — 6370000000 HC RX 637 (ALT 250 FOR IP): Performed by: NURSE PRACTITIONER

## 2019-08-09 PROCEDURE — 83690 ASSAY OF LIPASE: CPT

## 2019-08-09 PROCEDURE — 83735 ASSAY OF MAGNESIUM: CPT

## 2019-08-09 PROCEDURE — 36415 COLL VENOUS BLD VENIPUNCTURE: CPT

## 2019-08-09 PROCEDURE — 6360000002 HC RX W HCPCS: Performed by: INTERNAL MEDICINE

## 2019-08-09 PROCEDURE — 6370000000 HC RX 637 (ALT 250 FOR IP): Performed by: INTERNAL MEDICINE

## 2019-08-09 PROCEDURE — 85025 COMPLETE CBC W/AUTO DIFF WBC: CPT

## 2019-08-09 PROCEDURE — 80048 BASIC METABOLIC PNL TOTAL CA: CPT

## 2019-08-09 PROCEDURE — 94640 AIRWAY INHALATION TREATMENT: CPT

## 2019-08-09 PROCEDURE — 84478 ASSAY OF TRIGLYCERIDES: CPT

## 2019-08-09 RX ORDER — KETOROLAC TROMETHAMINE 30 MG/ML
15 INJECTION, SOLUTION INTRAMUSCULAR; INTRAVENOUS EVERY 6 HOURS PRN
Status: DISCONTINUED | OUTPATIENT
Start: 2019-08-09 | End: 2019-08-12 | Stop reason: HOSPADM

## 2019-08-09 RX ORDER — SODIUM CHLORIDE, SODIUM LACTATE, POTASSIUM CHLORIDE, CALCIUM CHLORIDE 600; 310; 30; 20 MG/100ML; MG/100ML; MG/100ML; MG/100ML
INJECTION, SOLUTION INTRAVENOUS CONTINUOUS
Status: DISCONTINUED | OUTPATIENT
Start: 2019-08-09 | End: 2019-08-12 | Stop reason: HOSPADM

## 2019-08-09 RX ORDER — POTASSIUM CHLORIDE 20 MEQ/1
40 TABLET, EXTENDED RELEASE ORAL ONCE
Status: COMPLETED | OUTPATIENT
Start: 2019-08-09 | End: 2019-08-09

## 2019-08-09 RX ORDER — POLYETHYLENE GLYCOL 3350 17 G/17G
17 POWDER, FOR SOLUTION ORAL DAILY
Status: DISCONTINUED | OUTPATIENT
Start: 2019-08-09 | End: 2019-08-12 | Stop reason: HOSPADM

## 2019-08-09 RX ADMIN — POLYETHYLENE GLYCOL (3350) 17 G: 17 POWDER, FOR SOLUTION ORAL at 17:08

## 2019-08-09 RX ADMIN — POTASSIUM CHLORIDE 40 MEQ: 20 TABLET, EXTENDED RELEASE ORAL at 10:19

## 2019-08-09 RX ADMIN — ENOXAPARIN SODIUM 40 MG: 40 INJECTION SUBCUTANEOUS at 17:08

## 2019-08-09 RX ADMIN — FOLIC ACID 1 MG: 1 TABLET ORAL at 09:13

## 2019-08-09 RX ADMIN — ACETAMINOPHEN 650 MG: 325 TABLET ORAL at 20:30

## 2019-08-09 RX ADMIN — ACETAMINOPHEN 650 MG: 325 TABLET ORAL at 11:43

## 2019-08-09 RX ADMIN — TAMSULOSIN HYDROCHLORIDE 0.4 MG: 0.4 CAPSULE ORAL at 09:13

## 2019-08-09 RX ADMIN — OLANZAPINE 5 MG: 5 TABLET, FILM COATED ORAL at 20:30

## 2019-08-09 RX ADMIN — SODIUM CHLORIDE: 9 INJECTION, SOLUTION INTRAVENOUS at 03:55

## 2019-08-09 RX ADMIN — POTASSIUM PHOSPHATE, MONOBASIC AND POTASSIUM PHOSPHATE, DIBASIC 16 MMOL: 224; 236 INJECTION, SOLUTION INTRAVENOUS at 10:12

## 2019-08-09 RX ADMIN — BUSPIRONE HYDROCHLORIDE 10 MG: 10 TABLET ORAL at 20:30

## 2019-08-09 RX ADMIN — SODIUM CHLORIDE, POTASSIUM CHLORIDE, SODIUM LACTATE AND CALCIUM CHLORIDE: 600; 310; 30; 20 INJECTION, SOLUTION INTRAVENOUS at 14:40

## 2019-08-09 RX ADMIN — IPRATROPIUM BROMIDE AND ALBUTEROL SULFATE 3 ML: .5; 3 SOLUTION RESPIRATORY (INHALATION) at 22:30

## 2019-08-09 RX ADMIN — SENNOSIDES AND DOCUSATE SODIUM 1 TABLET: 8.6; 5 TABLET ORAL at 20:30

## 2019-08-09 RX ADMIN — DOXAZOSIN 2 MG: 4 TABLET ORAL at 09:13

## 2019-08-09 RX ADMIN — BUSPIRONE HYDROCHLORIDE 10 MG: 10 TABLET ORAL at 09:13

## 2019-08-09 RX ADMIN — IBUPROFEN 800 MG: 800 TABLET, FILM COATED ORAL at 06:54

## 2019-08-09 RX ADMIN — SODIUM CHLORIDE, POTASSIUM CHLORIDE, SODIUM LACTATE AND CALCIUM CHLORIDE: 600; 310; 30; 20 INJECTION, SOLUTION INTRAVENOUS at 23:01

## 2019-08-09 RX ADMIN — Medication 2 PUFF: at 22:32

## 2019-08-09 RX ADMIN — KETOROLAC TROMETHAMINE 15 MG: 30 INJECTION, SOLUTION INTRAMUSCULAR at 22:55

## 2019-08-09 RX ADMIN — ATORVASTATIN CALCIUM 80 MG: 80 TABLET, FILM COATED ORAL at 09:13

## 2019-08-09 RX ADMIN — IPRATROPIUM BROMIDE AND ALBUTEROL SULFATE 3 ML: .5; 3 SOLUTION RESPIRATORY (INHALATION) at 08:16

## 2019-08-09 RX ADMIN — PANTOPRAZOLE SODIUM 40 MG: 40 TABLET, DELAYED RELEASE ORAL at 06:54

## 2019-08-09 RX ADMIN — ASPIRIN 81 MG 81 MG: 81 TABLET ORAL at 09:13

## 2019-08-09 RX ADMIN — FAMOTIDINE 20 MG: 20 TABLET ORAL at 09:13

## 2019-08-09 RX ADMIN — Medication 2 PUFF: at 08:21

## 2019-08-09 RX ADMIN — SENNOSIDES AND DOCUSATE SODIUM 1 TABLET: 8.6; 5 TABLET ORAL at 09:18

## 2019-08-09 RX ADMIN — IPRATROPIUM BROMIDE AND ALBUTEROL SULFATE 3 ML: .5; 3 SOLUTION RESPIRATORY (INHALATION) at 13:40

## 2019-08-09 RX ADMIN — KETOROLAC TROMETHAMINE 15 MG: 30 INJECTION, SOLUTION INTRAMUSCULAR at 17:09

## 2019-08-09 RX ADMIN — BISACODYL 10 MG: 10 SUPPOSITORY RECTAL at 09:18

## 2019-08-09 RX ADMIN — IPRATROPIUM BROMIDE AND ALBUTEROL SULFATE 3 ML: .5; 3 SOLUTION RESPIRATORY (INHALATION) at 18:06

## 2019-08-09 ASSESSMENT — PAIN SCALES - GENERAL
PAINLEVEL_OUTOF10: 6
PAINLEVEL_OUTOF10: 6
PAINLEVEL_OUTOF10: 7
PAINLEVEL_OUTOF10: 5
PAINLEVEL_OUTOF10: 6
PAINLEVEL_OUTOF10: 7
PAINLEVEL_OUTOF10: 6

## 2019-08-09 ASSESSMENT — PAIN DESCRIPTION - ORIENTATION: ORIENTATION: MID

## 2019-08-09 ASSESSMENT — PAIN DESCRIPTION - PROGRESSION: CLINICAL_PROGRESSION: NOT CHANGED

## 2019-08-09 ASSESSMENT — PAIN DESCRIPTION - PAIN TYPE: TYPE: ACUTE PAIN

## 2019-08-09 ASSESSMENT — PAIN DESCRIPTION - DESCRIPTORS: DESCRIPTORS: ACHING

## 2019-08-09 NOTE — CARE COORDINATION
8/9/19, 7:32 AM      George Graec       Admitted from: ER, patient presented with abdominal pain, nausea, and decreased appetite. 8/8/2019/ [facility] day: 1   Location: Count includes the [facility]21/021-A Reason for admit: Constipation [K59.00] Status: Inpt. Admit order signed?: yes  PMH:  has a past medical history of Arthritis, Cancer ([facility] [address] COPD (chronic obstructive pulmonary disease) ([facility] [address] Gastric reflux, Hyperlipidemia, and Hypertension. Procedure: N/A  Pertinent abnormal Imaging:CT of abdomen: A liver mass has increased in size as have bilateral adrenal masses. Medications:  Scheduled Meds:   aspirin  81 mg Oral Daily    atorvastatin  80 mg Oral Daily    mometasone-formoterol  2 puff Inhalation BID    busPIRone  10 mg Oral BID    folic acid  1 mg Oral Daily    ipratropium-albuterol  1 vial Inhalation Q4H    OLANZapine  5 mg Oral Nightly    pantoprazole  40 mg Oral QAM AC    famotidine  20 mg Oral Daily    tamsulosin  0.4 mg Oral Daily    doxazosin  2 mg Oral Daily    sodium chloride flush  10 mL Intravenous 2 times per day    enoxaparin  40 mg Subcutaneous Q24H    sennosides-docusate sodium  1 tablet Oral BID    phosphorus replacement protocol   Other RX Placeholder    bisacodyl  10 mg Rectal Daily     Continuous Infusions:   sodium chloride 100 mL/hr at 08/09/19 0355      Pertinent Info/Orders/Treatment Plan:  Marylou Lima has a history of lung cancer with met's to liver, bones, adrenal glands, and brain. He follows with Dr. Litzy Francois. Radiation completed and chemotherapy started 8/05/19. IV fluids, Lactulose x 1 dose, Duoneb nebulizer, Dulera, daily Dulcolax suppository, senokot twice daily, prn Glycolax, prn Dolophine, Phosphorus replacement protocol, oxygen, up with assistance. Diet: DIET GENERAL;   Smoking status:  reports that he has been smoking cigarettes. He started smoking about 50 years ago. He has a 100.00 pack-year smoking history.  He has never used smokeless tobacco.   PCP: Alphonse Melo, MD  Readmission: No  Readmission Risk Score: 28%    Discharge Planning  Current Residence:  Private Residence  Living Arrangements:  Spouse/Significant Other, Children   Support Systems:  Spouse/Significant Other, Children  Current Services PTA:     Potential Assistance Needed:  N/A  Potential Assistance Purchasing Medications:  No  Does patient want to participate in local refill/ meds to beds program?  No  Type of Home Care Services:  [place]  Patient expects to be discharged to:  home  Expected Discharge date:  08/12/19  Follow Up Appointment: Best Day/ Time: Monday AM  Discharge Plan: Met with Tabatha Seen. He is from home with his wife. They were  on 6/08/19. He wants to update his living will with pastoral care. Tabatha Seen to notify staff when his wife arrives. Tabatha Seen states he receives [employer] established through the [place], skilled nursing and therapies. At discharge the plan is for Tabatha Seen to return home with his wife and resume [facility] services as established PTA.     Evaluation: yes

## 2019-08-09 NOTE — PLAN OF CARE
Problem: Impaired respiratory status  Goal: Clear lung sounds  8/8/2019 2115 by Bry Diaz RCP  Outcome: Ongoing  Note:   Treatment will be continued as ordered to improve aeration.

## 2019-08-09 NOTE — PLAN OF CARE
Problem: Falls - Risk of:  Goal: Will remain free from falls  Description  Will remain free from falls  8/9/2019 1249 by Stacy Valladares RN  Outcome: Ongoing  Note:   No fall this shift, out of bed with standby assist. Bed alarm on, call light within reach. Gait is unsteady at times, staying with patient when up to the bathroom. Problem: Pain:  Goal: Pain level will decrease  Description  Pain level will decrease  8/9/2019 1249 by Stacy Valladares RN  Outcome: Ongoing  Note:   Pain 7/10 with pain goal 5-6/10. Treating pain with prn motrin, tylenol and repositioning. Patient has methadone ordered but does not like the way it makes him feel. Problem: Respiratory  Goal: No pulmonary complications  8/5/2210 0150 by Stacy Valladares RN  Outcome: Ongoing  Note:   LS clear, oxygen sats WNL on room air. Problem: GI  Goal: No bowel complications  4/1/1432 [id number] by Stacy Valladares RN  Outcome: Ongoing  Note:   Had small BM today, given scheduled dulcolax and senokot     Problem: Nutrition  Goal: Optimal nutrition therapy  8/9/2019 1249 by Stacy Valladares RN  Outcome: Ongoing  Note:   NPO for US of gall, ate most of breakfast, tolerated well. Problem: DISCHARGE BARRIERS  Goal: Patient's continuum of care needs are met  8/9/2019 1249 by Stacy Valladares RN  Outcome: Ongoing  Note:   Home    Care plan reviewed with patient. Patient verbalizes understanding of the plan of care and contributes to goal setting.

## 2019-08-09 NOTE — CARE COORDINATION
DISCHARGE BARRIERS  8/9/19, 11:47 AM    Reason for Referral: patient is current with [facility] and [facility]  Mental Status: alert and oriented  Decision Making: patient is able to make his own decisions. Family/Social/Home Environment: Spoke with patient re: home situation and discharge needs. He is  and states that his spouse is in good health and does not work outside the home. He states that she does the cooking and cleaning. They reside in a 2 story duplex. Dining room has been converted into a bedroom. There is a toilet only on the first floor-full bathroom is on the second floor. Patient states that he goes upstairs when he is able to do personal care-otherwise he will do a sponge bath. He states that he and spouse both continue to drive. Patient has a history of metastatic lung cancer. He states that he has completed radiation treatments and is now receiving chemo. He states that he has had 5 or 6 treatments so far and that each has felt increasingly worse after each one. He is current with home health [facility]Dr. Randall Jang is PCP and Dr. Jordana Obando follows for oncology. Current Services: Spoke with Zeynep RN/CM with the [address] [place] [address]-advised her of admission and diagnosis. Patient is seen by [facility]. She will need to be notified when patient is discharged and AVS faxed to her. She will also need to be notified of any medication changes so that they can get them ordered. Call to [facility]-confirmed with Elvia Rod that they are providing services to patient (nursing and therapy). She was aware of patient admission. SW to notify her when patient is discharged. Patient shared with  that he has received gas cards for transportation needs [facility].   Current Equipment: walker and cane  Payment Source: [employer] medicare  Concerns or Barriers to Discharge: none indicated  Collabrative List of ECF/HH were provided: N/A-already current    Teach Back Method used with patient regarding care plan   Patient verbalize understanding of the plan of care and contribute to goal setting. Anticipated Needs/Discharge Plan: patient plans home with services through [facility] and [facility]. Provided patient with brochure for [facility].      Electronically signed by AARON Garcia on 8/9/2019 at 11:47 AM

## 2019-08-09 NOTE — PLAN OF CARE
Problem: DISCHARGE BARRIERS  Goal: Patient's continuum of care needs are met  Note:   From home with assistance from spouse. Current with [facility] for nursing and therapy.

## 2019-08-09 NOTE — PLAN OF CARE
Problem: Falls - Risk of:  Goal: Will remain free from falls  Description  Will remain free from falls  Outcome: Ongoing  Note:   Patient remains free of falls this shift. Bed in lowest position, personal items within reach, call light within reach. Rounding hourly. Problem: Pain:  Goal: Pain level will decrease  Description  Pain level will decrease  Outcome: Ongoing  Note:   Pt stated pain goal 3/10. Tylenol and Motrin given PRN. Pt turns and repositions self as needed. Pt satisfied with pain control regimen. Problem: Discharge Planning  Intervention: Interaction with patient/family and care team  Note:   Discharge plans to return home. Care plan reviewed with patient. Patient verbalizes understanding of the plan of care and contribute to goal setting.

## 2019-08-09 NOTE — PROGRESS NOTES
[facility] Course: Patient admitted overnight for constipation and abdominal pain over the last week. Subjective (past 24 hours):  Patient has chronic pains related to cancer. Located in bones, lower back, and right abdomen. Noted after chemotherapy infusion on 7/29 (Nivolimab and Ipilumimab) the abdominal pain became more frequent and associated with nausea/vomiting. Also endorses constipation. The pain is constant, and is dull in description. Appetite is feeling better at this time. Medications:  Reviewed    Infusion Medications    lactated ringers       Scheduled Medications    potassium replacement protocol   Other RX Placeholder    magnesium replacement protocol   Other RX Placeholder    aspirin  81 mg Oral Daily    atorvastatin  80 mg Oral Daily    mometasone-formoterol  2 puff Inhalation BID    busPIRone  10 mg Oral BID    folic acid  1 mg Oral Daily    ipratropium-albuterol  1 vial Inhalation Q4H    OLANZapine  5 mg Oral Nightly    pantoprazole  40 mg Oral QAM AC    famotidine  20 mg Oral Daily    tamsulosin  0.4 mg Oral Daily    doxazosin  2 mg Oral Daily    sodium chloride flush  10 mL Intravenous 2 times per day    enoxaparin  40 mg Subcutaneous Q24H    sennosides-docusate sodium  1 tablet Oral BID    phosphorus replacement protocol   Other RX Placeholder    bisacodyl  10 mg Rectal Daily     PRN Meds: albuterol sulfate HFA, methadone, sodium chloride flush, ondansetron, polyethylene glycol, acetaminophen, ibuprofen      Intake/Output Summary (Last 24 hours) at 8/9/2019 1415  Last data filed at 8/9/2019 1019  Gross per 24 hour   Intake 547.92 ml   Output 0 ml   Net 547.92 ml     Weight change:       Exam:  BP (!) 146/70   Pulse 88   Temp 98 °F (36.7 °C) (Oral)   Resp 16   Ht 5' 8\" (1.727 m)   Wt 142 lb 8 oz (64.6 kg)   SpO2 93%   BMI 21.67 kg/m²     General appearance: No apparent distress, well developed, appears stated age.   Eyes:  Pupils equal, round, and reactive to light. Conjunctivae/corneas clear. HENT: Head normal in appearance. External nares normal.  Oral mucosa moist without lesions. Hearing grossly intact. Neck: Supple, with full range of motion. No jugular venous distention. Trachea midline. Respiratory:  Normal respiratory effort. Clear to auscultation, bilaterally without rales or wheezes or Rhonchi. Cardiovascular: Normal rate, regular rhythm with normal S1/S2 without murmurs. No lower extremity edema. Abdomen: Soft, non-distended with normal bowel sounds, tender to palpation in the epigastric and RUQ. Musculoskeletal: Normal range of motion in extremities. There is no joint swelling or tenderness. Skin: Skin color, texture, turgor normal.  No rashes or lesions. Neurologic:  Neurovascularly intact without any focal sensory/motor deficits in the upper and lower extremities. Cranial nerves:  grossly non-focal.  Psychiatric: Alert and oriented, thought content appropriate, normal insight. Labs:   Recent Labs     08/08/19  1126 08/09/19  0641   WBC 4.1* 2.8*   HGB 12.1* 10.8*   HCT 36.1* 33.9*    197     Recent Labs     08/08/19  1126 08/09/19  0641    135   K 3.8 3.4*   CL 99 102   CO2 25 22*   BUN 7 4*   CREATININE 0.4 0.3*   CALCIUM 8.9 7.6*   PHOS 0.7* 1.2*     Recent Labs     08/08/19  1126   AST 13   ALT 8*   BILIDIR <0.2   BILITOT 0.6   ALKPHOS 91     No results for input(s): INR in the last 72 hours. No results for input(s): Melody Fargo in the last 72 hours. Microbiology:      Urinalysis:      Lab Results   Component Value Date    NITRU NEGATIVE 07/03/2019    WBCUA NONE SEEN 01/21/2019    BACTERIA NONE 01/21/2019    RBCUA 3-5 01/21/2019    BLOODU NEGATIVE 07/03/2019    GLUCOSEU NEGATIVE 07/03/2019       Radiology:  CT ABDOMEN PELVIS WO CONTRAST Additional Contrast? None   Final Result   A liver mass has increased in size as have bilateral adrenal masses.       Moderate right hydronephrosis persists without visible cause. **This report has been created using voice recognition software. It may contain minor errors which are inherent in voice recognition technology. **      Final report electronically signed by Dr. Sona Marsh on 8/8/2019 1:13 PM      [address]    (Results Pending)       DVT prophylaxis: [x] Lovenox                                  [] SCDs                                 [] SQ Heparin                                 [] Encourage ambulation           [] Already on Anticoagulation     Code Status: Full Code    PT/OT Eval Status: n/a    Diet:   Diet NPO Effective Now    Fluids: yes    Tele:   [] yes             [] no      Electronically signed by Mishel Saldivar DO on 8/9/2019 at 2:15 PM

## 2019-08-09 NOTE — H&P
**This is a Medical/ PA/ APRN Student Note and is charted for educational purposes. The non-physician staff attested note is not to be used for billing purposes or to guide patient care. Please see the physician modifications/ attestation for treatment plan/suggestions. This note has been reviewed and feedback has been provided to the student. **     Assessment and Plan:        1. Acute Pancreatitis: lipase 590.9  a. NPO  b. IVF - LR @ 125 mL/hr  c. Patient requests no narcotic pain medication  d. Tordal PRN pain  e. CT negative for acute signs of pancreatitis or dilation of common bile duct  f. Liver enzymes w/in normal limits  g. Consult GI  2. Oral Candidiasis vs Leukoplakia:   a. Present for the past 2 days   b. Possibly secondary to SCC (leukoplakia) or candidiasis (s/p CTx)  c. Biopsy for pathology  3. Small cell carcinoma with distant metasis: managed by oncology  a. consult oncology regarding changing chemotherapy  b. Next Tx scheduled for Aug 19th  4. COPD: stable on home med  5. Anxiety: stable on home meds  a. Continue buspirone  b. Patient reports he has not been taking Zyprexa  6. BPH: stable on home meds  a. Continue tamsolusin  b. Patient reports he has not been taking Terazosin per PCP  7. HTN: stable - managed with BPH meds  8. Macrocytic Anemia: Hb 10.8 Hct 33.9  a. Likely secondary to chemotherapy  b. Fe on 8/9/19 47  c. Oral iron replacement      CC:  Constipation  HPI: Patient is a 70year old male with a PMH of small cell carcinoma, CVA, remote EtOH abuse, tobacco abuse, HTN, GERD, HLD who presents with worsening constipation and abdominal pain. He reports that he has been having abdominal pain and nausea for the past month with his chemotherapy treatments but it had gotten worse over the past 5 days. He had been taking his wife to the neurologist at [facility] when he lost consciousness in the parking lot and was kept at [facility] overnight.  He reports that his abdominal pain was worse when he came home home and came to the [facility] ER. He describes his pain as a dull non radiating progressive 7/10 pain that is better when he uses hemp cream.He reports that has been constipated and has not had a bowel movement in 5 days. He otherwise denies any fever or sick contacts. ROS (12 point review of systems completed. Pertinent positives noted. Otherwise ROS is negative) : OA with back and polyarticular joint pain. Reports he has been seeing flashes of light. Has some shortness of breath with exertion. PMH:    COPD - Duoneb & Symbicort  Anxiety - Buspirone  GERD - Zantac & Omeprazole  HLD - atorvastatin  CVA  SCC - Ctx w/ etoposide and carboplatin  OA  PSH: Kidney stent for congenital ureter malformation, hernia repair  SHX: Has recently quit smoking with a 100 pack year history. Last drank EtOH in 1992 and has been an active sponsor in [place] [address]. Patient is a retired  and was in the [address] [facility] in [facility] and [facility]. Worked maintenance. Patient is recently  (this June)   [id number]: Patient is adopted and has had no contact with his family.  He has no biological children that he is aware of  Allergies: lisinopril  Medications:     lactated ringers 150 mL/hr at 08/09/19 1440      potassium replacement protocol   Other RX Placeholder    magnesium replacement protocol   Other RX Placeholder    aspirin  81 mg Oral Daily    atorvastatin  80 mg Oral Daily    mometasone-formoterol  2 puff Inhalation BID    busPIRone  10 mg Oral BID    folic acid  1 mg Oral Daily    ipratropium-albuterol  1 vial Inhalation Q4H    OLANZapine  5 mg Oral Nightly    pantoprazole  40 mg Oral QAM AC    famotidine  20 mg Oral Daily    tamsulosin  0.4 mg Oral Daily    doxazosin  2 mg Oral Daily    sodium chloride flush  10 mL Intravenous 2 times per day    enoxaparin  40 mg Subcutaneous Q24H    sennosides-docusate sodium  1 tablet Oral BID    phosphorus replacement protocol   Other RX Placeholder    bisacodyl  10 mg Rectal Daily Vital Signs:   BP (!) 146/70   Pulse 88   Temp 98 °F (36.7 °C) (Oral)   Resp 16   Ht 5' 8\" (1.727 m)   Wt 142 lb 8 oz (64.6 kg)   SpO2 93%   BMI 21.67 kg/m²      Intake/Output Summary (Last 24 hours) at 8/9/2019 1457  Last data filed at 8/9/2019 1019  Gross per 24 hour   Intake 547.92 ml   Output 0 ml   Net 547.92 ml        General:  Patient is resting comfortably in bed at time of interview  HEENT: Yellow plaques coating normocephalic and atraumatic. No scleral icterus. PERR. Neck: supple. No JVD. No thyromegaly. Lungs: clear to auscultation. No retractions  Cardiac: RRR without murmur, gallop or ectopic beat. Abdomen: Flat atraumatic and soft. Bowel sounds hypoactive with LUQ tenderness and guarding. Extremities:  No clubbing, cyanosis, or edema x 4. Vasculature: capillary refill < 3 seconds. Palpable LE pulses bilaterally. Skin:  warm and dry. Psych:  Alert and oriented x3. Affect appropriate  Lymph:  No supraclavicular adenopathy. Neurologic:  No focal deficit. No seizures. Data: (All radiographs, tracings, PFTs, and imaging are personally viewed and interpreted unless otherwise noted).  Abdominal CT significant for lesions on the liver that have increased in size since last admission   Pericardial effusion is present which is larger than last CT   Possible adrenal mets    hydronephrosis       Electronically signed by Irma Menon on 8/9/2019 at 2:57 PM     **This is a Medical/ PA/ APRN Student Note and is charted for educational purposes. The non-physician staff attested note is not to be used for billing purposes or to guide patient care. Please see the physician modifications/ attestation for treatment plan/suggestions. This note has been reviewed and feedback has been provided to the student.  **

## 2019-08-10 LAB
ALBUMIN SERPL-MCNC: 3 G/DL (ref 3.5–5.1)
ALP BLD-CCNC: 81 U/L (ref 38–126)
ALT SERPL-CCNC: 7 U/L (ref 11–66)
ANION GAP SERPL CALCULATED.3IONS-SCNC: 12 MEQ/L (ref 8–16)
AST SERPL-CCNC: 13 U/L (ref 5–40)
BILIRUB SERPL-MCNC: 0.6 MG/DL (ref 0.3–1.2)
BUN BLDV-MCNC: 4 MG/DL (ref 7–22)
CALCIUM SERPL-MCNC: 8.4 MG/DL (ref 8.5–10.5)
CHLORIDE BLD-SCNC: 108 MEQ/L (ref 98–111)
CO2: 22 MEQ/L (ref 23–33)
CREAT SERPL-MCNC: 0.3 MG/DL (ref 0.4–1.2)
ERYTHROCYTE [DISTWIDTH] IN BLOOD BY AUTOMATED COUNT: 14.7 % (ref 11.5–14.5)
ERYTHROCYTE [DISTWIDTH] IN BLOOD BY AUTOMATED COUNT: 50.9 FL (ref 35–45)
FOLATE: 16.5 NG/ML (ref 4.8–24.2)
GFR SERPL CREATININE-BSD FRML MDRD: > 90 ML/MIN/1.73M2
GLUCOSE BLD-MCNC: 103 MG/DL (ref 70–108)
HCT VFR BLD CALC: 32.6 % (ref 42–52)
HEMOGLOBIN: 10.6 GM/DL (ref 14–18)
LIPASE: 625.5 U/L (ref 5.6–51.3)
MCH RBC QN AUTO: 30.6 PG (ref 26–33)
MCHC RBC AUTO-ENTMCNC: 32.5 GM/DL (ref 32.2–35.5)
MCV RBC AUTO: 94.2 FL (ref 80–94)
PLATELET # BLD: 217 THOU/MM3 (ref 130–400)
PMV BLD AUTO: 8.3 FL (ref 9.4–12.4)
POTASSIUM SERPL-SCNC: 3.9 MEQ/L (ref 3.5–5.2)
RBC # BLD: 3.46 MILL/MM3 (ref 4.7–6.1)
SODIUM BLD-SCNC: 142 MEQ/L (ref 135–145)
TOTAL PROTEIN: 6.1 G/DL (ref 6.1–8)
VITAMIN B-12: 1525 PG/ML (ref 211–911)
WBC # BLD: 2.9 THOU/MM3 (ref 4.8–10.8)

## 2019-08-10 PROCEDURE — 80053 COMPREHEN METABOLIC PANEL: CPT

## 2019-08-10 PROCEDURE — 94640 AIRWAY INHALATION TREATMENT: CPT

## 2019-08-10 PROCEDURE — 83690 ASSAY OF LIPASE: CPT

## 2019-08-10 PROCEDURE — 85027 COMPLETE CBC AUTOMATED: CPT

## 2019-08-10 PROCEDURE — 6370000000 HC RX 637 (ALT 250 FOR IP): Performed by: NURSE PRACTITIONER

## 2019-08-10 PROCEDURE — 1200000000 HC SEMI PRIVATE

## 2019-08-10 PROCEDURE — 6360000002 HC RX W HCPCS: Performed by: INTERNAL MEDICINE

## 2019-08-10 PROCEDURE — 2580000003 HC RX 258: Performed by: INTERNAL MEDICINE

## 2019-08-10 PROCEDURE — 36415 COLL VENOUS BLD VENIPUNCTURE: CPT

## 2019-08-10 PROCEDURE — 6360000002 HC RX W HCPCS: Performed by: NURSE PRACTITIONER

## 2019-08-10 PROCEDURE — 6370000000 HC RX 637 (ALT 250 FOR IP): Performed by: INTERNAL MEDICINE

## 2019-08-10 PROCEDURE — 99233 SBSQ HOSP IP/OBS HIGH 50: CPT | Performed by: INTERNAL MEDICINE

## 2019-08-10 PROCEDURE — 82746 ASSAY OF FOLIC ACID SERUM: CPT

## 2019-08-10 PROCEDURE — 82607 VITAMIN B-12: CPT

## 2019-08-10 PROCEDURE — 94760 N-INVAS EAR/PLS OXIMETRY 1: CPT

## 2019-08-10 RX ADMIN — BISACODYL 10 MG: 10 SUPPOSITORY RECTAL at 08:45

## 2019-08-10 RX ADMIN — ENOXAPARIN SODIUM 40 MG: 40 INJECTION SUBCUTANEOUS at 17:16

## 2019-08-10 RX ADMIN — DOXAZOSIN 2 MG: 4 TABLET ORAL at 08:45

## 2019-08-10 RX ADMIN — TAMSULOSIN HYDROCHLORIDE 0.4 MG: 0.4 CAPSULE ORAL at 08:46

## 2019-08-10 RX ADMIN — IPRATROPIUM BROMIDE AND ALBUTEROL SULFATE 3 ML: .5; 3 SOLUTION RESPIRATORY (INHALATION) at 21:29

## 2019-08-10 RX ADMIN — BUSPIRONE HYDROCHLORIDE 10 MG: 10 TABLET ORAL at 08:44

## 2019-08-10 RX ADMIN — IPRATROPIUM BROMIDE AND ALBUTEROL SULFATE 3 ML: .5; 3 SOLUTION RESPIRATORY (INHALATION) at 08:18

## 2019-08-10 RX ADMIN — KETOROLAC TROMETHAMINE 15 MG: 30 INJECTION, SOLUTION INTRAMUSCULAR at 05:53

## 2019-08-10 RX ADMIN — NYSTATIN 500000 UNITS: 100000 SUSPENSION ORAL at 17:16

## 2019-08-10 RX ADMIN — NYSTATIN 500000 UNITS: 100000 SUSPENSION ORAL at 20:18

## 2019-08-10 RX ADMIN — IPRATROPIUM BROMIDE AND ALBUTEROL SULFATE 3 ML: .5; 3 SOLUTION RESPIRATORY (INHALATION) at 15:42

## 2019-08-10 RX ADMIN — POLYETHYLENE GLYCOL (3350) 17 G: 17 POWDER, FOR SOLUTION ORAL at 08:44

## 2019-08-10 RX ADMIN — ASPIRIN 81 MG 81 MG: 81 TABLET ORAL at 08:45

## 2019-08-10 RX ADMIN — KETOROLAC TROMETHAMINE 15 MG: 30 INJECTION, SOLUTION INTRAMUSCULAR at 14:58

## 2019-08-10 RX ADMIN — SODIUM CHLORIDE, POTASSIUM CHLORIDE, SODIUM LACTATE AND CALCIUM CHLORIDE: 600; 310; 30; 20 INJECTION, SOLUTION INTRAVENOUS at 20:21

## 2019-08-10 RX ADMIN — SODIUM CHLORIDE, POTASSIUM CHLORIDE, SODIUM LACTATE AND CALCIUM CHLORIDE: 600; 310; 30; 20 INJECTION, SOLUTION INTRAVENOUS at 13:33

## 2019-08-10 RX ADMIN — SENNOSIDES AND DOCUSATE SODIUM 1 TABLET: 8.6; 5 TABLET ORAL at 08:44

## 2019-08-10 RX ADMIN — IPRATROPIUM BROMIDE AND ALBUTEROL SULFATE 3 ML: .5; 3 SOLUTION RESPIRATORY (INHALATION) at 11:46

## 2019-08-10 RX ADMIN — BUSPIRONE HYDROCHLORIDE 10 MG: 10 TABLET ORAL at 20:18

## 2019-08-10 RX ADMIN — OLANZAPINE 5 MG: 5 TABLET, FILM COATED ORAL at 20:18

## 2019-08-10 RX ADMIN — FAMOTIDINE 20 MG: 20 TABLET ORAL at 08:45

## 2019-08-10 RX ADMIN — SENNOSIDES AND DOCUSATE SODIUM 1 TABLET: 8.6; 5 TABLET ORAL at 20:18

## 2019-08-10 RX ADMIN — FOLIC ACID 1 MG: 1 TABLET ORAL at 08:44

## 2019-08-10 RX ADMIN — Medication 2 PUFF: at 21:29

## 2019-08-10 RX ADMIN — PANTOPRAZOLE SODIUM 40 MG: 40 TABLET, DELAYED RELEASE ORAL at 05:53

## 2019-08-10 RX ADMIN — Medication 2 PUFF: at 08:23

## 2019-08-10 RX ADMIN — SODIUM CHLORIDE, POTASSIUM CHLORIDE, SODIUM LACTATE AND CALCIUM CHLORIDE: 600; 310; 30; 20 INJECTION, SOLUTION INTRAVENOUS at 06:08

## 2019-08-10 RX ADMIN — ATORVASTATIN CALCIUM 80 MG: 80 TABLET, FILM COATED ORAL at 08:44

## 2019-08-10 ASSESSMENT — PAIN SCALES - GENERAL
PAINLEVEL_OUTOF10: 5
PAINLEVEL_OUTOF10: 5
PAINLEVEL_OUTOF10: 4
PAINLEVEL_OUTOF10: 4

## 2019-08-10 NOTE — PROGRESS NOTES
Hospitalist Progress Note    Patient:  Awa Dsah      Unit/Bed:5K-21/021-A    YOB: 1950    MRN: [id number]       Acct: [de-identified]     PCP: Greg Maldonado MD    Date of Admission: 8/8/2019      Assessment/Plan:  Active Hospital Problems    Diagnosis Date Noted    Constipation [K59.00] 08/08/2019         Acute Pancreatitis - with elevated lipase, and abdominal pain. No CT evidence of pancreatic inflammation. No definite gallstone on imaging. TG wnl. Denies etoh use recently  - likely secondary to Nivolumab and Ipilimumab - both with associated elevation of lipase and abdominal pain. Just recently got first infusion on 7/29/19 at which time symptoms started. - will make NPO, daily lipase - worsened today from 430 to 625. Make strict NPO. Consult GI for ongoing recommendations  - continue fluids at 150cc/hr  - pain control with tylenol, nsaids (toradol) - patient declines use of opiate medications  - US GB shows distended GB but no pericholecystic fluid, and no gallstones or risa ductal dilatation. Hx of Small Cell Lung Cancer - Extensive Stage with brain, bone, liver, renal, and adrenal masses. Started on ipilimumab and novilumab on 7/29/19. Follows Dr. Bettye Roland. Hx of SVC syndrome in 1/2019. Cancer related pain - avoid opiates as patient felt groggy on methadone. Tylenol and nsaids. Gets bisphosphonate infusion for bone pain. Leukopenia - suspect due to chemotherapy, will monitor. Anemia, Macrocytic - monitor. Normal B12 and folate. Occult blood negative. Hypophosphatemia - likely related to poor oral intake. Replacement protocol. Oral Thrush - start nystatin swish and spit. Likely related to chemotherapy. Constipation - with moderate stool in the colon on CT. Start and continue on bowel regimen - dulcolax, miralax, sennosides. Chronic Hydronephrosis of right kidney - noted. No MELISSA.      History of Partial Small Bowel Resection    Tobacco Abuse      Expected discharge date:  1-2 days pending course    Disposition: home         [] Home       [] TCU       [] Rehab       [] Psych       [] SNF       [] [place]       [] Other-    --------------------------------------------    Chief Complaint: Columba Bazan. Hospital Course: Patient admitted overnight for constipation and abdominal pain over the last week. Noted after chemotherapy infusion on 7/29 (Nivolimab and Ipilumimab) the abdominal pain became more frequent and associated with nausea/vomiting. Also endorses constipation    Subjective (past 24 hours): Ulus Reusing The patient has noted improvement in his abdominal pain. The pain is constant, and is dull in description but less than yesterday. Had a small BM, and passing flatus. No nausea or vomiting today. Discussed with worsening lipase, cannot reinitiate diet at this time. Will consult GI.        Medications:  Reviewed    Infusion Medications    lactated ringers 150 mL/hr at 08/10/19 0608     Scheduled Medications    potassium replacement protocol   Other RX Placeholder    magnesium replacement protocol   Other RX Placeholder    polyethylene glycol  17 g Oral Daily    aspirin  81 mg Oral Daily    atorvastatin  80 mg Oral Daily    mometasone-formoterol  2 puff Inhalation BID    busPIRone  10 mg Oral BID    folic acid  1 mg Oral Daily    ipratropium-albuterol  1 vial Inhalation Q4H    OLANZapine  5 mg Oral Nightly    pantoprazole  40 mg Oral QAM AC    famotidine  20 mg Oral Daily    tamsulosin  0.4 mg Oral Daily    doxazosin  2 mg Oral Daily    sodium chloride flush  10 mL Intravenous 2 times per day    enoxaparin  40 mg Subcutaneous Q24H    sennosides-docusate sodium  1 tablet Oral BID    phosphorus replacement protocol   Other RX Placeholder    bisacodyl  10 mg Rectal Daily     PRN Meds: ketorolac, albuterol sulfate HFA, methadone, sodium chloride flush, ondansetron, acetaminophen      Intake/Output Summary (Last 24 hours) at 8/10/2019 1143  Last data filed at 8/10/2019 0406  Gross per 24 hour   Intake 3869.69 ml   Output 0 ml   Net 3869.69 ml     Weight change:       Exam:  /88   Pulse 93   Temp 98 °F (36.7 °C) (Oral)   Resp 16   Ht 5' 8\" (1.727 m)   Wt 142 lb 8 oz (64.6 kg)   SpO2 92%   BMI 21.67 kg/m²     General appearance: No apparent distress, well developed, appears stated age. Eyes:  Pupils equal, round, and reactive to light. Conjunctivae/corneas clear. HENT: Head normal in appearance. External nares normal.  Oral mucosa moist without lesions. Hearing grossly intact. Neck: Supple, with full range of motion. No jugular venous distention. Trachea midline. Respiratory:  Normal respiratory effort. Clear to auscultation, bilaterally without rales or wheezes or Rhonchi, decreased sounds on left lung overall. Cardiovascular: Normal rate, regular rhythm with normal S1/S2 without murmurs. No lower extremity edema. Abdomen: Soft, non-distended with normal bowel sounds, tender to palpation in the epigastric and RUQ. Musculoskeletal: Normal range of motion in extremities. There is no joint swelling or tenderness. Skin: Skin color, texture, turgor normal.  No rashes or lesions. Neurologic:  Neurovascularly intact without any focal sensory/motor deficits in the upper and lower extremities. Cranial nerves:  grossly non-focal.  Psychiatric: Alert and oriented, thought content appropriate, normal insight.        Labs:   Recent Labs     08/08/19 1126 08/09/19  0641 08/10/19  0544   WBC 4.1* 2.8* 2.9*   HGB 12.1* 10.8* 10.6*   HCT 36.1* 33.9* 32.6*    197 217     Recent Labs     08/08/19 1126 08/09/19  0641 08/10/19  0544    135 142   K 3.8 3.4* 3.9   CL 99 102 108   CO2 25 22* 22*   BUN 7 4* 4*   CREATININE 0.4 0.3* 0.3*   CALCIUM 8.9 7.6* 8.4*   PHOS 0.7* 1.2*  --      Recent Labs     08/08/19  1126 08/10/19  0544   AST 13 13   ALT 8* 7*   BILIDIR <0.2  --    BILITOT 0.6 0.6   ALKPHOS 91 81     No results for input(s): INR in the last 72 hours. No results for input(s): Alex Shira in the last 72 hours. Microbiology:      Urinalysis:      Lab Results   Component Value Date    NITRU NEGATIVE 07/03/2019    WBCUA NONE SEEN 01/21/2019    BACTERIA NONE 01/21/2019    RBCUA 3-5 01/21/2019    BLOODU NEGATIVE 07/03/2019    GLUCOSEU NEGATIVE 07/03/2019       Radiology:  US GALLBLADDER RUQ   Final Result   1. The gallbladder is mildly distended and the gallbladder wall is upper limits normal thickness. There are no gallstones. 2. The common duct is borderline prominent measuring 0.7 cm in transverse dimension. 3. There are 2 solid mass lesions within the liver measuring 2.4 x 2.7 x 3.5 cm within the left hepatic lobe and 3.9 x 2.9 x 2.9 cm within the right hepatic lobe. A malignancy cannot be excluded. 4. There to solid mass lesions within the pancreas measuring 2.5 x 1.3 x 1.5 cm within the pancreatic head and 1.2 x 1.0 x 1.3 cm within the pancreatic neck. A malignancy cannot be excluded. 5. There is a 2.6 x 1.3 x 1.4 cm hypoechoic solid mass between the right kidney and the inferior tip of the liver. A pathologically enlarged lymph node would be a diagnostic consideration. 6. An MRI of the abdomen is recommended to further evaluate the hepatic and pancreatic mass lesions and also the mass lesion between the right kidney and the inferior tip of the liver. **This report has been created using voice recognition software. It may contain minor errors which are inherent in voice recognition technology. **      Final report electronically signed by Dr. Josee Connor on 8/9/2019 4:33 PM      CT ABDOMEN PELVIS WO CONTRAST Additional Contrast? None   Final Result   A liver mass has increased in size as have bilateral adrenal masses. Moderate right hydronephrosis persists without visible cause. **This report has been created using voice recognition software.  It may contain minor errors which are inherent in voice recognition technology. **      Final report electronically signed by Dr. Chandrakant Martino on 8/8/2019 1:13 PM          DVT prophylaxis: [x] Lovenox                                  [] SCDs                                 [] SQ Heparin                                 [] Encourage ambulation           [] Already on Anticoagulation     Code Status: Full Code    PT/OT Eval Status: yes    Diet:   Diet NPO Effective Now    Fluids: yes    Tele:   [] yes             [] no      Electronically signed by Jeremy Guevara DO on 8/10/2019 at 11:43 AM

## 2019-08-10 NOTE — PLAN OF CARE
Problem: Falls - Risk of:  Goal: Will remain free from falls  Description  Will remain free from falls  8/10/2019 0039 by Magnolia Covarrubias RN  Outcome: Ongoing  Note:   Patient bed in lowest position, wheels locked, 2/4 side rails up and alarm on. Call light and belongings within reach. Pathway clear. Nonskid footwear on. Patient rounded on hourly. Fall risk assessment complete. Patient remains free from falls this shift, will continue to monitor. Problem: Pain:  Goal: Pain level will decrease  Description  Pain level will decrease  8/10/2019 0039 by Magnolia Covarrubias RN  Outcome: Ongoing  Note:   Patient pain goal is 1/10. Patient takes PRN Tramadol and Tylenol. Patient pain is currently at 5/10. Patient utilizes warm compress, rest and reposition. Pain assessment ongoing. Problem: Respiratory  Goal: Supplemental O2 requirements decreased  8/10/2019 0039 by Magnolia Covarrubias RN  Outcome: Ongoing  Note:   Patient oxygen 93% on room air. Regular rate and rhythm of respirations with no SOB. Problem: GI  Goal: Bowel movement at least every other day  8/10/2019 0039 by Magnolia Covarrubias RN  Outcome: Ongoing  Note:   Patient has not had a BM yet this shift. Patient took scheduled [place]. IV fluids running at 125 mL/hr. Problem: Nutrition  Goal: Optimal nutrition therapy  8/10/2019 0039 by Magnolia Covarrubias RN  Outcome: Ongoing  Note:   Patient NPO. LR at 125 mL/hr. Problem: DISCHARGE BARRIERS  Goal: Patient's continuum of care needs are met  8/10/2019 0039 by Magnolia Covarrubias RN  Outcome: Ongoing  Note:   Patient plans to discharge home with wife. Discharge planning ongoing. Problem: Impaired respiratory status  Goal: Clear lung sounds  8/10/2019 0039 by Magnolia Covarrubias RN  Outcome: Ongoing  Note:   Patient lung sounds are clear and slightly diminished  Care plan reviewed with patient. Patient verbalizeS understanding of the plan of care and contribute to goal setting.

## 2019-08-10 NOTE — PLAN OF CARE
Problem: Falls - Risk of:  Goal: Will remain free from falls  Description  Will remain free from falls  Outcome: Ongoing  Note:   No fall this shift, call light in reach. Impulsive at times, gets out of bed on his own without using call light. Continue to encourage patient not to try to get up without help. Problem: Pain:  Goal: Pain level will decrease  Description  Pain level will decrease  Outcome: Ongoing  Note:   Continues to have abdominal pain. Treating with IV toradol prn. Pain goal 6/10, pain 4/10 today. Patient appears to be comfortable. Problem: Respiratory  Goal: No pulmonary complications  Outcome: Ongoing  Note:   LS clear and diminished. Care plan reviewed with patient. Patient verbalizes understanding of the plan of care and contributes to goal setting.

## 2019-08-11 ENCOUNTER — APPOINTMENT (OUTPATIENT)
Dept: MRI IMAGING | Age: 69
DRG: 438 | End: 2019-08-11
Payer: MEDICARE

## 2019-08-11 LAB
ANION GAP SERPL CALCULATED.3IONS-SCNC: 12 MEQ/L (ref 8–16)
BUN BLDV-MCNC: 5 MG/DL (ref 7–22)
CALCIUM SERPL-MCNC: 8.5 MG/DL (ref 8.5–10.5)
CHLORIDE BLD-SCNC: 108 MEQ/L (ref 98–111)
CO2: 20 MEQ/L (ref 23–33)
CREAT SERPL-MCNC: 0.4 MG/DL (ref 0.4–1.2)
GFR SERPL CREATININE-BSD FRML MDRD: > 90 ML/MIN/1.73M2
GLUCOSE BLD-MCNC: 107 MG/DL (ref 70–108)
LIPASE: 900.4 U/L (ref 5.6–51.3)
MAGNESIUM: 1.8 MG/DL (ref 1.6–2.4)
PHOSPHORUS: 1.5 MG/DL (ref 2.4–4.7)
POTASSIUM SERPL-SCNC: 3.8 MEQ/L (ref 3.5–5.2)
SODIUM BLD-SCNC: 140 MEQ/L (ref 135–145)

## 2019-08-11 PROCEDURE — 6370000000 HC RX 637 (ALT 250 FOR IP): Performed by: INTERNAL MEDICINE

## 2019-08-11 PROCEDURE — 6370000000 HC RX 637 (ALT 250 FOR IP): Performed by: NURSE PRACTITIONER

## 2019-08-11 PROCEDURE — 2709999900 HC NON-CHARGEABLE SUPPLY

## 2019-08-11 PROCEDURE — 84100 ASSAY OF PHOSPHORUS: CPT

## 2019-08-11 PROCEDURE — 83735 ASSAY OF MAGNESIUM: CPT

## 2019-08-11 PROCEDURE — 6360000002 HC RX W HCPCS: Performed by: NURSE PRACTITIONER

## 2019-08-11 PROCEDURE — 99233 SBSQ HOSP IP/OBS HIGH 50: CPT | Performed by: INTERNAL MEDICINE

## 2019-08-11 PROCEDURE — 6360000002 HC RX W HCPCS: Performed by: INTERNAL MEDICINE

## 2019-08-11 PROCEDURE — 94640 AIRWAY INHALATION TREATMENT: CPT

## 2019-08-11 PROCEDURE — A9579 GAD-BASE MR CONTRAST NOS,1ML: HCPCS | Performed by: INTERNAL MEDICINE

## 2019-08-11 PROCEDURE — 2580000003 HC RX 258: Performed by: INTERNAL MEDICINE

## 2019-08-11 PROCEDURE — 1200000000 HC SEMI PRIVATE

## 2019-08-11 PROCEDURE — 83690 ASSAY OF LIPASE: CPT

## 2019-08-11 PROCEDURE — 94760 N-INVAS EAR/PLS OXIMETRY 1: CPT

## 2019-08-11 PROCEDURE — 6360000004 HC RX CONTRAST MEDICATION: Performed by: INTERNAL MEDICINE

## 2019-08-11 PROCEDURE — 36415 COLL VENOUS BLD VENIPUNCTURE: CPT

## 2019-08-11 PROCEDURE — 74183 MRI ABD W/O CNTR FLWD CNTR: CPT

## 2019-08-11 PROCEDURE — 80048 BASIC METABOLIC PNL TOTAL CA: CPT

## 2019-08-11 RX ADMIN — SODIUM CHLORIDE, POTASSIUM CHLORIDE, SODIUM LACTATE AND CALCIUM CHLORIDE: 600; 310; 30; 20 INJECTION, SOLUTION INTRAVENOUS at 13:33

## 2019-08-11 RX ADMIN — KETOROLAC TROMETHAMINE 15 MG: 30 INJECTION, SOLUTION INTRAMUSCULAR at 21:09

## 2019-08-11 RX ADMIN — IPRATROPIUM BROMIDE AND ALBUTEROL SULFATE 3 ML: .5; 3 SOLUTION RESPIRATORY (INHALATION) at 21:37

## 2019-08-11 RX ADMIN — Medication 2 PUFF: at 09:14

## 2019-08-11 RX ADMIN — TAMSULOSIN HYDROCHLORIDE 0.4 MG: 0.4 CAPSULE ORAL at 10:29

## 2019-08-11 RX ADMIN — OLANZAPINE 5 MG: 5 TABLET, FILM COATED ORAL at 21:09

## 2019-08-11 RX ADMIN — SENNOSIDES AND DOCUSATE SODIUM 1 TABLET: 8.6; 5 TABLET ORAL at 10:27

## 2019-08-11 RX ADMIN — Medication 2 PUFF: at 21:37

## 2019-08-11 RX ADMIN — NYSTATIN 500000 UNITS: 100000 SUSPENSION ORAL at 10:27

## 2019-08-11 RX ADMIN — FOLIC ACID 1 MG: 1 TABLET ORAL at 10:28

## 2019-08-11 RX ADMIN — SENNOSIDES AND DOCUSATE SODIUM 1 TABLET: 8.6; 5 TABLET ORAL at 21:09

## 2019-08-11 RX ADMIN — BUSPIRONE HYDROCHLORIDE 10 MG: 10 TABLET ORAL at 10:26

## 2019-08-11 RX ADMIN — FAMOTIDINE 20 MG: 20 TABLET ORAL at 10:27

## 2019-08-11 RX ADMIN — ENOXAPARIN SODIUM 40 MG: 40 INJECTION SUBCUTANEOUS at 17:54

## 2019-08-11 RX ADMIN — IPRATROPIUM BROMIDE AND ALBUTEROL SULFATE 3 ML: .5; 3 SOLUTION RESPIRATORY (INHALATION) at 17:35

## 2019-08-11 RX ADMIN — ATORVASTATIN CALCIUM 80 MG: 80 TABLET, FILM COATED ORAL at 10:27

## 2019-08-11 RX ADMIN — IPRATROPIUM BROMIDE AND ALBUTEROL SULFATE 3 ML: .5; 3 SOLUTION RESPIRATORY (INHALATION) at 13:16

## 2019-08-11 RX ADMIN — KETOROLAC TROMETHAMINE 15 MG: 30 INJECTION, SOLUTION INTRAMUSCULAR at 13:49

## 2019-08-11 RX ADMIN — DOXAZOSIN 2 MG: 4 TABLET ORAL at 10:27

## 2019-08-11 RX ADMIN — POLYETHYLENE GLYCOL (3350) 17 G: 17 POWDER, FOR SOLUTION ORAL at 10:28

## 2019-08-11 RX ADMIN — GADOTERIDOL 13 ML: 279.3 INJECTION, SOLUTION INTRAVENOUS at 12:03

## 2019-08-11 RX ADMIN — PANTOPRAZOLE SODIUM 40 MG: 40 TABLET, DELAYED RELEASE ORAL at 06:29

## 2019-08-11 RX ADMIN — ASPIRIN 81 MG 81 MG: 81 TABLET ORAL at 10:27

## 2019-08-11 RX ADMIN — IPRATROPIUM BROMIDE AND ALBUTEROL SULFATE 3 ML: .5; 3 SOLUTION RESPIRATORY (INHALATION) at 09:12

## 2019-08-11 RX ADMIN — BUSPIRONE HYDROCHLORIDE 10 MG: 10 TABLET ORAL at 21:09

## 2019-08-11 ASSESSMENT — PAIN SCALES - GENERAL
PAINLEVEL_OUTOF10: 3
PAINLEVEL_OUTOF10: 4

## 2019-08-11 ASSESSMENT — PAIN DESCRIPTION - LOCATION: LOCATION: BACK

## 2019-08-11 ASSESSMENT — PAIN DESCRIPTION - PROGRESSION: CLINICAL_PROGRESSION: NOT CHANGED

## 2019-08-11 ASSESSMENT — PAIN DESCRIPTION - ONSET: ONSET: ON-GOING

## 2019-08-11 ASSESSMENT — PAIN - FUNCTIONAL ASSESSMENT: PAIN_FUNCTIONAL_ASSESSMENT: ACTIVITIES ARE NOT PREVENTED

## 2019-08-11 ASSESSMENT — PAIN DESCRIPTION - PAIN TYPE: TYPE: CHRONIC PAIN

## 2019-08-11 NOTE — CONSULTS
[address] [place] [address]                                  CONSULTATION    PATIENT NAME: Will Berg                    :        1950  MED REC NO:   [id number]                           ROOM:       0021  ACCOUNT NO:   [de-identified]                           ADMIT DATE: 2019  PROVIDER:     Kimberly Franz M.D.    CONSULT DATE:  2019    REASON FOR CONSULTATION:  Nausea, vomiting, increased lipase. HISTORY OF PRESENT ILLNESS:  The patient is a 59-year-old pleasant  gentleman who has got diagnosis of a lung cancer. The patient is under  care of Dr. Shad Mack and had gone to radiation therapy and chemotherapy  and immunotherapy with Avelumab, and the patient is admitted in the  hospital with constipation, nausea, vomiting, abdominal pain, and was  found to have increased lipase which was around 450, but it is  increasing slowly to until 900. The patient tells me that he had  episode of this kind of pain, nausea, vomiting back in July and was  admitted for a short time and feel better, but then this time it was  worse and though today he feels some improvement in the pain and the  nausea and vomiting. The patient also for pain control has been on  methadone from metastatic disease and that has been causing some  constipation. There is no hematemesis, there is no melena and the  patient had no previous history of peptic ulcer disease. The patient  has CT of the abdomen which is showing relatively normal pancreas, but  ultrasound did show a small lesion in the pancreas which is around 2 cm,  but there is a couple of lesion in the liver as well. The patient has  no appetite, though today he is feeling a little hungry after many days  and the patient had completed the radiation on  and he is also  complaining of some fatigue.     PAST MEDICAL HISTORY:  Positive for lung cancer, COPD, GERD,  hyperlipidemia, hypertension, some arthritis. PAST SURGICAL HISTORY:  Positive for hernia repair, had kidney surgery  with stent placement and the bronchoscopy, also the patient had CARLOS done  by Dr. Amber Haile. MEDICATIONS:  The patient's medication list is reviewed and is attached  and does include Zyprexa, Lipitor, Flomax, Zofran, Ventolin inhaler,  BuSpar, and also omeprazole. ALLERGIES:  Allergy to LISINOPRIL    PERSONAL HISTORY:  FAMILY HISTORY:  Positive for hypertension. REVIEW OF SYSTEMS:  GENERAL:  No fever, chills, or weight loss. EYES:   No cataract or double vision. ENT:  No history of hearing loss or sinus  infection. CHEST:  Positive for lung cancer, shortness of breath. ABDOMEN:  As above. :  Negative. NEUROLOGIC:  No migraine, epilepsy,  or seizure. ENDOCRINE:  No diabetes or thyroid disease. MUSCULOSKELETAL:  No arthritis or swollen joints. PSYCHIATRIC:  No  anxiety or depression. CANCER:  Positive for lung cancer. PHYSICAL EXAMINATION:  GENERAL:  He is alert and oriented x3. VITAL SIGNS:  Stable, _____ around 100, temperature 98, respiratory rate  is 18, weight is 139 pounds, blood pressure is around 138/74. HEENT:  Pupils are equal and reactive to light. Pallor is positive. No  cyanosis or jaundice. NECK:  Supple. No JVD. No palpable thyroid. No cervical adenopathy. CHEST:  Good air entry bilaterally. CVS:  S1 plus, S2 plus zero. No gallop or murmur. ABDOMEN:  Soft, tenderness in the lower abdomen. Bowel sounds are  positive. No hepatosplenomegaly. NEUROLOGIC:  No motor or sensory deficits. Normal cranial nerves. No  cerebral dysfunction. EXTREMITIES:  No edema. Normal peripheral pulses. SKIN:  Dry and warm to touch. LYMPHATICS:  No cervical or axillary lymph nodes. ASSESSMENT AND PLAN:  The patient with the history of having metastatic  lung cancer post radiation/chemo and the patient is having nausea,  vomiting, abdominal pain and constipation.   The patient does have a  small lesion in the pancreas. We should do an MRI to better understand  if it is inflammatory or is it metastatic disease in the pancreas or  even primary. I feel the patient clinically is a little better and we  should think about clear liquids after the MRI. Further plans will be  based on the MRI finding. I had a good chance to talk to the patient  and family in detail. Symptomatic treatment with fluid and pain control  should be continued in the meantime.         Deepthi Bowman M.D.    D: 08/11/2019 12:10:21       T: 08/11/2019 13:38:53     [email or web address]  Job#: [id number]     Doc#: [id number]    CC:

## 2019-08-11 NOTE — PLAN OF CARE
Problem: Falls - Risk of:  Goal: Will remain free from falls  Description  Will remain free from falls  Outcome: Ongoing  Note:   No fall this shift, better today about calling for help before trying to get out of bed on his own to use the bathroom. Call light remains within reach, bed alarm is on. Gait is unsteady at times, standby assist with ambulation. Problem: Pain:  Goal: Pain level will decrease  Description  Pain level will decrease  Outcome: Ongoing  Note:   Pain better today, denied pain until after MRI then had one dose of toradol IV. Pain goal remains 5/10 with pain at 4/10 today in abdomen. Problem: Nutrition  Goal: Optimal nutrition therapy  Outcome: Ongoing  Note:   Tolerated clear liquids today without nausea. Patient requested prn dilaudid but stated pain \"is not too bad\". Problem: DISCHARGE BARRIERS  Goal: Patient's continuum of care needs are met  Outcome: Ongoing  Note:   Home with wife     Care plan reviewed with patient. Patient verbalizes understanding of the plan of care and contributes to goal setting.

## 2019-08-11 NOTE — PROGRESS NOTES
Hospitalist Progress Note    Patient:  Liliam Gutierrez      Unit/Bed:5K-21/021-A    YOB: 1950    MRN: [id number]       Acct: [de-identified]     PCP: Mally Vital MD    Date of Admission: 8/8/2019      Assessment/Plan:  Active Hospital Problems    Diagnosis Date Noted    Constipation [K59.00] 08/08/2019         Acute Pancreatitis - with elevated lipase, and abdominal pain. No CT evidence of pancreatic inflammation. No definite gallstone on imaging. TG wnl. Denies etoh use recently  - likely secondary to Nivolumab and Ipilimumab - both with associated elevation of lipase and abdominal pain. Just recently got first infusion on 7/29/19 at which time symptoms started. - will make NPO, daily lipase - worsened today from 625 -> 900.    - Consult GI for ongoing recommendations   - d/w GI   - since lipase getting worse but patient feeling better, will trial CLD   - less likely related to chemo, more likely related to ?metastatic disease (see below)   - mrcp for further characterization  - continue fluids but will cut back  - pain control with tylenol, nsaids (toradol) - patient declines use of opiate medications  - US GB shows distended GB but no pericholecystic fluid, and no gallstones or risa ductal dilatation.   - See below for pancreas lesions     New Pancreatic Lesions - 1.3cm tail and 2.5cm head of pancreas lesion seen on US liver. unclear etiology, these were not seen on CT scan abdomen w/out contrast 8/8/19, or on prior CT abdomen 5/13/19 w/IV contrast.  CT Chest done 7/23/19 with contrast did not mention any finding of this however upon my review I did note a ?pancreatic head lesion.   - concerning for metastatic disease, but could be related to pancreatitis. - GI to obtain MRCP for further characterization.   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   Started on ipilimumab and novilumab on 7/29/19. Follows Dr. Jordana Obando. Hx of SVC syndrome in 1/2019. Cancer related pain - avoid opiates as patient felt groggy on methadone. Tylenol and nsaids. Gets bisphosphonate infusion for bone pain. Leukopenia - suspect due to chemotherapy, will monitor. Anemia, Macrocytic - monitor. Normal B12 and folate. Occult blood negative. Hypophosphatemia - likely related to poor oral intake. Replacement protocol. Oral Thrush - start nystatin swish and spit. Likely related to chemotherapy. Improving. Constipation - with moderate stool in the colon on CT. Start and continue on bowel regimen - dulcolax, miralax, sennosides. Chronic Hydronephrosis of right kidney - noted. No MELISSA. History of Partial Small Bowel Resection    Tobacco Abuse      Expected discharge date:  1-2 days pending course    Disposition: home         [] Home       [] TCU       [] Rehab       [] Psych       [] SNF       [] [facility]       [] Other-    --------------------------------------------    Chief Complaint: Juni Proper. Hospital Course: Patient admitted overnight for constipation and abdominal pain over the last week. Noted after chemotherapy infusion on 7/29 (Nivolimab and Ipilumimab) the abdominal pain became more frequent and associated with nausea/vomiting. Also endorses constipation    Subjective (past 24 hours):    Patient notes continued improvement in abdominal discomfort, still trouble with bowel movements, is passing gas but no stool. No nausea or vomiting and was restarted and tolerated a CLD today. Abdominal pain is improved to 4/10 and manageable -> less persistent.        Medications:  Reviewed    Infusion Medications    lactated ringers 150 mL/hr at 08/10/19 2021     Scheduled Medications    nystatin  5 mL Oral 4x Daily    potassium replacement protocol   Other RX Placeholder    magnesium replacement protocol   Other RX Placeholder    polyethylene glycol  17 g Oral Daily    aspirin  81 mg Oral Daily    atorvastatin  80 mg Oral Daily    mometasone-formoterol  2 puff Inhalation BID    busPIRone  10 mg Oral BID    folic acid  1 mg Oral Daily    ipratropium-albuterol  1 vial Inhalation Q4H    OLANZapine  5 mg Oral Nightly    pantoprazole  40 mg Oral QAM AC    famotidine  20 mg Oral Daily    tamsulosin  0.4 mg Oral Daily    doxazosin  2 mg Oral Daily    sodium chloride flush  10 mL Intravenous 2 times per day    enoxaparin  40 mg Subcutaneous Q24H    sennosides-docusate sodium  1 tablet Oral BID    phosphorus replacement protocol   Other RX Placeholder    bisacodyl  10 mg Rectal Daily     PRN Meds: ketorolac, albuterol sulfate HFA, methadone, sodium chloride flush, ondansetron, acetaminophen      Intake/Output Summary (Last 24 hours) at 8/11/2019 1301  Last data filed at 8/11/2019 0549  Gross per 24 hour   Intake 3631 ml   Output 0 ml   Net 3631 ml     Weight change:       Exam:  /82   Pulse 115   Temp 98.9 °F (37.2 °C) (Oral)   Resp 16   Ht 5' 8\" (1.727 m)   Wt 142 lb 8 oz (64.6 kg)   SpO2 90%   BMI 21.67 kg/m²     General appearance: No apparent distress, well developed, appears stated age. Eyes:  Pupils equal, round, and reactive to light. Conjunctivae/corneas clear. HENT: Head normal in appearance. External nares normal.  Oral mucosa moist, thrush resolving. Hearing grossly intact. Neck: Supple, with full range of motion. No jugular venous distention. Trachea midline. Respiratory:  Normal respiratory effort. Clear to auscultation, bilaterally without rales or wheezes or Rhonchi, decreased sounds on left lung overall. Cardiovascular: Normal rate, regular rhythm with normal S1/S2 without murmurs. No lower extremity edema. Abdomen: Soft, non-distended with normal bowel sounds, tender to palpation in the epigastric and RUQ but less so. Musculoskeletal: Normal range of motion in extremities. There is no joint swelling or tenderness.    Skin: abdomen is recommended to further evaluate the hepatic and pancreatic mass lesions and also the mass lesion between the right kidney and the inferior tip of the liver. **This report has been created using voice recognition software. It may contain minor errors which are inherent in voice recognition technology. **      Final report electronically signed by Dr. Irina Cordero on 8/9/2019 4:33 PM      CT ABDOMEN PELVIS WO CONTRAST Additional Contrast? None   Final Result   A liver mass has increased in size as have bilateral adrenal masses. Moderate right hydronephrosis persists without visible cause. **This report has been created using voice recognition software. It may contain minor errors which are inherent in voice recognition technology. **      Final report electronically signed by Dr. Sandra Fu on 8/8/2019 1:13 PM      MRI ABDOMEN W WO CONTRAST MRCP    (Results Pending)       DVT prophylaxis: [x] Lovenox                                  [] SCDs                                 [] SQ Heparin                                 [] Encourage ambulation           [] Already on Anticoagulation     Code Status: Full Code    PT/OT Eval Status: yes    Diet:   DIET CLEAR LIQUID;    Fluids: yes    Tele:   [] yes             [] no      Electronically signed by Ha Shultz DO on 8/11/2019 at 1:01 PM

## 2019-08-12 VITALS
WEIGHT: 138.3 LBS | SYSTOLIC BLOOD PRESSURE: 98 MMHG | RESPIRATION RATE: 16 BRPM | TEMPERATURE: 98 F | BODY MASS INDEX: 20.96 KG/M2 | HEIGHT: 68 IN | HEART RATE: 112 BPM | OXYGEN SATURATION: 91 % | DIASTOLIC BLOOD PRESSURE: 54 MMHG

## 2019-08-12 PROBLEM — K85.90 ACUTE PANCREATITIS: Status: ACTIVE | Noted: 2019-08-12

## 2019-08-12 PROCEDURE — 6370000000 HC RX 637 (ALT 250 FOR IP): Performed by: INTERNAL MEDICINE

## 2019-08-12 PROCEDURE — 2709999900 HC NON-CHARGEABLE SUPPLY

## 2019-08-12 PROCEDURE — 97116 GAIT TRAINING THERAPY: CPT

## 2019-08-12 PROCEDURE — 6370000000 HC RX 637 (ALT 250 FOR IP): Performed by: NURSE PRACTITIONER

## 2019-08-12 PROCEDURE — 6360000002 HC RX W HCPCS: Performed by: NURSE PRACTITIONER

## 2019-08-12 PROCEDURE — 97110 THERAPEUTIC EXERCISES: CPT

## 2019-08-12 PROCEDURE — 99239 HOSP IP/OBS DSCHRG MGMT >30: CPT | Performed by: INTERNAL MEDICINE

## 2019-08-12 PROCEDURE — 94640 AIRWAY INHALATION TREATMENT: CPT

## 2019-08-12 PROCEDURE — 97162 PT EVAL MOD COMPLEX 30 MIN: CPT

## 2019-08-12 RX ORDER — NAPROXEN 250 MG/1
250 TABLET ORAL 2 TIMES DAILY PRN
Qty: 40 TABLET | Refills: 0 | Status: ON HOLD | OUTPATIENT
Start: 2019-08-12 | End: 2019-10-22 | Stop reason: HOSPADM

## 2019-08-12 RX ORDER — POLYETHYLENE GLYCOL 3350 17 G/17G
17 POWDER, FOR SOLUTION ORAL DAILY
Qty: 30 EACH | Refills: 0 | Status: ON HOLD | OUTPATIENT
Start: 2019-08-13 | End: 2019-09-17

## 2019-08-12 RX ADMIN — TAMSULOSIN HYDROCHLORIDE 0.4 MG: 0.4 CAPSULE ORAL at 09:03

## 2019-08-12 RX ADMIN — FAMOTIDINE 20 MG: 20 TABLET ORAL at 09:03

## 2019-08-12 RX ADMIN — PANTOPRAZOLE SODIUM 40 MG: 40 TABLET, DELAYED RELEASE ORAL at 06:44

## 2019-08-12 RX ADMIN — IPRATROPIUM BROMIDE AND ALBUTEROL SULFATE 3 ML: .5; 3 SOLUTION RESPIRATORY (INHALATION) at 07:46

## 2019-08-12 RX ADMIN — ASPIRIN 81 MG 81 MG: 81 TABLET ORAL at 09:03

## 2019-08-12 RX ADMIN — Medication 2 PUFF: at 07:46

## 2019-08-12 RX ADMIN — DOXAZOSIN 2 MG: 4 TABLET ORAL at 09:03

## 2019-08-12 RX ADMIN — POLYETHYLENE GLYCOL (3350) 17 G: 17 POWDER, FOR SOLUTION ORAL at 09:03

## 2019-08-12 RX ADMIN — FOLIC ACID 1 MG: 1 TABLET ORAL at 09:03

## 2019-08-12 RX ADMIN — NYSTATIN 500000 UNITS: 100000 SUSPENSION ORAL at 09:03

## 2019-08-12 RX ADMIN — IPRATROPIUM BROMIDE AND ALBUTEROL SULFATE 3 ML: .5; 3 SOLUTION RESPIRATORY (INHALATION) at 16:20

## 2019-08-12 RX ADMIN — IPRATROPIUM BROMIDE AND ALBUTEROL SULFATE 3 ML: .5; 3 SOLUTION RESPIRATORY (INHALATION) at 12:03

## 2019-08-12 RX ADMIN — ENOXAPARIN SODIUM 40 MG: 40 INJECTION SUBCUTANEOUS at 17:33

## 2019-08-12 RX ADMIN — BUSPIRONE HYDROCHLORIDE 10 MG: 10 TABLET ORAL at 09:03

## 2019-08-12 RX ADMIN — SENNOSIDES AND DOCUSATE SODIUM 1 TABLET: 8.6; 5 TABLET ORAL at 09:03

## 2019-08-12 ASSESSMENT — PAIN SCALES - GENERAL
PAINLEVEL_OUTOF10: 0
PAINLEVEL_OUTOF10: 0

## 2019-08-12 NOTE — PLAN OF CARE
Problem: Impaired respiratory status  Goal: Clear lung sounds  8/12/2019 0748 by Stan Guerra RCP  Outcome: Ongoing  Note:   Cont aerosol to improve aeration  8/11/2019 2143 by Ellis Caraballo RCP  Outcome: Ongoing

## 2019-08-12 NOTE — PLAN OF CARE
Problem: Falls - Risk of:  Goal: Will remain free from falls  Description  Will remain free from falls  Outcome: Ongoing  Note:   Pt remains free from falls this shift. Bed exit alarm activated. Bed in lowest position with brakes on. 2/4 side rails raised for increased safety. Pt utilizes non-skid footwear and standby assistance with ambulation. Pathway clear and possessions within reach. Call light within reach. Pt rounded on hourly. Problem: Pain:  Goal: Pain level will decrease  Description  Pain level will decrease  Outcome: Ongoing  Note:   Pt's pain controlled this shift with Toradol. Pt has chronic back pain at a 3/10 in severity. Pt's pain goal is 6/10 in severity. Non-pharmacological interventions include rest and reposition. Pt tolerates and agrees with plan of care for pain. Problem: GI  Goal: No bowel complications  Outcome: Ongoing  Note:   Pt has not had bowel movement this shift. Pt's bowel sounds are hypoactive. Pt receiving Senna. Problem: DISCHARGE BARRIERS  Goal: Patient's continuum of care needs are met  Outcome: Ongoing  Note:   Pt's discharge plan reviewed with pt. Pt is from home. Upon discharge, pt plans to return to home with some home care. Care plan reviewed with patient. Patient verbalize understanding of the plan of care and contribute to goal setting.

## 2019-08-12 NOTE — PLAN OF CARE
Problem: Impaired respiratory status  Goal: Clear lung sounds  8/11/2019 2143 by Maya Persaud RCP  Outcome: Ongoing   Breath sounds are clear and diminished at this time. Continue with treatments to help improve breath sounds.

## 2019-08-12 NOTE — PROGRESS NOTES
**This is a Medical/ PA/ APRN Student Note and is charted for educational purposes. The non-physician staff attested note is not to be used for billing purposes or to guide patient care. Please see the physician modifications/ attestation for treatment plan/suggestions. This note has been reviewed and feedback has been provided to the student. **         Hospitalist Progress Note    Patient:  Elicia Duran      Unit/Bed:5K-21/021-A    YOB: 1950    MRN: [id number]       Acct: [de-identified]     PCP: Sofiya Spears MD    Date of Admission: 8/8/2019    Assessment/Plan:    1. Acute Pancreatitis:   a. lipase 900.4 on 8/12/19 up from 625  b. unkown etiology possibly metastatic disease in pancreatic head (MRI 8/11/19) or Ctx treatment (known s/e of Nivolumab with Ipililumab)  c. D/c clear liquids  d. Patient requests no narcotic pain medication  e. Tordal PRN pain  f. CT negative for acute signs of pancreatitis or dilation of common bile duct  g. Liver enzymes w/in normal limits  h. Consult GI  2. Constipation:  a. Improving on ducolax miralax and sennoisides. 3. Oral Candidiasis :   a. Present for the past 2 days likely secondary to immunosuppression s/p CTx  b. Improving on oral nystatin. 4. Small cell carcinoma with distant metasis: managed by oncology  a. consult oncology regarding changing chemotherapy  b. Next Tx scheduled for Aug 19th  c. 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 Receiving Zometa injections for bone mets   5. COPD: stable on home med  6. Hypophosphatemia: improving with empiric treatment and Phospate replacement protocol  7. Leukopenia:   a. WBC 2.9 on 8/12/19  b. Likely secondary to Ctx  8. Anxiety: stable on home meds  a. Continue buspirone  b. Patient reports he has not been taking Zyprexa  9. BPH: stable on home meds  a. Continue tamsolusin  b. Patient reports he has not been taking Terazosin per PCP  10.  HTN: PRN Meds: ketorolac, albuterol sulfate HFA, sodium chloride flush, ondansetron, acetaminophen      Intake/Output Summary (Last 24 hours) at 8/12/2019 1119  Last data filed at 8/12/2019 0350  Gross per 24 hour   Intake 3504.91 ml   Output --   Net 3504.91 ml       Diet:  DIET CLEAR LIQUID;    Exam:  /71   Pulse 114   Temp 98.4 °F (36.9 °C) (Oral)   Resp 18   Ht 5' 8\" (1.727 m)   Wt 138 lb 4.8 oz (62.7 kg)   SpO2 91%   BMI 21.03 kg/m²     General appearance: No apparent distress, appears stated age and cooperative. HEENT: Yellow/brown plaques coating tongue. Head normocephalic and atraumatic. No scleral icterus. PERRLA. Neck: 7mm red macule at the angle of the left mandible. Neck supple, with full range of motion. No jugular venous distention. Trachea midline. Respiratory:  Increased respiratory effort. Scattered left sided wheezes on auscultation. Cardiovascular: Regular rate and rhythm with normal S1/S2 without murmurs, rubs or gallops. Abdomen: Soft, non-tender, non-distended Bowel sounds hypoactive with LUQ tenderness and guarding. Musculoskeletal: passive and active ROM x 4 extremities. Skin: Skin color, texture, turgor normal.  No rashes or lesions. Neurologic:  Neurovascularly intact without any focal sensory/motor deficits. Cranial nerves: II-XII intact, grossly non-focal.  Psychiatric: Alert and oriented, thought content appropriate, normal insight  Capillary Refill: Brisk,< 3 seconds   Peripheral Pulses: +2 palpable, equal bilaterally       Labs:   Recent Labs     08/10/19  0544   WBC 2.9*   HGB 10.6*   HCT 32.6*        Recent Labs     08/10/19  0544 08/11/19  0505    140   K 3.9 3.8    108   CO2 22* 20*   BUN 4* 5*   CREATININE 0.3* 0.4   CALCIUM 8.4* 8.5   PHOS  --  1.5*     Recent Labs     08/10/19  0544   AST 13   ALT 7*   BILITOT 0.6   ALKPHOS 81     No results for input(s): INR in the last 72 hours.   No results for input(s): Gloria Orozco in the last 72 hours. Microbiology:      Urinalysis:      Lab Results   Component Value Date    NITRU NEGATIVE 07/03/2019    WBCUA NONE SEEN 01/21/2019    BACTERIA NONE 01/21/2019    RBCUA 3-5 01/21/2019    BLOODU NEGATIVE 07/03/2019    GLUCOSEU NEGATIVE 07/03/2019       Radiology:  MRI ABDOMEN W WO CONTRAST MRCP   Final Result      There are mass lesions within the liver, the adrenal glands, within the pancreas and near the right kidney all favored to relate to metastatic lesions. **This report has been created using voice recognition software. It may contain minor errors which are inherent in voice recognition technology. **      Final report electronically signed by Dr. Fred Jade on 8/11/2019 1:46 PM      US GALLBLADDER RUQ   Final Result   1. The gallbladder is mildly distended and the gallbladder wall is upper limits normal thickness. There are no gallstones. 2. The common duct is borderline prominent measuring 0.7 cm in transverse dimension. 3. There are 2 solid mass lesions within the liver measuring 2.4 x 2.7 x 3.5 cm within the left hepatic lobe and 3.9 x 2.9 x 2.9 cm within the right hepatic lobe. A malignancy cannot be excluded. 4. There to solid mass lesions within the pancreas measuring 2.5 x 1.3 x 1.5 cm within the pancreatic head and 1.2 x 1.0 x 1.3 cm within the pancreatic neck. A malignancy cannot be excluded. 5. There is a 2.6 x 1.3 x 1.4 cm hypoechoic solid mass between the right kidney and the inferior tip of the liver. A pathologically enlarged lymph node would be a diagnostic consideration. 6. An MRI of the abdomen is recommended to further evaluate the hepatic and pancreatic mass lesions and also the mass lesion between the right kidney and the inferior tip of the liver. **This report has been created using voice recognition software. It may contain minor errors which are inherent in voice recognition technology. **      Final report electronically signed by  Ortega Hernandez on 8/9/2019 4:33 PM      CT ABDOMEN PELVIS WO CONTRAST Additional Contrast? None   Final Result   A liver mass has increased in size as have bilateral adrenal masses. Moderate right hydronephrosis persists without visible cause. **This report has been created using voice recognition software. It may contain minor errors which are inherent in voice recognition technology. **      Final report electronically signed by Dr. Makenzie Diaz on 8/8/2019 1:13 PM          DVT prophylaxis: [x] Lovenox                                 [] SCDs                                 [] SQ Heparin                                 [] Encourage ambulation           [] Already on Anticoagulation     Code Status: Full Code    PT/OT Eval Status: patient has been receiving PT/OT    Tele:   [x] yes             [] no    Active Hospital Problems    Diagnosis Date Noted    Constipation [K59.00] 08/08/2019       Electronically signed by Irma Menon on 8/12/2019 at 11:19 AM   **This is a Medical/ PA/ APRN Student Note and is charted for educational purposes. The non-physician staff attested note is not to be used for billing purposes or to guide patient care. Please see the physician modifications/ attestation for treatment plan/suggestions. This note has been reviewed and feedback has been provided to the student.  **

## 2019-08-12 NOTE — CARE COORDINATION
8/12/19, 11:54 AM      Dagoberto Galvan day: 4  Location: [facility]21/021-A Reason for admit: Constipation [K59.00]   Procedure:  8/11/19 MRCP: There are mass lesions within the liver, the adrenal glands, within the pancreas and near the right kidney all favored to relate to metastatic lesions. Treatment Plan of Care: Lipase up to 900.4. GI following, IV fluids, Potassium, Magnesium, and Phosphorus replacement protocol, Duoneb nebulizer, prn Toradol for pain, Oxygen, Dietician following, PT/OT, up with assistance. PCP: Dorothy Delgadillo MD  Readmission Risk Score: 30%  Discharge Plan: Home with spouse continuing services through [facility].

## 2019-08-12 NOTE — PROGRESS NOTES
5/5    Balance:  Static Sitting Balance:  Supervision  Dynamic Sitting Balance: Stand By Assistance  Static Standing Balance: Stand By Assistance  Dynamic Standing Balance: Contact Guard Assistance    Bed Mobility:  Rolling to Right: Stand By Assistance, with verbal cues , with increased time for completion   Supine to Sit: Stand By Assistance, with verbal cues , with increased time for completion    Transfers:  Sit to Stand: Stand By Assistance, with increased time for completion, with verbal cues, to/from chair with arms  Stand to Sit:Stand By Assistance, with verbal cues, to/from chair with arms    Ambulation:  Contact Guard Assistance, Minimal Assistance, with cues for safety, with verbal cues , with increased time for completion  Distance: 15+ 5 with out AD, 75+75 with 2WW  Surface: Level Tile  Device:2WW, no AD  Gait Deviations: Forward Flexed Posture, Slow Maryann, Decreased Heel Strike Bilaterally and downward gaze  Cueing for improved proximity of 2WW and breathing technique   Pt requires seated rest breaks in between ambulation attempts - increased time to complete. Exercise:  Patient was guided in 1 set(s) 12 reps of exercise to both lower extremities. Ankle pumps, Glut sets, Heelslides, Hip abduction/adduction and Straight leg raises. Exercises were completed for increased independence with functional mobility. Functional Outcome Measures: Completed  AM-PAC Inpatient Mobility without Stair Climbing Raw Score : 15  AM-PAC Inpatient without Stair Climbing T-Scale Score : 43.03    ASSESSMENT:  Activity Tolerance:  Patient tolerance of  treatment: fair. Pt limited by SOB and fatigue. Pt required several seated rest breaks throughout session. Treatment Initiated: Treatment and education initiated within context of evaluation.   Evaluation time included review of current medical information, gathering information related to past medical, social and functional history, completion of standardized testing, formal and informal observation of tasks, assessment of data and development of plan of care and goals. Treatment time included skilled education and facilitation of tasks to increase safety and independence with functional mobility for improved independence and quality of life. Assessment: Body structures, Functions, Activity limitations: Decreased functional mobility , Decreased endurance, Decreased strength, Decreased balance  Assessment: Pt demonstrates a decrease in baseline by way of transfers, bed mobility and ambulation secondary to the aforementioned deficits. Pt will benefit from skilled PT services during admission and post d/c for improved functional I and safety with mobility.    Prognosis: Good    REQUIRES PT FOLLOW UP: Yes    Discharge Recommendations:  Discharge Recommendations: Continue to assess pending progress, Home with Home health PT, Home with assist PRN(Pt will need assist for stair negotiation from spouse)    Patient Education: role of PT, plan of care       Equipment Recommendations:  Equipment Needed: No  Other: pt advised to use walker upon d/c for improved balance, endurance and activity tolerance     Plan:  Times per week: 5xGM  Current Treatment Recommendations: Strengthening, Gait Training, ROM, Balance Training, Functional Mobility Training, Transfer Training, Endurance Training, Home Exercise Program, Stair training    Goals:  Patient goals : return home with spouse  Short term goals  Time Frame for Short term goals: by discharge  Short term goal 1: bed mobility with mod I for ease of getting in/out of bed  Short term goal 2: sit <> stand with mod I for ease of transfers  Short term goal 3: ambulae 75ft without AD and supervision A to return to PLOF and ease of in home mobility   Short term goal 4: negotiate 6 steps without rails and contact guard assist for safe entry/exit of his home  Long term goals  Time Frame for Long term goals : N/A secondary to short ELOS Following session, patient left in safe position with all fall risk precautions in place.

## 2019-08-12 NOTE — PROGRESS NOTES
Nutrition Assessment    Type and Reason for Visit: Initial, Positive Nutrition Screen(Poor Appetite/Intake/Weight Loss)    Nutrition Recommendations: Advance diet as tolerated. Recommend a Multivitamin w/minerals daily. *Started Ensure Clear TID. Nutrition Assessment: Pt. severely malnourished AEB -9.2% unplanned weight loss in 3 months, consuming less than 50% estimated energy requirement x2-3 weeks now, and moderate muscle loss on physical exam. At risk for further nutritional compromise r/t hx stage IV lung cancer with lesions in the pancreas and liver per MD (s/p chemotherapy, radiation therapy and immunotherapy), only on a Clear Liquid diet and need for nutrition support. Recommendations as per above. Malnutrition Assessment:  · Malnutrition Status: Meets the criteria for severe malnutrition  · Context: Acute illness or injury  · Findings of the 6 clinical characteristics of malnutrition (Minimum of 2 out of 6 clinical characteristics is required to make the diagnosis of moderate or severe Protein Calorie Malnutrition based on AND/ASPEN Guidelines):  1. Energy Intake-Less than or equal to 50% of estimated energy requirement, Greater than or equal to 1 month    2. Weight Loss-(-9.2% unplanned weight loss), in 3 months  3.  Muscle Loss-Moderate muscle mass loss, Temples (temporalis muscle), Clavicles (pectoralis and deltoids)    Nutrition Risk Level: High    Nutrient Needs:  · Estimated Daily Total Kcal: 1912-0674- kcal/day (25-30 kcal/kg - 62.7 kg on 8/12)  · Estimated Daily Protein (g): 75 or more g/day (1.2+ g/kg - 62.7 kg on 8/12)    Nutrition Diagnosis:   · Problem: Severe malnutrition, In context of acute illness or injury  · Etiology: related to Insufficient energy/nutrient consumption, Catabolic illness     Signs and symptoms:  as evidenced by Diet history of poor intake, Moderate muscle loss    Objective Information:  · Nutrition-Focused Physical Findings: hx lung CA s/p chemo, radiation & immunotherapy now with pancreatic mass; pt denies N/V; tolerating Clear Liquid diet; last BM x3 on 8/9; poor appetite x1 month; follows [facility] outpt.; drinks x2 Boost (vanilla) daily at home  · Wound Type: None  · Current Nutrition Therapies:  · Oral Diet Orders: Clear Liquid   · Oral Diet intake: 26-50%(on Clear Liquid diet- tolerating well per pt report)  · Oral Nutrition Supplement (ONS) Orders: Clear Liquid Oral Supplement(Ensure Clear TID (berry). Will order Vanilla Ensure Enlive TID when diet is advanced)  · ONS intake: (Initiated today)  · Anthropometric Measures:  · Ht: 5' 8\" (172.7 cm)   · Current Body Wt: 138 lb 4.8 oz (62.7 kg)(8/12; no edema noted)  · Admission Body Wt: 142 lb 8 oz (64.6 kg)(8/8; no edema noted)  · Usual Body Wt: 151 lb (68.5 kg)(per pt report. Per EMR: 152 lb 9.6 oz on 5/24/19; 127 lb 9.6 oz on 2/25/19; 128 lb 3.2 oz on 9/5/18)  · % Weight Change:  -9.2% unplanned weight loss in 3 months- severe weight loss noted  · Ideal Body Wt: 154 lb (69.9 kg), % Ideal Body 89.6%  · BMI Classification: BMI 18.5 - 24.9 Normal Weight(21.1)    Nutrition Interventions:   Continue current diet, Start ONS, Vitamin Supplement(Started Ensure Clear TID. Advance diet as tolerated.  Recommend a Multivitamin w/minerals daily.)  Continued Inpatient Monitoring, Education Initiated, Coordination of Care(Encouraged po intake of meals and use of ONS during LOS and upon d/c home.)    Nutrition Evaluation:   · Evaluation: Goals set   · Goals: Pt will recieve adequate nutrition within 1-4 days    · Monitoring: Nutrition Progression, Meal Intake, Diet Tolerance, Weight, Pertinent Labs, Nausea or Vomiting, Constipation, Monitor Bowel Function    Electronically signed by Nicole Cui RD, LD on 8/12/19 at 11:23 AM    Contact Number: [phone]

## 2019-08-12 NOTE — PROGRESS NOTES
Gastrointestinal Progress Note      Patient:  Gurdeep Hollingsworth  Unit/Bed:5K-21/021-A       YOB: 1950    MRN: [id number]                      Acct: [de-identified]     Admit date: 8/8/2019      Subjective:  Patient lying in bed getting breathing tx. Family at side. We discussed advancing diet to soft and then he could be discharged later today. Per RN, hospitalist already talked to him about his MRI/metastasis and will have him follow up with Oncologist as OP        Objective:       CBC:   Recent Labs     08/10/19  0544   WBC 2.9*   HGB 10.6*        BMP:    Recent Labs     08/10/19  0544 08/11/19  0505    140   K 3.9 3.8    108   CO2 22* 20*   BUN 4* 5*   CREATININE 0.3* 0.4   GLUCOSE 103 107     Calcium:  Recent Labs     08/11/19  0505   CALCIUM 8.5     Ionized Calcium:No results for input(s): IONCA in the last 72 hours. Magnesium:  Recent Labs     08/11/19  0505   MG 1.8     Phosphorus:  Recent Labs     08/11/19  0505   PHOS 1.5*     INR: No results for input(s): INR in the last 72 hours. Hepatic:   Recent Labs     08/10/19  0544   ALKPHOS 81   ALT 7*   AST 13   PROT 6.1   BILITOT 0.6   LABALBU 3.0*     Amylase and Lipase:No results for input(s): LACTA, AMYLASE in the last 72 hours. Lactic Acid: No results for input(s): LACTA in the last 72 hours. Physical Exam:  Vitals:    08/12/19 0900   BP: 129/71   Pulse: 114   Resp: 18   Temp: 98.4 °F (36.9 °C)   SpO2: 91%     GEN: Well nourished, thin build   LUNGS:  Clear to auscultation bilaterally. Chest rises equally on inspiration. CARDIOVASCULAR:  Regular rate and rhythm without murmurs, rubs or gallops. ABDOMEN:  Soft, nontender and nondistended with normal bowel sounds. HEAD:  Normal cephalic/atraumatic. NECK:  Neck supple. Trachea midline      UPPER EXTREMITIES:  No cyanosis, clubbing, or edema. DERM:  No rash or jaundice. LOWER EXTREMITIES:  No cyanosis, clubbing, or edema.     NEURO:  Alert and oriented for above  · Abdominal pain   · Anemia; slight, but stable H/H 10.6/32.6  · Leukopenia; 2.9  · Constipation  · Small pancreatic lesion-believed to be metastasis   · Liver lesions-believed to be metastasis   · Adrenal gland lesions-believed to be metastasis      Plan:  · Reviewed DR Stewart Kenan note from yesterday ; pancreatic lesion, MRI ordered  · MRI report reviewed; see above. No choledocholithiasis  · Pain control; Toradol  · Nystatin  · Soft  diet ordered  · Miralax 17 gm daily   · Dulcolax suppository daily  · Senokot bid  · Continue to treat constipation   · Gi signing off; planning for him to go home today if tolerates soft diet       Hospitalist/Attending provider notes, consulting physician notes, laboratory results and procedure notes reviewed prior to seeing the patient.    Note done in collaboration with DR Marc Zuleta MD.   Electronically signed by CARLOS Woo CNP on 8/12/19 at 9:38 AM

## 2019-08-12 NOTE — PLAN OF CARE
Problem: Nutrition  Goal: Optimal nutrition therapy  Outcome: Ongoing   Nutrition Problem: Severe malnutrition, In context of acute illness or injury  Intervention: Food and/or Nutrient Delivery: Continue current diet, Start ONS, Vitamin Supplement(Started Ensure Clear TID. Advance diet as tolerated.  Recommend a Multivitamin w/minerals daily.)  Nutritional Goals: Pt will recieve adequate nutrition within 1-4 days

## 2019-08-12 NOTE — FLOWSHEET NOTE
Drew Zuleta 59  OCCUPATIONAL THERAPY MISSED TREATMENT NOTE  Reyna Godinez MED 5K  5K-21/021-A      Date: 2019  Patient Name: Lynnann Buerger        CSN: [id number]   : 1950  (70 y.o.)  Gender: male                REASON FOR MISSED TREATMENT: OT attempted at this time, although pt with MD student for prolonged time. Will check back as able.

## 2019-08-13 NOTE — CARE COORDINATION
8/13/19, 8:30 AM    DISCHARGE BARRIERS    Patient discharged last evening. Call to Elvia Rod with [facility]-advised her of patient discharge. She can obtain all needed information including AVS from [facility]. Call to ERIC HERNANDEZ/-advised staff that patient was discharged last evening and that SW would fax AVS to [phone]. They will advise Zuleika Gutierrez of patient discharge. AVS faxed. No other needs. 8/13/19, 8:38 AM    Discharge plan discussed by  and . Discharge plan reviewed with patient/ family. Patient/ family verbalize understanding of discharge plan and are in agreement with plan. Understanding was demonstrated using the teach back method.    Services After Discharge  Services At/After Discharge: Nursing Services, OT, PT([facility])   IMM Letter  IMM Letter given to Patient/Family/Significant other/Guardian/POA/by[de-identified] staff  IMM Letter date given[de-identified] 08/08/19  IMM Letter time given[de-identified] [phone]

## 2019-08-14 ENCOUNTER — TELEPHONE (OUTPATIENT)
Dept: ONCOLOGY | Age: 69
End: 2019-08-14

## 2019-08-14 NOTE — DISCHARGE SUMMARY
Chucho Flor). The patient's symptoms continued to improve, so a diet was restarted despite the continued rise in lipase. Likely his initial pancreatitis was related to Nivolimab/Ipilimumab (known to have this effect), however subsequent sustained rise was due to lesions. He was able to tolerate advancement of diet. He never required any opioids, and will be discharged on NSAIDs and tylenol, which controlled his pain while inpatient. Will ensure he follows up closely with Oncologist as OP, to adjust his chemo regimen if necessary. Discussed low fat diet upon discharge. The patient was stable for discharge - all consultants were contacted and in agreement with plan for discharge. Appropriate follow up appointment was arranged prior to discharge. Please see below or view chart for more details from hospital course. Discharge Diagnoses:    Acute Pancreatitis - likely related to chemotherapy, however sustained rise (with improved symptoms) liekly related to metastatic disease.      New Pancreatic Lesions - 1.3cm tail and 2.5cm head of pancreas lesion seen on US liver. unclear etiology, these were not seen on CT scan abdomen w/out contrast 8/8/19, or on prior CT abdomen 5/13/19 w/IV contrast. MRCP concerning for metastatic disease.      Hx of Small Cell Lung Cancer - Extensive Stage with brain, bone, liver, renal, and adrenal masses. Likely new pancreatic lesions  Started on ipilimumab and novilumab on 7/29/19. Follows Dr. Della Gerardo. Hx of SVC syndrome in 1/2019. Will need to follow closely to discuss possibly changing the regimen given above issues. Imaging also noted progression of adrenal and liver disease as well.      Cancer related pain - avoid opiates as patient felt groggy on methadone. Tylenol and nsaids. Gets bisphosphonate infusion for bone pain as OP.      Leukopenia - suspect due to chemotherapy, will monitor.      Anemia, Macrocytic - monitor. Normal B12 and folate. Occult blood negative.    Hypophosphatemia - likely related to poor oral intake. Replacement protocol. Improved. Severe malnutrition - BMI 21, POA, likely related to malignancy.      Oral Thrush - start nystatin swish and spit. Likely related to chemotherapy. IComplete course on discharge.      Constipation - with moderate stool in the colon on CT. Start and continue on bowel regimen - dulcolax, miralax, sennosides. Improved.      Chronic Hydronephrosis of right kidney - noted. No MELISSA.      History of Partial Small Bowel Resection     Tobacco Abuse      The patient was seen and examined on day of discharge and this discharge summary is in conjunction with any daily progress note from day of discharge. Exam:     Vitals:  Vitals:    08/11/19 2107 08/12/19 0350 08/12/19 0900 08/12/19 1406   BP: 126/70 (!) 145/79 129/71 (!) 98/54   Pulse: 114 113 114 112   Resp: 16 16 18 16   Temp: 98.1 °F (36.7 °C) 98.4 °F (36.9 °C) 98.4 °F (36.9 °C) 98 °F (36.7 °C)   TempSrc: Oral Oral Oral Axillary   SpO2: 91% 92% 91% 91%   Weight:  138 lb 4.8 oz (62.7 kg)     Height:         Weight: Weight: 138 lb 4.8 oz (62.7 kg)     24 hour intake/output:No intake or output data in the 24 hours ending 08/14/19 1445      General appearance: No apparent distress, well developed, appears stated age. Eyes:  Pupils equal, round, and reactive to light. Conjunctivae/corneas clear. HENT: Head normal in appearance. External nares normal.  Oral mucosa moist, thrush resolving. Hearing grossly intact. Neck: Supple, with full range of motion. No jugular venous distention. Trachea midline. Respiratory:  Normal respiratory effort. Clear to auscultation, bilaterally without rales or wheezes or Rhonchi, decreased sounds on left lung overall. Cardiovascular: Normal rate, regular rhythm with normal S1/S2 without murmurs. No lower extremity edema.    Abdomen: Soft, non-distended with normal bowel sounds, very mildly tender to palpation in the epigastric and RUQ.  Musculoskeletal: Normal range of motion in extremities. There is no joint swelling or tenderness. Skin: Skin color, texture, turgor normal.  No rashes or lesions. Neurologic:  Neurovascularly intact without any focal sensory/motor deficits in the upper and lower extremities. Cranial nerves:  grossly non-focal.  Psychiatric: Alert and oriented, thought content appropriate, normal insight.          Labs: For convenience and continuity at follow-up the following most recent labs are provided:      CBC:    Lab Results   Component Value Date    WBC 2.9 08/10/2019    HGB 10.6 08/10/2019    HCT 32.6 08/10/2019     08/10/2019       Renal:    Lab Results   Component Value Date     08/11/2019    K 3.8 08/11/2019    K 3.4 08/09/2019     08/11/2019    CO2 20 08/11/2019    BUN 5 08/11/2019    CREATININE 0.4 08/11/2019    CALCIUM 8.5 08/11/2019    PHOS 1.5 08/11/2019         Significant Diagnostic Studies    Radiology:   MRI ABDOMEN W WO CONTRAST MRCP   Final Result      There are mass lesions within the liver, the adrenal glands, within the pancreas and near the right kidney all favored to relate to metastatic lesions. **This report has been created using voice recognition software. It may contain minor errors which are inherent in voice recognition technology. **      Final report electronically signed by Dr. Leopoldo Kelch on 8/11/2019 1:46 PM      US GALLBLADDER RUQ   Final Result   1. The gallbladder is mildly distended and the gallbladder wall is upper limits normal thickness. There are no gallstones. 2. The common duct is borderline prominent measuring 0.7 cm in transverse dimension. 3. There are 2 solid mass lesions within the liver measuring 2.4 x 2.7 x 3.5 cm within the left hepatic lobe and 3.9 x 2.9 x 2.9 cm within the right hepatic lobe. A malignancy cannot be excluded.    4. There to solid mass lesions within the pancreas measuring 2.5 x 1.3 x 1.5 cm within the pancreatic head and 1.2 x 1.0 x 1.3 cm within the pancreatic neck. A malignancy cannot be excluded. 5. There is a 2.6 x 1.3 x 1.4 cm hypoechoic solid mass between the right kidney and the inferior tip of the liver. A pathologically enlarged lymph node would be a diagnostic consideration. 6. An MRI of the abdomen is recommended to further evaluate the hepatic and pancreatic mass lesions and also the mass lesion between the right kidney and the inferior tip of the liver. **This report has been created using voice recognition software. It may contain minor errors which are inherent in voice recognition technology. **      Final report electronically signed by Dr. Akshat Melo on 8/9/2019 4:33 PM      CT ABDOMEN PELVIS WO CONTRAST Additional Contrast? None   Final Result   A liver mass has increased in size as have bilateral adrenal masses. Moderate right hydronephrosis persists without visible cause. **This report has been created using voice recognition software. It may contain minor errors which are inherent in voice recognition technology. **      Final report electronically signed by Dr. Katie Andrade on 8/8/2019 1:13 PM             Consults:     STR ED TO IP CONSULT  IP CONSULT TO [facility]  IP CONSULT TO SOCIAL WORK  IP CONSULT TO GI    Disposition:    [x] Home        [] TCU       [] Rehab       [] Psych       [] SNF       [] [place]       [] Other-    Condition at Discharge: Stable    Code Status:  Prior Full    Patient Instructions:    Discharge lab work: n/a  Activity: activity as tolerated  Diet: No diet orders on file      Follow-up visits:   Rebekah Jean Baptiste MD  [address]   [address] [phone]    In 1 week  please call the office tomorrow to schedule an appointment for 1 week     [address] [facility]. [address] [facility] [address] [phone]        [address] [facility] [address] [place] [address]      As needed    Erickson Jenkins Avis Tam MD  [address] [place] [address].  [address]  [facility] [address]      Please call the office to schedule an appointment for this week          Discharge Medications:        Medication List      START taking these medications    naproxen 250 MG tablet  Commonly known as:  NAPROSYN  Take 1 tablet by mouth 2 times daily as needed for Pain     nystatin 146193 UNIT/ML suspension  Commonly known as:  MYCOSTATIN  Take 5 mLs by mouth 4 times daily for 3 days     polyethylene glycol packet  Commonly known as:  GLYCOLAX  Take 17 g by mouth daily        CONTINUE taking these medications    acetaminophen 325 MG tablet  Commonly known as:  TYLENOL  Take 2 tablets by mouth every 4 hours as needed for Pain     albuterol sulfate  (90 Base) MCG/ACT inhaler  Inhale 2 puffs into the lungs every 4 hours as needed for Wheezing or Shortness of Breath     aspirin 81 MG chewable tablet  Take 1 tablet by mouth daily     ATROVENT IN     busPIRone 10 MG tablet  Commonly known as:  BUSPAR     cyanocobalamin 1000 MCG tablet  Take 1 tablet by mouth daily     folic acid 1 MG tablet  Commonly known as:  FOLVITE  Take 1 tablet by mouth daily     ipratropium-albuterol 0.5-2.5 (3) MG/3ML Soln nebulizer solution  Commonly known as:  DUONEB  Inhale 3 mLs into the lungs every 4 hours     NUTRITIONAL SUPPLEMENT Liqd  Take 1 Bottle by mouth 3 times daily     OLANZapine 5 MG tablet  Commonly known as:  ZYPREXA  Take 1 tablet by mouth nightly     omeprazole 20 MG delayed release capsule  Commonly known as:  PRILOSEC     ondansetron 4 MG tablet  Commonly known as:  ZOFRAN  Take 1 tablet by mouth every 8 hours as needed for Nausea or Vomiting     ranitidine 150 MG tablet  Commonly known as:  ZANTAC     SYMBICORT 160-4.5 MCG/ACT Aero  Generic drug:  budesonide-formoterol     tamsulosin 0.4 MG capsule  Commonly known as:  FLOMAX  Take 1 capsule by mouth daily     terazosin 2 MG capsule  Commonly known as:  HYTRIN        STOP taking these medications atorvastatin 80 MG tablet  Commonly known as:  LIPITOR     methadone 5 MG tablet  Commonly known as:  DOLOPHINE           Where to Get Your Medications      You can get these medications from any pharmacy    Bring a paper prescription for each of these medications  · naproxen 250 MG tablet  · nystatin [id number] UNIT/ML suspension  · polyethylene glycol packet               Time Spent on discharge is roughly 35 minutes in the examination, evaluation, counseling and review of medications and discharge plan. Thank you Robbi Yañez MD for the opportunity to be involved in this patient's care.     Signed:    Electronically signed by Mary Smith DO on 8/14/2019 at 2:45 PM

## 2019-08-19 ENCOUNTER — HOSPITAL ENCOUNTER (OUTPATIENT)
Dept: INFUSION THERAPY | Age: 69
Discharge: HOME | End: 2019-08-19
Payer: MEDICARE

## 2019-08-19 ENCOUNTER — OFFICE VISIT (OUTPATIENT)
Dept: ONCOLOGY | Age: 69
End: 2019-08-19
Payer: MEDICARE

## 2019-08-19 VITALS
WEIGHT: 132.4 LBS | HEIGHT: 68 IN | RESPIRATION RATE: 18 BRPM | HEART RATE: 112 BPM | TEMPERATURE: 97.8 F | BODY MASS INDEX: 20.07 KG/M2 | OXYGEN SATURATION: 95 % | DIASTOLIC BLOOD PRESSURE: 64 MMHG | SYSTOLIC BLOOD PRESSURE: 113 MMHG

## 2019-08-19 DIAGNOSIS — C80.1 SMALL CELL CARCINOMA (HCC): ICD-10-CM

## 2019-08-19 DIAGNOSIS — C79.31 BRAIN METASTASIS (HCC): ICD-10-CM

## 2019-08-19 DIAGNOSIS — G89.3 CANCER RELATED PAIN: ICD-10-CM

## 2019-08-19 DIAGNOSIS — C78.7 LIVER METASTASES (HCC): ICD-10-CM

## 2019-08-19 DIAGNOSIS — K86.1 CHRONIC PANCREATITIS, UNSPECIFIED PANCREATITIS TYPE (HCC): ICD-10-CM

## 2019-08-19 DIAGNOSIS — J44.1 CHRONIC OBSTRUCTIVE PULMONARY DISEASE WITH ACUTE EXACERBATION (HCC): ICD-10-CM

## 2019-08-19 DIAGNOSIS — C34.92 MALIGNANT NEOPLASM OF LEFT LUNG, UNSPECIFIED PART OF LUNG (HCC): ICD-10-CM

## 2019-08-19 DIAGNOSIS — R10.10 PAIN OF UPPER ABDOMEN: ICD-10-CM

## 2019-08-19 DIAGNOSIS — R10.10 PAIN OF UPPER ABDOMEN: Primary | ICD-10-CM

## 2019-08-19 LAB
ALBUMIN SERPL-MCNC: 3.3 G/DL (ref 3.5–5.1)
ALP BLD-CCNC: 96 U/L (ref 38–126)
ALT SERPL-CCNC: 16 U/L (ref 11–66)
AMYLASE: 104 U/L (ref 20–104)
AST SERPL-CCNC: 18 U/L (ref 5–40)
BILIRUB SERPL-MCNC: 0.4 MG/DL (ref 0.3–1.2)
BILIRUBIN DIRECT: < 0.2 MG/DL (ref 0–0.3)
BUN, WHOLE BLOOD: 6 MG/DL (ref 8–26)
CHLORIDE, WHOLE BLOOD: 95 MEQ/L (ref 98–109)
CREATININE, WHOLE BLOOD: 0.4 MG/DL (ref 0.5–1.2)
GFR, ESTIMATED: > 90 ML/MIN/1.73M2
GLUCOSE, WHOLE BLOOD: 150 MG/DL (ref 70–108)
HCT VFR BLD CALC: 33.5 % (ref 42–52)
HEMOGLOBIN: 11.6 GM/DL (ref 14–18)
IONIZED CALCIUM, WHOLE BLOOD: 1.14 MMOL/L (ref 1.12–1.32)
LIPASE: 197 U/L (ref 5.6–51.3)
MCH RBC QN AUTO: 30.5 PG (ref 27–31)
MCHC RBC AUTO-ENTMCNC: 34.4 GM/DL (ref 33–37)
MCV RBC AUTO: 89 FL (ref 80–94)
PDW BLD-RTO: 14 % (ref 11.5–14.5)
PLATELET # BLD: 351 THOU/MM3 (ref 130–400)
PMV BLD AUTO: 6 FL (ref 7.4–10.4)
POTASSIUM, WHOLE BLOOD: 3.6 MEQ/L (ref 3.5–4.9)
RBC # BLD: 3.79 MILL/MM3 (ref 4.7–6.1)
SEG NEUTROPHILS: 77 % (ref 43–75)
SEGMENTED NEUTROPHILS ABSOLUTE COUNT: 4.5 THOU/MM3 (ref 1.8–7.7)
SODIUM, WHOLE BLOOD: 133 MEQ/L (ref 138–146)
TOTAL CO2, WHOLE BLOOD: 29 MEQ/L (ref 23–33)
TOTAL PROTEIN: 6.9 G/DL (ref 6.1–8)
WBC # BLD: 5.8 THOU/MM3 (ref 4.8–10.8)

## 2019-08-19 PROCEDURE — 36415 COLL VENOUS BLD VENIPUNCTURE: CPT

## 2019-08-19 PROCEDURE — 82150 ASSAY OF AMYLASE: CPT

## 2019-08-19 PROCEDURE — 80076 HEPATIC FUNCTION PANEL: CPT

## 2019-08-19 PROCEDURE — 80047 BASIC METABLC PNL IONIZED CA: CPT

## 2019-08-19 PROCEDURE — 99211 OFF/OP EST MAY X REQ PHY/QHP: CPT

## 2019-08-19 PROCEDURE — 83690 ASSAY OF LIPASE: CPT

## 2019-08-19 PROCEDURE — 99214 OFFICE O/P EST MOD 30 MIN: CPT | Performed by: INTERNAL MEDICINE

## 2019-08-19 PROCEDURE — 85027 COMPLETE CBC AUTOMATED: CPT

## 2019-08-19 RX ORDER — METHYLPREDNISOLONE 4 MG/1
TABLET ORAL
Qty: 1 KIT | Refills: 0 | Status: ON HOLD | OUTPATIENT
Start: 2019-08-19 | End: 2019-09-01 | Stop reason: HOSPADM

## 2019-08-19 ASSESSMENT — ENCOUNTER SYMPTOMS
VOMITING: 0
RECTAL PAIN: 0
SHORTNESS OF BREATH: 1
ABDOMINAL PAIN: 0
COUGH: 0
FACIAL SWELLING: 0
SORE THROAT: 0
NAUSEA: 0
COLOR CHANGE: 0
TROUBLE SWALLOWING: 0
WHEEZING: 0
EYE DISCHARGE: 0
CHEST TIGHTNESS: 0
DIARRHEA: 0
ABDOMINAL DISTENTION: 0
BACK PAIN: 1
CONSTIPATION: 0
BLOOD IN STOOL: 0

## 2019-08-25 ENCOUNTER — APPOINTMENT (OUTPATIENT)
Dept: GENERAL RADIOLOGY | Age: 69
DRG: 196 | End: 2019-08-25
Payer: MEDICARE

## 2019-08-25 ENCOUNTER — HOSPITAL ENCOUNTER (INPATIENT)
Age: 69
LOS: 7 days | Discharge: HOME | DRG: 196 | End: 2019-09-01
Attending: INTERNAL MEDICINE | Admitting: INTERNAL MEDICINE
Payer: MEDICARE

## 2019-08-25 ENCOUNTER — APPOINTMENT (OUTPATIENT)
Dept: CT IMAGING | Age: 69
DRG: 196 | End: 2019-08-25
Payer: MEDICARE

## 2019-08-25 DIAGNOSIS — J18.9 MULTIFOCAL PNEUMONIA: Primary | ICD-10-CM

## 2019-08-25 DIAGNOSIS — E87.1 HYPONATREMIA: ICD-10-CM

## 2019-08-25 DIAGNOSIS — G89.3 CANCER RELATED PAIN: ICD-10-CM

## 2019-08-25 DIAGNOSIS — C34.90 PRIMARY MALIGNANT NEOPLASM OF LUNG METASTATIC TO OTHER SITE, UNSPECIFIED LATERALITY (HCC): ICD-10-CM

## 2019-08-25 PROBLEM — R09.02 HYPOXIA: Status: ACTIVE | Noted: 2019-08-25

## 2019-08-25 PROBLEM — K86.1 OTHER CHRONIC PANCREATITIS (HCC): Status: ACTIVE | Noted: 2019-08-25

## 2019-08-25 LAB
ALBUMIN SERPL-MCNC: 3.1 G/DL (ref 3.5–5.1)
ALLEN TEST: POSITIVE
ALP BLD-CCNC: 106 U/L (ref 38–126)
ALT SERPL-CCNC: 11 U/L (ref 11–66)
ANION GAP SERPL CALCULATED.3IONS-SCNC: 11 MEQ/L (ref 8–16)
AST SERPL-CCNC: 13 U/L (ref 5–40)
BACTERIA: ABNORMAL
BASE EXCESS (CALCULATED): 4.4 MMOL/L (ref -2.5–2.5)
BASOPHILS # BLD: 0.5 %
BASOPHILS ABSOLUTE: 0 THOU/MM3 (ref 0–0.1)
BILIRUB SERPL-MCNC: 0.4 MG/DL (ref 0.3–1.2)
BILIRUBIN DIRECT: < 0.2 MG/DL (ref 0–0.3)
BILIRUBIN URINE: NEGATIVE
BLOOD, URINE: NEGATIVE
BUN BLDV-MCNC: 12 MG/DL (ref 7–22)
CALCIUM SERPL-MCNC: 8.7 MG/DL (ref 8.5–10.5)
CASTS: ABNORMAL /LPF
CASTS: ABNORMAL /LPF
CHARACTER, URINE: CLEAR
CHLORIDE BLD-SCNC: 88 MEQ/L (ref 98–111)
CO2: 26 MEQ/L (ref 23–33)
COLLECTED BY:: ABNORMAL
COLOR: YELLOW
CREAT SERPL-MCNC: 0.4 MG/DL (ref 0.4–1.2)
CRYSTALS: ABNORMAL
DEVICE: ABNORMAL
EKG ATRIAL RATE: 115 BPM
EKG P AXIS: 85 DEGREES
EKG P-R INTERVAL: 154 MS
EKG Q-T INTERVAL: 332 MS
EKG QRS DURATION: 116 MS
EKG QTC CALCULATION (BAZETT): 459 MS
EKG R AXIS: 10 DEGREES
EKG T AXIS: 85 DEGREES
EKG VENTRICULAR RATE: 115 BPM
EOSINOPHIL # BLD: 0 %
EOSINOPHILS ABSOLUTE: 0 THOU/MM3 (ref 0–0.4)
EPITHELIAL CELLS, UA: ABNORMAL /HPF
ERYTHROCYTE [DISTWIDTH] IN BLOOD BY AUTOMATED COUNT: 15.2 % (ref 11.5–14.5)
ERYTHROCYTE [DISTWIDTH] IN BLOOD BY AUTOMATED COUNT: 50.6 FL (ref 35–45)
GFR SERPL CREATININE-BSD FRML MDRD: > 90 ML/MIN/1.73M2
GLUCOSE BLD-MCNC: 114 MG/DL (ref 70–108)
GLUCOSE, URINE: NEGATIVE MG/DL
HCO3: 28 MMOL/L (ref 23–28)
HCT VFR BLD CALC: 32.6 % (ref 42–52)
HEMOGLOBIN: 10.7 GM/DL (ref 14–18)
IFIO2: 2
IMMATURE GRANS (ABS): 0.15 THOU/MM3 (ref 0–0.07)
IMMATURE GRANULOCYTES: 2 %
KETONES, URINE: NEGATIVE
LACTIC ACID, SEPSIS: 1.2 MMOL/L (ref 0.5–1.9)
LEUKOCYTE ESTERASE, URINE: NEGATIVE
LIPASE: 157.2 U/L (ref 5.6–51.3)
LYMPHOCYTES # BLD: 10.4 %
LYMPHOCYTES ABSOLUTE: 0.9 THOU/MM3 (ref 1–4.8)
MCH RBC QN AUTO: 30.1 PG (ref 26–33)
MCHC RBC AUTO-ENTMCNC: 32.8 GM/DL (ref 32.2–35.5)
MCV RBC AUTO: 91.6 FL (ref 80–94)
MISCELLANEOUS LAB TEST RESULT: ABNORMAL
MONOCYTES # BLD: 10.2 %
MONOCYTES ABSOLUTE: 0.9 THOU/MM3 (ref 0.4–1.3)
NITRITE, URINE: NEGATIVE
NUCLEATED RED BLOOD CELLS: 0 /100 WBC
O2 SATURATION: 99 %
OSMOLALITY CALCULATION: 252.1 MOSMOL/KG (ref 275–300)
PCO2: 38 MMHG (ref 35–45)
PH BLOOD GAS: 7.48 (ref 7.35–7.45)
PH UA: 6.5 (ref 5–9)
PLATELET # BLD: 360 THOU/MM3 (ref 130–400)
PMV BLD AUTO: 8.5 FL (ref 9.4–12.4)
PO2: 116 MMHG (ref 71–104)
POTASSIUM REFLEX MAGNESIUM: 4.4 MEQ/L (ref 3.5–5.2)
PRO-BNP: 50.3 PG/ML (ref 0–900)
PROCALCITONIN: 0.27 NG/ML (ref 0.01–0.09)
PROTEIN UA: NEGATIVE MG/DL
RBC # BLD: 3.56 MILL/MM3 (ref 4.7–6.1)
RBC URINE: ABNORMAL /HPF
RENAL EPITHELIAL, UA: ABNORMAL
SEG NEUTROPHILS: 77.1 %
SEGMENTED NEUTROPHILS ABSOLUTE COUNT: 6.6 THOU/MM3 (ref 1.8–7.7)
SODIUM BLD-SCNC: 125 MEQ/L (ref 135–145)
SOURCE, BLOOD GAS: ABNORMAL
SPECIFIC GRAVITY UA: > 1.03 (ref 1–1.03)
TOTAL PROTEIN: 6.4 G/DL (ref 6.1–8)
TROPONIN T: < 0.01 NG/ML
UROBILINOGEN, URINE: 2 EU/DL (ref 0–1)
WBC # BLD: 8.5 THOU/MM3 (ref 4.8–10.8)
WBC UA: ABNORMAL /HPF
YEAST: ABNORMAL

## 2019-08-25 PROCEDURE — 2580000003 HC RX 258: Performed by: PHYSICIAN ASSISTANT

## 2019-08-25 PROCEDURE — 36600 WITHDRAWAL OF ARTERIAL BLOOD: CPT

## 2019-08-25 PROCEDURE — 36415 COLL VENOUS BLD VENIPUNCTURE: CPT

## 2019-08-25 PROCEDURE — 6360000004 HC RX CONTRAST MEDICATION: Performed by: PHYSICIAN ASSISTANT

## 2019-08-25 PROCEDURE — 83690 ASSAY OF LIPASE: CPT

## 2019-08-25 PROCEDURE — 94640 AIRWAY INHALATION TREATMENT: CPT

## 2019-08-25 PROCEDURE — 93005 ELECTROCARDIOGRAM TRACING: CPT | Performed by: INTERNAL MEDICINE

## 2019-08-25 PROCEDURE — 87899 AGENT NOS ASSAY W/OPTIC: CPT

## 2019-08-25 PROCEDURE — 73502 X-RAY EXAM HIP UNI 2-3 VIEWS: CPT

## 2019-08-25 PROCEDURE — 87086 URINE CULTURE/COLONY COUNT: CPT

## 2019-08-25 PROCEDURE — 84484 ASSAY OF TROPONIN QUANT: CPT

## 2019-08-25 PROCEDURE — 87449 NOS EACH ORGANISM AG IA: CPT

## 2019-08-25 PROCEDURE — 80048 BASIC METABOLIC PNL TOTAL CA: CPT

## 2019-08-25 PROCEDURE — 87641 MR-STAPH DNA AMP PROBE: CPT

## 2019-08-25 PROCEDURE — 87500 VANOMYCIN DNA AMP PROBE: CPT

## 2019-08-25 PROCEDURE — 94761 N-INVAS EAR/PLS OXIMETRY MLT: CPT

## 2019-08-25 PROCEDURE — 83880 ASSAY OF NATRIURETIC PEPTIDE: CPT

## 2019-08-25 PROCEDURE — 6360000002 HC RX W HCPCS: Performed by: PHYSICIAN ASSISTANT

## 2019-08-25 PROCEDURE — 81001 URINALYSIS AUTO W/SCOPE: CPT

## 2019-08-25 PROCEDURE — 6370000000 HC RX 637 (ALT 250 FOR IP): Performed by: PHYSICIAN ASSISTANT

## 2019-08-25 PROCEDURE — 71045 X-RAY EXAM CHEST 1 VIEW: CPT

## 2019-08-25 PROCEDURE — 85025 COMPLETE CBC W/AUTO DIFF WBC: CPT

## 2019-08-25 PROCEDURE — 74177 CT ABD & PELVIS W/CONTRAST: CPT

## 2019-08-25 PROCEDURE — 2060000000 HC ICU INTERMEDIATE R&B

## 2019-08-25 PROCEDURE — 80076 HEPATIC FUNCTION PANEL: CPT

## 2019-08-25 PROCEDURE — 82803 BLOOD GASES ANY COMBINATION: CPT

## 2019-08-25 PROCEDURE — 93010 ELECTROCARDIOGRAM REPORT: CPT | Performed by: INTERNAL MEDICINE

## 2019-08-25 PROCEDURE — 2700000000 HC OXYGEN THERAPY PER DAY

## 2019-08-25 PROCEDURE — 87081 CULTURE SCREEN ONLY: CPT

## 2019-08-25 PROCEDURE — 99223 1ST HOSP IP/OBS HIGH 75: CPT | Performed by: INTERNAL MEDICINE

## 2019-08-25 PROCEDURE — 84145 PROCALCITONIN (PCT): CPT

## 2019-08-25 PROCEDURE — 96365 THER/PROPH/DIAG IV INF INIT: CPT

## 2019-08-25 PROCEDURE — 71275 CT ANGIOGRAPHY CHEST: CPT

## 2019-08-25 PROCEDURE — 99285 EMERGENCY DEPT VISIT HI MDM: CPT

## 2019-08-25 PROCEDURE — 87040 BLOOD CULTURE FOR BACTERIA: CPT

## 2019-08-25 PROCEDURE — 83605 ASSAY OF LACTIC ACID: CPT

## 2019-08-25 RX ORDER — POTASSIUM CHLORIDE 7.45 MG/ML
10 INJECTION INTRAVENOUS PRN
Status: DISCONTINUED | OUTPATIENT
Start: 2019-08-25 | End: 2019-09-01 | Stop reason: HOSPADM

## 2019-08-25 RX ORDER — OLANZAPINE 5 MG/1
5 TABLET ORAL NIGHTLY
Status: DISCONTINUED | OUTPATIENT
Start: 2019-08-25 | End: 2019-09-01 | Stop reason: HOSPADM

## 2019-08-25 RX ORDER — ASPIRIN 81 MG/1
81 TABLET, CHEWABLE ORAL DAILY
Status: DISCONTINUED | OUTPATIENT
Start: 2019-08-26 | End: 2019-09-01 | Stop reason: HOSPADM

## 2019-08-25 RX ORDER — IPRATROPIUM BROMIDE AND ALBUTEROL SULFATE 2.5; .5 MG/3ML; MG/3ML
1 SOLUTION RESPIRATORY (INHALATION)
Status: DISCONTINUED | OUTPATIENT
Start: 2019-08-26 | End: 2019-09-01 | Stop reason: HOSPADM

## 2019-08-25 RX ORDER — DIPHENHYDRAMINE HCL 25 MG
25 TABLET ORAL NIGHTLY PRN
Status: DISCONTINUED | OUTPATIENT
Start: 2019-08-26 | End: 2019-09-01 | Stop reason: HOSPADM

## 2019-08-25 RX ORDER — ZINC OXIDE 216 MG/ML
1 LOTION TOPICAL 3 TIMES DAILY
Status: DISCONTINUED | OUTPATIENT
Start: 2019-08-25 | End: 2019-08-25 | Stop reason: CLARIF

## 2019-08-25 RX ORDER — ACETAMINOPHEN 325 MG/1
650 TABLET ORAL EVERY 4 HOURS PRN
Status: DISCONTINUED | OUTPATIENT
Start: 2019-08-25 | End: 2019-09-01 | Stop reason: HOSPADM

## 2019-08-25 RX ORDER — IPRATROPIUM BROMIDE AND ALBUTEROL SULFATE 2.5; .5 MG/3ML; MG/3ML
1 SOLUTION RESPIRATORY (INHALATION) ONCE
Status: COMPLETED | OUTPATIENT
Start: 2019-08-25 | End: 2019-08-25

## 2019-08-25 RX ORDER — PANTOPRAZOLE SODIUM 40 MG/1
40 TABLET, DELAYED RELEASE ORAL
Status: DISCONTINUED | OUTPATIENT
Start: 2019-08-26 | End: 2019-09-01 | Stop reason: HOSPADM

## 2019-08-25 RX ORDER — FOLIC ACID 1 MG/1
1 TABLET ORAL DAILY
Status: DISCONTINUED | OUTPATIENT
Start: 2019-08-26 | End: 2019-09-01 | Stop reason: HOSPADM

## 2019-08-25 RX ORDER — POLYETHYLENE GLYCOL 3350 17 G/17G
17 POWDER, FOR SOLUTION ORAL DAILY
Status: DISCONTINUED | OUTPATIENT
Start: 2019-08-26 | End: 2019-09-01 | Stop reason: HOSPADM

## 2019-08-25 RX ORDER — 0.9 % SODIUM CHLORIDE 0.9 %
500 INTRAVENOUS SOLUTION INTRAVENOUS ONCE
Status: COMPLETED | OUTPATIENT
Start: 2019-08-25 | End: 2019-08-25

## 2019-08-25 RX ORDER — SODIUM CHLORIDE 9 MG/ML
INJECTION, SOLUTION INTRAVENOUS CONTINUOUS
Status: DISCONTINUED | OUTPATIENT
Start: 2019-08-25 | End: 2019-08-27

## 2019-08-25 RX ORDER — LEVOFLOXACIN 5 MG/ML
750 INJECTION, SOLUTION INTRAVENOUS EVERY 24 HOURS
Status: DISCONTINUED | OUTPATIENT
Start: 2019-08-26 | End: 2019-08-28

## 2019-08-25 RX ORDER — BUSPIRONE HYDROCHLORIDE 10 MG/1
10 TABLET ORAL 2 TIMES DAILY
Status: DISCONTINUED | OUTPATIENT
Start: 2019-08-25 | End: 2019-09-01 | Stop reason: HOSPADM

## 2019-08-25 RX ORDER — ONDANSETRON 2 MG/ML
4 INJECTION INTRAMUSCULAR; INTRAVENOUS EVERY 6 HOURS PRN
Status: DISCONTINUED | OUTPATIENT
Start: 2019-08-25 | End: 2019-09-01 | Stop reason: HOSPADM

## 2019-08-25 RX ORDER — SODIUM CHLORIDE 0.9 % (FLUSH) 0.9 %
10 SYRINGE (ML) INJECTION PRN
Status: DISCONTINUED | OUTPATIENT
Start: 2019-08-25 | End: 2019-09-01 | Stop reason: HOSPADM

## 2019-08-25 RX ORDER — SODIUM CHLORIDE 0.9 % (FLUSH) 0.9 %
10 SYRINGE (ML) INJECTION EVERY 12 HOURS SCHEDULED
Status: DISCONTINUED | OUTPATIENT
Start: 2019-08-25 | End: 2019-09-01 | Stop reason: HOSPADM

## 2019-08-25 RX ORDER — MORPHINE SULFATE 2 MG/ML
2 INJECTION, SOLUTION INTRAMUSCULAR; INTRAVENOUS
Status: DISCONTINUED | OUTPATIENT
Start: 2019-08-25 | End: 2019-09-01 | Stop reason: HOSPADM

## 2019-08-25 RX ORDER — POTASSIUM CHLORIDE 20 MEQ/1
40 TABLET, EXTENDED RELEASE ORAL PRN
Status: DISCONTINUED | OUTPATIENT
Start: 2019-08-25 | End: 2019-09-01 | Stop reason: HOSPADM

## 2019-08-25 RX ORDER — TAMSULOSIN HYDROCHLORIDE 0.4 MG/1
0.4 CAPSULE ORAL DAILY
Status: DISCONTINUED | OUTPATIENT
Start: 2019-08-26 | End: 2019-09-01 | Stop reason: HOSPADM

## 2019-08-25 RX ADMIN — IPRATROPIUM BROMIDE AND ALBUTEROL SULFATE 1 AMPULE: .5; 3 SOLUTION RESPIRATORY (INHALATION) at 21:04

## 2019-08-25 RX ADMIN — CEFEPIME HYDROCHLORIDE 2 G: 2 INJECTION, POWDER, FOR SOLUTION INTRAVENOUS at 21:27

## 2019-08-25 RX ADMIN — SODIUM CHLORIDE 500 ML: 9 INJECTION, SOLUTION INTRAVENOUS at 21:30

## 2019-08-25 RX ADMIN — SODIUM CHLORIDE 500 ML: 9 INJECTION, SOLUTION INTRAVENOUS at 18:48

## 2019-08-25 RX ADMIN — IOPAMIDOL 80 ML: 755 INJECTION, SOLUTION INTRAVENOUS at 19:51

## 2019-08-25 RX ADMIN — SODIUM CHLORIDE: 9 INJECTION, SOLUTION INTRAVENOUS at 22:54

## 2019-08-25 ASSESSMENT — ENCOUNTER SYMPTOMS
SORE THROAT: 0
BACK PAIN: 0
ABDOMINAL DISTENTION: 1
NAUSEA: 0
CONSTIPATION: 0
RHINORRHEA: 0
WHEEZING: 0
VOMITING: 0
COUGH: 1
DIARRHEA: 0
ABDOMINAL PAIN: 1
SHORTNESS OF BREATH: 1

## 2019-08-25 ASSESSMENT — PAIN DESCRIPTION - FREQUENCY: FREQUENCY: CONTINUOUS

## 2019-08-25 ASSESSMENT — PAIN SCALES - GENERAL
PAINLEVEL_OUTOF10: 4
PAINLEVEL_OUTOF10: 6

## 2019-08-25 ASSESSMENT — PAIN DESCRIPTION - ORIENTATION: ORIENTATION: LEFT

## 2019-08-25 ASSESSMENT — PAIN DESCRIPTION - LOCATION
LOCATION: HIP
LOCATION: HIP

## 2019-08-25 ASSESSMENT — PAIN DESCRIPTION - DESCRIPTORS: DESCRIPTORS: DULL;PRESSURE

## 2019-08-25 ASSESSMENT — PAIN DESCRIPTION - PAIN TYPE: TYPE: CHRONIC PAIN

## 2019-08-25 NOTE — ED PROVIDER NOTES
Nose: Nose normal.   Mouth/Throat: Oropharynx is clear and moist.   Eyes: Pupils are equal, round, and reactive to light. Conjunctivae and EOM are normal. Right eye exhibits no discharge. Left eye exhibits no discharge. No scleral icterus. Neck: Normal range of motion. Neck supple. Cardiovascular: Regular rhythm and normal heart sounds. Tachycardia present. Exam reveals no gallop and no friction rub. No murmur heard. Pulmonary/Chest: Effort normal. No accessory muscle usage or stridor. Tachypnea noted. No respiratory distress. He has no decreased breath sounds. He has no wheezes. He has rhonchi. He has no rales. He exhibits no tenderness. There are scattered expiratory rhonchi throughout the bilateral lung fields. The airway appears to be patent, and the patient is handling secretions well. Speech is spontaneous and respirations are not labored. There is no use of accessory muscles of respiration. There are no signs of airway compromise. Abdominal: Soft. Bowel sounds are normal. He exhibits no distension and no mass. There is generalized tenderness. There is no rebound and no guarding. No hernia. Musculoskeletal: Normal range of motion. He exhibits no edema. Left hip: He exhibits tenderness. He exhibits normal range of motion and no deformity. Lymphadenopathy:     He has no cervical adenopathy. Neurological: He is alert and oriented to person, place, and time. He exhibits normal muscle tone. Coordination normal. GCS eye subscore is 4. GCS verbal subscore is 5. GCS motor subscore is 6. Skin: Skin is warm and dry. No rash noted. He is not diaphoretic. No erythema. No pallor. Psychiatric: He has a normal mood and affect. His behavior is normal. Thought content normal.   Nursing note and vitals reviewed.     DIFFERENTIAL DIAGNOSIS:   Includes but not limited to: ACS, PE, cancer, viral URI, bronchitis, pneumonia, pleuritis, COPD, CHF, pulmonary edema    DIAGNOSTIC RESULTS     EKG: All EKG's All other components within normal limits   BASIC METABOLIC PANEL - Abnormal; Notable for the following components:    Sodium 128 (*)     Chloride 90 (*)     All other components within normal limits   BASIC METABOLIC PANEL - Abnormal; Notable for the following components:    Sodium 129 (*)     Chloride 91 (*)     Glucose 257 (*)     All other components within normal limits   URIC ACID - Abnormal; Notable for the following components:    Uric Acid 2.0 (*)     All other components within normal limits   CULTURE BLOOD #1    Narrative:     Source: blood-Adult-suboptimal <5.5oz./set volume       Site: Peripheral Vein            Current Antibiotics: not stated   CULTURE BLOOD #2    Narrative:     Source: blood-Adult-suboptimal <5.5oz./set volume       Site: (single bottle)Peripheral;            Current Antibiotics: not stated   MRSA SCREENING CULTURE ONLY    Narrative:     Source: rectal       Site:           Current Antibiotics: Cefepime   VRE SCREEN BY PCR   STREP PNEUMONIAE ANTIGEN   LEGIONELLA ANTIGEN, URINE   RESPIRATORY CULTURE   BRAIN NATRIURETIC PEPTIDE   TROPONIN   LACTATE, SEPSIS   ANION GAP   GLOMERULAR FILTRATION RATE, ESTIMATED   MRSA BY PCR   MAGNESIUM   LACTIC ACID, PLASMA   ANION GAP   GLOMERULAR FILTRATION RATE, ESTIMATED   ANION GAP   GLOMERULAR FILTRATION RATE, ESTIMATED   ANION GAP   GLOMERULAR FILTRATION RATE, ESTIMATED   ANION GAP   GLOMERULAR FILTRATION RATE, ESTIMATED   SODIUM, URINE, RANDOM   OSMOLALITY, URINE   ANION GAP   GLOMERULAR FILTRATION RATE, ESTIMATED   ANION GAP   GLOMERULAR FILTRATION RATE, ESTIMATED   CBC   BASIC METABOLIC PANEL       EMERGENCY DEPARTMENT COURSE:   Vitals:    Vitals:    08/30/19 0310 08/30/19 0915 08/30/19 1115 08/30/19 1532   BP: 125/75 118/62 119/63 104/67   Pulse: 101 101 107 110   Resp: 18 18 17 18   Temp: 98.2 °F (36.8 °C) 97.8 °F (36.6 °C) 97.9 °F (36.6 °C) 98.3 °F (36.8 °C)   TempSrc: Oral Oral Oral Oral   SpO2: 95% 94% 94% 95%   Weight: 130 lb 3.2 oz patient remained stable and maintained stable vital signs until admission to the floor. CRITICAL CARE:   None      CONSULTS:  Discussed the case with my attending physician in the Emergency Department, Dr. Huseyin Rodney, who agreed with my workup, treatment, and disposition decisions. Dr. Moises Moore, Hospitalist - kindly agreed to admit the patient for further evaluation and management    PROCEDURES:  None    FINAL IMPRESSION      1. Multifocal pneumonia    2. Primary malignant neoplasm of lung metastatic to other site, unspecified laterality [facility])          DISPOSITION/PLAN   Admit under Hospitalist services    PATIENT REFERRED TO:  Alice Dewey MD  [address]   [address] [phone]          [facility]  [address] [address] [phone]          DISCHARGEMEDICATIONS:  Current Discharge Medication List          (Please note that portions of this note were completedwith a voice recognition program.  Efforts were made to edit the dictations but occasionally words are mis-transcribed.)    Scribe:    Signed by:Ev Hickman, 8/25/19 6:26 PM Scribing for and in the presence of Luis Law PA-C    Provider:  I personally performed the services described in the documentation, reviewed and edited thedocumentation which was dictated to the scribe in my presence, and it accurately records my words and actions.     Luis Law PA-C 8/25/19 7:06 PM                      Luis Law PA-C  08/30/19 2006

## 2019-08-26 ENCOUNTER — TELEPHONE (OUTPATIENT)
Dept: ONCOLOGY | Age: 69
End: 2019-08-26

## 2019-08-26 PROBLEM — E43 SEVERE MALNUTRITION (HCC): Chronic | Status: ACTIVE | Noted: 2018-09-06

## 2019-08-26 LAB
ANION GAP SERPL CALCULATED.3IONS-SCNC: 10 MEQ/L (ref 8–16)
BASOPHILS # BLD: 0.5 %
BASOPHILS ABSOLUTE: 0 THOU/MM3 (ref 0–0.1)
BUN BLDV-MCNC: 6 MG/DL (ref 7–22)
CALCIUM SERPL-MCNC: 8.3 MG/DL (ref 8.5–10.5)
CHLORIDE BLD-SCNC: 96 MEQ/L (ref 98–111)
CO2: 25 MEQ/L (ref 23–33)
CREAT SERPL-MCNC: 0.4 MG/DL (ref 0.4–1.2)
EOSINOPHIL # BLD: 0 %
EOSINOPHILS ABSOLUTE: 0 THOU/MM3 (ref 0–0.4)
ERYTHROCYTE [DISTWIDTH] IN BLOOD BY AUTOMATED COUNT: 15.3 % (ref 11.5–14.5)
ERYTHROCYTE [DISTWIDTH] IN BLOOD BY AUTOMATED COUNT: 51.4 FL (ref 35–45)
GFR SERPL CREATININE-BSD FRML MDRD: > 90 ML/MIN/1.73M2
GLUCOSE BLD-MCNC: 97 MG/DL (ref 70–108)
HCT VFR BLD CALC: 31.3 % (ref 42–52)
HEMOGLOBIN: 10.1 GM/DL (ref 14–18)
IMMATURE GRANS (ABS): 0.14 THOU/MM3 (ref 0–0.07)
IMMATURE GRANULOCYTES: 2 %
LACTIC ACID: 0.9 MMOL/L (ref 0.5–2.2)
LIPASE: 119.2 U/L (ref 5.6–51.3)
LYMPHOCYTES # BLD: 10.5 %
LYMPHOCYTES ABSOLUTE: 0.6 THOU/MM3 (ref 1–4.8)
MAGNESIUM: 1.9 MG/DL (ref 1.6–2.4)
MCH RBC QN AUTO: 30 PG (ref 26–33)
MCHC RBC AUTO-ENTMCNC: 32.3 GM/DL (ref 32.2–35.5)
MCV RBC AUTO: 92.9 FL (ref 80–94)
MONOCYTES # BLD: 10.8 %
MONOCYTES ABSOLUTE: 0.7 THOU/MM3 (ref 0.4–1.3)
MRSA SCREEN RT-PCR: NEGATIVE
NUCLEATED RED BLOOD CELLS: 0 /100 WBC
ORGANISM: ABNORMAL
PLATELET # BLD: 311 THOU/MM3 (ref 130–400)
PMV BLD AUTO: 8.3 FL (ref 9.4–12.4)
POTASSIUM REFLEX MAGNESIUM: 3.9 MEQ/L (ref 3.5–5.2)
PROCALCITONIN: 0.23 NG/ML (ref 0.01–0.09)
RBC # BLD: 3.37 MILL/MM3 (ref 4.7–6.1)
SEG NEUTROPHILS: 75.9 %
SEGMENTED NEUTROPHILS ABSOLUTE COUNT: 4.6 THOU/MM3 (ref 1.8–7.7)
SODIUM BLD-SCNC: 131 MEQ/L (ref 135–145)
URINE CULTURE, ROUTINE: ABNORMAL
VANCOMYCIN RESISTANT ENTEROCOCCUS: NEGATIVE
WBC # BLD: 6.1 THOU/MM3 (ref 4.8–10.8)

## 2019-08-26 PROCEDURE — 36415 COLL VENOUS BLD VENIPUNCTURE: CPT

## 2019-08-26 PROCEDURE — 97535 SELF CARE MNGMENT TRAINING: CPT

## 2019-08-26 PROCEDURE — 99233 SBSQ HOSP IP/OBS HIGH 50: CPT | Performed by: INTERNAL MEDICINE

## 2019-08-26 PROCEDURE — 6370000000 HC RX 637 (ALT 250 FOR IP): Performed by: INTERNAL MEDICINE

## 2019-08-26 PROCEDURE — 84145 PROCALCITONIN (PCT): CPT

## 2019-08-26 PROCEDURE — 6360000002 HC RX W HCPCS: Performed by: INTERNAL MEDICINE

## 2019-08-26 PROCEDURE — 2580000003 HC RX 258: Performed by: INTERNAL MEDICINE

## 2019-08-26 PROCEDURE — 83735 ASSAY OF MAGNESIUM: CPT

## 2019-08-26 PROCEDURE — 2709999900 HC NON-CHARGEABLE SUPPLY

## 2019-08-26 PROCEDURE — 83690 ASSAY OF LIPASE: CPT

## 2019-08-26 PROCEDURE — 94640 AIRWAY INHALATION TREATMENT: CPT

## 2019-08-26 PROCEDURE — 83605 ASSAY OF LACTIC ACID: CPT

## 2019-08-26 PROCEDURE — 80048 BASIC METABOLIC PNL TOTAL CA: CPT

## 2019-08-26 PROCEDURE — 2060000000 HC ICU INTERMEDIATE R&B

## 2019-08-26 PROCEDURE — 99222 1ST HOSP IP/OBS MODERATE 55: CPT | Performed by: NURSE PRACTITIONER

## 2019-08-26 PROCEDURE — 85025 COMPLETE CBC W/AUTO DIFF WBC: CPT

## 2019-08-26 PROCEDURE — 97166 OT EVAL MOD COMPLEX 45 MIN: CPT

## 2019-08-26 RX ORDER — PREDNISONE 20 MG/1
60 TABLET ORAL DAILY
Status: COMPLETED | OUTPATIENT
Start: 2019-08-26 | End: 2019-08-30

## 2019-08-26 RX ADMIN — IPRATROPIUM BROMIDE AND ALBUTEROL SULFATE 1 AMPULE: .5; 3 SOLUTION RESPIRATORY (INHALATION) at 12:36

## 2019-08-26 RX ADMIN — VANCOMYCIN HYDROCHLORIDE 1000 MG: 1 INJECTION, POWDER, LYOPHILIZED, FOR SOLUTION INTRAVENOUS at 00:06

## 2019-08-26 RX ADMIN — FOLIC ACID 1 MG: 1 TABLET ORAL at 08:30

## 2019-08-26 RX ADMIN — PANTOPRAZOLE SODIUM 40 MG: 40 TABLET, DELAYED RELEASE ORAL at 05:33

## 2019-08-26 RX ADMIN — BUSPIRONE HYDROCHLORIDE 10 MG: 10 TABLET ORAL at 00:00

## 2019-08-26 RX ADMIN — SODIUM CHLORIDE: 9 INJECTION, SOLUTION INTRAVENOUS at 12:04

## 2019-08-26 RX ADMIN — TAMSULOSIN HYDROCHLORIDE 0.4 MG: 0.4 CAPSULE ORAL at 08:30

## 2019-08-26 RX ADMIN — ACETAMINOPHEN 650 MG: 325 TABLET ORAL at 16:50

## 2019-08-26 RX ADMIN — PREDNISONE 60 MG: 20 TABLET ORAL at 20:42

## 2019-08-26 RX ADMIN — BUSPIRONE HYDROCHLORIDE 10 MG: 10 TABLET ORAL at 08:31

## 2019-08-26 RX ADMIN — ASPIRIN 81 MG 81 MG: 81 TABLET ORAL at 08:30

## 2019-08-26 RX ADMIN — BUSPIRONE HYDROCHLORIDE 10 MG: 10 TABLET ORAL at 20:42

## 2019-08-26 RX ADMIN — CEFEPIME HYDROCHLORIDE 2 G: 2 INJECTION, POWDER, FOR SOLUTION INTRAVENOUS at 05:33

## 2019-08-26 RX ADMIN — VANCOMYCIN HYDROCHLORIDE 1000 MG: 1 INJECTION, POWDER, LYOPHILIZED, FOR SOLUTION INTRAVENOUS at 12:12

## 2019-08-26 RX ADMIN — ACETAMINOPHEN 650 MG: 325 TABLET ORAL at 12:06

## 2019-08-26 RX ADMIN — IPRATROPIUM BROMIDE AND ALBUTEROL SULFATE 1 AMPULE: .5; 3 SOLUTION RESPIRATORY (INHALATION) at 16:15

## 2019-08-26 RX ADMIN — LEVOFLOXACIN 750 MG: 5 INJECTION, SOLUTION INTRAVENOUS at 00:00

## 2019-08-26 RX ADMIN — POLYETHYLENE GLYCOL 3350 17 G: 17 POWDER, FOR SOLUTION ORAL at 08:30

## 2019-08-26 RX ADMIN — CEFEPIME HYDROCHLORIDE 2 G: 2 INJECTION, POWDER, FOR SOLUTION INTRAVENOUS at 15:01

## 2019-08-26 RX ADMIN — OLANZAPINE 5 MG: 5 TABLET, FILM COATED ORAL at 20:42

## 2019-08-26 RX ADMIN — IPRATROPIUM BROMIDE AND ALBUTEROL SULFATE 1 AMPULE: .5; 3 SOLUTION RESPIRATORY (INHALATION) at 08:33

## 2019-08-26 RX ADMIN — ENOXAPARIN SODIUM 40 MG: 40 INJECTION SUBCUTANEOUS at 08:30

## 2019-08-26 RX ADMIN — CEFEPIME HYDROCHLORIDE 2 G: 2 INJECTION, POWDER, FOR SOLUTION INTRAVENOUS at 21:38

## 2019-08-26 RX ADMIN — OLANZAPINE 5 MG: 5 TABLET, FILM COATED ORAL at 00:00

## 2019-08-26 RX ADMIN — IPRATROPIUM BROMIDE AND ALBUTEROL SULFATE 1 AMPULE: .5; 3 SOLUTION RESPIRATORY (INHALATION) at 20:41

## 2019-08-26 ASSESSMENT — PAIN DESCRIPTION - LOCATION: LOCATION: BACK

## 2019-08-26 ASSESSMENT — PAIN SCALES - GENERAL
PAINLEVEL_OUTOF10: 9
PAINLEVEL_OUTOF10: 4
PAINLEVEL_OUTOF10: 6

## 2019-08-26 ASSESSMENT — PAIN DESCRIPTION - PAIN TYPE: TYPE: CHRONIC PAIN

## 2019-08-26 NOTE — H&P
Assessment and Plan:        1. Multifocal pneumonia: iv abx, pan cx  2. scc: oncology consult  3. Chronic pancreatitis: mets to pancrease  4. malnutrition: po supplement  5. pain: control  6. hypoxia: duoneb/oxygen protocol    CC:  sob    HPI: chief complaint of SOB. The patient has been diagnosed with lung cancer with metastasis to the liver, pancreas, bone, and brain. Patient has been undergoing chemo treatments and has an appointment tomorrow morning at 0930 with Dr. Karuan Boothe (Oncology). Patient reports increased SOB from his baseline for the past 2 days. He has a mild productive cough. He reports other symptoms including generalized weakness (worse in legs), fatigue, left hip pain, and chronic abdominal pain. He denies injury to this hip. He denies constipation or diarrhea. Patient is taking a stool softener daily. He denies chest pain. He denies nausea or vomiting. Patient feels bloated. , feels better in his belly now, tolerating po intake. patient states he is a full code, he has increased sputum production    ROS (12 point review of systems completed. Pertinent positives noted.  Otherwise ROS is negative) :   Sputum production  Cough  sob    PMH:  Per HPI and       Diagnosis Date    Arthritis     Cancer ([facility] [address]     lung    COPD (chronic obstructive pulmonary disease) (HCC)     Gastric reflux     Hyperlipidemia     Hypertension      SHX:        Procedure Laterality Date    COLONOSCOPY      HERNIA REPAIR  80's    bilateral inguinal    KIDNEY SURGERY      stent placement    [place] [address] INCL FLUOR GDNCE DX W/CELL WASHG SPX N/A 9/7/2018    BRONCHOSCOPY FLUOROSCOPY performed by Zaheer Hardy MD at [facility]    [place] OFFICE/OUTPT VISIT,PROCEDURE ONLY Right 9/5/2018    RIGHT INGUINAL HERNIA REPAIR performed by Gennaro Hurtado MD at [facility] OFFICE/OUTPT [facility] N/A 9/11/2018    EXPLORATORY LAPAROSCOPY  OPEN PROCEDURE, SMALL BOWEL RESECTION performed by Gennaro Hurtado MD at [place] [facility]

## 2019-08-26 NOTE — PROGRESS NOTES
[address] 59  INPATIENT OCCUPATIONAL THERAPY  STRZ ICU STEPDOWN TELEMETRY 4K  EVALUATION    Time:    Time In: 1130  Time Out: 1158  Timed Code Treatment Minutes: 14 Minutes  Minutes: 28          Date: 2019  Patient Name: Irina Fatima,   Gender: male      MRN: [id number]  : 1950  (70 y.o.)  Referring Practitioner: Dr. Lulú Ramsey  Diagnosis: multifocal pneumonia   Additional Pertinent Hx: 70 y.o. male who presents to the Emergency Department with a chief complaint of SOB. The patient has been diagnosed with lung cancer with metastasis to the liver, pancreas, bone, and brain. Patient has been undergoing chemo treatments and has an appointment tomorrow morning at 0930 with Dr. Roly Beltran (Oncology). Patient reports increased SOB from his baseline for the past 2 days. He has a mild productive cough. He reports other symptoms including generalized weakness (worse in legs), fatigue, left hip pain, and chronic abdominal pain    Restrictions/Precautions:  Restrictions/Precautions: Fall Risk    Subjective  Chart Reviewed: Yes, History and Physical, Orders, Progress Notes  Patient assessed for rehabilitation services?: Yes    Subjective: Pt lying in bed and agreeable to OT session. Pain:  Pain Assessment  Patient Currently in Pain: No    Social/Functional History:  Lives With: Spouse  Type of Home: House  Home Layout: Two level(fullb athroom on 2nd floor, bedroom and half bath on 1st floor. )  Home Access: Stairs to enter without rails  Entrance Stairs - Number of Steps: 2+3   Bathroom Shower/Tub: Tub/Shower unit  Bathroom Toilet: Standard  Bathroom Accessibility: Accessible       ADL Assistance: Independent  Ambulation Assistance: Independent  Transfer Assistance: Independent          Additional Comments: Pt stating he was indep with his ADL tasks but it may take him longer to complete d/t fatigue and SOB.  Pt stating he does get SOB with mobility in home especially walking from living room to

## 2019-08-26 NOTE — PROGRESS NOTES
prior examination and could be on the basis of gastritis and/or enteritis. Other etiologies should also be considered. 2. Stable size of liver masses in the right and left hepatic lobes. 3. Redemonstration of adrenal enlargement bilaterally of a unchanged in size. 4. Persistent moderate hydronephrosis on the right side with no obstructive mass or stone identified. 5. Additional chronic findings as described above.  CTA chest per rad:1. Infiltrates are seen throughout both upper lobes and the left lower lobe. They're most notable in the left upper lung. These findings are likely related to multifocal pneumonia. 2. Soft tissue mass in the left prepectoral region which has increased in size up to 3.2 cm up from 1.8 cm on the previous study. This could represent lymphadenopathy. 3. No evidence of a pulmonary embolism. 4. Paraseptal emphysematous changes. 5. Sclerotic lesion seen at T8, L1, and L2. Sclerotic focus is stable at the posterior aspect of left fifth rib.    MRSA screen negative   Sodium improved 131      Electronically signed by Kathy Ibrahim MD on 8/26/2019 at 1:02 PM

## 2019-08-26 NOTE — CONSULTS
levofloxacin  750 mg Intravenous Q24H    vancomycin  1,000 mg Intravenous Q12H    vancomycin (VANCOCIN) intermittent dosing (placeholder)   Other RX Placeholder     acetaminophen, sodium chloride flush, ondansetron, magnesium sulfate, potassium chloride **OR** potassium alternative oral replacement **OR** potassium chloride, morphine, diphenhydrAMINE  IV Drips/Infusions   sodium chloride 100 mL/hr at 08/25/19 2254    sodium chloride 75 mL/hr at 08/26/19 0001     Past Medical History         Diagnosis Date    Arthritis     Cancer [facility])     lung    COPD (chronic obstructive pulmonary disease) ([facility] [address]     Gastric reflux     Hyperlipidemia     Hypertension       Past Surgical History           Procedure Laterality Date    COLONOSCOPY      HERNIA REPAIR  80's    bilateral inguinal    KIDNEY SURGERY      stent placement    [place] [address] INCL FLUOR GDNCE DX W/CELL WASHG SPX N/A 9/7/2018    BRONCHOSCOPY FLUOROSCOPY performed by Stoney Maguire MD at [facility]    CA OFF[address]/OUTPT [address] Right 9/5/2018    RIGHT INGUINAL HERNIA REPAIR performed by Anyi Morrison MD at [address] OFFICE/OUTPT [address] N/A 9/11/2018    EXPLORATORY LAPAROSCOPY  OPEN PROCEDURE, SMALL BOWEL RESECTION performed by Anyi Morrison MD at [address] [facility] TRANSESOPHAGEAL ECHOCARDIOGRAM Left 5/14/2019    TRANSESOPHAGEAL ECHOCARDIOGRAM performed by Kristin Stout MD at [address] [facility];  Dietary Nutrition Supplements: Standard High Calorie Oral Supplement  Allergies    Lisinopril  Social History     Social History     Socioeconomic History    Marital status:      Spouse name: Not on file    Number of children: 0    Years of education: Not on file    Highest education level: Not on file   Occupational History    Not on file   Social Needs    Financial resource strain: Not on file    Food insecurity:     Worry: Not on file     Inability: Not on file    Transportation needs: Rales/Wheezes/Rhonchi. Cardiovascular: Regular rate and rhythm with normal S1/S2 without murmurs, rubs or gallops. Abdomen: Soft, non-tender, non-distended with active bowel sounds. Musculoskeletal: No clubbing, cyanosis or edema bilaterally. Full range of motion without deformity. Skin: Skin color, texture, turgor normal.  Numerous bruises on right forearm  Neurologic:  Neurovascularly intact without any focal sensory/motor deficits. Cranial nerves: II-XII intact, grossly non-focal.  4+/5 hip flexor, 5/5 UE  Psychiatric: Alert and oriented, thought content appropriate, normal insight  Capillary Refill: Brisk,< 3 seconds   Peripheral Pulses: +2 palpable, equal bilaterally        Labs   CBC  Recent Labs     08/25/19 1825 08/26/19  0544   WBC 8.5 6.1   RBC 3.56* 3.37*   HGB 10.7* 10.1*   HCT 32.6* 31.3*   MCV 91.6 92.9   MCH 30.1 30.0   MCHC 32.8 32.3    311   MPV 8.5* 8.3*      BMP  Recent Labs     08/25/19 1825 08/26/19  0544   * 131*   K 4.4 3.9   CL 88* 96*   CO2 26 25   BUN 12 6*   CREATININE 0.4 0.4   GLUCOSE 114* 97   MG  --  1.9   CALCIUM 8.7 8.3*     LFT  Recent Labs     08/25/19 1825 08/26/19  0544   AST 13  --    ALT 11  --    BILITOT 0.4  --    ALKPHOS 106  --    LIPASE 157.2* 119.2*     INR  No results for input(s): INR, PROTIME in the last 72 hours. PTT  No results for input(s): APTT in the last 72 hours. Radiology        Ct Abdomen Pelvis Wo Contrast Additional Contrast? None    Result Date: 8/8/2019  PROCEDURE: CT ABDOMEN PELVIS WO CONTRAST CLINICAL INFORMATION: abd. pain eval for bowel obstruction . COMPARISON: CT 7/3/2019 TECHNIQUE: Axial 5 mm CT images were obtained through the abdomen and pelvis. No contrast was given. Coronal reconstructions were obtained. All CT scans at this facility use dose modulation, iterative reconstruction, and/or weight-based dosing when appropriate to reduce radiation dose to as low as reasonably achievable.  FINDINGS Lung base: Pericardial approximately 1.8 x 0.8 cm. Sclerotic lesions are again seen at T8, L1 and L2. Stable sclerotic focus in the posterior aspect of the left fifth rib. 1. Infiltrates are seen throughout both upper lobes and the left lower lobe. They're most notable in the left upper lung. These findings are likely related to multifocal pneumonia. 2. Soft tissue mass in the left prepectoral region which has increased in size up to 3.2 cm up from 1.8 cm on the previous study. This could represent lymphadenopathy. 3. No evidence of a pulmonary embolism. 4. Paraseptal emphysematous changes. 5. Sclerotic lesion seen at T8, L1, and L2. Sclerotic focus is stable at the posterior aspect of left fifth rib. **This report has been created using voice recognition software. It may contain minor errors which are inherent in voice recognition technology. ** Final report electronically signed by Dr Sagrario Hill on 8/25/2019 8:31 PM    Ct Abdomen Pelvis W Iv Contrast Additional Contrast? None    Result Date: 8/25/2019  PROCEDURE: CT ABDOMEN PELVIS W IV CONTRAST CLINICAL INFORMATION: 78-year-old male with upper abdominal pain. History of metastatic disease . COMPARISON: CT scan 08/08/2019. TECHNIQUE: 5 mm axial CT images were obtained through the abdomen and pelvis after the administration of intravenous and oral contrast. Coronal and sagittal reconstructions were obtained. All CT scans at this facility use dose modulation, iterative reconstruction, and/or weight-based dosing when appropriate to reduce radiation dose to as low as reasonably achievable. FINDINGS: Please refer to dictation for CT of the chest from the same date for thoracic findings. There is a low-density lesion in the left hepatic lobe which measures approximately 2.8 cm, unchanged since the prior examination.  An additional low-density lesion is seen in the right hepatic lobe on axial image #18 measuring 3.5 x 2.9 cm, also similar to the prior exam. The gallbladder appears contracted. The spleen is normal in size. The pancreas is within normal limits. There is enlargement of the bilateral adrenal glands. The measurements are similar to the prior exam measuring approximately 3.8 x 1.9 cm on the right and 3.3 x 2.1 cm on the left. Moderate right-sided hydronephrosis is again noted. A low-density lesion is again seen arising from the right kidney similar in size since prior study. A 2 mm nonobstructive stone is again seen in the right kidney. There is a moderately thickened appearance of the stomach which is similar to the previous examination. There is also a slightly thickened appearance of some small bowel loops in the left side of the abdomen (well seen on axial image #32). There is no evidence of a small bowel obstruction. Postsurgical changes are seen involving some of the small bowel loops in the left side of the abdomen. Diverticula are seen throughout the colon. There is no colonic wall thickening or edema. The appendix has a normal appearance. No inflammatory changes or dilatation. The urinary bladder appears normal. There is no free intraperitoneal air or free fluid in the abdomen or pelvis. There is a small fat-containing umbilical hernia. Degenerative changes of the spine. Sclerotic lesions again noted at L1, L4 and S1. Atherosclerotic calcifications are seen through the aorta. No aneurysmal dilatation. 1. Thickened appearance of the stomach and some loops of small bowel. This has a similar appearance to the prior examination and could be on the basis of gastritis and/or enteritis. Other etiologies should also be considered. 2. Stable size of liver masses in the right and left hepatic lobes. 3. Redemonstration of adrenal enlargement bilaterally of a unchanged in size. 4. Persistent moderate hydronephrosis on the right side with no obstructive mass or stone identified. 5. Additional chronic findings as described above.  **This report has been created using voice recognition software. It may contain minor errors which are inherent in voice recognition technology. ** Final report electronically signed by Dr Cassius Davenport on 8/25/2019 8:44 PM    Us Gallbladder Ruq    Result Date: 8/9/2019  PROCEDURE: US GALLBLADDER RUQ CLINICAL INFORMATION: Pancreatitis; assess common bile duct. COMPARISON: No prior study. TECHNIQUE: Routine US examination of the gallbladder. TECHNICAL DATA: Gallbladder - 6.60 x 3.53 x 2.96 cm Gallbladder Wall - 0.33 cm Common Duct - 0.70 cm Chester's Sign: Negative FINDINGS: GALLBLADDER: 1. The gallbladder is mildly distended and the gallbladder wall is upper limits normal thickness. There are no gallstones. There is no sludge. There is no pericholecystic fluid collection. COMMON DUCT: 1. The common duct is borderline prominent measuring 0.7 cm in transverse dimension. LIVER: 1. There is a 2.4 x 2.7 x 3.5 cm hypoechoic solid mass within the left hepatic lobe. There is also a 3.9 x 2.9 x 2.9 cm isoechoic solid mass with a hypoechoic rim within the right hepatic lobe. PANCREAS: 1. There is a 2.5 x 1.3 x 1.5 cm hypoechoic solid mass within the pancreatic head and a 1.2 x 1.0 x 1.3 cm hypoechoic solid mass within the pancreatic neck. OTHER: 1. There is a 2.6 x 1.3 x 1.4 cm hypoechoic solid mass between the right kidney and the inferior tip of the liver. 1. The gallbladder is mildly distended and the gallbladder wall is upper limits normal thickness. There are no gallstones. 2. The common duct is borderline prominent measuring 0.7 cm in transverse dimension. 3. There are 2 solid mass lesions within the liver measuring 2.4 x 2.7 x 3.5 cm within the left hepatic lobe and 3.9 x 2.9 x 2.9 cm within the right hepatic lobe. A malignancy cannot be excluded. 4. There to solid mass lesions within the pancreas measuring 2.5 x 1.3 x 1.5 cm within the pancreatic head and 1.2 x 1.0 x 1.3 cm within the pancreatic neck. A malignancy cannot be excluded.  5. There is a 2.6 x prominence of the collecting system of the right kidney without delayed enhancement or excretion. Extending superiorly off of the right kidney is a 2.1 cm low T2 signal mass. It shows moderate peripheral enhancement suspicious for metastasis. Projecting between the liver and right kidney is a 19 mm low T2 signal mass. It shows mild peripheral enhancement and is suspicious for metastasis. BOWEL: Nonobstructive. FREE AIR/FREE FLUID/INFLAMMATION: None. LYMPHADENOPATHY: There are no pathologically enlarged lymph nodes. ABDOMINAL AORTA/IVC: Unremarkable. LUNG BASES: Unremarkable. MUSCULOSKELETAL: Blastic metastatic lesions are again noted, the largest within L1 and L4. 3.2 cm area of enhancement left iliac bone posteriorly suggests an additional metastasis. OTHER: None. There are mass lesions within the liver, the adrenal glands, within the pancreas and near the right kidney all favored to relate to metastatic lesions. **This report has been created using voice recognition software. It may contain minor errors which are inherent in voice recognition technology. ** Final report electronically signed by Dr. Yessica Arenas on 8/11/2019 1:46 PM      Assessment/Recommendations    Erica Chino is a 70 y.o. 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 He states that he was becoming more SOB and weaker. Metastatic SCLC with mets to liver, bone, brain  - Follows with Dr. Katlin Mckinnon  - S/P WBRT completed 6/27/19  - S/P -16 and carboplatin 1/2019  - Last chemo for palliative therapy with ipi/nivolumab and zometa on 7/29 C1D1. Last chemotherapy held due to decrease in ECOG score   - CTA this admission showed increase in previous soft tissue mass from 1.8-3.2 cm which could represent lymphadenopathy     Anemia - secondary to disease and chemotherapy  - Hgb today 10.1  - Transfuse to keep Hgb > 7.0  - No transfusions required today.     PNA vs hypersensitivity pneumonitis - CTA showed

## 2019-08-26 NOTE — PROGRESS NOTES
Nutrition Assessment    Type and Reason for Visit: Initial, Positive Nutrition Screen(Poor Appetite/Intake/Weight Loss)    Nutrition Recommendations: Recommend a Multivitamin w/minerals daily. Initiated Zettics 4 times daily. Continue current diet. Nutrition Assessment: Pt. severely malnourished AEB pt report of consuming 50% or less of meals over the past 11 months since cancer came back, -13.8% unplanned weight loss in 3 months, and severe muscle loss and loss of subcutaneous fat on physical exam. At risk for further nutritional compromise r/t catabolic illness (lung cancer with met to pancreas, liver, brain and bone currently undergoing chemotherapy, radiation and immunotherapy), ongoing poor appetite and need for nutrition support. Will send Ensure Enlive 4 times daily. Recommendations as per above. Malnutrition Assessment:  · Malnutrition Status: Meets the criteria for severe malnutrition  · Context: Chronic illness  · Findings of the 6 clinical characteristics of malnutrition (Minimum of 2 out of 6 clinical characteristics is required to make the diagnosis of moderate or severe Protein Calorie Malnutrition based on AND/ASPEN Guidelines):  1. Energy Intake-Less than or equal to 50% of estimated energy requirement, Greater than or equal to 3 months    2. Weight Loss-10% loss or greater(-13.8% unplanned weight loss), in 3 months  3. Fat Loss-Severe subcutaneous fat loss, Triceps, Fat overlying the ribs  4.  Muscle Loss-Severe muscle mass loss, Temples (temporalis muscle), Clavicles (pectoralis and deltoids)    Nutrition Risk Level: High    Nutrient Needs:  · Estimated Daily Total Kcal: 9425-1516 kcal/day (28-30 kcal/kg - 59.7 kg on 8/25)  · Estimated Daily Protein (g): 72+ g/day (1.2+ g/kg - 59.7 kg on 8/25)    Nutrition Diagnosis:   · Problem: Severe malnutrition, In context of chronic illness  · Etiology: related to Catabolic illness, Insufficient energy/nutrient consumption     Signs and symptoms:  as evidenced by Diet history of poor intake, Weight loss greater than or equal to 7.5% in 3 months, Severe loss of subcutaneous fat, Severe muscle loss    Objective Information:  · Nutrition-Focused Physical Findings: pt denies N/V/D/C- no BM yet; pt reports poor appetite; muscle wasting; cachexic; weak  · Wound Type: None  · Current Nutrition Therapies:  · Oral Diet Orders: General   · Oral Diet intake: %, 26-50%(pt reports consuming 26-50% of meals over the past several months; pt forced himself to eat 75% of his breakfast today)  · Oral Nutrition Supplement (ONS) Orders: Standard High Calorie Oral Supplement(Ensure Ensure -4 times daily (chocolate or vanilla))  · ONS intake: (Initiated today)  · Anthropometric Measures:  · Ht: 5' 8\" (172.7 cm)   · Current Body Wt: 131 lb 11.2 oz (59.7 kg)(8/25; no edema noted)  · Admission Body Wt: 131 lb 11.2 oz (59.7 kg)(8/25; no edema noted)  · Usual Body Wt: 151 lb (68.5 kg)(per pt report. Per EMR: 152 lb 9.6 oz on 5/24/19; 127 lb 9.6 oz on 2/25/19; 128 lb 3.2 oz on 9/5/18)  · % Weight Change:  -13.8% unplanned weight loss in 3 months per EMR; severe weight loss noted  · Ideal Body Wt: 154 lb (69.9 kg), % Ideal Body 85%  · BMI Classification: BMI 18.5 - 24.9 Normal Weight(20.1)    Nutrition Interventions:   Continue current diet, Start ONS, Vitamin Supplement  Continued Inpatient Monitoring, Education Initiated, Coordination of Care(Discussed importance of nutrition during chemotherapy treatment and use of Ensure at home and during LOS.  Discussed ways to add calories/protein to food.)    Nutrition Evaluation:   · Evaluation: Goals set   · Goals: Pt will consume 75% of more of meals during LOS    · Monitoring: Nutrition Progression, Meal Intake, Supplement Intake, Diet Tolerance, Weight, Pertinent Labs, Monitor Bowel Function    Electronically signed by Pierre De Santiago RD, LD on 8/26/19 at 10:29 AM    Contact Number: [phone]

## 2019-08-27 LAB
ALBUMIN SERPL-MCNC: 3.3 G/DL (ref 3.5–5.1)
ALP BLD-CCNC: 95 U/L (ref 38–126)
ALT SERPL-CCNC: 11 U/L (ref 11–66)
ANION GAP SERPL CALCULATED.3IONS-SCNC: 11 MEQ/L (ref 8–16)
AST SERPL-CCNC: 12 U/L (ref 5–40)
BILIRUB SERPL-MCNC: 0.3 MG/DL (ref 0.3–1.2)
BUN BLDV-MCNC: 8 MG/DL (ref 7–22)
CALCIUM SERPL-MCNC: 9.1 MG/DL (ref 8.5–10.5)
CHLORIDE BLD-SCNC: 99 MEQ/L (ref 98–111)
CO2: 27 MEQ/L (ref 23–33)
CREAT SERPL-MCNC: 0.4 MG/DL (ref 0.4–1.2)
ERYTHROCYTE [DISTWIDTH] IN BLOOD BY AUTOMATED COUNT: 15.4 % (ref 11.5–14.5)
ERYTHROCYTE [DISTWIDTH] IN BLOOD BY AUTOMATED COUNT: 52.4 FL (ref 35–45)
GFR SERPL CREATININE-BSD FRML MDRD: > 90 ML/MIN/1.73M2
GLUCOSE BLD-MCNC: 170 MG/DL (ref 70–108)
HCT VFR BLD CALC: 32.7 % (ref 42–52)
HEMOGLOBIN: 10.3 GM/DL (ref 14–18)
LEGIONELLA URINARY AG: NEGATIVE
MCH RBC QN AUTO: 29.4 PG (ref 26–33)
MCHC RBC AUTO-ENTMCNC: 31.5 GM/DL (ref 32.2–35.5)
MCV RBC AUTO: 93.4 FL (ref 80–94)
MRSA SCREEN: NORMAL
PLATELET # BLD: 315 THOU/MM3 (ref 130–400)
PMV BLD AUTO: 8.4 FL (ref 9.4–12.4)
POTASSIUM SERPL-SCNC: 3.4 MEQ/L (ref 3.5–5.2)
RBC # BLD: 3.5 MILL/MM3 (ref 4.7–6.1)
SODIUM BLD-SCNC: 137 MEQ/L (ref 135–145)
STREP PNEUMO AG, UR: NEGATIVE
TOTAL PROTEIN: 6.7 G/DL (ref 6.1–8)
WBC # BLD: 5 THOU/MM3 (ref 4.8–10.8)

## 2019-08-27 PROCEDURE — 94640 AIRWAY INHALATION TREATMENT: CPT

## 2019-08-27 PROCEDURE — 97116 GAIT TRAINING THERAPY: CPT

## 2019-08-27 PROCEDURE — 6370000000 HC RX 637 (ALT 250 FOR IP): Performed by: INTERNAL MEDICINE

## 2019-08-27 PROCEDURE — 99232 SBSQ HOSP IP/OBS MODERATE 35: CPT | Performed by: INTERNAL MEDICINE

## 2019-08-27 PROCEDURE — 85027 COMPLETE CBC AUTOMATED: CPT

## 2019-08-27 PROCEDURE — 80053 COMPREHEN METABOLIC PANEL: CPT

## 2019-08-27 PROCEDURE — 94760 N-INVAS EAR/PLS OXIMETRY 1: CPT

## 2019-08-27 PROCEDURE — 2580000003 HC RX 258: Performed by: INTERNAL MEDICINE

## 2019-08-27 PROCEDURE — 97162 PT EVAL MOD COMPLEX 30 MIN: CPT

## 2019-08-27 PROCEDURE — 1200000003 HC TELEMETRY R&B

## 2019-08-27 PROCEDURE — 97535 SELF CARE MNGMENT TRAINING: CPT

## 2019-08-27 PROCEDURE — 97530 THERAPEUTIC ACTIVITIES: CPT

## 2019-08-27 PROCEDURE — 2709999900 HC NON-CHARGEABLE SUPPLY

## 2019-08-27 PROCEDURE — 36415 COLL VENOUS BLD VENIPUNCTURE: CPT

## 2019-08-27 PROCEDURE — 1200000000 HC SEMI PRIVATE

## 2019-08-27 PROCEDURE — 6360000002 HC RX W HCPCS: Performed by: INTERNAL MEDICINE

## 2019-08-27 RX ORDER — LIDOCAINE 4 G/G
3 PATCH TOPICAL DAILY
Status: DISCONTINUED | OUTPATIENT
Start: 2019-08-27 | End: 2019-09-01 | Stop reason: HOSPADM

## 2019-08-27 RX ADMIN — IPRATROPIUM BROMIDE AND ALBUTEROL SULFATE 1 AMPULE: .5; 3 SOLUTION RESPIRATORY (INHALATION) at 09:27

## 2019-08-27 RX ADMIN — ACETAMINOPHEN 650 MG: 325 TABLET ORAL at 07:54

## 2019-08-27 RX ADMIN — POLYETHYLENE GLYCOL 3350 17 G: 17 POWDER, FOR SOLUTION ORAL at 07:55

## 2019-08-27 RX ADMIN — Medication 10 ML: at 20:20

## 2019-08-27 RX ADMIN — CEFEPIME HYDROCHLORIDE 2 G: 2 INJECTION, POWDER, FOR SOLUTION INTRAVENOUS at 21:57

## 2019-08-27 RX ADMIN — TAMSULOSIN HYDROCHLORIDE 0.4 MG: 0.4 CAPSULE ORAL at 07:50

## 2019-08-27 RX ADMIN — IPRATROPIUM BROMIDE AND ALBUTEROL SULFATE 1 AMPULE: .5; 3 SOLUTION RESPIRATORY (INHALATION) at 12:34

## 2019-08-27 RX ADMIN — PREDNISONE 60 MG: 20 TABLET ORAL at 07:50

## 2019-08-27 RX ADMIN — SODIUM CHLORIDE: 9 INJECTION, SOLUTION INTRAVENOUS at 02:04

## 2019-08-27 RX ADMIN — PANTOPRAZOLE SODIUM 40 MG: 40 TABLET, DELAYED RELEASE ORAL at 05:28

## 2019-08-27 RX ADMIN — ASPIRIN 81 MG 81 MG: 81 TABLET ORAL at 07:54

## 2019-08-27 RX ADMIN — LEVOFLOXACIN 750 MG: 5 INJECTION, SOLUTION INTRAVENOUS at 00:49

## 2019-08-27 RX ADMIN — MORPHINE SULFATE 2 MG: 2 INJECTION, SOLUTION INTRAMUSCULAR; INTRAVENOUS at 16:12

## 2019-08-27 RX ADMIN — ACETAMINOPHEN 650 MG: 325 TABLET ORAL at 15:44

## 2019-08-27 RX ADMIN — CEFEPIME HYDROCHLORIDE 2 G: 2 INJECTION, POWDER, FOR SOLUTION INTRAVENOUS at 14:25

## 2019-08-27 RX ADMIN — ACETAMINOPHEN 650 MG: 325 TABLET ORAL at 21:56

## 2019-08-27 RX ADMIN — DIPHENHYDRAMINE HCL 25 MG: 25 TABLET ORAL at 21:57

## 2019-08-27 RX ADMIN — BUSPIRONE HYDROCHLORIDE 10 MG: 10 TABLET ORAL at 20:20

## 2019-08-27 RX ADMIN — BUSPIRONE HYDROCHLORIDE 10 MG: 10 TABLET ORAL at 07:50

## 2019-08-27 RX ADMIN — ENOXAPARIN SODIUM 40 MG: 40 INJECTION SUBCUTANEOUS at 08:02

## 2019-08-27 RX ADMIN — FOLIC ACID 1 MG: 1 TABLET ORAL at 07:50

## 2019-08-27 RX ADMIN — IPRATROPIUM BROMIDE AND ALBUTEROL SULFATE 1 AMPULE: .5; 3 SOLUTION RESPIRATORY (INHALATION) at 20:30

## 2019-08-27 RX ADMIN — CEFEPIME HYDROCHLORIDE 2 G: 2 INJECTION, POWDER, FOR SOLUTION INTRAVENOUS at 04:53

## 2019-08-27 RX ADMIN — OLANZAPINE 5 MG: 5 TABLET, FILM COATED ORAL at 20:20

## 2019-08-27 RX ADMIN — IPRATROPIUM BROMIDE AND ALBUTEROL SULFATE 1 AMPULE: .5; 3 SOLUTION RESPIRATORY (INHALATION) at 16:50

## 2019-08-27 ASSESSMENT — PAIN SCALES - GENERAL
PAINLEVEL_OUTOF10: 8
PAINLEVEL_OUTOF10: 5
PAINLEVEL_OUTOF10: 4
PAINLEVEL_OUTOF10: 6
PAINLEVEL_OUTOF10: 5

## 2019-08-27 ASSESSMENT — PAIN DESCRIPTION - LOCATION
LOCATION: GENERALIZED
LOCATION: BACK

## 2019-08-27 ASSESSMENT — PAIN DESCRIPTION - FREQUENCY
FREQUENCY: CONTINUOUS
FREQUENCY: CONTINUOUS

## 2019-08-27 ASSESSMENT — PAIN DESCRIPTION - PROGRESSION
CLINICAL_PROGRESSION: NOT CHANGED
CLINICAL_PROGRESSION: NOT CHANGED

## 2019-08-27 ASSESSMENT — PAIN - FUNCTIONAL ASSESSMENT
PAIN_FUNCTIONAL_ASSESSMENT: ACTIVITIES ARE NOT PREVENTED
PAIN_FUNCTIONAL_ASSESSMENT: ACTIVITIES ARE NOT PREVENTED

## 2019-08-27 ASSESSMENT — PAIN DESCRIPTION - ONSET
ONSET: ON-GOING
ONSET: ON-GOING

## 2019-08-27 ASSESSMENT — PAIN DESCRIPTION - PAIN TYPE
TYPE: CHRONIC PAIN
TYPE: CHRONIC PAIN

## 2019-08-27 ASSESSMENT — PAIN DESCRIPTION - DESCRIPTORS
DESCRIPTORS: ACHING;DISCOMFORT
DESCRIPTORS: CONSTANT

## 2019-08-27 NOTE — PROGRESS NOTES
[address] [facility] PHYSICAL THERAPY  EVALUATION   [facility] [place] ICU STEPDOWN TELEMETRY 4K - [id number]    Time In: 3939  Time Out: 6622  Timed Code Treatment Minutes: 10 Minutes  Minutes: 24        Date: 2019  Patient Name: Sean Rojas,  Gender:  male        MRN: [id number]  : 1950  (70 y.o.)  Referral Date : 19   Referring Practitioner: Alexi Stephen DO  Diagnosis: multifocal pneumonia  Additional Pertinent Hx: chief complaint of SOB. The patient has been diagnosed with lung cancer with metastasis to the liver, pancreas, bone, and brain. Patient has been undergoing chemo treatments and has an appointment tomorrow morning at 0930 with Dr. Yrn Peraza (Oncology). Patient reports increased SOB from his baseline for the past 2 days. He has a mild productive cough. He reports other symptoms including generalized weakness (worse in legs), fatigue, left hip pain, and chronic abdominal pain. He denies injury to this hip. He denies constipation or diarrhea. Patient is taking a stool softener daily. He denies chest pain. He denies nausea or vomiting. Patient feels bloated. , feels better in his belly now, tolerating po intake. patient states he is a full code, he has increased sputum production     Restrictions/Precautions:  Restrictions/Precautions: Up as Tolerated    Subjective:  Chart Reviewed: Yes  Patient assessed for rehabilitation services?: Yes  Response To Previous Treatment: Not applicable  Family / Caregiver Present: No  Subjective: Pt supine in bed upon entry, agreeable to participate in therapy. Pt stated he needed to use the bathroom during session, and had a large BM. Pt endorsed fatigue following ambulating to the bathroom, requiring a seated rest break before ambulating. General:  Follows Commands: Within Functional Limits    Vision: Within Functional Limits    Hearing: Within functional limits       Pain:  Denies.      Social/Functional History:    Lives With: Spouse  Type of Home: House  Home Layout: Two level(full bathroom on 2nd floor, bedroom and half bath on 1st flr)  Home Access: Stairs to enter without rails  Entrance Stairs - Number of Steps: 2+3  Home Equipment: 4 wheeled walker, Cane, Grab bars     Bathroom Shower/Tub: Tub/Shower unit  Bathroom Toilet: Standard  Bathroom Accessibility: Accessible    Receives Help From: (none PTA)  ADL Assistance: Independent  Homemaking Assistance: Independent  Ambulation Assistance: Independent(4WW for distance)  Transfer Assistance: Independent          Additional Comments: Pt stating he was indep with his ADL tasks but it may take him longer to complete d/t fatigue and SOB. Pt stating he does get SOB with mobility in home especially walking from living room to bathroom. Pt reporting his spouse is home at all time. He does not use any AD consistently for mobility. Pt had HH PT 2x week    OBJECTIVE:  Range of Motion:  Right Lower Extremity: WFL  Left Lower Extremity: WFL    Strength:  Right Lower Extremity: Impaired - 4-/5 grossly  Left Lower Extremity: Impaired - 4-/5 grossly    Balance:  Static Standing Balance: Stand By Assistance, pt able to stand holding grab bar while he was cleaned   Dynamic Standing Balance: Contact Guard Assistance     Bed Mobility:  Supine to Sit: Stand By Assistance  Sit to Supine: Stand By Assistance     Transfers:  Sit to Stand: [employer], with verbal cues, verbal cues to push from surface and not pull on walker  Stand to [place] [address], with verbal cues, verbal cues to reach back for chair with arms    Ambulation:  Contact Guard Assistance  Distance: 10' + 120'  Surface: Level Tile  Device:Rolling Walker  Gait Deviations: Forward Flexed Posture, Decreased Heel Strike Bilaterally, Narrow Base of Support, Unsteady Gait and Scissoring.  Pt demonstrated LOB when walking to the bathroom which he self-corrected    Exercise:  Patient was guided in 1 set(s) 10 reps of exercise to both

## 2019-08-28 LAB
ALBUMIN SERPL-MCNC: 2.9 G/DL (ref 3.5–5.1)
ALP BLD-CCNC: 85 U/L (ref 38–126)
ALT SERPL-CCNC: 11 U/L (ref 11–66)
ANION GAP SERPL CALCULATED.3IONS-SCNC: 10 MEQ/L (ref 8–16)
AST SERPL-CCNC: 15 U/L (ref 5–40)
BILIRUB SERPL-MCNC: 0.2 MG/DL (ref 0.3–1.2)
BUN BLDV-MCNC: 10 MG/DL (ref 7–22)
CALCIUM SERPL-MCNC: 8.7 MG/DL (ref 8.5–10.5)
CHLORIDE BLD-SCNC: 96 MEQ/L (ref 98–111)
CO2: 26 MEQ/L (ref 23–33)
CREAT SERPL-MCNC: 0.4 MG/DL (ref 0.4–1.2)
ERYTHROCYTE [DISTWIDTH] IN BLOOD BY AUTOMATED COUNT: 15.7 % (ref 11.5–14.5)
ERYTHROCYTE [DISTWIDTH] IN BLOOD BY AUTOMATED COUNT: 53.6 FL (ref 35–45)
GFR SERPL CREATININE-BSD FRML MDRD: > 90 ML/MIN/1.73M2
GLUCOSE BLD-MCNC: 108 MG/DL (ref 70–108)
HCT VFR BLD CALC: 29.3 % (ref 42–52)
HEMOGLOBIN: 9.3 GM/DL (ref 14–18)
MCH RBC QN AUTO: 29.9 PG (ref 26–33)
MCHC RBC AUTO-ENTMCNC: 31.7 GM/DL (ref 32.2–35.5)
MCV RBC AUTO: 94.2 FL (ref 80–94)
PLATELET # BLD: 322 THOU/MM3 (ref 130–400)
PMV BLD AUTO: 8.5 FL (ref 9.4–12.4)
POTASSIUM SERPL-SCNC: 3.9 MEQ/L (ref 3.5–5.2)
RBC # BLD: 3.11 MILL/MM3 (ref 4.7–6.1)
SODIUM BLD-SCNC: 132 MEQ/L (ref 135–145)
TOTAL PROTEIN: 5.9 G/DL (ref 6.1–8)
WBC # BLD: 7.1 THOU/MM3 (ref 4.8–10.8)

## 2019-08-28 PROCEDURE — 36415 COLL VENOUS BLD VENIPUNCTURE: CPT

## 2019-08-28 PROCEDURE — 80053 COMPREHEN METABOLIC PANEL: CPT

## 2019-08-28 PROCEDURE — 2580000003 HC RX 258: Performed by: INTERNAL MEDICINE

## 2019-08-28 PROCEDURE — 6370000000 HC RX 637 (ALT 250 FOR IP): Performed by: INTERNAL MEDICINE

## 2019-08-28 PROCEDURE — 85027 COMPLETE CBC AUTOMATED: CPT

## 2019-08-28 PROCEDURE — 1200000003 HC TELEMETRY R&B

## 2019-08-28 PROCEDURE — 94640 AIRWAY INHALATION TREATMENT: CPT

## 2019-08-28 PROCEDURE — 2709999900 HC NON-CHARGEABLE SUPPLY

## 2019-08-28 PROCEDURE — 97110 THERAPEUTIC EXERCISES: CPT

## 2019-08-28 PROCEDURE — 99232 SBSQ HOSP IP/OBS MODERATE 35: CPT | Performed by: INTERNAL MEDICINE

## 2019-08-28 PROCEDURE — 99232 SBSQ HOSP IP/OBS MODERATE 35: CPT | Performed by: NURSE PRACTITIONER

## 2019-08-28 PROCEDURE — 6360000002 HC RX W HCPCS: Performed by: INTERNAL MEDICINE

## 2019-08-28 RX ORDER — LEVOFLOXACIN 500 MG/1
500 TABLET, FILM COATED ORAL DAILY
Status: DISCONTINUED | OUTPATIENT
Start: 2019-08-29 | End: 2019-08-30

## 2019-08-28 RX ORDER — SODIUM CHLORIDE 9 MG/ML
INJECTION, SOLUTION INTRAVENOUS CONTINUOUS
Status: DISCONTINUED | OUTPATIENT
Start: 2019-08-28 | End: 2019-08-29

## 2019-08-28 RX ORDER — OXYCODONE HYDROCHLORIDE 5 MG/1
2.5 TABLET ORAL EVERY 6 HOURS PRN
Status: DISCONTINUED | OUTPATIENT
Start: 2019-08-28 | End: 2019-08-29

## 2019-08-28 RX ORDER — OXYCODONE HYDROCHLORIDE 5 MG/1
2.5 TABLET ORAL EVERY 4 HOURS PRN
Status: DISCONTINUED | OUTPATIENT
Start: 2019-08-28 | End: 2019-08-28

## 2019-08-28 RX ADMIN — OXYCODONE HYDROCHLORIDE 2.5 MG: 5 TABLET ORAL at 20:27

## 2019-08-28 RX ADMIN — FOLIC ACID 1 MG: 1 TABLET ORAL at 09:17

## 2019-08-28 RX ADMIN — ACETAMINOPHEN 650 MG: 325 TABLET ORAL at 21:21

## 2019-08-28 RX ADMIN — CEFEPIME HYDROCHLORIDE 2 G: 2 INJECTION, POWDER, FOR SOLUTION INTRAVENOUS at 05:38

## 2019-08-28 RX ADMIN — ASPIRIN 81 MG 81 MG: 81 TABLET ORAL at 09:17

## 2019-08-28 RX ADMIN — TAMSULOSIN HYDROCHLORIDE 0.4 MG: 0.4 CAPSULE ORAL at 09:16

## 2019-08-28 RX ADMIN — PREDNISONE 60 MG: 20 TABLET ORAL at 09:16

## 2019-08-28 RX ADMIN — IPRATROPIUM BROMIDE AND ALBUTEROL SULFATE 1 AMPULE: .5; 3 SOLUTION RESPIRATORY (INHALATION) at 11:58

## 2019-08-28 RX ADMIN — DIPHENHYDRAMINE HCL 25 MG: 25 TABLET ORAL at 21:21

## 2019-08-28 RX ADMIN — POLYETHYLENE GLYCOL 3350 17 G: 17 POWDER, FOR SOLUTION ORAL at 09:17

## 2019-08-28 RX ADMIN — PANTOPRAZOLE SODIUM 40 MG: 40 TABLET, DELAYED RELEASE ORAL at 05:43

## 2019-08-28 RX ADMIN — IPRATROPIUM BROMIDE AND ALBUTEROL SULFATE 1 AMPULE: .5; 3 SOLUTION RESPIRATORY (INHALATION) at 16:21

## 2019-08-28 RX ADMIN — ACETAMINOPHEN 650 MG: 325 TABLET ORAL at 09:16

## 2019-08-28 RX ADMIN — ENOXAPARIN SODIUM 40 MG: 40 INJECTION SUBCUTANEOUS at 09:17

## 2019-08-28 RX ADMIN — IPRATROPIUM BROMIDE AND ALBUTEROL SULFATE 1 AMPULE: .5; 3 SOLUTION RESPIRATORY (INHALATION) at 20:02

## 2019-08-28 RX ADMIN — BUSPIRONE HYDROCHLORIDE 10 MG: 10 TABLET ORAL at 20:27

## 2019-08-28 RX ADMIN — ACETAMINOPHEN 650 MG: 325 TABLET ORAL at 14:25

## 2019-08-28 RX ADMIN — IPRATROPIUM BROMIDE AND ALBUTEROL SULFATE 1 AMPULE: .5; 3 SOLUTION RESPIRATORY (INHALATION) at 07:44

## 2019-08-28 RX ADMIN — BUSPIRONE HYDROCHLORIDE 10 MG: 10 TABLET ORAL at 09:17

## 2019-08-28 RX ADMIN — LEVOFLOXACIN 750 MG: 5 INJECTION, SOLUTION INTRAVENOUS at 00:26

## 2019-08-28 RX ADMIN — SODIUM CHLORIDE: 9 INJECTION, SOLUTION INTRAVENOUS at 18:11

## 2019-08-28 RX ADMIN — Medication 10 ML: at 09:18

## 2019-08-28 RX ADMIN — OLANZAPINE 5 MG: 5 TABLET, FILM COATED ORAL at 20:27

## 2019-08-28 ASSESSMENT — PAIN SCALES - GENERAL
PAINLEVEL_OUTOF10: 2
PAINLEVEL_OUTOF10: 5
PAINLEVEL_OUTOF10: 4
PAINLEVEL_OUTOF10: 5
PAINLEVEL_OUTOF10: 5
PAINLEVEL_OUTOF10: 3
PAINLEVEL_OUTOF10: 4
PAINLEVEL_OUTOF10: 5
PAINLEVEL_OUTOF10: 3
PAINLEVEL_OUTOF10: 5
PAINLEVEL_OUTOF10: 3

## 2019-08-28 ASSESSMENT — PAIN DESCRIPTION - DESCRIPTORS: DESCRIPTORS: DISCOMFORT

## 2019-08-28 ASSESSMENT — PAIN DESCRIPTION - LOCATION: LOCATION: HIP

## 2019-08-28 ASSESSMENT — PAIN DESCRIPTION - PAIN TYPE: TYPE: CHRONIC PAIN

## 2019-08-28 ASSESSMENT — PAIN - FUNCTIONAL ASSESSMENT: PAIN_FUNCTIONAL_ASSESSMENT: ACTIVITIES ARE NOT PREVENTED

## 2019-08-28 ASSESSMENT — PAIN DESCRIPTION - ORIENTATION: ORIENTATION: LEFT

## 2019-08-28 ASSESSMENT — PAIN DESCRIPTION - FREQUENCY: FREQUENCY: CONTINUOUS

## 2019-08-28 ASSESSMENT — PAIN DESCRIPTION - ONSET: ONSET: ON-GOING

## 2019-08-28 ASSESSMENT — PAIN DESCRIPTION - PROGRESSION: CLINICAL_PROGRESSION: NOT CHANGED

## 2019-08-28 NOTE — PROGRESS NOTES
A home oxygen evaluation has been completed. [x]Patient is an inpatient. It is expected that the patient will be discharged within the next 48 hours. Qualified provider to write order for home prescription if patient qualifies. Social service/care managers will arrange for home oxygen. If patient is active, arrange for Home Medical supplier to assess for Oxygen Conserving Device per pulse oximetry. []Patient is an outpatient. Results will be faxed to the ordering provider. Qualified provider to write order for home prescription if patient qualifies and arranges for home oxygen. Patient was found on room air . SpO2 was 98 % on room air at rest. Patients SpO2 was 89% or above and did not qualify for home oxygen. Patient was walked for 3 minutes. Pt has no indurance and struggled . SpO2 was 94 % during walking. Patients SpO2 was 89% or above and did not qualify for home oxygen. Patient does not have a positive pressure airway device at home. Patient is not  diagnosed with Obstructive Sleep Apnea. Patient will not be set up/instructed on a nocturnal study. Results will be given to qualified provider. Note: For any SpO2 at 21% see policy and procedure for possible qualifications.

## 2019-08-28 NOTE — PROGRESS NOTES
cancer. Originally seen by Dr Pa Steiner on 9/7 for L hilar lung mass. He had bronch with washings  Pos for strep and enterobacter and fiberoptic exam (unremarkable). He had a course of antibiotics and was referred for CT guided bx L hilar mass. He had a break due to medical insurance isues with [facility] and had lapse in f/u until he saw Dr Joshua Marvin and was trying to work out his medical care path but became more SOB. Kailyn David went to ED at [facility] and had chest CT which showed large mediastinal lymphadenopathy. Path from brushings came back small cell lung ca. CT of the head on January 24, 2019 showed normal appearance of the brain.  CT of the abdomen on January 25, 2019 showed bilateral pleural metastases and effusions.  In addition there was 2.3 cm low attenuation area in the left lobe liver suspicious for metastases in the right hepatic lobe.  Left adrenal gland was enlarged and an measured 20 mm in the largest dimension there was a 3.8 cm mass in the region of the left iliac chain worrisome for lymph node metastasis.  Due to presentation with SVC the patient started radiation treatment on January 25, 2019 and completed on 02/20/2019.  On January 28, 2019 he received first cycle of chemotherapy with -16 and carboplatin. Overall, he tolerated it reasonably well, mild nausea. After 4 cycles of -16 and carboplatin, the patient underwent staging studies which showed scattered punctate foci of acute infarct throughout the brain with evidence of embolic phenomenon on MRI of the brain on 5/13/19. He was admitted to [address] [place] [address] for further evaluation. He underwent embolic workup and had negative CARLOS on 5/15/19. Neurology was consulted and recommended repeat MRI in 2 weeks to show stability of a new brain lesions. MRI of the brain on 5/23/19 showed enlarging focal areas of restricted diffusion throughout the brain.   These likely represent metastatic lesions given interval increase in size compared to prior are no gallstones. 2. The common duct is borderline prominent measuring 0.7 cm in transverse dimension. 3. There are 2 solid mass lesions within the liver measuring 2.4 x 2.7 x 3.5 cm within the left hepatic lobe and 3.9 x 2.9 x 2.9 cm within the right hepatic lobe. A malignancy cannot be excluded. 4. There to solid mass lesions within the pancreas measuring 2.5 x 1.3 x 1.5 cm within the pancreatic head and 1.2 x 1.0 x 1.3 cm within the pancreatic neck. A malignancy cannot be excluded. 5. There is a 2.6 x 1.3 x 1.4 cm hypoechoic solid mass between the right kidney and the inferior tip of the liver. A pathologically enlarged lymph node would be a diagnostic consideration. 6. An MRI of the abdomen is recommended to further evaluate the hepatic and pancreatic mass lesions and also the mass lesion between the right kidney and the inferior tip of the liver. **This report has been created using voice recognition software. It may contain minor errors which are inherent in voice recognition technology. ** Final report electronically signed by Dr. Ashley Browning on 8/9/2019 4:33 PM    Xr Chest Portable    Result Date: 8/25/2019  PROCEDURE: XR CHEST PORTABLE CLINICAL INFORMATION: 62-year-old male with shortness of breath. COMPARISON: Chest x-ray 1/21/2019. TECHNIQUE: AP upright view of the chest was obtained. FINDINGS: The lungs are hyperinflated and there is flattening of hemidiaphragms likely related to emphysematous changes. There are opacities extending in the left upper lung with air bronchograms which could be related to a pneumonic infiltrate. Indistinct densities are again seen in the superior mediastinum which could be related to lymphadenopathy. The cardiac silhouette and pulmonary vasculature are within normal limits. Atherosclerotic changes are seen in the thoracic aorta. There is no significant pleural effusion or pneumothorax. Visualized portions of the upper abdomen are within normal limits.  The osseous structures difficult to entirely exclude. The remainder of the pancreatic duct is normal size measuring 2 mm There is no pancreatic divisum. There is no peripancreatic fluid or inflammation. SPLEEN:  No mass or enlargement. ADRENAL GLANDS: Bilateral irregular masses throughout the adrenal glands enhance peripherally and in their septations. They are difficult to accurately measure. On the right measures approximately 3.7 cm x 1.5 cm. On the left on the left this measures approximately 3.5 cm x 2.3 cm. KIDNEYS: Moderate prominence of the collecting system of the right kidney without delayed enhancement or excretion. Extending superiorly off of the right kidney is a 2.1 cm low T2 signal mass. It shows moderate peripheral enhancement suspicious for metastasis. Projecting between the liver and right kidney is a 19 mm low T2 signal mass. It shows mild peripheral enhancement and is suspicious for metastasis. BOWEL: Nonobstructive. FREE AIR/FREE FLUID/INFLAMMATION: None. LYMPHADENOPATHY: There are no pathologically enlarged lymph nodes. ABDOMINAL AORTA/IVC: Unremarkable. LUNG BASES: Unremarkable. MUSCULOSKELETAL: Blastic metastatic lesions are again noted, the largest within L1 and L4. 3.2 cm area of enhancement left iliac bone posteriorly suggests an additional metastasis. OTHER: None. There are mass lesions within the liver, the adrenal glands, within the pancreas and near the right kidney all favored to relate to metastatic lesions. **This report has been created using voice recognition software. It may contain minor errors which are inherent in voice recognition technology. ** Final report electronically signed by Dr. Ros Leslie on 8/11/2019 1:46 PM      Assessment/Recommendations    Angelina Dumont is a 70 y.o. 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 He states that he was becoming more SOB and weaker.     Metastatic SCLC with mets to liver, bone, brain  - Follows with Dr. Quentin Wiseman  - S/P WBRT completed 6/27/19  - S/P -16 and carboplatin 1/2019  - Last chemo for palliative therapy with ipi/nivolumab and zometa on 7/29 C1D1. Last chemotherapy held due to decrease in ECOG score   - CTA this admission showed increase in previous soft tissue mass from 1.8-3.2 cm which could represent lymphadenopathy     Anemia - secondary to disease and chemotherapy  - Hgb today 10.3  - Transfuse to keep Hgb > 7.0  - No transfusions required today. PNA vs hypersensitivity pneumonitis - CTA showed infiltrates. Last immunotherapy with ipi/nivolumab 7/29  - has been afebrile  - Blood cultures show NGTD  -antibiotics deescalated to levaquin only   - legionella - neg  - will start Prednisone 1mg/kg/day for 5 days and assess response - discussed with hospitalist, Dr. Zulema Ball    Case discussed with nurse and patient/family. Questions and concerns addressed.   Plan made in collaboration with     Electronically signed by   CARLOS Vanegas NP on 8/28/2019 at 11:54 AM

## 2019-08-28 NOTE — PROGRESS NOTES
Hospitalist Progress Note    Patient:  Amador Danger      Unit/Bed:4K-27/027-A    YOB: 1950    MRN: [id number]       Acct: [de-identified]     PCP: Corrinne Southerly, MD    Date of Admission: 8/25/2019      Assessment/Plan:  Active Hospital Problems    Diagnosis Date Noted    Primary malignant neoplasm of lung metastatic to other site [facility]) [C34.90]     Multifocal pneumonia [J18.9] 08/25/2019    Hypoxia [R09.02] 08/25/2019    Other chronic pancreatitis ([facility] [K86.1] 08/25/2019    [facility] [C79.51] 06/07/2019    Small cell carcinoma ([facility] [C80.1] 01/23/2019    Severe malnutrition ([facility] [E43] 09/06/2018     Class: Chronic       Acute Hypoxic Respiratory Insufficiency: Could be multifocal pneumonia. No fevers, procal 0.23. Continue antibiotics for the time being. Confirmed he received nivolumab 7/29. It appears that his GGO's are focused in DUNG, some on right. - resp cultures and urinary antigens negatuive  - continue abx  - Discussed with onc will give 1 mg/kg pred for 5 days. Likely will need a taper, further d/w Oncology tomorrow. - Could be PNA or pneumonitis. He is clinically improving. Off of O2. Will continue empiric steroids and abx. Hyponatremia - consideration for SIADH due to malignancy. Will trial fluids and repeat in am, if worsened then that confirms siadh. Consider salt tabs at that point.      Extensive Stage Small Cell Lung Cancer: diffuse mets (liver, bone, brain, ?pancreas?). Has had issues with pancreatitis recently. Now on palliative nivo/ipi. Poor Prognosis. - He is resolute in remaining full code and pressing on with treatment. Will respect his wishes.   - oncology consult - appreciate recs     Cancer related pain (back and hip) - avoid opiates as patient felt groggy on methadone in past. Tylenol and nsaids.  Trial of low dose roxicodone. Gets bisphosphonate infusion for bone pain as OP.   - Consult to Pain Mgmt, but will not be able to see

## 2019-08-28 NOTE — FLOWSHEET NOTE
[address] [address]  PHYSICAL THERAPY MISSED TREATMENT NOTE  ACUTE CARE  [facility] ICU STEPDOWN TELEMETRY 4K       x2- pt politely refused all activities offered , reports he is just too fatigued at this attempt       Missed Treatment  Giana Leslie PTA [id number]

## 2019-08-28 NOTE — PROGRESS NOTES
Resting in bed eyes closed. Heating pads in place. Breathing easy and unlabored at this time. No changes from previous assessment. Will continue to monitor. S. Maguire SN/RSC

## 2019-08-29 LAB
ANION GAP SERPL CALCULATED.3IONS-SCNC: 11 MEQ/L (ref 8–16)
BUN BLDV-MCNC: 9 MG/DL (ref 7–22)
CALCIUM SERPL-MCNC: 8.7 MG/DL (ref 8.5–10.5)
CHLORIDE BLD-SCNC: 90 MEQ/L (ref 98–111)
CO2: 28 MEQ/L (ref 23–33)
CREAT SERPL-MCNC: 0.3 MG/DL (ref 0.4–1.2)
ERYTHROCYTE [DISTWIDTH] IN BLOOD BY AUTOMATED COUNT: 15.2 % (ref 11.5–14.5)
ERYTHROCYTE [DISTWIDTH] IN BLOOD BY AUTOMATED COUNT: 52.6 FL (ref 35–45)
GFR SERPL CREATININE-BSD FRML MDRD: > 90 ML/MIN/1.73M2
GLUCOSE BLD-MCNC: 95 MG/DL (ref 70–108)
HCT VFR BLD CALC: 30.5 % (ref 42–52)
HEMOGLOBIN: 9.6 GM/DL (ref 14–18)
MCH RBC QN AUTO: 29.6 PG (ref 26–33)
MCHC RBC AUTO-ENTMCNC: 31.5 GM/DL (ref 32.2–35.5)
MCV RBC AUTO: 94.1 FL (ref 80–94)
OSMOLALITY URINE: 671 MOSMOL/KG (ref 250–750)
PLATELET # BLD: 328 THOU/MM3 (ref 130–400)
PMV BLD AUTO: 8.3 FL (ref 9.4–12.4)
POTASSIUM SERPL-SCNC: 4.4 MEQ/L (ref 3.5–5.2)
RBC # BLD: 3.24 MILL/MM3 (ref 4.7–6.1)
SODIUM BLD-SCNC: 129 MEQ/L (ref 135–145)
SODIUM URINE: 119 MEQ/L
WBC # BLD: 6.5 THOU/MM3 (ref 4.8–10.8)

## 2019-08-29 PROCEDURE — 6370000000 HC RX 637 (ALT 250 FOR IP): Performed by: INTERNAL MEDICINE

## 2019-08-29 PROCEDURE — 83935 ASSAY OF URINE OSMOLALITY: CPT

## 2019-08-29 PROCEDURE — 1200000003 HC TELEMETRY R&B

## 2019-08-29 PROCEDURE — 97110 THERAPEUTIC EXERCISES: CPT

## 2019-08-29 PROCEDURE — 94640 AIRWAY INHALATION TREATMENT: CPT

## 2019-08-29 PROCEDURE — 36415 COLL VENOUS BLD VENIPUNCTURE: CPT

## 2019-08-29 PROCEDURE — 99233 SBSQ HOSP IP/OBS HIGH 50: CPT | Performed by: INTERNAL MEDICINE

## 2019-08-29 PROCEDURE — 80048 BASIC METABOLIC PNL TOTAL CA: CPT

## 2019-08-29 PROCEDURE — 6360000002 HC RX W HCPCS: Performed by: INTERNAL MEDICINE

## 2019-08-29 PROCEDURE — 85027 COMPLETE CBC AUTOMATED: CPT

## 2019-08-29 PROCEDURE — 84300 ASSAY OF URINE SODIUM: CPT

## 2019-08-29 PROCEDURE — 97535 SELF CARE MNGMENT TRAINING: CPT

## 2019-08-29 PROCEDURE — 99232 SBSQ HOSP IP/OBS MODERATE 35: CPT | Performed by: NURSE PRACTITIONER

## 2019-08-29 RX ORDER — SODIUM CHLORIDE 1000 MG
1 TABLET, SOLUBLE MISCELLANEOUS 2 TIMES DAILY WITH MEALS
Status: DISCONTINUED | OUTPATIENT
Start: 2019-08-29 | End: 2019-08-30

## 2019-08-29 RX ORDER — OXYCODONE HYDROCHLORIDE 5 MG/1
5 TABLET ORAL EVERY 6 HOURS PRN
Status: DISCONTINUED | OUTPATIENT
Start: 2019-08-29 | End: 2019-09-01 | Stop reason: HOSPADM

## 2019-08-29 RX ORDER — SENNA PLUS 8.6 MG/1
1 TABLET ORAL NIGHTLY PRN
Status: DISCONTINUED | OUTPATIENT
Start: 2019-08-29 | End: 2019-09-01 | Stop reason: HOSPADM

## 2019-08-29 RX ADMIN — ASPIRIN 81 MG 81 MG: 81 TABLET ORAL at 08:34

## 2019-08-29 RX ADMIN — IPRATROPIUM BROMIDE AND ALBUTEROL SULFATE 1 AMPULE: .5; 3 SOLUTION RESPIRATORY (INHALATION) at 16:11

## 2019-08-29 RX ADMIN — IPRATROPIUM BROMIDE AND ALBUTEROL SULFATE 1 AMPULE: .5; 3 SOLUTION RESPIRATORY (INHALATION) at 21:30

## 2019-08-29 RX ADMIN — BUSPIRONE HYDROCHLORIDE 10 MG: 10 TABLET ORAL at 20:48

## 2019-08-29 RX ADMIN — BUSPIRONE HYDROCHLORIDE 10 MG: 10 TABLET ORAL at 08:32

## 2019-08-29 RX ADMIN — IPRATROPIUM BROMIDE AND ALBUTEROL SULFATE 1 AMPULE: .5; 3 SOLUTION RESPIRATORY (INHALATION) at 10:43

## 2019-08-29 RX ADMIN — DIPHENHYDRAMINE HCL 25 MG: 25 TABLET ORAL at 18:48

## 2019-08-29 RX ADMIN — ACETAMINOPHEN 650 MG: 325 TABLET ORAL at 12:41

## 2019-08-29 RX ADMIN — TAMSULOSIN HYDROCHLORIDE 0.4 MG: 0.4 CAPSULE ORAL at 08:32

## 2019-08-29 RX ADMIN — SODIUM CHLORIDE TAB 1 GM 1 G: 1 TAB at 16:49

## 2019-08-29 RX ADMIN — OXYCODONE HYDROCHLORIDE 2.5 MG: 5 TABLET ORAL at 10:53

## 2019-08-29 RX ADMIN — PREDNISONE 60 MG: 20 TABLET ORAL at 08:36

## 2019-08-29 RX ADMIN — PANTOPRAZOLE SODIUM 40 MG: 40 TABLET, DELAYED RELEASE ORAL at 06:33

## 2019-08-29 RX ADMIN — OLANZAPINE 5 MG: 5 TABLET, FILM COATED ORAL at 20:48

## 2019-08-29 RX ADMIN — FOLIC ACID 1 MG: 1 TABLET ORAL at 08:32

## 2019-08-29 RX ADMIN — ACETAMINOPHEN 650 MG: 325 TABLET ORAL at 18:48

## 2019-08-29 RX ADMIN — OXYCODONE HYDROCHLORIDE 2.5 MG: 5 TABLET ORAL at 03:26

## 2019-08-29 RX ADMIN — OXYCODONE HYDROCHLORIDE 5 MG: 5 TABLET ORAL at 20:48

## 2019-08-29 RX ADMIN — ENOXAPARIN SODIUM 40 MG: 40 INJECTION SUBCUTANEOUS at 08:35

## 2019-08-29 RX ADMIN — LEVOFLOXACIN 500 MG: 500 TABLET, FILM COATED ORAL at 08:32

## 2019-08-29 ASSESSMENT — PAIN DESCRIPTION - ONSET
ONSET: ON-GOING

## 2019-08-29 ASSESSMENT — PAIN DESCRIPTION - PAIN TYPE
TYPE: CHRONIC PAIN

## 2019-08-29 ASSESSMENT — PAIN - FUNCTIONAL ASSESSMENT
PAIN_FUNCTIONAL_ASSESSMENT: ACTIVITIES ARE NOT PREVENTED

## 2019-08-29 ASSESSMENT — PAIN DESCRIPTION - ORIENTATION
ORIENTATION: LEFT

## 2019-08-29 ASSESSMENT — PAIN DESCRIPTION - FREQUENCY
FREQUENCY: CONTINUOUS

## 2019-08-29 ASSESSMENT — PAIN SCALES - GENERAL
PAINLEVEL_OUTOF10: 4
PAINLEVEL_OUTOF10: 7
PAINLEVEL_OUTOF10: 3
PAINLEVEL_OUTOF10: 0
PAINLEVEL_OUTOF10: 4
PAINLEVEL_OUTOF10: 4

## 2019-08-29 ASSESSMENT — PAIN DESCRIPTION - LOCATION
LOCATION: HIP
LOCATION: BACK;HIP
LOCATION: HIP
LOCATION: HIP

## 2019-08-29 ASSESSMENT — PAIN DESCRIPTION - DESCRIPTORS
DESCRIPTORS: DISCOMFORT;ACHING;DULL
DESCRIPTORS: ACHING
DESCRIPTORS: DISCOMFORT;ACHING
DESCRIPTORS: ACHING

## 2019-08-29 ASSESSMENT — PAIN DESCRIPTION - PROGRESSION
CLINICAL_PROGRESSION: NOT CHANGED

## 2019-08-29 ASSESSMENT — PAIN DESCRIPTION - DIRECTION
RADIATING_TOWARDS: LOWER BACK
RADIATING_TOWARDS: LOWER BACK

## 2019-08-29 NOTE — PROGRESS NOTES
referred for CT guided bx L hilar mass. He had a break due to medical insurance isues with [facility] and had lapse in f/u until he saw Dr Lois Malave and was trying to work out his medical care path but became more SOB. Carina Ding went to ED at [facility] and had chest CT which showed large mediastinal lymphadenopathy. Path from brushings came back small cell lung ca. CT of the head on January 24, 2019 showed normal appearance of the brain.  CT of the abdomen on January 25, 2019 showed bilateral pleural metastases and effusions.  In addition there was 2.3 cm low attenuation area in the left lobe liver suspicious for metastases in the right hepatic lobe.  Left adrenal gland was enlarged and an measured 20 mm in the largest dimension there was a 3.8 cm mass in the region of the left iliac chain worrisome for lymph node metastasis.  Due to presentation with SVC the patient started radiation treatment on January 25, 2019 and completed on 02/20/2019.  On January 28, 2019 he received first cycle of chemotherapy with -16 and carboplatin. Overall, he tolerated it reasonably well, mild nausea. After 4 cycles of -16 and carboplatin, the patient underwent staging studies which showed scattered punctate foci of acute infarct throughout the brain with evidence of embolic phenomenon on MRI of the brain on 5/13/19. He was admitted to [address] [place] [address] for further evaluation. He underwent embolic workup and had negative CARLOS on 5/15/19. Neurology was consulted and recommended repeat MRI in 2 weeks to show stability of a new brain lesions. MRI of the brain on 5/23/19 showed enlarging focal areas of restricted diffusion throughout the brain. These likely represent metastatic lesions given interval increase in size compared to prior exam, these are unusual in that they do not enhance. He completed WBRT with 300 cGy.    Meds    Current Medications    sodium chloride  1 g Oral BID WC    levofloxacin  500 mg Oral Daily    lidocaine  3 abdomen and pelvis from the same date for infradiaphragmatic findings. There is interval development of a soft tissue mass in the prepectoral region on the left side well seen on axial image #39 measuring 3.2 x 2.8 cm. Previously this measured approximately 1.8 x 0.8 cm. Sclerotic lesions are again seen at T8, L1 and L2. Stable sclerotic focus in the posterior aspect of the left fifth rib. 1. Infiltrates are seen throughout both upper lobes and the left lower lobe. They're most notable in the left upper lung. These findings are likely related to multifocal pneumonia. 2. Soft tissue mass in the left prepectoral region which has increased in size up to 3.2 cm up from 1.8 cm on the previous study. This could represent lymphadenopathy. 3. No evidence of a pulmonary embolism. 4. Paraseptal emphysematous changes. 5. Sclerotic lesion seen at T8, L1, and L2. Sclerotic focus is stable at the posterior aspect of left fifth rib. **This report has been created using voice recognition software. It may contain minor errors which are inherent in voice recognition technology. ** Final report electronically signed by Dr Henry Galeana on 8/25/2019 8:31 PM    Ct Abdomen Pelvis W Iv Contrast Additional Contrast? None    Result Date: 8/25/2019  PROCEDURE: CT ABDOMEN PELVIS W IV CONTRAST CLINICAL INFORMATION: 41-year-old male with upper abdominal pain. History of metastatic disease . COMPARISON: CT scan 08/08/2019. TECHNIQUE: 5 mm axial CT images were obtained through the abdomen and pelvis after the administration of intravenous and oral contrast. Coronal and sagittal reconstructions were obtained. All CT scans at this facility use dose modulation, iterative reconstruction, and/or weight-based dosing when appropriate to reduce radiation dose to as low as reasonably achievable. FINDINGS: Please refer to dictation for CT of the chest from the same date for thoracic findings.  There is a low-density lesion in the left hepatic lobe cannot be excluded. 4. There to solid mass lesions within the pancreas measuring 2.5 x 1.3 x 1.5 cm within the pancreatic head and 1.2 x 1.0 x 1.3 cm within the pancreatic neck. A malignancy cannot be excluded. 5. There is a 2.6 x 1.3 x 1.4 cm hypoechoic solid mass between the right kidney and the inferior tip of the liver. A pathologically enlarged lymph node would be a diagnostic consideration. 6. An MRI of the abdomen is recommended to further evaluate the hepatic and pancreatic mass lesions and also the mass lesion between the right kidney and the inferior tip of the liver. **This report has been created using voice recognition software. It may contain minor errors which are inherent in voice recognition technology. ** Final report electronically signed by Dr. Monet Diaz on 8/9/2019 4:33 PM    Xr Chest Portable    Result Date: 8/25/2019  PROCEDURE: XR CHEST PORTABLE CLINICAL INFORMATION: 78-year-old male with shortness of breath. COMPARISON: Chest x-ray 1/21/2019. TECHNIQUE: AP upright view of the chest was obtained. FINDINGS: The lungs are hyperinflated and there is flattening of hemidiaphragms likely related to emphysematous changes. There are opacities extending in the left upper lung with air bronchograms which could be related to a pneumonic infiltrate. Indistinct densities are again seen in the superior mediastinum which could be related to lymphadenopathy. The cardiac silhouette and pulmonary vasculature are within normal limits. Atherosclerotic changes are seen in the thoracic aorta. There is no significant pleural effusion or pneumothorax. Visualized portions of the upper abdomen are within normal limits. The osseous structures are intact. No acute fractures or suspicious osseous lesions. 1. Opacities extending into the left upper lobe likely related to pneumonic infiltrate. Follow-up exam following treatment is recommended to assure resolution. 2. Emphysematous changes.  **This report has been glands enhance peripherally and in their septations. They are difficult to accurately measure. On the right measures approximately 3.7 cm x 1.5 cm. On the left on the left this measures approximately 3.5 cm x 2.3 cm. KIDNEYS: Moderate prominence of the collecting system of the right kidney without delayed enhancement or excretion. Extending superiorly off of the right kidney is a 2.1 cm low T2 signal mass. It shows moderate peripheral enhancement suspicious for metastasis. Projecting between the liver and right kidney is a 19 mm low T2 signal mass. It shows mild peripheral enhancement and is suspicious for metastasis. BOWEL: Nonobstructive. FREE AIR/FREE FLUID/INFLAMMATION: None. LYMPHADENOPATHY: There are no pathologically enlarged lymph nodes. ABDOMINAL AORTA/IVC: Unremarkable. LUNG BASES: Unremarkable. MUSCULOSKELETAL: Blastic metastatic lesions are again noted, the largest within L1 and L4. 3.2 cm area of enhancement left iliac bone posteriorly suggests an additional metastasis. OTHER: None. There are mass lesions within the liver, the adrenal glands, within the pancreas and near the right kidney all favored to relate to metastatic lesions. **This report has been created using voice recognition software. It may contain minor errors which are inherent in voice recognition technology. ** Final report electronically signed by Dr. Nahomy Fuentes on 8/11/2019 1:46 PM      Assessment/Recommendations    Ravin Cardenas is a 70 y.o. 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 He states that he was becoming more SOB and weaker. Metastatic SCLC with mets to liver, bone, brain  - Follows with Dr. Slime Minaya  - S/P WBRT completed 6/27/19  - S/P -16 and carboplatin 1/2019  - Last chemo for palliative therapy with ipi/nivolumab and zometa on 7/29 C1D1.  Last chemotherapy held due to decrease in ECOG score   - CTA this admission showed increase in previous soft tissue mass from 1.8-3.2 cm which could represent lymphadenopathy     Anemia - secondary to disease and chemotherapy  - Hgb today 9.6  - Transfuse to keep Hgb > 7.0  - No transfusions required today. PNA vs hypersensitivity pneumonitis - CTA showed infiltrates. Last immunotherapy with ipi/nivolumab 7/29  - has been afebrile  - Blood cultures show NGTD  - will stop antibiotics  - legionella - neg  - will start Prednisone 1mg/kg/day for 5 days. At discharge will need a 2 week taper of steroids    SIADH   - Na 129. Agree with urine lytes and fluid restriction. Case discussed with nurse and patient/family. Questions and concerns addressed.   Plan made in collaboration with     Electronically signed by   CARLOS Garrido NP on 8/29/2019 at 4:15 PM

## 2019-08-29 NOTE — PROGRESS NOTES
Daily    busPIRone  10 mg Oral BID    folic acid  1 mg Oral Daily    OLANZapine  5 mg Oral Nightly    pantoprazole  40 mg Oral QAM AC    polyethylene glycol  17 g Oral Daily    tamsulosin  0.4 mg Oral Daily    sodium chloride flush  10 mL Intravenous 2 times per day    enoxaparin  40 mg Subcutaneous Daily    ipratropium-albuterol  1 ampule Inhalation Q4H WA     PRN Meds: oxyCODONE, acetaminophen, sodium chloride flush, ondansetron, magnesium sulfate, potassium chloride **OR** potassium alternative oral replacement **OR** potassium chloride, morphine, diphenhydrAMINE      Intake/Output Summary (Last 24 hours) at 8/29/2019 1300  Last data filed at 8/29/2019 0814  Gross per 24 hour   Intake 1110.2 ml   Output 800 ml   Net 310.2 ml     Weight change: 1 lb 9 oz (0.709 kg)      Exam:  /66   Pulse 105   Temp 97.5 °F (36.4 °C) (Oral)   Resp 16   Ht 5' 8\" (1.727 m)   Wt 135 lb 1 oz (61.3 kg)   SpO2 95%   BMI 20.54 kg/m²     General appearance: No apparent distress, appears older than stated age. Eyes:  Pupils equal, round, and reactive to light. Conjunctivae/corneas clear. HENT: Head normal in appearance. External nares normal.  Oral mucosa moist, thrush resolving. Hearing grossly intact. Neck: Supple, with full range of motion. No jugular venous distention. Trachea midline. Respiratory:  Normal respiratory effort. Clear to auscultation, bilaterally without rales or wheezes or Rhonchi, decreased sounds on left lung overall. Cardiovascular: Normal rate, regular rhythm with normal S1/S2 without murmurs. No lower extremity edema. Abdomen: Soft, non-distended with normal bowel sounds  Musculoskeletal: Normal range of motion in extremities. There is no joint swelling. Itender in left hip to palpation, overall weak. Skin: Skin color, texture, turgor normal.  No rashes or lesions. Neurologic:  Neurovascularly intact without any focal sensory/motor deficits in the upper and lower extremities. loops of small bowel. This has a similar appearance to the prior examination and could be on the basis of gastritis and/or enteritis. Other etiologies should also be considered. 2. Stable size of liver masses in the right and left hepatic lobes. 3. Redemonstration of adrenal enlargement bilaterally of a unchanged in size. 4. Persistent moderate hydronephrosis on the right side with no obstructive mass or stone identified. 5. Additional chronic findings as described above. **This report has been created using voice recognition software. It may contain minor errors which are inherent in voice recognition technology. **      Final report electronically signed by Dr Sharad Perry on 8/25/2019 8:44 PM      XR HIP LEFT (2-3 VIEWS)   Final Result   Joint space narrowing with no acute fracture or dislocation involving the left hip. **This report has been created using voice recognition software. It may contain minor errors which are inherent in voice recognition technology. **      Final report electronically signed by Dr Sharad Perry on 8/25/2019 7:54 PM      XR CHEST PORTABLE   Final Result      1. Opacities extending into the left upper lobe likely related to pneumonic infiltrate. Follow-up exam following treatment is recommended to assure resolution. 2. Emphysematous changes. **This report has been created using voice recognition software. It may contain minor errors which are inherent in voice recognition technology. **      Final report electronically signed by Dr Sharad Perry on 8/25/2019 6:22 PM          DVT prophylaxis: [x] Lovenox                                  [] SCDs                                 [] SQ Heparin                                 [] Encourage ambulation           [] Already on Anticoagulation     Code Status: Full Code    PT/OT Eval Status:     Diet:   Dietary Nutrition Supplements: Standard High Calorie Oral Supplement  DIET GENERAL; Daily Fluid Restriction: 1500 ml    Fluids:na    Tele:   [x] yes             [] no      Electronically signed by Davon Vega DO on 8/29/2019 at 1:00 PM

## 2019-08-29 NOTE — PROGRESS NOTES
he does get SOB with mobility in home especially walking from living room to bathroom. Pt reporting his spouse is home at all time. He does not use any AD consistently for mobility. Pt had HH PT 2x week    Restrictions/Precautions:  Restrictions/Precautions: Up as Tolerated    SUBJECTIVE: Pt is very pleasant and initially agrees to ambulating and possible up to chair. After standing and taking a couple of steps he backed up and sat down reporting sharp shooting pain from hip up thru low back. PAIN: initially 3/10; became 7/10- left hip and low back- pt was medicated before ever sitting up - nurse was informed of the shooting pain after session    OBJECTIVE:  Bed Mobility:  Supine to Sit: Moderate Assistance, X 1, with head of bed raised, with increased time for completion  Sit to Supine: Moderate Assistance, X 1, with verbal cues , with increased time for completion     Transfers:  Sit to Stand: Contact Guard Assistance  Stand to Sit:Contact Guard Assistance    Ambulation:  Contact Guard Assistance, X 1  Distance: ~ 3-4 feet x 1   Surface: Level Tile  Device:Rolling Walker  Gait Deviations: Forward Flexed Posture and Slow Maryann    Balance:  Static Sitting Balance:  Stand By Assistance, X 1  Dynamic Sitting Balance: Stand By Assistance, X 1    Exercise:  Pt declined performing therex after the shooting pain. PTA did educate on performing ankle pumps, heelslides, hip ABD/ADD and glut sets - 10 x each     Functional Outcome Measures: Completed  AM-PAC Inpatient Mobility without Stair Climbing Raw Score : 15  AM-PAC Inpatient without Stair Climbing T-Scale Score : 43.03    ASSESSMENT:  Assessment: Patient progressing toward established goals. Activity Tolerance:  Patient tolerance of  treatment: good.       Equipment Recommendations:Equipment Needed: Yes(RW? pt stated he felt more steady vs 4WW)  Discharge Recommendations:  Continue to assess pending progress, Patient would benefit from continued therapy after

## 2019-08-30 LAB
ANION GAP SERPL CALCULATED.3IONS-SCNC: 12 MEQ/L (ref 8–16)
ANION GAP SERPL CALCULATED.3IONS-SCNC: 15 MEQ/L (ref 8–16)
BUN BLDV-MCNC: 13 MG/DL (ref 7–22)
BUN BLDV-MCNC: 16 MG/DL (ref 7–22)
CALCIUM SERPL-MCNC: 8.6 MG/DL (ref 8.5–10.5)
CALCIUM SERPL-MCNC: 8.7 MG/DL (ref 8.5–10.5)
CHLORIDE BLD-SCNC: 90 MEQ/L (ref 98–111)
CHLORIDE BLD-SCNC: 91 MEQ/L (ref 98–111)
CO2: 23 MEQ/L (ref 23–33)
CO2: 26 MEQ/L (ref 23–33)
CREAT SERPL-MCNC: 0.4 MG/DL (ref 0.4–1.2)
CREAT SERPL-MCNC: 0.5 MG/DL (ref 0.4–1.2)
ERYTHROCYTE [DISTWIDTH] IN BLOOD BY AUTOMATED COUNT: 15.2 % (ref 11.5–14.5)
ERYTHROCYTE [DISTWIDTH] IN BLOOD BY AUTOMATED COUNT: 50.7 FL (ref 35–45)
GFR SERPL CREATININE-BSD FRML MDRD: > 90 ML/MIN/1.73M2
GFR SERPL CREATININE-BSD FRML MDRD: > 90 ML/MIN/1.73M2
GLUCOSE BLD-MCNC: 257 MG/DL (ref 70–108)
GLUCOSE BLD-MCNC: 95 MG/DL (ref 70–108)
HCT VFR BLD CALC: 30.5 % (ref 42–52)
HEMOGLOBIN: 9.8 GM/DL (ref 14–18)
MCH RBC QN AUTO: 29.5 PG (ref 26–33)
MCHC RBC AUTO-ENTMCNC: 32.1 GM/DL (ref 32.2–35.5)
MCV RBC AUTO: 91.9 FL (ref 80–94)
PLATELET # BLD: 305 THOU/MM3 (ref 130–400)
PMV BLD AUTO: 8.2 FL (ref 9.4–12.4)
POTASSIUM SERPL-SCNC: 4.2 MEQ/L (ref 3.5–5.2)
POTASSIUM SERPL-SCNC: 4.6 MEQ/L (ref 3.5–5.2)
RBC # BLD: 3.32 MILL/MM3 (ref 4.7–6.1)
SODIUM BLD-SCNC: 128 MEQ/L (ref 135–145)
SODIUM BLD-SCNC: 129 MEQ/L (ref 135–145)
URIC ACID: 2 MG/DL (ref 3.7–7)
WBC # BLD: 6.2 THOU/MM3 (ref 4.8–10.8)

## 2019-08-30 PROCEDURE — 97530 THERAPEUTIC ACTIVITIES: CPT

## 2019-08-30 PROCEDURE — 36415 COLL VENOUS BLD VENIPUNCTURE: CPT

## 2019-08-30 PROCEDURE — 99233 SBSQ HOSP IP/OBS HIGH 50: CPT | Performed by: INTERNAL MEDICINE

## 2019-08-30 PROCEDURE — 6370000000 HC RX 637 (ALT 250 FOR IP): Performed by: INTERNAL MEDICINE

## 2019-08-30 PROCEDURE — 2580000003 HC RX 258: Performed by: INTERNAL MEDICINE

## 2019-08-30 PROCEDURE — 6360000002 HC RX W HCPCS: Performed by: INTERNAL MEDICINE

## 2019-08-30 PROCEDURE — 80048 BASIC METABOLIC PNL TOTAL CA: CPT

## 2019-08-30 PROCEDURE — 84550 ASSAY OF BLOOD/URIC ACID: CPT

## 2019-08-30 PROCEDURE — 1200000003 HC TELEMETRY R&B

## 2019-08-30 PROCEDURE — 97110 THERAPEUTIC EXERCISES: CPT

## 2019-08-30 PROCEDURE — 94760 N-INVAS EAR/PLS OXIMETRY 1: CPT

## 2019-08-30 PROCEDURE — 94640 AIRWAY INHALATION TREATMENT: CPT

## 2019-08-30 PROCEDURE — 85027 COMPLETE CBC AUTOMATED: CPT

## 2019-08-30 RX ORDER — PREDNISONE 20 MG/1
40 TABLET ORAL DAILY
Status: DISCONTINUED | OUTPATIENT
Start: 2019-08-31 | End: 2019-09-01 | Stop reason: HOSPADM

## 2019-08-30 RX ORDER — SODIUM CHLORIDE 1000 MG
1 TABLET, SOLUBLE MISCELLANEOUS
Status: DISCONTINUED | OUTPATIENT
Start: 2019-08-30 | End: 2019-08-31

## 2019-08-30 RX ADMIN — SODIUM CHLORIDE TAB 1 GM 1 G: 1 TAB at 09:25

## 2019-08-30 RX ADMIN — PREDNISONE 60 MG: 20 TABLET ORAL at 09:24

## 2019-08-30 RX ADMIN — ACETAMINOPHEN 650 MG: 325 TABLET ORAL at 23:50

## 2019-08-30 RX ADMIN — TAMSULOSIN HYDROCHLORIDE 0.4 MG: 0.4 CAPSULE ORAL at 09:25

## 2019-08-30 RX ADMIN — IPRATROPIUM BROMIDE AND ALBUTEROL SULFATE 1 AMPULE: .5; 3 SOLUTION RESPIRATORY (INHALATION) at 09:50

## 2019-08-30 RX ADMIN — FOLIC ACID 1 MG: 1 TABLET ORAL at 09:24

## 2019-08-30 RX ADMIN — BUSPIRONE HYDROCHLORIDE 10 MG: 10 TABLET ORAL at 09:25

## 2019-08-30 RX ADMIN — SODIUM CHLORIDE TAB 1 GM 1 G: 1 TAB at 11:37

## 2019-08-30 RX ADMIN — BUSPIRONE HYDROCHLORIDE 10 MG: 10 TABLET ORAL at 21:18

## 2019-08-30 RX ADMIN — OLANZAPINE 5 MG: 5 TABLET, FILM COATED ORAL at 21:18

## 2019-08-30 RX ADMIN — OXYCODONE HYDROCHLORIDE 5 MG: 5 TABLET ORAL at 03:16

## 2019-08-30 RX ADMIN — IPRATROPIUM BROMIDE AND ALBUTEROL SULFATE 1 AMPULE: .5; 3 SOLUTION RESPIRATORY (INHALATION) at 13:58

## 2019-08-30 RX ADMIN — DIPHENHYDRAMINE HCL 25 MG: 25 TABLET ORAL at 23:50

## 2019-08-30 RX ADMIN — OXYCODONE HYDROCHLORIDE 5 MG: 5 TABLET ORAL at 09:24

## 2019-08-30 RX ADMIN — ASPIRIN 81 MG 81 MG: 81 TABLET ORAL at 09:24

## 2019-08-30 RX ADMIN — OXYCODONE HYDROCHLORIDE 5 MG: 5 TABLET ORAL at 18:31

## 2019-08-30 RX ADMIN — ENOXAPARIN SODIUM 40 MG: 40 INJECTION SUBCUTANEOUS at 09:25

## 2019-08-30 RX ADMIN — PANTOPRAZOLE SODIUM 40 MG: 40 TABLET, DELAYED RELEASE ORAL at 06:17

## 2019-08-30 RX ADMIN — POLYETHYLENE GLYCOL 3350 17 G: 17 POWDER, FOR SOLUTION ORAL at 09:25

## 2019-08-30 RX ADMIN — IPRATROPIUM BROMIDE AND ALBUTEROL SULFATE 1 AMPULE: .5; 3 SOLUTION RESPIRATORY (INHALATION) at 17:24

## 2019-08-30 RX ADMIN — Medication 10 ML: at 00:31

## 2019-08-30 RX ADMIN — SODIUM CHLORIDE TAB 1 GM 1 G: 1 TAB at 16:41

## 2019-08-30 RX ADMIN — IPRATROPIUM BROMIDE AND ALBUTEROL SULFATE 1 AMPULE: .5; 3 SOLUTION RESPIRATORY (INHALATION) at 20:52

## 2019-08-30 ASSESSMENT — PAIN SCALES - GENERAL
PAINLEVEL_OUTOF10: 7
PAINLEVEL_OUTOF10: 3
PAINLEVEL_OUTOF10: 7
PAINLEVEL_OUTOF10: 3
PAINLEVEL_OUTOF10: 7

## 2019-08-30 ASSESSMENT — PAIN DESCRIPTION - ORIENTATION
ORIENTATION: LEFT
ORIENTATION: LEFT;MID;LOWER
ORIENTATION: LEFT

## 2019-08-30 ASSESSMENT — ENCOUNTER SYMPTOMS
CHEST TIGHTNESS: 0
SINUS PRESSURE: 0
SINUS PAIN: 0
BLOOD IN STOOL: 0

## 2019-08-30 ASSESSMENT — PAIN DESCRIPTION - ONSET
ONSET: ON-GOING

## 2019-08-30 ASSESSMENT — PAIN DESCRIPTION - DESCRIPTORS
DESCRIPTORS: ACHING;CONSTANT
DESCRIPTORS: ACHING;CONSTANT
DESCRIPTORS: ACHING

## 2019-08-30 ASSESSMENT — PAIN DESCRIPTION - DIRECTION: RADIATING_TOWARDS: LOWER BACK

## 2019-08-30 ASSESSMENT — PAIN DESCRIPTION - LOCATION
LOCATION: BACK;HIP

## 2019-08-30 ASSESSMENT — PAIN DESCRIPTION - PROGRESSION
CLINICAL_PROGRESSION: NOT CHANGED

## 2019-08-30 ASSESSMENT — PAIN DESCRIPTION - PAIN TYPE
TYPE: CHRONIC PAIN

## 2019-08-30 ASSESSMENT — PAIN DESCRIPTION - FREQUENCY
FREQUENCY: CONTINUOUS

## 2019-08-30 NOTE — PROGRESS NOTES
There is no joint swelling. tender in left hip to palpation, overall weak. Skin: Skin color, texture, turgor normal.  No rashes or lesions. Neurologic:  Neurovascularly intact without any focal sensory/motor deficits in the upper and lower extremities. Cranial nerves:  grossly non-focal.  Psychiatric: Alert and oriented, thought content appropriate, normal insight. Labs:   Recent Labs     08/28/19  0547 08/29/19  0527 08/30/19  0615   WBC 7.1 6.5 6.2   HGB 9.3* 9.6* 9.8*   HCT 29.3* 30.5* 30.5*    328 305     Recent Labs     08/28/19  0547 08/29/19  0527 08/30/19  0615   * 129* 128*   K 3.9 4.4 4.2   CL 96* 90* 90*   CO2 26 28 26   BUN 10 9 13   CREATININE 0.4 0.3* 0.4   CALCIUM 8.7 8.7 8.7     Recent Labs     08/28/19  0547   AST 15   ALT 11   BILITOT 0.2*   ALKPHOS 85     No results for input(s): INR in the last 72 hours. No results for input(s): Socorro Pickler in the last 72 hours. Microbiology:      Urinalysis:      Lab Results   Component Value Date    NITRU NEGATIVE 08/25/2019    WBCUA 0-2 08/25/2019    BACTERIA NONE 08/25/2019    RBCUA 5-10 08/25/2019    BLOODU NEGATIVE 08/25/2019    SPECGRAV >1.030 08/25/2019    GLUCOSEU NEGATIVE 07/03/2019       Radiology:  CTA Chest W WO Contrast   Final Result      1. Infiltrates are seen throughout both upper lobes and the left lower lobe. They're most notable in the left upper lung. These findings are likely related to multifocal pneumonia. 2. Soft tissue mass in the left prepectoral region which has increased in size up to 3.2 cm up from 1.8 cm on the previous study. This could represent lymphadenopathy. 3. No evidence of a pulmonary embolism. 4. Paraseptal emphysematous changes. 5. Sclerotic lesion seen at T8, L1, and L2. Sclerotic focus is stable at the posterior aspect of left fifth rib. **This report has been created using voice recognition software.  It may contain minor errors which are inherent in voice recognition technology. **      Final report electronically signed by Dr Henry Galeana on 8/25/2019 8:31 PM      CT ABDOMEN PELVIS W IV CONTRAST Additional Contrast? None   Final Result       1. Thickened appearance of the stomach and some loops of small bowel. This has a similar appearance to the prior examination and could be on the basis of gastritis and/or enteritis. Other etiologies should also be considered. 2. Stable size of liver masses in the right and left hepatic lobes. 3. Redemonstration of adrenal enlargement bilaterally of a unchanged in size. 4. Persistent moderate hydronephrosis on the right side with no obstructive mass or stone identified. 5. Additional chronic findings as described above. **This report has been created using voice recognition software. It may contain minor errors which are inherent in voice recognition technology. **      Final report electronically signed by Dr Henry Galeana on 8/25/2019 8:44 PM      XR HIP LEFT (2-3 VIEWS)   Final Result   Joint space narrowing with no acute fracture or dislocation involving the left hip. **This report has been created using voice recognition software. It may contain minor errors which are inherent in voice recognition technology. **      Final report electronically signed by Dr Henry Galeana on 8/25/2019 7:54 PM      XR CHEST PORTABLE   Final Result      1. Opacities extending into the left upper lobe likely related to pneumonic infiltrate. Follow-up exam following treatment is recommended to assure resolution. 2. Emphysematous changes. **This report has been created using voice recognition software. It may contain minor errors which are inherent in voice recognition technology. **      Final report electronically signed by Dr Henry Galeana on 8/25/2019 6:22 PM          DVT prophylaxis: [x] Lovenox                                  [] SCDs                                 [] SQ Heparin [] Encourage ambulation           [] Already on Anticoagulation     Code Status: Full Code    PT/OT Eval Status:     Diet:   Dietary Nutrition Supplements: Standard High Calorie Oral Supplement  DIET GENERAL; Daily Fluid Restriction: 1500 ml    Fluids:na    Tele:   [x] yes             [] no      Electronically signed by Sinai Lira DO on 8/30/2019 at 6:09 PM

## 2019-08-30 NOTE — CARE COORDINATION
8/30/19, 1:46 PM    Discharge plan discussed by  and . Discharge plan reviewed with patient/ family. Patient/ family verbalize understanding of discharge plan and are in agreement with plan. Understanding was demonstrated using the teach back method. Services After Discharge  Services At/After Discharge: Nursing Services, A[facility] Services([facility])   IMM Letter  IMM Letter date given[de-identified] 08/30/19  IMM Letter time given[de-identified] 46     SW spoke with RN Chaparro Holcomb, possible weekend discharge. Pt will return home with spouse and current services with [facility]. Phone/fax number for [facility] on chart for RN to complete discharge.

## 2019-08-30 NOTE — PROGRESS NOTES
Training, Patient/Caregiver Education & Training, Equipment Evaluation, Education, & procurement    Patient Education  Patient Education: Verbal Exercise Instruction    Goals:  Patient goals : to go home  Short term goals  Time Frame for Short term goals: goals to be achieved by 9/3/19  Short term goal 1: Pt will perform all bed mobility with supervision  Short term goal 2: Pt will transfer sitting to/from standing with supervision  Short term goal 3: Pt will ambulate 150' using LRAD with supervision  Short term goal 4: Pt will ascend/descend 5 steps using LRAD without an AD with supervision  Short term goal 5: Pt will perform 10-15 reps of BLE therapeutic exercise       Following session, patient left in safe position with all fall risk precautions in place.

## 2019-08-31 LAB
ANION GAP SERPL CALCULATED.3IONS-SCNC: 13 MEQ/L (ref 8–16)
BLOOD CULTURE, ROUTINE: NORMAL
BLOOD CULTURE, ROUTINE: NORMAL
BUN BLDV-MCNC: 15 MG/DL (ref 7–22)
CALCIUM SERPL-MCNC: 8.4 MG/DL (ref 8.5–10.5)
CHLORIDE BLD-SCNC: 92 MEQ/L (ref 98–111)
CO2: 23 MEQ/L (ref 23–33)
CREAT SERPL-MCNC: 0.4 MG/DL (ref 0.4–1.2)
ERYTHROCYTE [DISTWIDTH] IN BLOOD BY AUTOMATED COUNT: 15.5 % (ref 11.5–14.5)
ERYTHROCYTE [DISTWIDTH] IN BLOOD BY AUTOMATED COUNT: 52.1 FL (ref 35–45)
GFR SERPL CREATININE-BSD FRML MDRD: > 90 ML/MIN/1.73M2
GLUCOSE BLD-MCNC: 100 MG/DL (ref 70–108)
HCT VFR BLD CALC: 31.2 % (ref 42–52)
HEMOGLOBIN: 9.9 GM/DL (ref 14–18)
MAGNESIUM: 2.2 MG/DL (ref 1.6–2.4)
MCH RBC QN AUTO: 29.7 PG (ref 26–33)
MCHC RBC AUTO-ENTMCNC: 31.7 GM/DL (ref 32.2–35.5)
MCV RBC AUTO: 93.7 FL (ref 80–94)
PLATELET # BLD: 301 THOU/MM3 (ref 130–400)
PMV BLD AUTO: 8.3 FL (ref 9.4–12.4)
POTASSIUM SERPL-SCNC: 4.2 MEQ/L (ref 3.5–5.2)
RBC # BLD: 3.33 MILL/MM3 (ref 4.7–6.1)
SODIUM BLD-SCNC: 128 MEQ/L (ref 135–145)
WBC # BLD: 6.4 THOU/MM3 (ref 4.8–10.8)

## 2019-08-31 PROCEDURE — 6360000002 HC RX W HCPCS: Performed by: INTERNAL MEDICINE

## 2019-08-31 PROCEDURE — 2580000003 HC RX 258: Performed by: INTERNAL MEDICINE

## 2019-08-31 PROCEDURE — 99233 SBSQ HOSP IP/OBS HIGH 50: CPT | Performed by: INTERNAL MEDICINE

## 2019-08-31 PROCEDURE — 36415 COLL VENOUS BLD VENIPUNCTURE: CPT

## 2019-08-31 PROCEDURE — 6370000000 HC RX 637 (ALT 250 FOR IP): Performed by: INTERNAL MEDICINE

## 2019-08-31 PROCEDURE — 80048 BASIC METABOLIC PNL TOTAL CA: CPT

## 2019-08-31 PROCEDURE — 83735 ASSAY OF MAGNESIUM: CPT

## 2019-08-31 PROCEDURE — 94640 AIRWAY INHALATION TREATMENT: CPT

## 2019-08-31 PROCEDURE — 99222 1ST HOSP IP/OBS MODERATE 55: CPT | Performed by: INTERNAL MEDICINE

## 2019-08-31 PROCEDURE — 1200000003 HC TELEMETRY R&B

## 2019-08-31 PROCEDURE — 85027 COMPLETE CBC AUTOMATED: CPT

## 2019-08-31 RX ORDER — FUROSEMIDE 40 MG/1
40 TABLET ORAL DAILY
Status: DISCONTINUED | OUTPATIENT
Start: 2019-08-31 | End: 2019-09-01 | Stop reason: HOSPADM

## 2019-08-31 RX ORDER — SODIUM CHLORIDE 1000 MG
2 TABLET, SOLUBLE MISCELLANEOUS
Status: DISCONTINUED | OUTPATIENT
Start: 2019-08-31 | End: 2019-09-01 | Stop reason: HOSPADM

## 2019-08-31 RX ADMIN — TAMSULOSIN HYDROCHLORIDE 0.4 MG: 0.4 CAPSULE ORAL at 09:38

## 2019-08-31 RX ADMIN — OXYCODONE HYDROCHLORIDE 5 MG: 5 TABLET ORAL at 23:48

## 2019-08-31 RX ADMIN — OLANZAPINE 5 MG: 5 TABLET, FILM COATED ORAL at 21:21

## 2019-08-31 RX ADMIN — OXYCODONE HYDROCHLORIDE 5 MG: 5 TABLET ORAL at 03:15

## 2019-08-31 RX ADMIN — BUSPIRONE HYDROCHLORIDE 10 MG: 10 TABLET ORAL at 09:38

## 2019-08-31 RX ADMIN — IPRATROPIUM BROMIDE AND ALBUTEROL SULFATE 1 AMPULE: .5; 3 SOLUTION RESPIRATORY (INHALATION) at 21:15

## 2019-08-31 RX ADMIN — FUROSEMIDE 40 MG: 40 TABLET ORAL at 18:20

## 2019-08-31 RX ADMIN — PREDNISONE 40 MG: 20 TABLET ORAL at 09:37

## 2019-08-31 RX ADMIN — PANTOPRAZOLE SODIUM 40 MG: 40 TABLET, DELAYED RELEASE ORAL at 09:38

## 2019-08-31 RX ADMIN — SODIUM CHLORIDE TAB 1 GM 1 G: 1 TAB at 11:32

## 2019-08-31 RX ADMIN — DIPHENHYDRAMINE HCL 25 MG: 25 TABLET ORAL at 23:49

## 2019-08-31 RX ADMIN — METOPROLOL TARTRATE 12.5 MG: 25 TABLET ORAL at 21:21

## 2019-08-31 RX ADMIN — IPRATROPIUM BROMIDE AND ALBUTEROL SULFATE 1 AMPULE: .5; 3 SOLUTION RESPIRATORY (INHALATION) at 17:07

## 2019-08-31 RX ADMIN — ENOXAPARIN SODIUM 40 MG: 40 INJECTION SUBCUTANEOUS at 09:38

## 2019-08-31 RX ADMIN — SODIUM CHLORIDE TAB 1 GM 1 G: 1 TAB at 09:38

## 2019-08-31 RX ADMIN — FOLIC ACID 1 MG: 1 TABLET ORAL at 09:38

## 2019-08-31 RX ADMIN — BUSPIRONE HYDROCHLORIDE 10 MG: 10 TABLET ORAL at 21:21

## 2019-08-31 RX ADMIN — SODIUM CHLORIDE TAB 1 GM 2 G: 1 TAB at 15:00

## 2019-08-31 RX ADMIN — SODIUM CHLORIDE TAB 1 GM 2 G: 1 TAB at 18:20

## 2019-08-31 RX ADMIN — OXYCODONE HYDROCHLORIDE 5 MG: 5 TABLET ORAL at 15:55

## 2019-08-31 RX ADMIN — OXYCODONE HYDROCHLORIDE 5 MG: 5 TABLET ORAL at 09:39

## 2019-08-31 RX ADMIN — ASPIRIN 81 MG 81 MG: 81 TABLET ORAL at 09:38

## 2019-08-31 RX ADMIN — Medication 10 ML: at 09:38

## 2019-08-31 RX ADMIN — IPRATROPIUM BROMIDE AND ALBUTEROL SULFATE 1 AMPULE: .5; 3 SOLUTION RESPIRATORY (INHALATION) at 09:41

## 2019-08-31 RX ADMIN — IPRATROPIUM BROMIDE AND ALBUTEROL SULFATE 1 AMPULE: .5; 3 SOLUTION RESPIRATORY (INHALATION) at 13:07

## 2019-08-31 RX ADMIN — POLYETHYLENE GLYCOL 3350 17 G: 17 POWDER, FOR SOLUTION ORAL at 09:39

## 2019-08-31 ASSESSMENT — PAIN DESCRIPTION - DESCRIPTORS
DESCRIPTORS: ACHING;DULL;CONSTANT
DESCRIPTORS: ACHING;DULL;CONSTANT
DESCRIPTORS: ACHING;CONSTANT

## 2019-08-31 ASSESSMENT — PAIN DESCRIPTION - PAIN TYPE
TYPE: CHRONIC PAIN

## 2019-08-31 ASSESSMENT — PAIN DESCRIPTION - ORIENTATION
ORIENTATION: LEFT;LOWER;MID
ORIENTATION: LEFT;MID;LOWER
ORIENTATION: LEFT;LOWER;MID

## 2019-08-31 ASSESSMENT — PAIN - FUNCTIONAL ASSESSMENT
PAIN_FUNCTIONAL_ASSESSMENT: PREVENTS OR INTERFERES SOME ACTIVE ACTIVITIES AND ADLS
PAIN_FUNCTIONAL_ASSESSMENT: ACTIVITIES ARE NOT PREVENTED
PAIN_FUNCTIONAL_ASSESSMENT: PREVENTS OR INTERFERES SOME ACTIVE ACTIVITIES AND ADLS

## 2019-08-31 ASSESSMENT — PAIN SCALES - GENERAL
PAINLEVEL_OUTOF10: 4
PAINLEVEL_OUTOF10: 3
PAINLEVEL_OUTOF10: 4

## 2019-08-31 ASSESSMENT — PAIN DESCRIPTION - ONSET
ONSET: ON-GOING

## 2019-08-31 ASSESSMENT — PAIN DESCRIPTION - LOCATION
LOCATION: BACK;HIP

## 2019-08-31 ASSESSMENT — PAIN DESCRIPTION - PROGRESSION
CLINICAL_PROGRESSION: NOT CHANGED

## 2019-08-31 ASSESSMENT — PAIN DESCRIPTION - FREQUENCY
FREQUENCY: CONTINUOUS

## 2019-08-31 NOTE — CONSULTS
[address] [place] [address]                                  CONSULTATION    PATIENT NAME: Cecy Malik                    :        1950  MED REC NO:   [id number]                           ROOM:       0024  ACCOUNT NO:   [de-identified]                           ADMIT DATE: 2019  PROVIDER:     KASEY Rowley DATE:  2019    NEPHROLOGY CONSULTATION NOTE    REQUESTING PROVIDER:  Facundo Colbert DO    REASON FOR CONSULTATION:  Hyponatremia. HISTORY OF PRESENT ILLNESS:  This is a 70-year-old pleasant white male  with history of hypertension, dyslipidemia, history of smoking,  arthritis, diagnosis of small-cell lung cancer for which he follows with  Dr. Rupal Perkins, but had previously seen Dr. Kristy Krabbe.  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 He has had both chemo and radiation therapy. He used to smoke  heavily in the past about two packs per day for 57 years. He says that  he quit in 2019. Anyway, the patient was admitted to the Hospitalist  Services on  with complaints of back pain, generalized weakness as  well as shortness of breath with poor oral intake. He was evaluated in  the emergency room. He reported some cough as well as fatigue. He was  noted to have stable vitals on admission. A CAT scan of the chest with  and without contrast at that time revealed some changes suggestive of  pneumonia. He was noted to have a soft tissue mass which was noted to  be increased in size. He also had a CAT scan of the abdomen and pelvis  with IV contrast which revealed liver masses as well as enlarged adrenal  gland. He was also noted to have moderate hydronephrosis on the right  side. The patient was noted to have a sodium level of 125. He was  admitted to the Medical Services. He was started on IV antibiotics. He  has also received tapering doses of prednisone.   Nephrology has been  asked to see him in

## 2019-08-31 NOTE — PLAN OF CARE
Problem: Falls - Risk of:  Goal: Will remain free from falls  Description  Will remain free from falls  8/30/2019 0018 by Hayden Warner RN  Outcome: Ongoing  Note:   Patient remained free from falls and accidental injury this shift. Used call light appropriately. Wore non-skid socks when ambulating. Problem: Pain:  Goal: Control of acute pain  Description  Control of acute pain  8/30/2019 0018 by Harry Ritter RN  Outcome: Ongoing  Note:    Pain meds given prn per [facility]. Pain rated on 4-10 pain rating scale. Will continue to reassess. Problem: Pain:  Goal: Control of chronic pain  Description  Control of chronic pain  8/30/2019 0018 by Hayden Warner RN  Outcome: Ongoing  Note:   Pt complains of chronic back pain rating a 4/10. Pt requesting PRN pain meds. Pt resting in bed at this time, with call light within reach. Problem: Discharge Planning:  Goal: Discharged to appropriate level of care  Description  Discharged to appropriate level of care  8/30/2019 0018 by Hayden Warner RN  Outcome: Ongoing  Note:   Discharge instructions will be given at appropiate time and explained to pt and wife. Problem: Discharge Planning:  Goal: Participates in care planning  Description  Participates in care planning  8/30/2019 0018 by Hayden Warner RN  Outcome: Ongoing  Note:   Continue to assess discharge needs as they arise. No concerns stated at this time. Pt awaiting sodium levels to normalize. Problem: Airway Clearance - Ineffective:  Goal: Clear lung sounds  Description  Clear lung sounds  8/30/2019 0018 by Harry Ritter RN  Outcome: Ongoing  Note:   Pt lung sounds clear/diminished at this time. Will continue to assess.        Problem: Gas Exchange - Impaired:  Goal: Levels of oxygenation will improve  Description  Levels of oxygenation will improve  8/30/2019 0018 by Hayden Warner RN  Outcome: Ongoing  Note:
Problem: Impaired respiratory status  Goal: Clear lung sounds  Outcome: Ongoing   Continue treatments to improve breathsounds
Ongoing  Note:   Patient diagnosed with lung cancer on 9/7. Cancer has metastasized to other organs. Patient is aware and has been grieving in a health matter. Patient ambulates to bathroom well with walker. Care plan reviewed with patient. Patient verbalize understanding of the plan of care and contribute to goal setting.
medically ready. Goal: Participates in care planning  Description  Participates in care planning  Outcome: Ongoing  Note:   Patient is able to participate in discharge plans. Problem: Airway Clearance - Ineffective:  Goal: Clear lung sounds  Description  Clear lung sounds  8/26/2019 2210 by Chandra Steiner RN  Outcome: Ongoing  Note:   Continuing to monitor. 8/26/2019 1619 by Baljeet Ribeiro RCP  Outcome: Ongoing  Goal: Ability to maintain a clear airway will improve  Description  Ability to maintain a clear airway will improve  8/26/2019 2210 by Chandra Steiner RN  Outcome: Ongoing  Note:   Patient is able to maintain a clear airway this shift. 8/26/2019 1619 by Baljeet Ribeiro RCP  Outcome: Ongoing     Problem: Fluid Volume - Deficit:  Goal: Achieves intake and output within specified parameters  Description  Achieves intake and output within specified parameters  Outcome: Ongoing  Note:   Continuing to monitor. Problem: Gas Exchange - Impaired:  Goal: Levels of oxygenation will improve  Description  Levels of oxygenation will improve  Outcome: Ongoing  Note:   Patient is on 2L NC with a O2 > 90% this shift. Problem: Hyperthermia:  Goal: Ability to maintain a body temperature in the normal range will improve  Description  Ability to maintain a body temperature in the normal range will improve  Outcome: Completed     Problem: Nutrition  Goal: Optimal nutrition therapy  8/26/2019 2210 by Chandra Steiner RN  Outcome: Ongoing  Note:   Patient has had optimal nutrition this shift. 8/26/2019 1035 by Pallavi Leslie RD, LD  Outcome: Ongoing     Problem: DISCHARGE BARRIERS  Goal: Patient's continuum of care needs are met  Outcome: Ongoing  Note:   Needs are being met by the patient and nurse.      Problem: Impaired respiratory status  Goal: Clear lung sounds  8/26/2019 2052 by Baljeet Ribeiro RCP  Outcome: Ongoing  8/26/2019 0839 by Suzan Szymanski RCP  Outcome: Ongoing     Care plan reviewed with
to maintain a clear airway will improve  Description  Ability to maintain a clear airway will improve  Outcome: Ongoing  Note:   Pt O2 stays above 92% throughout shift. Problem: Fluid Volume - Deficit:  Goal: Achieves intake and output within specified parameters  Description  Achieves intake and output within specified parameters  Outcome: Ongoing  Note:   Pt mucous membranes are pink and moist, is on a fluid restriction and will have morning labs. Problem: Nutrition  Goal: Optimal nutrition therapy  8/31/2019 0421 by Kurt Lamb RN  Outcome: Ongoing  Note:   Pt is eating % of meals. Problem: Activity:  Goal: Ability to tolerate increased activity will improve  Description  Ability to tolerate increased activity will improve  Outcome: Ongoing  Note:   Patient ambulates in room, tolerates well.

## 2019-08-31 NOTE — PROGRESS NOTES
with Dr. Archie Jacob (Oncology). Patient reports increased SOB from his baseline for the past 2 days. He has a mild productive cough. Subjective (past 24 hours): Patient with continued back and left hip pain, and overall weak. No new issues today. Wife present at bedside. Noted an episode of brief SVT overnight, patient without chest pain or palpitations. Had bowel movement this morning. .       Sodium levels still low today, Nephrology consulted for assistance with management. Medications:  Reviewed    Infusion Medications     Scheduled Medications    metoprolol tartrate  12.5 mg Oral BID    sodium chloride  2 g Oral TID WC    furosemide  40 mg Oral Daily    predniSONE  40 mg Oral Daily    lidocaine  3 patch Transdermal Daily    aspirin  81 mg Oral Daily    busPIRone  10 mg Oral BID    folic acid  1 mg Oral Daily    OLANZapine  5 mg Oral Nightly    pantoprazole  40 mg Oral QAM AC    polyethylene glycol  17 g Oral Daily    tamsulosin  0.4 mg Oral Daily    sodium chloride flush  10 mL Intravenous 2 times per day    enoxaparin  40 mg Subcutaneous Daily    ipratropium-albuterol  1 ampule Inhalation Q4H WA     PRN Meds: senna, oxyCODONE, acetaminophen, sodium chloride flush, ondansetron, magnesium sulfate, potassium chloride **OR** potassium alternative oral replacement **OR** potassium chloride, morphine, diphenhydrAMINE      Intake/Output Summary (Last 24 hours) at 8/31/2019 1609  Last data filed at 8/31/2019 1558  Gross per 24 hour   Intake 727 ml   Output 2000 ml   Net -1273 ml     Weight change: 14.4 oz (0.408 kg)      Exam:  /64   Pulse 104   Temp 98.4 °F (36.9 °C) (Oral)   Resp 18   Ht 5' 8\" (1.727 m)   Wt 131 lb 1.6 oz (59.5 kg)   SpO2 95%   BMI 19.93 kg/m²     General appearance: No apparent distress, appears older than stated age, frail. Eyes:  Pupils equal, round, and reactive to light. Conjunctivae/corneas clear. HENT: Head normal in appearance.  External nares normal. multifocal pneumonia. 2. Soft tissue mass in the left prepectoral region which has increased in size up to 3.2 cm up from 1.8 cm on the previous study. This could represent lymphadenopathy. 3. No evidence of a pulmonary embolism. 4. Paraseptal emphysematous changes. 5. Sclerotic lesion seen at T8, L1, and L2. Sclerotic focus is stable at the posterior aspect of left fifth rib. **This report has been created using voice recognition software. It may contain minor errors which are inherent in voice recognition technology. **      Final report electronically signed by Dr Halima Salas on 8/25/2019 8:31 PM      CT ABDOMEN PELVIS W IV CONTRAST Additional Contrast? None   Final Result       1. Thickened appearance of the stomach and some loops of small bowel. This has a similar appearance to the prior examination and could be on the basis of gastritis and/or enteritis. Other etiologies should also be considered. 2. Stable size of liver masses in the right and left hepatic lobes. 3. Redemonstration of adrenal enlargement bilaterally of a unchanged in size. 4. Persistent moderate hydronephrosis on the right side with no obstructive mass or stone identified. 5. Additional chronic findings as described above. **This report has been created using voice recognition software. It may contain minor errors which are inherent in voice recognition technology. **      Final report electronically signed by Dr Halima Salas on 8/25/2019 8:44 PM      XR HIP LEFT (2-3 VIEWS)   Final Result   Joint space narrowing with no acute fracture or dislocation involving the left hip. **This report has been created using voice recognition software. It may contain minor errors which are inherent in voice recognition technology. **      Final report electronically signed by Dr Halima Salas on 8/25/2019 7:54 PM      XR CHEST PORTABLE   Final Result      1.  Opacities extending into the left upper lobe likely related to pneumonic infiltrate. Follow-up exam following treatment is recommended to assure resolution. 2. Emphysematous changes. **This report has been created using voice recognition software. It may contain minor errors which are inherent in voice recognition technology. **      Final report electronically signed by Dr Indra Israel on 8/25/2019 6:22 PM          DVT prophylaxis: [x] Lovenox                                  [] SCDs                                 [] SQ Heparin                                 [] Encourage ambulation           [] Already on Anticoagulation     Code Status: Full Code    PT/OT Eval Status:     Diet:   Dietary Nutrition Supplements: Standard High Calorie Oral Supplement  DIET GENERAL; Daily Fluid Restriction: 1500 ml    Fluids:na    Tele:   [x] yes             [] no      Electronically signed by Sweta Mcfadden DO on 8/31/2019 at 4:09 PM

## 2019-09-01 VITALS
HEIGHT: 68 IN | OXYGEN SATURATION: 94 % | HEART RATE: 106 BPM | TEMPERATURE: 97.6 F | DIASTOLIC BLOOD PRESSURE: 67 MMHG | WEIGHT: 129.9 LBS | RESPIRATION RATE: 16 BRPM | SYSTOLIC BLOOD PRESSURE: 116 MMHG | BODY MASS INDEX: 19.69 KG/M2

## 2019-09-01 LAB
ANION GAP SERPL CALCULATED.3IONS-SCNC: 12 MEQ/L (ref 8–16)
BUN BLDV-MCNC: 16 MG/DL (ref 7–22)
CALCIUM SERPL-MCNC: 8.6 MG/DL (ref 8.5–10.5)
CHLORIDE BLD-SCNC: 96 MEQ/L (ref 98–111)
CO2: 26 MEQ/L (ref 23–33)
CREAT SERPL-MCNC: 0.3 MG/DL (ref 0.4–1.2)
ERYTHROCYTE [DISTWIDTH] IN BLOOD BY AUTOMATED COUNT: 16.1 % (ref 11.5–14.5)
ERYTHROCYTE [DISTWIDTH] IN BLOOD BY AUTOMATED COUNT: 54.5 FL (ref 35–45)
GFR SERPL CREATININE-BSD FRML MDRD: > 90 ML/MIN/1.73M2
GLUCOSE BLD-MCNC: 97 MG/DL (ref 70–108)
HCT VFR BLD CALC: 32.6 % (ref 42–52)
HEMOGLOBIN: 10.4 GM/DL (ref 14–18)
MAGNESIUM: 2.2 MG/DL (ref 1.6–2.4)
MCH RBC QN AUTO: 30.1 PG (ref 26–33)
MCHC RBC AUTO-ENTMCNC: 31.9 GM/DL (ref 32.2–35.5)
MCV RBC AUTO: 94.2 FL (ref 80–94)
PLATELET # BLD: 325 THOU/MM3 (ref 130–400)
PMV BLD AUTO: 8.5 FL (ref 9.4–12.4)
POTASSIUM SERPL-SCNC: 4.2 MEQ/L (ref 3.5–5.2)
RBC # BLD: 3.46 MILL/MM3 (ref 4.7–6.1)
SODIUM BLD-SCNC: 134 MEQ/L (ref 135–145)
WBC # BLD: 6.6 THOU/MM3 (ref 4.8–10.8)

## 2019-09-01 PROCEDURE — 36415 COLL VENOUS BLD VENIPUNCTURE: CPT

## 2019-09-01 PROCEDURE — 94761 N-INVAS EAR/PLS OXIMETRY MLT: CPT

## 2019-09-01 PROCEDURE — 6370000000 HC RX 637 (ALT 250 FOR IP): Performed by: INTERNAL MEDICINE

## 2019-09-01 PROCEDURE — 83735 ASSAY OF MAGNESIUM: CPT

## 2019-09-01 PROCEDURE — 94640 AIRWAY INHALATION TREATMENT: CPT

## 2019-09-01 PROCEDURE — 99232 SBSQ HOSP IP/OBS MODERATE 35: CPT | Performed by: INTERNAL MEDICINE

## 2019-09-01 PROCEDURE — 6360000002 HC RX W HCPCS: Performed by: INTERNAL MEDICINE

## 2019-09-01 PROCEDURE — 2580000003 HC RX 258: Performed by: INTERNAL MEDICINE

## 2019-09-01 PROCEDURE — 85027 COMPLETE CBC AUTOMATED: CPT

## 2019-09-01 PROCEDURE — 80048 BASIC METABOLIC PNL TOTAL CA: CPT

## 2019-09-01 PROCEDURE — 99239 HOSP IP/OBS DSCHRG MGMT >30: CPT | Performed by: INTERNAL MEDICINE

## 2019-09-01 RX ORDER — PREDNISONE 20 MG/1
TABLET ORAL
Qty: 14 TABLET | Refills: 0 | Status: ON HOLD | OUTPATIENT
Start: 2019-09-01 | End: 2019-09-17 | Stop reason: HOSPADM

## 2019-09-01 RX ORDER — FUROSEMIDE 40 MG/1
40 TABLET ORAL DAILY
Qty: 30 TABLET | Refills: 2 | Status: ON HOLD | OUTPATIENT
Start: 2019-09-02 | End: 2019-09-12

## 2019-09-01 RX ORDER — SENNA PLUS 8.6 MG/1
1 TABLET ORAL DAILY
Qty: 30 TABLET | Refills: 0 | Status: ON HOLD | OUTPATIENT
Start: 2019-09-01 | End: 2019-09-17 | Stop reason: SDUPTHER

## 2019-09-01 RX ORDER — LIDOCAINE 4 G/G
1 PATCH TOPICAL DAILY
Qty: 30 PATCH | Refills: 0 | Status: ON HOLD | OUTPATIENT
Start: 2019-09-02 | End: 2019-09-12

## 2019-09-01 RX ORDER — SODIUM CHLORIDE 1000 MG
2 TABLET, SOLUBLE MISCELLANEOUS
Qty: 180 TABLET | Refills: 2 | Status: SHIPPED | OUTPATIENT
Start: 2019-09-01 | End: 2019-09-09

## 2019-09-01 RX ORDER — OXYCODONE HYDROCHLORIDE 5 MG/1
5 TABLET ORAL EVERY 6 HOURS PRN
Qty: 24 TABLET | Refills: 0 | Status: SHIPPED | OUTPATIENT
Start: 2019-09-01 | End: 2019-09-08

## 2019-09-01 RX ADMIN — PREDNISONE 40 MG: 20 TABLET ORAL at 07:40

## 2019-09-01 RX ADMIN — BUSPIRONE HYDROCHLORIDE 10 MG: 10 TABLET ORAL at 07:40

## 2019-09-01 RX ADMIN — SODIUM CHLORIDE TAB 1 GM 2 G: 1 TAB at 07:39

## 2019-09-01 RX ADMIN — ENOXAPARIN SODIUM 40 MG: 40 INJECTION SUBCUTANEOUS at 07:39

## 2019-09-01 RX ADMIN — SODIUM CHLORIDE TAB 1 GM 2 G: 1 TAB at 11:20

## 2019-09-01 RX ADMIN — FUROSEMIDE 40 MG: 40 TABLET ORAL at 07:39

## 2019-09-01 RX ADMIN — FOLIC ACID 1 MG: 1 TABLET ORAL at 07:40

## 2019-09-01 RX ADMIN — IPRATROPIUM BROMIDE AND ALBUTEROL SULFATE 1 AMPULE: .5; 3 SOLUTION RESPIRATORY (INHALATION) at 06:03

## 2019-09-01 RX ADMIN — POLYETHYLENE GLYCOL 3350 17 G: 17 POWDER, FOR SOLUTION ORAL at 07:39

## 2019-09-01 RX ADMIN — OXYCODONE HYDROCHLORIDE 5 MG: 5 TABLET ORAL at 07:39

## 2019-09-01 RX ADMIN — Medication 10 ML: at 07:40

## 2019-09-01 RX ADMIN — IPRATROPIUM BROMIDE AND ALBUTEROL SULFATE 1 AMPULE: .5; 3 SOLUTION RESPIRATORY (INHALATION) at 12:15

## 2019-09-01 RX ADMIN — PANTOPRAZOLE SODIUM 40 MG: 40 TABLET, DELAYED RELEASE ORAL at 06:58

## 2019-09-01 RX ADMIN — ASPIRIN 81 MG 81 MG: 81 TABLET ORAL at 07:40

## 2019-09-01 RX ADMIN — OXYCODONE HYDROCHLORIDE 5 MG: 5 TABLET ORAL at 13:55

## 2019-09-01 RX ADMIN — TAMSULOSIN HYDROCHLORIDE 0.4 MG: 0.4 CAPSULE ORAL at 07:40

## 2019-09-01 RX ADMIN — METOPROLOL TARTRATE 12.5 MG: 25 TABLET ORAL at 07:40

## 2019-09-01 ASSESSMENT — PAIN SCALES - GENERAL
PAINLEVEL_OUTOF10: 3
PAINLEVEL_OUTOF10: 4

## 2019-09-01 ASSESSMENT — PAIN DESCRIPTION - FREQUENCY: FREQUENCY: CONTINUOUS

## 2019-09-01 ASSESSMENT — PAIN DESCRIPTION - ORIENTATION: ORIENTATION: LOWER

## 2019-09-01 ASSESSMENT — PAIN DESCRIPTION - PAIN TYPE: TYPE: CHRONIC PAIN

## 2019-09-01 ASSESSMENT — PAIN DESCRIPTION - PROGRESSION
CLINICAL_PROGRESSION: NOT CHANGED
CLINICAL_PROGRESSION: NOT CHANGED

## 2019-09-01 ASSESSMENT — PAIN DESCRIPTION - DESCRIPTORS: DESCRIPTORS: ACHING

## 2019-09-01 ASSESSMENT — PAIN DESCRIPTION - LOCATION: LOCATION: BACK

## 2019-09-01 ASSESSMENT — PAIN DESCRIPTION - ONSET: ONSET: ON-GOING

## 2019-09-01 NOTE — PROGRESS NOTES
mg Subcutaneous Daily    ipratropium-albuterol  1 ampule Inhalation Q4H WA     Continuous Infusions:    CBC:   Recent Labs     08/30/19  0615 08/31/19  0526 09/01/19  0606   WBC 6.2 6.4 6.6   HGB 9.8* 9.9* 10.4*    301 325     CMP:    Recent Labs     08/30/19  1739 08/31/19  0526 09/01/19  0606   * 128* 134*   K 4.6 4.2 4.2   CL 91* 92* 96*   CO2 23 23 26   BUN 16 15 16   CREATININE 0.5 0.4 0.3*   GLUCOSE 257* 100 97   CALCIUM 8.6 8.4* 8.6   LABGLOM >90 >90 >90     Troponin: No results for input(s): TROPONINI in the last 72 hours. BNP: No results for input(s): BNP in the last 72 hours. INR: No results for input(s): INR in the last 72 hours. Lipids: No results for input(s): CHOL, LDLDIRECT, TRIG, HDL, AMYLASE, LIPASE in the last 72 hours. Liver: No results for input(s): AST, ALT, ALKPHOS, PROT, LABALBU, BILITOT in the last 72 hours. Invalid input(s): BILDIR  Iron:  No results for input(s): IRONS, FERRITIN in the last 72 hours. Invalid input(s): LABIRONS    Objective:   Vitals: /67   Pulse 106   Temp 97.6 °F (36.4 °C) (Oral)   Resp 16   Ht 5' 8\" (1.727 m)   Wt 129 lb 14.4 oz (58.9 kg)   SpO2 94%   BMI 19.75 kg/m²    Wt Readings from Last 3 Encounters:   09/01/19 129 lb 14.4 oz (58.9 kg)   08/19/19 132 lb 6.4 oz (60.1 kg)   08/12/19 138 lb 4.8 oz (62.7 kg)      24HR INTAKE/OUTPUT:      Intake/Output Summary (Last 24 hours) at 9/1/2019 1311  Last data filed at 9/1/2019 0736  Gross per 24 hour   Intake 673 ml   Output 1750 ml   Net -1077 ml       Constitutional:  Alert, awake, no apparent distress   Skin:normal   HEENT:Pupils are reactive . Throat is clear   Neck:supple with no thyromegaly  Cardiovascular:  S1, S2 without murmur  Respiratory: Clear  Abdomen: +bs, soft, nontender  Ext: No LE edema  Musculoskeletal:Intact  Neuro:Alert and awake with no deficit      Electronically signed by Fara Grace MD on 9/1/2019 at 1:11 PM

## 2019-09-02 NOTE — DISCHARGE SUMMARY
resulted in improvement of serum sodium to 134. He will be discharged on this regimen and continued to restrict fluid intake, with repeat bmp in 1 week. Also, he had ongoing issues with cancer related pain. He had previously tried methadone however this made him groggy, so was trialed on low dose roxicodone which he tolerated well. Will be discharged on 5mg q6hr prn of Roxicodone x7 days for cancer related pain, and will be set up with Pain Management on discharge (were unable to see during his inpatient stay, but agreed to set up as outpatient). Was also started on bowel regimen to prevent opioid induced constipation. For his pneumonitis, he will be tapered off of steroids to prevent a rebound effect, over the next 2 weeks (see scripts below for details). The patient was stable for discharge - all consultants were contacted and in agreement with plan for discharge. Appropriate follow up appointment was arranged prior to discharge. He will follow up with his Oncologist, Nephrology, and Pain Management upon discharge. Please see below or view chart for more details from hospital course. Discharge Diagnoses:    · Pneumonitis - suspect from recent nivolumab use. Completing steroid taper as OP  · HCAP - competed course of levaquin while inpatient  · Acute Hypoxic Respiratory failure - resolved, due to above  · Hyponatremia - urine studies indicative of SIADH. Discharge on salt tabs 2g TID and lasix with fluid restriction. · Extensive stage SCLC - with mets to liver, bone, brain  · Cancer related back and hip pain  · 1 episode of SVT - started on lopressor, asymptomatic  · Anemia, Normocytic - Normal B12 and folate. Occult blood negative. Stable  · Severe malnutrition - BMI 20, POA, likely related to malignancy. Dietary followed. · Chronic Hydronephrosis of right kidney - noted. No MELISSA.    · History of Partial Small Bowel Resection  · Tobacco Abuse  · Generalized Weakness - PT/OT 09/01/2019    HGB 10.4 09/01/2019    HCT 32.6 09/01/2019     09/01/2019       Renal:    Lab Results   Component Value Date     09/01/2019    K 4.2 09/01/2019    K 3.9 08/26/2019    CL 96 09/01/2019    CO2 26 09/01/2019    BUN 16 09/01/2019    CREATININE 0.3 09/01/2019    CALCIUM 8.6 09/01/2019    PHOS 1.5 08/11/2019         Significant Diagnostic Studies    Radiology:   CTA Chest W WO Contrast   Final Result      1. Infiltrates are seen throughout both upper lobes and the left lower lobe. They're most notable in the left upper lung. These findings are likely related to multifocal pneumonia. 2. Soft tissue mass in the left prepectoral region which has increased in size up to 3.2 cm up from 1.8 cm on the previous study. This could represent lymphadenopathy. 3. No evidence of a pulmonary embolism. 4. Paraseptal emphysematous changes. 5. Sclerotic lesion seen at T8, L1, and L2. Sclerotic focus is stable at the posterior aspect of left fifth rib. **This report has been created using voice recognition software. It may contain minor errors which are inherent in voice recognition technology. **      Final report electronically signed by Dr Sonja German on 8/25/2019 8:31 PM      CT ABDOMEN PELVIS W IV CONTRAST Additional Contrast? None   Final Result       1. Thickened appearance of the stomach and some loops of small bowel. This has a similar appearance to the prior examination and could be on the basis of gastritis and/or enteritis. Other etiologies should also be considered. 2. Stable size of liver masses in the right and left hepatic lobes. 3. Redemonstration of adrenal enlargement bilaterally of a unchanged in size. 4. Persistent moderate hydronephrosis on the right side with no obstructive mass or stone identified. 5. Additional chronic findings as described above. **This report has been created using voice recognition software.  It may contain minor errors which are - CNP  [address]  [facility]  [phone]    Schedule an appointment as soon as possible for a visit in 1 week      Annalisa Padron MD  [place] [address]  [address] [address]  [phone]    Schedule an appointment as soon as possible for a visit in 2 weeks           Discharge Medications:        Medication List      START taking these medications    furosemide 40 MG tablet  Commonly known as:  LASIX  Take 1 tablet by mouth daily     lidocaine 4 % external patch  Place 1 patch onto the skin daily     metoprolol tartrate 25 MG tablet  Commonly known as:  LOPRESSOR  Take 1 tablet by mouth 2 times daily     oxyCODONE 5 MG immediate release tablet  Commonly known as:  ROXICODONE  Take 1 tablet by mouth every 6 hours as needed for Pain for up to 7 days. predniSONE 20 MG tablet  Commonly known as:  DELTASONE  Take 2 tablets by mouth daily for 3 days, THEN 1 tablet daily for 5 days, THEN 0.5 tablets daily for 5 days.   Start taking on:  9/1/2019     senna 8.6 MG tablet  Commonly known as:  SENOKOT  Take 1 tablet by mouth daily For constipation prevention     sodium chloride 1 g tablet  Take 2 tablets by mouth 3 times daily (with meals)        CONTINUE taking these medications    acetaminophen 325 MG tablet  Commonly known as:  TYLENOL  Take 2 tablets by mouth every 4 hours as needed for Pain     albuterol sulfate  (90 Base) MCG/ACT inhaler  Inhale 2 puffs into the lungs every 4 hours as needed for Wheezing or Shortness of Breath     aspirin 81 MG chewable tablet  Take 1 tablet by mouth daily     ATROVENT IN     busPIRone 10 MG tablet  Commonly known as:  BUSPAR     cyanocobalamin 1000 MCG tablet  Take 1 tablet by mouth daily     folic acid 1 MG tablet  Commonly known as:  FOLVITE  Take 1 tablet by mouth daily     ipratropium-albuterol 0.5-2.5 (3) MG/3ML Soln nebulizer solution  Commonly known as:  DUONEB  Inhale 3 mLs into the lungs every 4 hours     naproxen 250 MG tablet  Commonly known

## 2019-09-06 LAB
ALBUMIN SERPL-MCNC: NORMAL G/DL
ALP BLD-CCNC: NORMAL U/L
ALT SERPL-CCNC: NORMAL U/L
ANION GAP SERPL CALCULATED.3IONS-SCNC: 10 MMOL/L
AST SERPL-CCNC: NORMAL U/L
BILIRUB SERPL-MCNC: NORMAL MG/DL (ref 0.1–1.4)
BUN BLDV-MCNC: 12 MG/DL
CALCIUM SERPL-MCNC: 8.5 MG/DL
CHLORIDE BLD-SCNC: 98 MMOL/L
CO2: 30 MMOL/L
CREAT SERPL-MCNC: 0.41 MG/DL
GFR CALCULATED: >60
GLUCOSE BLD-MCNC: 122 MG/DL
POTASSIUM SERPL-SCNC: 4.5 MMOL/L
SODIUM BLD-SCNC: 138 MMOL/L
TOTAL PROTEIN: NORMAL

## 2019-09-09 ENCOUNTER — HOSPITAL ENCOUNTER (OUTPATIENT)
Dept: INFUSION THERAPY | Age: 69
Discharge: HOME | End: 2019-09-09
Payer: MEDICARE

## 2019-09-09 ENCOUNTER — OFFICE VISIT (OUTPATIENT)
Dept: ONCOLOGY | Age: 69
End: 2019-09-09
Payer: MEDICARE

## 2019-09-09 VITALS
SYSTOLIC BLOOD PRESSURE: 96 MMHG | TEMPERATURE: 97.6 F | DIASTOLIC BLOOD PRESSURE: 58 MMHG | RESPIRATION RATE: 20 BRPM | OXYGEN SATURATION: 96 % | WEIGHT: 135.8 LBS | HEART RATE: 100 BPM | BODY MASS INDEX: 20.58 KG/M2 | HEIGHT: 68 IN

## 2019-09-09 DIAGNOSIS — C80.1 SMALL CELL CARCINOMA (HCC): ICD-10-CM

## 2019-09-09 DIAGNOSIS — C78.7 LIVER METASTASES (HCC): ICD-10-CM

## 2019-09-09 DIAGNOSIS — J44.1 CHRONIC OBSTRUCTIVE PULMONARY DISEASE WITH ACUTE EXACERBATION (HCC): ICD-10-CM

## 2019-09-09 DIAGNOSIS — E87.1 HYPONATREMIA: ICD-10-CM

## 2019-09-09 DIAGNOSIS — C34.92 MALIGNANT NEOPLASM OF LEFT LUNG, UNSPECIFIED PART OF LUNG (HCC): Primary | ICD-10-CM

## 2019-09-09 DIAGNOSIS — C79.31 BRAIN METASTASIS (HCC): ICD-10-CM

## 2019-09-09 DIAGNOSIS — G89.3 CANCER RELATED PAIN: ICD-10-CM

## 2019-09-09 PROCEDURE — 99214 OFFICE O/P EST MOD 30 MIN: CPT | Performed by: INTERNAL MEDICINE

## 2019-09-09 PROCEDURE — 99211 OFF/OP EST MAY X REQ PHY/QHP: CPT

## 2019-09-09 RX ORDER — SODIUM CHLORIDE 1000 MG
1 TABLET, SOLUBLE MISCELLANEOUS
Qty: 180 TABLET | Refills: 2
Start: 2019-09-09

## 2019-09-09 ASSESSMENT — ENCOUNTER SYMPTOMS
VOMITING: 0
WHEEZING: 0
COUGH: 0
COLOR CHANGE: 0
FACIAL SWELLING: 0
RECTAL PAIN: 0
SORE THROAT: 0
BLOOD IN STOOL: 0
ABDOMINAL PAIN: 0
DIARRHEA: 0
CHEST TIGHTNESS: 0
CONSTIPATION: 0
BACK PAIN: 1
SHORTNESS OF BREATH: 1
TROUBLE SWALLOWING: 0
ABDOMINAL DISTENTION: 0
NAUSEA: 0
EYE DISCHARGE: 0

## 2019-09-09 NOTE — PATIENT INSTRUCTIONS
1.  No treatment today  2. Referral to rad Onc for palliative radiation treatment to the hip, the patient is known to Dr. Zahida Edwards  3. RTC in 8 days.   On RTC labs: CBC, BMP, LFTs

## 2019-09-09 NOTE — PROGRESS NOTES
Endoscopy    [facility] OFFICE/OUTPT VISIT,PROCEDURE ONLY Right 2018    RIGHT INGUINAL HERNIA REPAIR performed by Brynn Velarde MD at [facility] OFFICE/OUTPT [facility] N/A 2018    EXPLORATORY LAPAROSCOPY  OPEN PROCEDURE, SMALL BOWEL RESECTION performed by Brynn Velarde MD at [address] TRANSESOPHAGEAL ECHOCARDIOGRAM Left 2019    TRANSESOPHAGEAL ECHOCARDIOGRAM performed by Rafael Stevens MD at [address] Endoscopy      Family History   Adopted: Yes      Social History     Tobacco Use    Smoking status: Current Some Day Smoker     Packs/day: 2.00     Years: 50.00     Pack years: 100.00     Types: Cigarettes     Start date: 1969     Last attempt to quit: 2019     Years since quittin.6    Smokeless tobacco: Never Used    Tobacco comment: trying to quit   Substance Use Topics    Alcohol use: Not Currently      Current Outpatient Medications   Medication Sig Dispense Refill    furosemide (LASIX) 40 MG tablet Take 1 tablet by mouth daily 30 tablet 2    lidocaine 4 % external patch Place 1 patch onto the skin daily 30 patch 0    metoprolol tartrate (LOPRESSOR) 25 MG tablet Take 1 tablet by mouth 2 times daily 60 tablet 3    predniSONE (DELTASONE) 20 MG tablet Take 2 tablets by mouth daily for 3 days, THEN 1 tablet daily for 5 days, THEN 0.5 tablets daily for 5 days.  14 tablet 0    senna (SENOKOT) 8.6 MG tablet Take 1 tablet by mouth daily For constipation prevention 30 tablet 0    sodium chloride 1 g tablet Take 2 tablets by mouth 3 times daily (with meals) 180 tablet 2    nystatin (MYCOSTATIN) 965149 UNIT/ML suspension Take 500,000 Units by mouth 4 times daily      naproxen (NAPROSYN) 250 MG tablet Take 1 tablet by mouth 2 times daily as needed for Pain 40 tablet 0    polyethylene glycol (GLYCOLAX) packet Take 17 g by mouth daily 30 each 0    NUTRITIONAL SUPPLEMENT LIQD Take 1 Bottle by mouth 3 times daily 90 Can 0    aspirin 81 MG chewable tablet Take 1 tablet by mouth daily 30 disease. 2. Probable degenerative or a hepatic changes as detailed above. Assessment and Plan:   1. Extensive Stage Small Cell Lung Cancer   Patients with extensive stage small cell lung cancer are treated with systemic chemotherapy. Palliative radiation treatment can be added to alleviate the symptoms related to life-threatening organ involvement. As the patient presented with SVC syndrome he started radiation treatment  in the hospital on January 25, 2019, last dose of radiation treatment given late February 2019. On January 28, 2019 he received first cycle of palliative chemotherapy with carboplatin and etoposide in Dr. Puri Samples office. However, he wished to transfer his care to [address] [place] [address]. He has completed four cycles of carbo and etoposide. He completed whole brain radiation treatment. He presented to the emergency room on July 3, 2019 with abdominal pain. CT of the abdomen showed progression of disease in the liver and the bones. Patient completed metastatic work-up. He had CT of the chest bone scan that did confirm evidence of disease progression. His performance status is 2/3 today. The patient still wants to try second line of palliative treatment. We will proceed with combination of nivolumab and ipi. The data from  [facility], the randomized portion of the phase II study that enrolled more than 240 patients treated with  the combination of Nivolumab with Ipililumab, showed an objective response in 21% of patients receiving both nivolumab and ipilimumab, with three-month PFS rates of 30 %, and three-month OS rates of 65 %. The patient received first cycle of immunotherapy on July 29, 2019. Subsequently he developed abdominal pain,  nausea and vomiting, further work-up during hospitalization showed pancreatitis either secondary to mass in the pancreas or possible side effect of immunotherapy.     The patient had a second hospitalization for shortness of breath poorly

## 2019-09-11 ENCOUNTER — HOSPITAL ENCOUNTER (INPATIENT)
Age: 69
LOS: 6 days | Discharge: NURSING, REHAB OR LONG-TERM CARE | DRG: 871 | End: 2019-09-17
Attending: HOSPITALIST | Admitting: HOSPITALIST
Payer: MEDICARE

## 2019-09-11 ENCOUNTER — HOSPITAL ENCOUNTER (OUTPATIENT)
Dept: RADIATION ONCOLOGY | Age: 69
End: 2019-09-11
Attending: RADIOLOGY
Payer: MEDICARE

## 2019-09-11 ENCOUNTER — HOSPITAL ENCOUNTER (OUTPATIENT)
Dept: RADIATION ONCOLOGY | Age: 69
Discharge: HOME | End: 2019-09-11
Attending: RADIOLOGY
Payer: MEDICARE

## 2019-09-11 ENCOUNTER — HOSPITAL ENCOUNTER (OUTPATIENT)
Dept: CT IMAGING | Age: 69
Discharge: HOME | End: 2019-09-11
Payer: MEDICARE

## 2019-09-11 ENCOUNTER — APPOINTMENT (OUTPATIENT)
Dept: GENERAL RADIOLOGY | Age: 69
DRG: 871 | End: 2019-09-11
Payer: MEDICARE

## 2019-09-11 VITALS
DIASTOLIC BLOOD PRESSURE: 42 MMHG | TEMPERATURE: 97.5 F | HEART RATE: 102 BPM | RESPIRATION RATE: 20 BRPM | OXYGEN SATURATION: 94 % | SYSTOLIC BLOOD PRESSURE: 72 MMHG

## 2019-09-11 DIAGNOSIS — C80.1 SMALL CELL CARCINOMA (HCC): Primary | ICD-10-CM

## 2019-09-11 DIAGNOSIS — C34.12 PRIMARY CANCER OF BRONCHUS OF LEFT UPPER LOBE (HCC): ICD-10-CM

## 2019-09-11 DIAGNOSIS — R62.7 FAILURE TO THRIVE IN ADULT: Primary | ICD-10-CM

## 2019-09-11 DIAGNOSIS — C79.51 SECONDARY MALIGNANT NEOPLASM OF BONE (HCC): ICD-10-CM

## 2019-09-11 DIAGNOSIS — C79.51 BONE METASTASES (HCC): ICD-10-CM

## 2019-09-11 DIAGNOSIS — C79.51 BONE METASTASIS (HCC): ICD-10-CM

## 2019-09-11 LAB
ANION GAP SERPL CALCULATED.3IONS-SCNC: 11 MEQ/L (ref 8–16)
BASOPHILS # BLD: 0.5 %
BASOPHILS ABSOLUTE: 0 THOU/MM3 (ref 0–0.1)
BUN BLDV-MCNC: 19 MG/DL (ref 7–22)
CALCIUM SERPL-MCNC: 8.3 MG/DL (ref 8.5–10.5)
CHLORIDE BLD-SCNC: 99 MEQ/L (ref 98–111)
CO2: 28 MEQ/L (ref 23–33)
CREAT SERPL-MCNC: 0.5 MG/DL (ref 0.4–1.2)
EKG ATRIAL RATE: 115 BPM
EKG P AXIS: 78 DEGREES
EKG P-R INTERVAL: 152 MS
EKG Q-T INTERVAL: 308 MS
EKG QRS DURATION: 72 MS
EKG QTC CALCULATION (BAZETT): 426 MS
EKG R AXIS: -5 DEGREES
EKG T AXIS: 79 DEGREES
EKG VENTRICULAR RATE: 115 BPM
EOSINOPHIL # BLD: 0 %
EOSINOPHILS ABSOLUTE: 0 THOU/MM3 (ref 0–0.4)
ERYTHROCYTE [DISTWIDTH] IN BLOOD BY AUTOMATED COUNT: 16.8 % (ref 11.5–14.5)
ERYTHROCYTE [DISTWIDTH] IN BLOOD BY AUTOMATED COUNT: 59.6 FL (ref 35–45)
GFR SERPL CREATININE-BSD FRML MDRD: > 90 ML/MIN/1.73M2
GLUCOSE BLD-MCNC: 89 MG/DL (ref 70–108)
HCT VFR BLD CALC: 33.5 % (ref 42–52)
HEMOGLOBIN: 10.4 GM/DL (ref 14–18)
IMMATURE GRANS (ABS): 0.07 THOU/MM3 (ref 0–0.07)
IMMATURE GRANULOCYTES: 2 %
LACTIC ACID: 1 MMOL/L (ref 0.5–2.2)
LYMPHOCYTES # BLD: 14.7 %
LYMPHOCYTES ABSOLUTE: 0.6 THOU/MM3 (ref 1–4.8)
MCH RBC QN AUTO: 30 PG (ref 26–33)
MCHC RBC AUTO-ENTMCNC: 31 GM/DL (ref 32.2–35.5)
MCV RBC AUTO: 96.5 FL (ref 80–94)
MONOCYTES # BLD: 10.6 %
MONOCYTES ABSOLUTE: 0.5 THOU/MM3 (ref 0.4–1.3)
NUCLEATED RED BLOOD CELLS: 0 /100 WBC
OSMOLALITY CALCULATION: 277.4 MOSMOL/KG (ref 275–300)
PLATELET # BLD: 224 THOU/MM3 (ref 130–400)
PMV BLD AUTO: 8.5 FL (ref 9.4–12.4)
POTASSIUM SERPL-SCNC: 4 MEQ/L (ref 3.5–5.2)
RBC # BLD: 3.47 MILL/MM3 (ref 4.7–6.1)
SEG NEUTROPHILS: 72.6 %
SEGMENTED NEUTROPHILS ABSOLUTE COUNT: 3.2 THOU/MM3 (ref 1.8–7.7)
SODIUM BLD-SCNC: 138 MEQ/L (ref 135–145)
TROPONIN T: < 0.01 NG/ML
WBC # BLD: 4.4 THOU/MM3 (ref 4.8–10.8)

## 2019-09-11 PROCEDURE — 99285 EMERGENCY DEPT VISIT HI MDM: CPT

## 2019-09-11 PROCEDURE — 71045 X-RAY EXAM CHEST 1 VIEW: CPT

## 2019-09-11 PROCEDURE — 83605 ASSAY OF LACTIC ACID: CPT

## 2019-09-11 PROCEDURE — 2709999900 HC NON-CHARGEABLE SUPPLY

## 2019-09-11 PROCEDURE — 80048 BASIC METABOLIC PNL TOTAL CA: CPT

## 2019-09-11 PROCEDURE — 77290 THER RAD SIMULAJ FIELD CPLX: CPT | Performed by: RADIOLOGY

## 2019-09-11 PROCEDURE — 6370000000 HC RX 637 (ALT 250 FOR IP): Performed by: EMERGENCY MEDICINE

## 2019-09-11 PROCEDURE — G0378 HOSPITAL OBSERVATION PER HR: HCPCS

## 2019-09-11 PROCEDURE — 6370000000 HC RX 637 (ALT 250 FOR IP): Performed by: PHYSICIAN ASSISTANT

## 2019-09-11 PROCEDURE — 77334 RADIATION TREATMENT AID(S): CPT | Performed by: RADIOLOGY

## 2019-09-11 PROCEDURE — 2060000000 HC ICU INTERMEDIATE R&B

## 2019-09-11 PROCEDURE — 77307 TELETHX ISODOSE PLAN CPLX: CPT | Performed by: RADIOLOGY

## 2019-09-11 PROCEDURE — 87040 BLOOD CULTURE FOR BACTERIA: CPT

## 2019-09-11 PROCEDURE — 99223 1ST HOSP IP/OBS HIGH 75: CPT | Performed by: PHYSICIAN ASSISTANT

## 2019-09-11 PROCEDURE — 87081 CULTURE SCREEN ONLY: CPT

## 2019-09-11 PROCEDURE — 93005 ELECTROCARDIOGRAM TRACING: CPT | Performed by: EMERGENCY MEDICINE

## 2019-09-11 PROCEDURE — 84484 ASSAY OF TROPONIN QUANT: CPT

## 2019-09-11 PROCEDURE — 85025 COMPLETE CBC W/AUTO DIFF WBC: CPT

## 2019-09-11 PROCEDURE — 87500 VANOMYCIN DNA AMP PROBE: CPT

## 2019-09-11 PROCEDURE — 87641 MR-STAPH DNA AMP PROBE: CPT

## 2019-09-11 PROCEDURE — 2580000003 HC RX 258: Performed by: PHYSICIAN ASSISTANT

## 2019-09-11 PROCEDURE — 36415 COLL VENOUS BLD VENIPUNCTURE: CPT

## 2019-09-11 PROCEDURE — 87147 CULTURE TYPE IMMUNOLOGIC: CPT

## 2019-09-11 PROCEDURE — 3209999900 CT GUIDE RADIATION THERAPY NO CHARGE

## 2019-09-11 RX ORDER — MORPHINE SULFATE 2 MG/ML
2 INJECTION, SOLUTION INTRAMUSCULAR; INTRAVENOUS EVERY 4 HOURS PRN
Status: DISCONTINUED | OUTPATIENT
Start: 2019-09-11 | End: 2019-09-17 | Stop reason: HOSPADM

## 2019-09-11 RX ORDER — SODIUM CHLORIDE 0.9 % (FLUSH) 0.9 %
10 SYRINGE (ML) INJECTION PRN
Status: DISCONTINUED | OUTPATIENT
Start: 2019-09-11 | End: 2019-09-17 | Stop reason: HOSPADM

## 2019-09-11 RX ORDER — LANOLIN ALCOHOL/MO/W.PET/CERES
6 CREAM (GRAM) TOPICAL NIGHTLY PRN
Status: DISCONTINUED | OUTPATIENT
Start: 2019-09-11 | End: 2019-09-17 | Stop reason: HOSPADM

## 2019-09-11 RX ORDER — FUROSEMIDE 40 MG/1
40 TABLET ORAL DAILY
Status: DISCONTINUED | OUTPATIENT
Start: 2019-09-12 | End: 2019-09-17 | Stop reason: HOSPADM

## 2019-09-11 RX ORDER — ACETAMINOPHEN 325 MG/1
650 TABLET ORAL EVERY 4 HOURS PRN
Status: DISCONTINUED | OUTPATIENT
Start: 2019-09-11 | End: 2019-09-17 | Stop reason: HOSPADM

## 2019-09-11 RX ORDER — PANTOPRAZOLE SODIUM 40 MG/1
40 TABLET, DELAYED RELEASE ORAL
Status: DISCONTINUED | OUTPATIENT
Start: 2019-09-12 | End: 2019-09-17 | Stop reason: HOSPADM

## 2019-09-11 RX ORDER — POTASSIUM CHLORIDE 20 MEQ/1
40 TABLET, EXTENDED RELEASE ORAL PRN
Status: DISCONTINUED | OUTPATIENT
Start: 2019-09-11 | End: 2019-09-17 | Stop reason: HOSPADM

## 2019-09-11 RX ORDER — FOLIC ACID 1 MG/1
1 TABLET ORAL DAILY
Status: DISCONTINUED | OUTPATIENT
Start: 2019-09-12 | End: 2019-09-17 | Stop reason: HOSPADM

## 2019-09-11 RX ORDER — ASPIRIN 81 MG/1
81 TABLET, CHEWABLE ORAL DAILY
Status: DISCONTINUED | OUTPATIENT
Start: 2019-09-12 | End: 2019-09-17 | Stop reason: HOSPADM

## 2019-09-11 RX ORDER — IPRATROPIUM BROMIDE AND ALBUTEROL SULFATE 2.5; .5 MG/3ML; MG/3ML
1 SOLUTION RESPIRATORY (INHALATION) EVERY 4 HOURS
Status: DISCONTINUED | OUTPATIENT
Start: 2019-09-11 | End: 2019-09-17 | Stop reason: HOSPADM

## 2019-09-11 RX ORDER — FAMOTIDINE 20 MG/1
20 TABLET, FILM COATED ORAL DAILY
Status: DISCONTINUED | OUTPATIENT
Start: 2019-09-12 | End: 2019-09-17 | Stop reason: HOSPADM

## 2019-09-11 RX ORDER — NICOTINE 21 MG/24HR
1 PATCH, TRANSDERMAL 24 HOURS TRANSDERMAL DAILY
Status: DISCONTINUED | OUTPATIENT
Start: 2019-09-12 | End: 2019-09-17 | Stop reason: HOSPADM

## 2019-09-11 RX ORDER — RANITIDINE 150 MG/1
150 TABLET ORAL EVERY MORNING
Status: DISCONTINUED | OUTPATIENT
Start: 2019-09-12 | End: 2019-09-11

## 2019-09-11 RX ORDER — BUDESONIDE AND FORMOTEROL FUMARATE DIHYDRATE 160; 4.5 UG/1; UG/1
2 AEROSOL RESPIRATORY (INHALATION) 2 TIMES DAILY
Status: DISCONTINUED | OUTPATIENT
Start: 2019-09-11 | End: 2019-09-12 | Stop reason: CLARIF

## 2019-09-11 RX ORDER — ALBUTEROL SULFATE 2.5 MG/3ML
2.5 SOLUTION RESPIRATORY (INHALATION) EVERY 6 HOURS PRN
Status: DISCONTINUED | OUTPATIENT
Start: 2019-09-11 | End: 2019-09-17 | Stop reason: HOSPADM

## 2019-09-11 RX ORDER — ONDANSETRON 2 MG/ML
4 INJECTION INTRAMUSCULAR; INTRAVENOUS EVERY 6 HOURS PRN
Status: DISCONTINUED | OUTPATIENT
Start: 2019-09-11 | End: 2019-09-17 | Stop reason: HOSPADM

## 2019-09-11 RX ORDER — POTASSIUM CHLORIDE 7.45 MG/ML
10 INJECTION INTRAVENOUS PRN
Status: DISCONTINUED | OUTPATIENT
Start: 2019-09-11 | End: 2019-09-17 | Stop reason: HOSPADM

## 2019-09-11 RX ORDER — BUSPIRONE HYDROCHLORIDE 10 MG/1
10 TABLET ORAL 2 TIMES DAILY
Status: DISCONTINUED | OUTPATIENT
Start: 2019-09-11 | End: 2019-09-17 | Stop reason: HOSPADM

## 2019-09-11 RX ORDER — SODIUM CHLORIDE 0.9 % (FLUSH) 0.9 %
10 SYRINGE (ML) INJECTION EVERY 12 HOURS SCHEDULED
Status: DISCONTINUED | OUTPATIENT
Start: 2019-09-11 | End: 2019-09-17 | Stop reason: HOSPADM

## 2019-09-11 RX ORDER — SODIUM CHLORIDE 1000 MG
1 TABLET, SOLUBLE MISCELLANEOUS DAILY
Status: DISCONTINUED | OUTPATIENT
Start: 2019-09-12 | End: 2019-09-17 | Stop reason: HOSPADM

## 2019-09-11 RX ORDER — TAMSULOSIN HYDROCHLORIDE 0.4 MG/1
0.4 CAPSULE ORAL DAILY
Status: DISCONTINUED | OUTPATIENT
Start: 2019-09-12 | End: 2019-09-17 | Stop reason: HOSPADM

## 2019-09-11 RX ORDER — OMEPRAZOLE 40 MG/1
40 CAPSULE, DELAYED RELEASE ORAL DAILY
Status: DISCONTINUED | OUTPATIENT
Start: 2019-09-12 | End: 2019-09-11

## 2019-09-11 RX ORDER — OXYCODONE HYDROCHLORIDE AND ACETAMINOPHEN 5; 325 MG/1; MG/1
1 TABLET ORAL EVERY 6 HOURS PRN
Status: DISCONTINUED | OUTPATIENT
Start: 2019-09-11 | End: 2019-09-16

## 2019-09-11 RX ORDER — SODIUM CHLORIDE 9 MG/ML
INJECTION, SOLUTION INTRAVENOUS CONTINUOUS
Status: DISCONTINUED | OUTPATIENT
Start: 2019-09-11 | End: 2019-09-12

## 2019-09-11 RX ORDER — OXYCODONE HYDROCHLORIDE AND ACETAMINOPHEN 5; 325 MG/1; MG/1
1 TABLET ORAL ONCE
Status: COMPLETED | OUTPATIENT
Start: 2019-09-11 | End: 2019-09-11

## 2019-09-11 RX ORDER — LANOLIN ALCOHOL/MO/W.PET/CERES
1000 CREAM (GRAM) TOPICAL DAILY
Status: DISCONTINUED | OUTPATIENT
Start: 2019-09-12 | End: 2019-09-17 | Stop reason: HOSPADM

## 2019-09-11 RX ADMIN — SODIUM CHLORIDE, PRESERVATIVE FREE 10 ML: 5 INJECTION INTRAVENOUS at 23:33

## 2019-09-11 RX ADMIN — SODIUM CHLORIDE: 9 INJECTION, SOLUTION INTRAVENOUS at 23:34

## 2019-09-11 RX ADMIN — OXYCODONE HYDROCHLORIDE AND ACETAMINOPHEN 1 TABLET: 5; 325 TABLET ORAL at 19:44

## 2019-09-11 RX ADMIN — OXYCODONE HYDROCHLORIDE AND ACETAMINOPHEN 1 TABLET: 5; 325 TABLET ORAL at 23:41

## 2019-09-11 ASSESSMENT — ENCOUNTER SYMPTOMS
RESPIRATORY NEGATIVE: 1
BACK PAIN: 0
DIARRHEA: 0
SHORTNESS OF BREATH: 0
COUGH: 1
RHINORRHEA: 0
NAUSEA: 0
ALLERGIC/IMMUNOLOGIC NEGATIVE: 1
EYES NEGATIVE: 1
VOMITING: 0
WHEEZING: 0
EYE REDNESS: 0
EYE DISCHARGE: 0
GASTROINTESTINAL NEGATIVE: 1
SORE THROAT: 0
ABDOMINAL PAIN: 0

## 2019-09-11 ASSESSMENT — PAIN DESCRIPTION - PAIN TYPE
TYPE: ACUTE PAIN
TYPE: ACUTE PAIN

## 2019-09-11 ASSESSMENT — PAIN DESCRIPTION - ONSET: ONSET: ON-GOING

## 2019-09-11 ASSESSMENT — PAIN SCALES - GENERAL
PAINLEVEL_OUTOF10: 6
PAINLEVEL_OUTOF10: 4
PAINLEVEL_OUTOF10: 5
PAINLEVEL_OUTOF10: 5

## 2019-09-11 ASSESSMENT — PAIN DESCRIPTION - ORIENTATION: ORIENTATION: LEFT

## 2019-09-11 ASSESSMENT — PAIN DESCRIPTION - LOCATION: LOCATION: LEG

## 2019-09-11 ASSESSMENT — PAIN DESCRIPTION - DESCRIPTORS: DESCRIPTORS: ACHING

## 2019-09-11 ASSESSMENT — PAIN - FUNCTIONAL ASSESSMENT: PAIN_FUNCTIONAL_ASSESSMENT: PREVENTS OR INTERFERES SOME ACTIVE ACTIVITIES AND ADLS

## 2019-09-11 ASSESSMENT — PAIN DESCRIPTION - FREQUENCY: FREQUENCY: INTERMITTENT

## 2019-09-11 ASSESSMENT — PAIN DESCRIPTION - PROGRESSION: CLINICAL_PROGRESSION: NOT CHANGED

## 2019-09-11 NOTE — PROGRESS NOTES
RADIATION ONCOLOGY FOLLOW-UP:         Patient:  Erica Chino  YOB: 1950    Primary Oncologist:  Dr. Gilbert Walton  PCP:  Jero Plaza MD  Referring Provider: Ketan Bustillo - [address] [place], [address]     PROBLEM LIST:       FOLLOWING Dx:  Cancer Staging  Primary cancer of bronchus of left upper lobe [facility])  Staging form: Lung, AJCC 8th Edition  - Clinical stage from 1/21/2019: Stage IV (cT4, cN3, cM1c) - Signed by Gulshan Vogt MD on 9/11/2019     HISTORY:       Erica Chino is a 70 y.o. male with extensive stage small cell lung cancer. Originally seen by Dr Melisa Gillespie for L hilar lung mass. He had a break due to medical [facility] isues with [facility] and had lapse in f/u until he saw Dr Lois Malave and was trying to work out his medical care path but became more SOB. Carina Ding went to ED at [facility] in January 2019,  chest CT showed large mediastinal lymphadenopathy. Path from brushings came back positive for small cell lung ca. CT of the head on January 24, 2019 showed normal appearance of the brain.  CT of the abdomen on January 25, 2019 showed bilateral pleural metastases and effusions.  In addition there was 2.3 cm low attenuation area in the left lobe liver suspicious for metastases in the right hepatic lobe.  Left adrenal gland was enlarged and an measured 20 mm in the largest dimension there was a 3.8 cm mass in the region of the left iliac chain worrisome for lymph node metastasis.  Due to presentation with SVC the patient started radiation treatment on January 25, 2019 and completed on 02/20/2019.  On January 28, 2019 he received first cycle of chemotherapy with -16 and carboplatin. Overall, he tolerated it reasonably well, mild nausea.  After 4 cycles of -16 and carboplatin, the patient underwent staging studies which showed scattered punctate foci of acute infarct throughout the brain with evidence of embolic phenomenon on MRI of the brain on the 9/11/19 encounter [facility] Encounter) with Jaylene Severino MD.     ALLERGIES:  Allergies   Allergen Reactions    Lisinopril Other (See Comments)     coughing       LAB RESULTS:   Lab Results   Component Value Date    GLUCOSE 122 09/06/2019    GLUCOSE 97 09/01/2019    GLUCOSE 100 08/31/2019          RADIOLOGY RESULTS: Xr Hip Left (2-3 Views)    Result Date: 8/25/2019  PROCEDURE: XR HIP LEFT (2-3 VIEWS) CLINICAL INFORMATION: 59-year-old male with left-sided hip pain. No known injury. COMPARISON: No prior study. TECHNIQUE: AP and frog-leg lateral views were obtained  of the left hip. FINDINGS: There is no acute fracture or dislocation. There is joint space narrowing at the left hip. The left sacroiliac joint appears normal. The pubic symphysis is preserved. No soft tissue abnormalities identified. Joint space narrowing with no acute fracture or dislocation involving the left hip. Cta Chest W Wo Contrast    Result Date: 8/25/2019  PROCEDURE: CTA CHEST W WO CONTRAST CLINICAL INFORMATION: 59-year-old male with shortness of breath. Mild productive cough. COMPARISON: CT 7/23/2019. TECHNIQUE: 3 mm axial images were obtained through the chest after the administration of IV contrast.  A non-contrast localizer was obtained. 3D reconstructions were performed on the scanner to include MIP images through the right and left pulmonary arteries and sagittal images through the chest. Isovue was the intravenous contrast utilized. All CT scans at this facility use dose modulation, iterative reconstruction, and/or weight-based dosing when appropriate to reduce radiation dose to as low as reasonably achievable. FINDINGS: The thyroid gland is present. The central airways patent. There is mild cardiomegaly. There is a trace pericardial effusion. There are coronary artery calcifications. There are no pathologically enlarged mediastinal lymph nodes. There are atherosclerotic calcifications in the visualized aorta.  No movements intact and concordant, sclera anicteric, nasal passages unobstructed, oral mucosa moist without lesion or exudate, tongue midline with good mobility. Neck: No palpable adenopathy in the neck bilaterally levels 1 through 5, supraclavicular or axillary chains  Lungs: Clear to auscultation bilaterally, no focal wheeze, rhonchi, rales. Breasts:  operative breast is well healed. Contralateral breast without significant abnormality. No lymphedema clinically apparent at this time. Heart: Regular rhythm,   Abdomen: Active bowel sounds present. Soft, non-tender; no masses,  no organomegaly  Musculoskeletal: No reproducible spinal point tenderness from level of the cervical vertebrae through the sacrum. No bony point tenderness to percussion. No costovertebral angle tenderness  Extremities: without cyanosis, clubbing, edema; normal ROM  Skin: No jaundice, fresh rash, purpura or petechiae      ASSESSMENT AND PLAN: [place] has progressive bone mets in at least his lumbar spine remains neurologically stable. Previous treatment related side efects have wholly resolved and they are without significant long term sequelae at present. They have agreed to palliative treatment of the spine. He will also complete a bone scan for restaging his bony disease. Thank you for the opportunity to remain involved with sharing in [address] [place] [address]. SIGNATURES:  SIGNATURE:    Ladi Dave  CC: Corrinne Southerly, MD, elizabethkareem [facility], [facility] [place] [address] [facility], [address]     Electronically signed by Ladi Dave MD on 9/11/19 at 12:26 PM    ATTESTATION  (CPT 55884): Over 30 minutes were spent in consultation with greater than 50% of the time spent in face-to-face examination, explanation of clinical and diagnostic findings and on-going management.

## 2019-09-11 NOTE — ED PROVIDER NOTES
mouth daily  Qty: 30 tablet, Refills: 0      ondansetron (ZOFRAN) 4 MG tablet Take 1 tablet by mouth every 8 hours as needed for Nausea or Vomiting  Qty: 30 tablet, Refills: 0             ALLERGIES     is allergic to lisinopril. FAMILY HISTORY     is adopted. family history is not on file. He was adopted. SOCIAL HISTORY      reports that he has been smoking cigarettes. He started smoking about 50 years ago. He has a 100.00 pack-year smoking history. He has never used smokeless tobacco. He reports that he drank alcohol. He reports that he does not use drugs. PHYSICAL EXAM     INITIAL VITALS:  height is 5' 8\" (1.727 m) and weight is 134 lb 3.2 oz (60.9 kg). His oral temperature is 98.5 °F (36.9 °C). His blood pressure is 107/61 and his pulse is 103. His respiration is 20 and oxygen saturation is 94%. Physical Exam   Constitutional: He is oriented to person, place, and time. He appears well-developed and well-nourished. He appears cachectic. HENT:   Head: Normocephalic and atraumatic. Right Ear: External ear normal.   Left Ear: External ear normal.   Eyes: Conjunctivae are normal. Right eye exhibits no discharge. Left eye exhibits no discharge. No scleral icterus. Neck: Normal range of motion. Neck supple. No JVD present. Cardiovascular: Regular rhythm. Tachycardia present. Pulmonary/Chest: Effort normal. No stridor. No respiratory distress. He has decreased breath sounds. He has no wheezes. He has no rhonchi. He has no rales. Abdominal: Soft. He exhibits no distension. There is no tenderness. There is no rigidity, no rebound and no guarding. Musculoskeletal: Normal range of motion. He exhibits no edema. Able to raise legs, equal in strength with bilateral weakness    Neurological: He is alert and oriented to person, place, and time. He exhibits normal muscle tone. GCS eye subscore is 4. GCS verbal subscore is 5. GCS motor subscore is 6. Skin: Skin is warm and dry.  He is not DISPOSITION/PLAN   admit    PATIENT REFERRED TO:  MD Arsenio Parry [phone]            DISCHARGE MEDICATIONS:  Current Discharge Medication List          (Please note that portions of this note were completed with a voice recognition program.  Efforts were made to edit the dictations but occasionally words are mis-transcribed.)    The patient was given an opportunity to see the Emergency Attending. The patient voiced understanding that I was a Mid-LevelProvider and was in agreement with being seen independently by myself. Scribe:  Beverly Garcia 9/11/19 5:12 PM Scribing for and in the presence of Poonam Kerns . Signed by: Marcelo Vidal, 09/12/19 12:36 AM    Provider:  I personally performed the services described in the documentation, reviewed and edited the documentation which was dictated to the scribe in my presence, and it accurately records my words and actions.     Poonam Kerns  9/11/19 12:36 AM       CARLOS Moss CNP  09/12/19 0036

## 2019-09-12 ENCOUNTER — HOSPITAL ENCOUNTER (OUTPATIENT)
Dept: RADIATION ONCOLOGY | Age: 69
Discharge: HOME | End: 2019-09-12
Attending: RADIOLOGY
Payer: MEDICARE

## 2019-09-12 PROBLEM — J96.20 ACUTE AND CHRONIC RESPIRATORY FAILURE, UNSPECIFIED WHETHER WITH HYPOXIA OR HYPERCAPNIA (HCC): Status: ACTIVE | Noted: 2019-09-12

## 2019-09-12 LAB
ANION GAP SERPL CALCULATED.3IONS-SCNC: 11 MEQ/L (ref 8–16)
BASOPHILS # BLD: 0.5 %
BASOPHILS ABSOLUTE: 0 THOU/MM3 (ref 0–0.1)
BUN BLDV-MCNC: 15 MG/DL (ref 7–22)
CALCIUM SERPL-MCNC: 8.1 MG/DL (ref 8.5–10.5)
CHLORIDE BLD-SCNC: 101 MEQ/L (ref 98–111)
CO2: 25 MEQ/L (ref 23–33)
CREAT SERPL-MCNC: 0.5 MG/DL (ref 0.4–1.2)
EOSINOPHIL # BLD: 0 %
EOSINOPHILS ABSOLUTE: 0 THOU/MM3 (ref 0–0.4)
ERYTHROCYTE [DISTWIDTH] IN BLOOD BY AUTOMATED COUNT: 16.7 % (ref 11.5–14.5)
ERYTHROCYTE [DISTWIDTH] IN BLOOD BY AUTOMATED COUNT: 59.8 FL (ref 35–45)
GFR SERPL CREATININE-BSD FRML MDRD: > 90 ML/MIN/1.73M2
GLUCOSE BLD-MCNC: 97 MG/DL (ref 70–108)
HCT VFR BLD CALC: 33.5 % (ref 42–52)
HEMOGLOBIN: 10.1 GM/DL (ref 14–18)
IMMATURE GRANS (ABS): 0.07 THOU/MM3 (ref 0–0.07)
IMMATURE GRANULOCYTES: 2 %
LYMPHOCYTES # BLD: 15.6 %
LYMPHOCYTES ABSOLUTE: 0.7 THOU/MM3 (ref 1–4.8)
MCH RBC QN AUTO: 29.7 PG (ref 26–33)
MCHC RBC AUTO-ENTMCNC: 30.1 GM/DL (ref 32.2–35.5)
MCV RBC AUTO: 98.5 FL (ref 80–94)
MONOCYTES # BLD: 9.2 %
MONOCYTES ABSOLUTE: 0.4 THOU/MM3 (ref 0.4–1.3)
MRSA SCREEN RT-PCR: NEGATIVE
NUCLEATED RED BLOOD CELLS: 0 /100 WBC
PLATELET # BLD: 215 THOU/MM3 (ref 130–400)
PMV BLD AUTO: 8.6 FL (ref 9.4–12.4)
POTASSIUM REFLEX MAGNESIUM: 4.1 MEQ/L (ref 3.5–5.2)
RBC # BLD: 3.4 MILL/MM3 (ref 4.7–6.1)
SEG NEUTROPHILS: 73 %
SEGMENTED NEUTROPHILS ABSOLUTE COUNT: 3.1 THOU/MM3 (ref 1.8–7.7)
SODIUM BLD-SCNC: 137 MEQ/L (ref 135–145)
VANCOMYCIN RESISTANT ENTEROCOCCUS: NEGATIVE
WBC # BLD: 4.2 THOU/MM3 (ref 4.8–10.8)

## 2019-09-12 PROCEDURE — 94760 N-INVAS EAR/PLS OXIMETRY 1: CPT

## 2019-09-12 PROCEDURE — 2580000003 HC RX 258: Performed by: PHYSICIAN ASSISTANT

## 2019-09-12 PROCEDURE — 99233 SBSQ HOSP IP/OBS HIGH 50: CPT | Performed by: INTERNAL MEDICINE

## 2019-09-12 PROCEDURE — 6360000002 HC RX W HCPCS: Performed by: PHYSICIAN ASSISTANT

## 2019-09-12 PROCEDURE — 36415 COLL VENOUS BLD VENIPUNCTURE: CPT

## 2019-09-12 PROCEDURE — 77412 RADIATION TX DELIVERY LVL 3: CPT | Performed by: RADIOLOGY

## 2019-09-12 PROCEDURE — 2709999900 HC NON-CHARGEABLE SUPPLY

## 2019-09-12 PROCEDURE — 77334 RADIATION TREATMENT AID(S): CPT | Performed by: RADIOLOGY

## 2019-09-12 PROCEDURE — 2700000000 HC OXYGEN THERAPY PER DAY

## 2019-09-12 PROCEDURE — 84145 PROCALCITONIN (PCT): CPT

## 2019-09-12 PROCEDURE — 6360000002 HC RX W HCPCS: Performed by: NURSE PRACTITIONER

## 2019-09-12 PROCEDURE — 77280 THER RAD SIMULAJ FIELD SMPL: CPT | Performed by: RADIOLOGY

## 2019-09-12 PROCEDURE — 93010 ELECTROCARDIOGRAM REPORT: CPT | Performed by: NUCLEAR MEDICINE

## 2019-09-12 PROCEDURE — 6370000000 HC RX 637 (ALT 250 FOR IP): Performed by: PHYSICIAN ASSISTANT

## 2019-09-12 PROCEDURE — 2060000000 HC ICU INTERMEDIATE R&B

## 2019-09-12 PROCEDURE — 85025 COMPLETE CBC W/AUTO DIFF WBC: CPT

## 2019-09-12 PROCEDURE — 2580000003 HC RX 258: Performed by: NURSE PRACTITIONER

## 2019-09-12 PROCEDURE — 80048 BASIC METABOLIC PNL TOTAL CA: CPT

## 2019-09-12 PROCEDURE — 94640 AIRWAY INHALATION TREATMENT: CPT

## 2019-09-12 RX ORDER — OXYCODONE HYDROCHLORIDE 5 MG/1
5 TABLET ORAL EVERY 6 HOURS PRN
Status: ON HOLD | COMMUNITY
End: 2019-09-17 | Stop reason: SDUPTHER

## 2019-09-12 RX ORDER — IPRATROPIUM BROMIDE AND ALBUTEROL SULFATE 2.5; .5 MG/3ML; MG/3ML
1 SOLUTION RESPIRATORY (INHALATION) EVERY 4 HOURS
COMMUNITY

## 2019-09-12 RX ORDER — OMEPRAZOLE 20 MG/1
20 CAPSULE, DELAYED RELEASE ORAL DAILY
COMMUNITY

## 2019-09-12 RX ORDER — ALBUTEROL SULFATE 90 UG/1
2 AEROSOL, METERED RESPIRATORY (INHALATION) EVERY 4 HOURS PRN
COMMUNITY

## 2019-09-12 RX ADMIN — SODIUM CHLORIDE: 9 INJECTION, SOLUTION INTRAVENOUS at 12:03

## 2019-09-12 RX ADMIN — CEFEPIME 2 G: 2 INJECTION, POWDER, FOR SOLUTION INTRAMUSCULAR; INTRAVENOUS at 18:33

## 2019-09-12 RX ADMIN — ENOXAPARIN SODIUM 40 MG: 40 INJECTION SUBCUTANEOUS at 12:03

## 2019-09-12 RX ADMIN — METOPROLOL TARTRATE 25 MG: 25 TABLET ORAL at 12:03

## 2019-09-12 RX ADMIN — Medication 1000 MCG: at 12:03

## 2019-09-12 RX ADMIN — IPRATROPIUM BROMIDE AND ALBUTEROL SULFATE 3 ML: .5; 3 SOLUTION RESPIRATORY (INHALATION) at 07:40

## 2019-09-12 RX ADMIN — MORPHINE SULFATE 2 MG: 2 INJECTION, SOLUTION INTRAMUSCULAR; INTRAVENOUS at 12:18

## 2019-09-12 RX ADMIN — Medication 6 MG: at 20:33

## 2019-09-12 RX ADMIN — BUSPIRONE HYDROCHLORIDE 10 MG: 10 TABLET ORAL at 12:03

## 2019-09-12 RX ADMIN — OXYCODONE HYDROCHLORIDE AND ACETAMINOPHEN 1 TABLET: 5; 325 TABLET ORAL at 13:49

## 2019-09-12 RX ADMIN — BUSPIRONE HYDROCHLORIDE 10 MG: 10 TABLET ORAL at 20:26

## 2019-09-12 RX ADMIN — ASPIRIN 81 MG 81 MG: 81 TABLET ORAL at 12:03

## 2019-09-12 RX ADMIN — TAMSULOSIN HYDROCHLORIDE 0.4 MG: 0.4 CAPSULE ORAL at 12:03

## 2019-09-12 RX ADMIN — MORPHINE SULFATE 2 MG: 2 INJECTION, SOLUTION INTRAMUSCULAR; INTRAVENOUS at 03:39

## 2019-09-12 RX ADMIN — IPRATROPIUM BROMIDE AND ALBUTEROL SULFATE 3 ML: .5; 3 SOLUTION RESPIRATORY (INHALATION) at 15:44

## 2019-09-12 RX ADMIN — FUROSEMIDE 40 MG: 40 TABLET ORAL at 12:03

## 2019-09-12 RX ADMIN — SODIUM CHLORIDE TAB 1 GM 1 G: 1 TAB at 12:03

## 2019-09-12 RX ADMIN — FOLIC ACID 1 MG: 1 TABLET ORAL at 12:03

## 2019-09-12 RX ADMIN — OXYCODONE HYDROCHLORIDE AND ACETAMINOPHEN 1 TABLET: 5; 325 TABLET ORAL at 07:51

## 2019-09-12 RX ADMIN — MORPHINE SULFATE 2 MG: 2 INJECTION, SOLUTION INTRAMUSCULAR; INTRAVENOUS at 18:20

## 2019-09-12 RX ADMIN — IPRATROPIUM BROMIDE AND ALBUTEROL SULFATE 3 ML: .5; 3 SOLUTION RESPIRATORY (INHALATION) at 20:14

## 2019-09-12 RX ADMIN — Medication 2 PUFF: at 20:14

## 2019-09-12 RX ADMIN — OXYCODONE HYDROCHLORIDE AND ACETAMINOPHEN 1 TABLET: 5; 325 TABLET ORAL at 20:26

## 2019-09-12 RX ADMIN — IPRATROPIUM BROMIDE AND ALBUTEROL SULFATE 3 ML: .5; 3 SOLUTION RESPIRATORY (INHALATION) at 12:10

## 2019-09-12 RX ADMIN — FAMOTIDINE 20 MG: 20 TABLET ORAL at 12:03

## 2019-09-12 RX ADMIN — METOPROLOL TARTRATE 25 MG: 25 TABLET ORAL at 20:26

## 2019-09-12 ASSESSMENT — PAIN DESCRIPTION - FREQUENCY: FREQUENCY: INTERMITTENT

## 2019-09-12 ASSESSMENT — PAIN DESCRIPTION - ORIENTATION
ORIENTATION: LEFT
ORIENTATION: LEFT

## 2019-09-12 ASSESSMENT — PAIN - FUNCTIONAL ASSESSMENT: PAIN_FUNCTIONAL_ASSESSMENT: PREVENTS OR INTERFERES SOME ACTIVE ACTIVITIES AND ADLS

## 2019-09-12 ASSESSMENT — PAIN DESCRIPTION - PROGRESSION: CLINICAL_PROGRESSION: NOT CHANGED

## 2019-09-12 ASSESSMENT — PAIN SCALES - GENERAL
PAINLEVEL_OUTOF10: 5
PAINLEVEL_OUTOF10: 4
PAINLEVEL_OUTOF10: 6
PAINLEVEL_OUTOF10: 4
PAINLEVEL_OUTOF10: 4
PAINLEVEL_OUTOF10: 5
PAINLEVEL_OUTOF10: 5
PAINLEVEL_OUTOF10: 3
PAINLEVEL_OUTOF10: 4
PAINLEVEL_OUTOF10: 5
PAINLEVEL_OUTOF10: 4
PAINLEVEL_OUTOF10: 5
PAINLEVEL_OUTOF10: 5
PAINLEVEL_OUTOF10: 4

## 2019-09-12 ASSESSMENT — PAIN DESCRIPTION - LOCATION
LOCATION: HIP
LOCATION: HIP

## 2019-09-12 ASSESSMENT — PAIN DESCRIPTION - PAIN TYPE: TYPE: ACUTE PAIN

## 2019-09-12 ASSESSMENT — PAIN DESCRIPTION - DESCRIPTORS: DESCRIPTORS: ACHING

## 2019-09-12 ASSESSMENT — PAIN DESCRIPTION - ONSET: ONSET: ON-GOING

## 2019-09-12 NOTE — PROGRESS NOTES
Pharmacy Medication History Note      List of current medications patient is taking is complete. Source of information: Patient     Changes made to medication list:  Medications removed (include reason, ex. therapy complete or physician discontinued):  · Furosemide 40 mg- per patient no longer taking   · Lidocaine 4 % patch- per patient no longer taking  · Olanzapine 5 mg- per patient no longer taking    Medications added/doses adjusted:  · Added:  · Oxycodone 5 mg- written 9/1/19, patient to take every 6 hours PRN pain, stated he has some left over that he will take PRN pain     · Adjusted:   · Ventolin HFA-patient stated he only uses in emergencies   · Symbicort 160-4.5- adjusted directions, patient knows he should be using daily, stated he doesn't use as often as the Atrovent inhaler, just uses as needed   · Ipratropium-albuterol nebulizer solution- inhales 1 vial into the lungs TID   · Metoprolol Tartrate 25 mg- adjusted directions, patient takes 1/2 tablet QD as he said the [place] adjusted the directions last time he was there  · Sodium Chloride 1 g- adjusted directions, patient takes 1 tablet TID    Other notes (ex. Recent course of antibiotics, Coumadin dosing):  · Patient takes omeprazole 20 mg PO QD and ranitidine 150 mg PO Am, he said he alternates taking one or the other. · Patient does not have the best compliance with inhalation medications- rarely uses albuterol inhaler, he stated he only uses his Symbicort when he \"needs\" it, and said he takes his Atrovent the way it is prescribed. Only uses nebulizer solution TID instead of every 4 hours. · Patient is aware how to take his sodium chloride. He was taking 2 tablets PO TID, but his doctor cut it down recently to 1 tablet PO TID due to high sodium levels. Patient aware. · Counseled patient on Lovenox. · Denies use of other OTC or herbal medications.       Allergies reviewed- lisinopril       Electronically signed by Carlos Lucas on 9/12/2019 at

## 2019-09-12 NOTE — CONSULTS
enoxaparin  40 mg Subcutaneous Daily    pantoprazole  40 mg Oral QAM AC    famotidine  20 mg Oral Daily    nicotine  1 patch Transdermal Daily     acetaminophen, albuterol, sodium chloride flush, magnesium hydroxide, ondansetron, potassium chloride **OR** potassium alternative oral replacement **OR** potassium chloride, magnesium sulfate, morphine, oxyCODONE-acetaminophen, melatonin  IV Drips/Infusions   sodium chloride 75 mL/hr at 09/12/19 1203     Past Medical History         Diagnosis Date    Arthritis     Cancer ([facility] [address]     lung w/ mets    COPD (chronic obstructive pulmonary disease) (HCC)     Gastric reflux     GERD (gastroesophageal reflux disease)     Hyperlipidemia     Hypertension       Past Surgical History           Procedure Laterality Date    COLONOSCOPY      HERNIA REPAIR  80's    bilateral inguinal    KIDNEY SURGERY      stent placement    [place] [address] INCL FLUOR GDNCE DX W/CELL WASHG SPX N/A 9/7/2018    BRONCHOSCOPY FLUOROSCOPY performed by Felix Alonso MD at [facility]    [place] OFFICE/OUTPT VISIT,PROCEDURE ONLY Right 9/5/2018    RIGHT INGUINAL HERNIA REPAIR performed by Man Ventura MD at [address] [facility] OFFICE/OUTPT [address] [place] [address] N/A 9/11/2018    EXPLORATORY LAPAROSCOPY  OPEN PROCEDURE, SMALL BOWEL RESECTION performed by Man Ventura MD at [address] TRANSESOPHAGEAL ECHOCARDIOGRAM Left 5/14/2019    TRANSESOPHAGEAL ECHOCARDIOGRAM performed by Ita Soriano MD at [facility]     Diet    DIET GENERAL;  Dietary Nutrition Supplements: Standard High Calorie Oral Supplement  Allergies    Lisinopril  Social History     Social History     Socioeconomic History    Marital status:      Spouse name: Sachin Elizalde    Number of children: 0    Years of education: Not on file    Highest education level: Not on file   Occupational History    Not on file   Social Needs    Financial resource strain: Not on file    Food insecurity:     Worry: Not on file     Inability: Not on file    Transportation needs:     Medical: Not on file     Non-medical: Not on file   Tobacco Use    Smoking status: Current Some Day Smoker     Packs/day: 2.00     Years: 50.00     Pack years: 100.00     Types: Cigarettes     Start date: 1969     Last attempt to quit: 2019     Years since quittin.6    Smokeless tobacco: Never Used    Tobacco comment: trying to quit   Substance and Sexual Activity    Alcohol use: Not Currently    Drug use: No    Sexual activity: Not Currently   Lifestyle    Physical activity:     Days per week: Not on file     Minutes per session: Not on file    Stress: Not on file   Relationships    Social connections:     Talks on phone: Not on file     Gets together: Not on file     Attends Jewish service: Not on file     Active member of club or organization: Not on file     Attends meetings of clubs or organizations: Not on file     Relationship status: Not on file    Intimate partner violence:     Fear of current or ex partner: Not on file     Emotionally abused: Not on file     Physically abused: Not on file     Forced sexual activity: Not on file   Other Topics Concern    Not on file   Social History Narrative    Not on file     Family History          Adopted: Yes     ROS     Review of Systems   Not done due to patient not seen due to out of room  Vitals     height is 5' 8\" (1.727 m) and weight is 134 lb 3.2 oz (60.9 kg). His oral temperature is 98.7 °F (37.1 °C). His blood pressure is 112/65 and his pulse is 98. His respiration is 24 and oxygen saturation is 92%.      O2 Flow Rate (L/min): 1 L/min    Exam   Physical Exam   Did not examine patient as he was out of his room        Labs   CBC  Recent Labs     19  0454   WBC 4.4* 4.2*   RBC 3.47* 3.40*   HGB 10.4* 10.1*   HCT 33.5* 33.5*   MCV 96.5* 98.5*   MCH 30.0 29.7   MCHC 31.0* 30.1*    215   MPV 8.5* 8.6*      BMP  Recent Labs     19  1640 19  0454    137 PORTABLE CLINICAL INFORMATION: 60-year-old male with shortness of breath. COMPARISON: Chest x-ray 1/21/2019. TECHNIQUE: AP upright view of the chest was obtained. FINDINGS: The lungs are hyperinflated and there is flattening of hemidiaphragms likely related to emphysematous changes. There are opacities extending in the left upper lung with air bronchograms which could be related to a pneumonic infiltrate. Indistinct densities are again seen in the superior mediastinum which could be related to lymphadenopathy. The cardiac silhouette and pulmonary vasculature are within normal limits. Atherosclerotic changes are seen in the thoracic aorta. There is no significant pleural effusion or pneumothorax. Visualized portions of the upper abdomen are within normal limits. The osseous structures are intact. No acute fractures or suspicious osseous lesions. 1. Opacities extending into the left upper lobe likely related to pneumonic infiltrate. Follow-up exam following treatment is recommended to assure resolution. 2. Emphysematous changes. **This report has been created using voice recognition software. It may contain minor errors which are inherent in voice recognition technology. ** Final report electronically signed by Dr Oscar Archer on 8/25/2019 6:22 PM    Ct Guide Radiation Therapy No Charge    Result Date: 9/11/2019  Radiology exam is complete. No Radiologist dictation. Please follow up with ordering provider. Assessment/Recommendations    Lenka Marx is a 70 y.o. male with SCLC last immunotherapy with admitted for ipilimumab and nivolumab on 7/29. After that treatment he was hospitalized for pneumonia vs pneumonitis and treated with antibiotics and prednisone. He came to ED for generalized weakness and left leg pain. Per the ED note, he has had productive cough and frequent falls at home and his wife is struggling to take care of him.   He was seen by RT plan is to start palliative RT to start

## 2019-09-12 NOTE — PROGRESS NOTES
Therapies:  · Oral Diet Orders: General   · Oral Diet intake: Unable to assess(no documentation per EMR. Pt reports consuming ~25% of breakfast today)  · Oral Nutrition Supplement (ONS) Orders: Standard High Calorie Oral Supplement(Ensure Enlive 4 times daily- Vanilla only)  · ONS intake: (Initiated today)  · Anthropometric Measures:  · Ht: 5' 8\" (172.7 cm)   · Current Body Wt: 134 lb 3.2 oz (60.9 kg)(9/11; no edema noted)  · Admission Body Wt: 134 lb 3.2 oz (60.9 kg)(9/11; no edema noted)  · Usual Body Wt: 151 lb (68.5 kg)(per pt report. Per EMR: 152 lb 9.6 oz on 5/24/19; 127 lb 9.6 oz on 2/25/19; 128 lb 3.2 oz on 9/5/18)  · % Weight Change: -11.8% weight loss in 4 months per EMR; no edema noted  · Ideal Body Wt: 154 lb (69.9 kg)   · BMI Classification: BMI 18.5 - 24.9 Normal Weight(20.4)    Nutrition Interventions:   Continue current diet, Start ONS, Vitamin Supplement(Started Ensure Enlive four time daily.  Recommend a Multivitamin w/minerals daily.)  Continued Inpatient Monitoring, Education Initiated, Coordination of Care    Nutrition Evaluation:   · Evaluation: Goals set   · Goals: Pt will consume 75% or more of meals during LOS    · Monitoring: Nutrition Progression, Meal Intake, Supplement Intake, Diet Tolerance, Weight, Pertinent Labs, Monitor Bowel Function    Electronically signed by Elvi Newby RD, LD on 9/12/19 at 1:29 PM    Contact Number: [phone]

## 2019-09-12 NOTE — H&P
Result Value Ref Range    Anion Gap 11.0 8.0 - 16.0 meq/L   Glomerular Filtration Rate, Estimated    Collection Time: 09/11/19  4:40 PM   Result Value Ref Range    Est, Glom Filt Rate >90 ml/min/1.73m2   Osmolality    Collection Time: 09/11/19  4:40 PM   Result Value Ref Range    Osmolality Calc 277.4 275.0 - 300 mOsmol/kg         Vital Signs: T: 98.5F P: 103 RR: 20 B/P: 107/61: FiO2: 2L: O2 Sat:94%: I/O: No intake or output data in the 24 hours ending 09/11/19 2304      General:   Chronically ill appearing, cachetic, ill appearing, non toxic  HEENT:  normocephalic and atraumatic. No scleral icterus. PEARLA, mucous membranes tacky  Neck: supple. Trachea midline. No JVD. Full ROM, no meningismus. Lungs:scattered wheezes, some rhonchi that clear with cough, diminished in the bases. No retractions, positive accessory muscle use. Cardiac: RRR, no murmur, 2+ pulses  Abdomen: soft. Nontender. Bowel sounds active  Extremities:  No clubbing, cyanosis x 4, no edema  Full ROM but painful ROM of the left hip. Vasculature: capillary refill < 3 seconds. Skin:  warm and dry. no visible rashes  Psych:  Alert and oriented x3. Affect appropriate  Lymph:  No supraclavicular adenopathy. Neurologic:  CN II-XII grossly intact. No focal deficit. Data: (All radiographs, tracings, PFTs, and imaging are personally viewed and interpreted unless otherwise noted).     Reviewed recent past encounters with oncology        Electronically signed by  Megan Benton PA-C

## 2019-09-12 NOTE — PROGRESS NOTES
Pt arrived in 4K 10 via cart/stretcher. Complaints: None. IV none infusing into the wrist left, condition patent and no redness. The best day to schedule a follow up Dr appointment is:  Monday a.m. The patient is interested in [facility]. [facility] meds to [facility] program?:  No    Oriented to [facility] and procedures.

## 2019-09-13 ENCOUNTER — HOSPITAL ENCOUNTER (OUTPATIENT)
Dept: RADIATION ONCOLOGY | Age: 69
Discharge: HOME | End: 2019-09-13
Attending: RADIOLOGY
Payer: MEDICARE

## 2019-09-13 LAB — PROCALCITONIN: 0.34 NG/ML (ref 0.01–0.09)

## 2019-09-13 PROCEDURE — 2060000000 HC ICU INTERMEDIATE R&B

## 2019-09-13 PROCEDURE — 94761 N-INVAS EAR/PLS OXIMETRY MLT: CPT

## 2019-09-13 PROCEDURE — 2580000003 HC RX 258: Performed by: NURSE PRACTITIONER

## 2019-09-13 PROCEDURE — 99232 SBSQ HOSP IP/OBS MODERATE 35: CPT | Performed by: NURSE PRACTITIONER

## 2019-09-13 PROCEDURE — 77387 GUIDANCE FOR RADJ TX DLVR: CPT | Performed by: RADIOLOGY

## 2019-09-13 PROCEDURE — 6370000000 HC RX 637 (ALT 250 FOR IP): Performed by: PHYSICIAN ASSISTANT

## 2019-09-13 PROCEDURE — 2709999900 HC NON-CHARGEABLE SUPPLY

## 2019-09-13 PROCEDURE — 97530 THERAPEUTIC ACTIVITIES: CPT

## 2019-09-13 PROCEDURE — 6360000002 HC RX W HCPCS: Performed by: NURSE PRACTITIONER

## 2019-09-13 PROCEDURE — 2580000003 HC RX 258: Performed by: PHYSICIAN ASSISTANT

## 2019-09-13 PROCEDURE — 77412 RADIATION TX DELIVERY LVL 3: CPT | Performed by: RADIOLOGY

## 2019-09-13 PROCEDURE — 2700000000 HC OXYGEN THERAPY PER DAY

## 2019-09-13 PROCEDURE — 94640 AIRWAY INHALATION TREATMENT: CPT

## 2019-09-13 PROCEDURE — 99232 SBSQ HOSP IP/OBS MODERATE 35: CPT | Performed by: INTERNAL MEDICINE

## 2019-09-13 PROCEDURE — 6360000002 HC RX W HCPCS: Performed by: PHYSICIAN ASSISTANT

## 2019-09-13 PROCEDURE — 97167 OT EVAL HIGH COMPLEX 60 MIN: CPT

## 2019-09-13 PROCEDURE — 97162 PT EVAL MOD COMPLEX 30 MIN: CPT

## 2019-09-13 RX ADMIN — SODIUM CHLORIDE, PRESERVATIVE FREE 10 ML: 5 INJECTION INTRAVENOUS at 08:58

## 2019-09-13 RX ADMIN — SODIUM CHLORIDE, PRESERVATIVE FREE 10 ML: 5 INJECTION INTRAVENOUS at 19:53

## 2019-09-13 RX ADMIN — Medication 1000 MCG: at 08:54

## 2019-09-13 RX ADMIN — OXYCODONE HYDROCHLORIDE AND ACETAMINOPHEN 1 TABLET: 5; 325 TABLET ORAL at 23:14

## 2019-09-13 RX ADMIN — TAMSULOSIN HYDROCHLORIDE 0.4 MG: 0.4 CAPSULE ORAL at 08:54

## 2019-09-13 RX ADMIN — IPRATROPIUM BROMIDE AND ALBUTEROL SULFATE 3 ML: .5; 3 SOLUTION RESPIRATORY (INHALATION) at 08:52

## 2019-09-13 RX ADMIN — IPRATROPIUM BROMIDE AND ALBUTEROL SULFATE 3 ML: .5; 3 SOLUTION RESPIRATORY (INHALATION) at 12:46

## 2019-09-13 RX ADMIN — MORPHINE SULFATE 2 MG: 2 INJECTION, SOLUTION INTRAMUSCULAR; INTRAVENOUS at 06:43

## 2019-09-13 RX ADMIN — MORPHINE SULFATE 2 MG: 2 INJECTION, SOLUTION INTRAMUSCULAR; INTRAVENOUS at 00:51

## 2019-09-13 RX ADMIN — METOPROLOL TARTRATE 25 MG: 25 TABLET ORAL at 08:54

## 2019-09-13 RX ADMIN — ASPIRIN 81 MG 81 MG: 81 TABLET ORAL at 08:54

## 2019-09-13 RX ADMIN — BUSPIRONE HYDROCHLORIDE 10 MG: 10 TABLET ORAL at 08:55

## 2019-09-13 RX ADMIN — FUROSEMIDE 40 MG: 40 TABLET ORAL at 08:55

## 2019-09-13 RX ADMIN — CEFEPIME 2 G: 2 INJECTION, POWDER, FOR SOLUTION INTRAMUSCULAR; INTRAVENOUS at 05:30

## 2019-09-13 RX ADMIN — OXYCODONE HYDROCHLORIDE AND ACETAMINOPHEN 1 TABLET: 5; 325 TABLET ORAL at 09:59

## 2019-09-13 RX ADMIN — IPRATROPIUM BROMIDE AND ALBUTEROL SULFATE 3 ML: .5; 3 SOLUTION RESPIRATORY (INHALATION) at 01:10

## 2019-09-13 RX ADMIN — Medication 2 PUFF: at 09:01

## 2019-09-13 RX ADMIN — OXYCODONE HYDROCHLORIDE AND ACETAMINOPHEN 1 TABLET: 5; 325 TABLET ORAL at 16:51

## 2019-09-13 RX ADMIN — SODIUM CHLORIDE TAB 1 GM 1 G: 1 TAB at 08:54

## 2019-09-13 RX ADMIN — FOLIC ACID 1 MG: 1 TABLET ORAL at 08:55

## 2019-09-13 RX ADMIN — OXYCODONE HYDROCHLORIDE AND ACETAMINOPHEN 1 TABLET: 5; 325 TABLET ORAL at 03:31

## 2019-09-13 RX ADMIN — FAMOTIDINE 20 MG: 20 TABLET ORAL at 08:55

## 2019-09-13 RX ADMIN — IPRATROPIUM BROMIDE AND ALBUTEROL SULFATE 3 ML: .5; 3 SOLUTION RESPIRATORY (INHALATION) at 21:24

## 2019-09-13 RX ADMIN — BUSPIRONE HYDROCHLORIDE 10 MG: 10 TABLET ORAL at 19:53

## 2019-09-13 RX ADMIN — ENOXAPARIN SODIUM 40 MG: 40 INJECTION SUBCUTANEOUS at 08:55

## 2019-09-13 RX ADMIN — IPRATROPIUM BROMIDE AND ALBUTEROL SULFATE 3 ML: .5; 3 SOLUTION RESPIRATORY (INHALATION) at 16:17

## 2019-09-13 RX ADMIN — CEFEPIME 2 G: 2 INJECTION, POWDER, FOR SOLUTION INTRAMUSCULAR; INTRAVENOUS at 16:49

## 2019-09-13 RX ADMIN — Medication 2 PUFF: at 21:29

## 2019-09-13 ASSESSMENT — PAIN - FUNCTIONAL ASSESSMENT
PAIN_FUNCTIONAL_ASSESSMENT: PREVENTS OR INTERFERES SOME ACTIVE ACTIVITIES AND ADLS
PAIN_FUNCTIONAL_ASSESSMENT: ACTIVITIES ARE NOT PREVENTED

## 2019-09-13 ASSESSMENT — PAIN DESCRIPTION - PROGRESSION
CLINICAL_PROGRESSION: NOT CHANGED

## 2019-09-13 ASSESSMENT — PAIN DESCRIPTION - PAIN TYPE
TYPE: ACUTE PAIN

## 2019-09-13 ASSESSMENT — PAIN DESCRIPTION - ORIENTATION
ORIENTATION: LEFT

## 2019-09-13 ASSESSMENT — PAIN SCALES - GENERAL
PAINLEVEL_OUTOF10: 0
PAINLEVEL_OUTOF10: 5
PAINLEVEL_OUTOF10: 8
PAINLEVEL_OUTOF10: 6
PAINLEVEL_OUTOF10: 5
PAINLEVEL_OUTOF10: 6
PAINLEVEL_OUTOF10: 5
PAINLEVEL_OUTOF10: 5

## 2019-09-13 ASSESSMENT — PAIN DESCRIPTION - LOCATION
LOCATION: HIP

## 2019-09-13 ASSESSMENT — PAIN DESCRIPTION - FREQUENCY
FREQUENCY: INTERMITTENT

## 2019-09-13 ASSESSMENT — PAIN DESCRIPTION - DESCRIPTORS
DESCRIPTORS: ACHING
DESCRIPTORS: ACHING
DESCRIPTORS: ACHING;SHARP
DESCRIPTORS: ACHING

## 2019-09-13 ASSESSMENT — PAIN DESCRIPTION - ONSET
ONSET: ON-GOING

## 2019-09-13 NOTE — FLOWSHEET NOTE
09/13/19 1437   Provider Notification   Reason for Communication Review case   Provider Name Dr. Amanuel Genao   Provider Notification Physician   Method of Communication Face to face   Response No new orders   Notification Time 1437   BP 87/47

## 2019-09-13 NOTE — PLAN OF CARE
Problem: Pain:  Goal: Pain level will decrease  Description  Pain level will decrease  Outcome: Ongoing  Note:   Patient c/o pain to left hip rated 5-6 out 10. Percocet and Morphine administered per orders. Patient pain reassessed 3/10. Problem: Falls - Risk of:  Goal: Will remain free from falls  Description  Will remain free from falls  Outcome: Ongoing  Note:   Fall precautions in place. Bed locked and in lowest position. Bed alarm on. Non-skid socks on. Call light and bedside table within reach. Patient demonstrates proper use of call light. Problem: Risk for Impaired Skin Integrity  Goal: Tissue integrity - skin and mucous membranes  Description  Structural intactness and normal physiological function of skin and  mucous membranes. Outcome: Ongoing  Note:   Patient encouraged to turn throughout shift. Only let us turn him once. Patient prefers to be in the supine position. Heel elevated off bed with pillow support. Problem: Nutrition  Goal: Optimal nutrition therapy  Outcome: Ongoing  Note:   Patient ate 0% of dinner. Offered snack and patient refused. Care plan reviewed with patient. Patient verbalize understanding of the plan of care and contribute to goal setting.

## 2019-09-13 NOTE — CARE COORDINATION
Clinical Update: #2 palliative radiation treatment today, now agreeable with ECF disposition.  Nikki Leal working with family for possible new [employer] (early out)  Electronically signed by Omar Giles RN on 9/13/2019 at 12:45 PM

## 2019-09-13 NOTE — PROGRESS NOTES
Assessment and Plan:        Acute Hypoxic Respiratory Insufficiency: Recently treated for pneumonitis. Doubt PNA. Seems stable, monitor     Extensive Stage Small Cell Lung Cancer: diffuse mets (liver, bone, brain, ?pancreas?). Has had issues with pancreatitis recently, so could be post inflammatory lesion. Now on palliative nivo/ipi (only received one round on 7/29/19). Had previously completed WBI, and was also given  and carbo in 1/2019.   - Poor Prognosis. Discussed with patient. Explained he is far too weak for aggressive treatment. Explained cancer is alright disseminated and will continue to grow. He states he will talk with his wife tomorrow about goals of care. Hospice came by to discuss philosophy. He would like to pursue rehab and try to resume treatment in the future. - Oncology consult - appreciate recs    SIRS: Had on fever and tachy. Suspect infiltrates are more to do with recent pneumonitis. Possibly PNA? Difficult to say for sure without bronch. Will continue abx for a short course.      Cancer related pain (back and hip) - has known sclerotic lesions in low back and pelvis - patient felt groggy on methadone in past. Continue tylenol and nsaids. Continue percocet. Getting palliative radiation.     Anemia, Normocytic - monitor.     Severe malnutrition - Body mass index is 20.41 kg/m²., POA, likely related to malignancy.      Chronic Hydronephrosis of right kidney - noted. No MELISSA.      History of Partial Small Bowel Resection     Tobacco Abuse     Generalized Weakness - due to malignancy    CC:  Failure to thrive    Hospital course: Admitted with generalized malaise from cancer. Subjective: (12 point review of systems completed. Pertinent positives noted. Otherwise ROS is negative) : Discussed prognosis further today. Explained cancer will continue to progress without treatment and he is currently too weak. He would like to proceed with therapy to attempt to seek more treatment.  Will work to obtain ECF placement. SOB improved. Still feels very fatigued. PMH:  Per HPI  Medications:       mometasone-formoterol  2 puff Inhalation BID    cefepime  2 g Intravenous Q12H    aspirin  81 mg Oral Daily    busPIRone  10 mg Oral BID    folic acid  1 mg Oral Daily    furosemide  40 mg Oral Daily    ipratropium-albuterol  1 vial Inhalation Q4H    metoprolol tartrate  25 mg Oral BID    sodium chloride  1 g Oral Daily    tamsulosin  0.4 mg Oral Daily    cyanocobalamin  1,000 mcg Oral Daily    sodium chloride flush  10 mL Intravenous 2 times per day    enoxaparin  40 mg Subcutaneous Daily    pantoprazole  40 mg Oral QAM AC    famotidine  20 mg Oral Daily    nicotine  1 patch Transdermal Daily       Vital Signs:   BP (!) 94/47   Pulse 100   Temp 98.3 °F (36.8 °C) (Oral)   Resp 16   Ht 5' 8\" (1.727 m)   Wt 134 lb 3.2 oz (60.9 kg)   SpO2 95%   BMI 20.41 kg/m²      Intake/Output Summary (Last 24 hours) at 9/13/2019 1752  Last data filed at 9/13/2019 1700  Gross per 24 hour   Intake 677.86 ml   Output 1400 ml   Net -722.14 ml        General:   Appears chachectic  HEENT:  normocephalic and atraumatic. No scleral icterus. PERR. Neck: supple. No JVD. No thyromegaly. Lungs: decreased breathe sounds with fine crackles  Cardiac: RRR without murmur. Abdomen: soft. Nontender. Bowel sounds positive. Extremities:  No clubbing, cyanosis, or edema x 4. Vasculature: capillary refill < 3 seconds. Palpable LE pulses bilaterally. Skin:  warm and dry. Psych:  Alert and oriented x3. Affect appropriate  Lymph:  No supraclavicular adenopathy. Neurologic:  No focal deficit. No seizures. Data: (All radiographs, tracings, PFTs, and imaging are personally viewed and interpreted unless otherwise noted).     hgb 10.1      Electronically signed by Marsha Raymundo MD on 9/13/2019 at 5:52 PM

## 2019-09-13 NOTE — PROGRESS NOTES
tamsulosin  0.4 mg Oral Daily    cyanocobalamin  1,000 mcg Oral Daily    sodium chloride flush  10 mL Intravenous 2 times per day    enoxaparin  40 mg Subcutaneous Daily    pantoprazole  40 mg Oral QAM AC    famotidine  20 mg Oral Daily    nicotine  1 patch Transdermal Daily       Vital Signs:   /69   Pulse 98   Temp 98.7 °F (37.1 °C) (Oral)   Resp 22   Ht 5' 8\" (1.727 m)   Wt 134 lb 3.2 oz (60.9 kg)   SpO2 99%   BMI 20.41 kg/m²      Intake/Output Summary (Last 24 hours) at 9/12/2019 2129  Last data filed at 9/12/2019 2012  Gross per 24 hour   Intake 1813.37 ml   Output 2100 ml   Net -286.63 ml        General:   Appears chachectic  HEENT:  normocephalic and atraumatic. No scleral icterus. PERR. Neck: supple. No JVD. No thyromegaly. Lungs: decreased breathe sounds with fine crackles  Cardiac: RRR without murmur. Abdomen: soft. Nontender. Bowel sounds positive. Extremities:  No clubbing, cyanosis, or edema x 4. Vasculature: capillary refill < 3 seconds. Palpable LE pulses bilaterally. Skin:  warm and dry. Psych:  Alert and oriented x3. Affect appropriate  Lymph:  No supraclavicular adenopathy. Neurologic:  No focal deficit. No seizures. Data: (All radiographs, tracings, PFTs, and imaging are personally viewed and interpreted unless otherwise noted).     hgb 10.1      Electronically signed by Moisés Rhodes MD on 9/12/2019 at 9:29 PM

## 2019-09-13 NOTE — PROGRESS NOTES
Intervention: None  Multiple Pain Sites: No    Social/Functional History:  Lives With: Spouse  Type of Home: House  Home Layout: Two level, Bed/Bath upstairs  Home Access: Stairs to enter with rails  Entrance Stairs - Number of Steps: 2 + 3  Home Equipment: 4 wheeled walker   Bathroom Toilet: Standard  Bathroom Accessibility: Accessible    Receives Help From: Family  ADL Assistance: Needs assistance  Homemaking Assistance: Needs assistance  Ambulation Assistance: Independent  Transfer Assistance: Independent    Active : No  Patient's  Info: family has been providing transportation  Occupation: Retired  Leisure & Hobbies: Watching TV, reading   Additional Comments: Pt states has been furnituring walking last couple days at home, more difficulty getting around, no home O2    Cognition/Orientation:  Overall Orientation Status: Within Normal Limits  Overall Cognitive Status: WFL  Cognition Comment: Cues for problem solving new situations    ADL;s:  Feeding: Moderate assistance, Adaptive utensils (comment)(Pt used foam  on his spoon and fork)  Toileting: Maximum assistance(used a urinal while in supine with help needed to place it)  Vision - Basic Assessment  Prior Vision: Wears glasses only for reading    Functional Mobility:  Bed mobility  Rolling to Right: Moderate assistance  Comment: Pt had mechanical draw sheet to assist with getting boosted up in bed    Functional Mobility  Functional Mobility Comments: Pt not able to demonstrate     Balance:  Balance  Sitting Balance: Unable to assess(comment)  Standing Balance: Unable to assess(comment)  Standing Balance  Time: Not applicable  Comment: Pt refused to attempt any standing.     Transfers:  Sit to stand: Unable to assess  Stand to sit: Unable to assess       Upper Extremity Assessment:Hand Dominance: Right  LUE PROM: WNL  LUE AROM : WNL  Left Hand PROM: WNL  Left Hand AROM: WNL  RUE PROM: WNL  RUE AROM : WNL  Right Hand PROM: WNL  Right Hand AROM:

## 2019-09-13 NOTE — PROGRESS NOTES
treatment with palliative Ipilimumab and Opdivo on July 29, 2019.   Meds    Current Medications    mometasone-formoterol  2 puff Inhalation BID    cefepime  2 g Intravenous Q12H    aspirin  81 mg Oral Daily    busPIRone  10 mg Oral BID    folic acid  1 mg Oral Daily    furosemide  40 mg Oral Daily    ipratropium-albuterol  1 vial Inhalation Q4H    metoprolol tartrate  25 mg Oral BID    sodium chloride  1 g Oral Daily    tamsulosin  0.4 mg Oral Daily    cyanocobalamin  1,000 mcg Oral Daily    sodium chloride flush  10 mL Intravenous 2 times per day    enoxaparin  40 mg Subcutaneous Daily    pantoprazole  40 mg Oral QAM AC    famotidine  20 mg Oral Daily    nicotine  1 patch Transdermal Daily     acetaminophen, albuterol, sodium chloride flush, magnesium hydroxide, ondansetron, potassium chloride **OR** potassium alternative oral replacement **OR** potassium chloride, magnesium sulfate, morphine, oxyCODONE-acetaminophen, melatonin  IV Drips/Infusions    Past Medical History         Diagnosis Date    Arthritis     Cancer (HCC)     lung w/ mets    COPD (chronic obstructive pulmonary disease) (HCC)     Gastric reflux     GERD (gastroesophageal reflux disease)     Hyperlipidemia     Hypertension       Past Surgical History           Procedure Laterality Date    COLONOSCOPY      HERNIA REPAIR  80's    bilateral inguinal    KIDNEY SURGERY      stent placement    [place] [address] INCL FLUOR GDNCE DX W/CELL WASHG SPX N/A 9/7/2018    BRONCHOSCOPY FLUOROSCOPY performed by Erwin Monroy MD at [facility]    [place] OFFICE/OUTPT VISIT,PROCEDURE ONLY Right 9/5/2018    RIGHT INGUINAL HERNIA REPAIR performed by rAley Harper MD at [facility] OFFICE/OUTPT VISIT,PROCEDURE ONLY N/A 9/11/2018    EXPLORATORY LAPAROSCOPY  OPEN PROCEDURE, SMALL BOWEL RESECTION performed by Arley Harper MD at [address] TRANSESOPHAGEAL ECHOCARDIOGRAM Left 5/14/2019    TRANSESOPHAGEAL ECHOCARDIOGRAM performed by Tila Crawford MD [place] at [facility];  Dietary Nutrition Supplements: Standard High Calorie Oral Supplement  Allergies    Lisinopril  Social History     Social History     Socioeconomic History    Marital status:      Spouse name: Peter Fernandez    Number of children: 0    Years of education: Not on file    Highest education level: Not on file   Occupational History    Not on file   Social Needs    Financial resource strain: Not on file    Food insecurity:     Worry: Not on file     Inability: Not on file    Transportation needs:     Medical: Not on file     Non-medical: Not on file   Tobacco Use    Smoking status: Current Some Day Smoker     Packs/day: 2.00     Years: 50.00     Pack years: 100.00     Types: Cigarettes     Start date: 1969     Last attempt to quit: 2019     Years since quittin.6    Smokeless tobacco: Never Used    Tobacco comment: trying to quit   Substance and Sexual Activity    Alcohol use: Not Currently    Drug use: No    Sexual activity: Not Currently   Lifestyle    Physical activity:     Days per week: Not on file     Minutes per session: Not on file    Stress: Not on file   Relationships    Social connections:     Talks on phone: Not on file     Gets together: Not on file     Attends Yazidi service: Not on file     Active member of club or organization: Not on file     Attends meetings of clubs or organizations: Not on file     Relationship status: Not on file    Intimate partner violence:     Fear of current or ex partner: Not on file     Emotionally abused: Not on file     Physically abused: Not on file     Forced sexual activity: Not on file   Other Topics Concern    Not on file   Social History Narrative    Not on file     Family History          Adopted: Yes     ROS     Review of Systems   Negative except for what is noted in HPI  Vitals     height is 5' 8\" (1.727 m) and weight is 134 lb 3.2 oz (60.9 kg).  His oral temperature is approximately 1.8 x 0.8 cm. Sclerotic lesions are again seen at T8, L1 and L2. Stable sclerotic focus in the posterior aspect of the left fifth rib. 1. Infiltrates are seen throughout both upper lobes and the left lower lobe. They're most notable in the left upper lung. These findings are likely related to multifocal pneumonia. 2. Soft tissue mass in the left prepectoral region which has increased in size up to 3.2 cm up from 1.8 cm on the previous study. This could represent lymphadenopathy. 3. No evidence of a pulmonary embolism. 4. Paraseptal emphysematous changes. 5. Sclerotic lesion seen at T8, L1, and L2. Sclerotic focus is stable at the posterior aspect of left fifth rib. **This report has been created using voice recognition software. It may contain minor errors which are inherent in voice recognition technology. ** Final report electronically signed by Dr Delia Marin on 8/25/2019 8:31 PM    Ct Abdomen Pelvis W Iv Contrast Additional Contrast? None    Result Date: 8/25/2019  PROCEDURE: CT ABDOMEN PELVIS W IV CONTRAST CLINICAL INFORMATION: 53-year-old male with upper abdominal pain. History of metastatic disease . COMPARISON: CT scan 08/08/2019. TECHNIQUE: 5 mm axial CT images were obtained through the abdomen and pelvis after the administration of intravenous and oral contrast. Coronal and sagittal reconstructions were obtained. All CT scans at this facility use dose modulation, iterative reconstruction, and/or weight-based dosing when appropriate to reduce radiation dose to as low as reasonably achievable. FINDINGS: Please refer to dictation for CT of the chest from the same date for thoracic findings. There is a low-density lesion in the left hepatic lobe which measures approximately 2.8 cm, unchanged since the prior examination.  An additional low-density lesion is seen in the right hepatic lobe on axial image #18 measuring 3.5 x 2.9 cm, also similar to the prior exam. The gallbladder appears Opacities extending into the left upper lobe likely related to pneumonic infiltrate. Follow-up exam following treatment is recommended to assure resolution. 2. Emphysematous changes. **This report has been created using voice recognition software. It may contain minor errors which are inherent in voice recognition technology. ** Final report electronically signed by Dr Elver Bay on 8/25/2019 6:22 PM    Ct Guide Radiation Therapy No Charge    Result Date: 9/11/2019  Radiology exam is complete. No Radiologist dictation. Please follow up with ordering provider. Assessment/Recommendations    Елена Batres is a 70 y.o. male with SCLC last immunotherapy with admitted for ipilimumab and nivolumab on 7/29. After that treatment he was hospitalized for pneumonia vs pneumonitis and treated with antibiotics and prednisone. He came to ED for generalized weakness and left leg pain. Per the ED note, he has had productive cough and frequent falls at home and his wife is struggling to take care of him. He was seen by RT plan is to start palliative RT to start palliative radiation 10 treatments for total of 3000 Gy. He was seen by Dr. Willie Millan on 9/9 and plan was to wait another week to continue immunotherapy. Metastatic small cell lung cancer  - Follows with Dr. Willie Millan  - last immunotherapy with nivolumab and ipilimumab on 7/29  - recommend PT/OT eval due to falls  - Concern for infiltrate on CXR - will start cefepime   - Blood cultures NGTD  - Wife is expressing concern about caring for him at home primarily since he is falling frequently. Case discussed with nurse and patient/family. Questions and concerns addressed.   Plan made in collaboration with Dr. Lauren Patel    Electronically signed by   CARLOS Torres NP on 9/13/2019 at 1:58 PM

## 2019-09-14 LAB
ANION GAP SERPL CALCULATED.3IONS-SCNC: 12 MEQ/L (ref 8–16)
BUN BLDV-MCNC: 12 MG/DL (ref 7–22)
CALCIUM SERPL-MCNC: 8.6 MG/DL (ref 8.5–10.5)
CHLORIDE BLD-SCNC: 98 MEQ/L (ref 98–111)
CO2: 24 MEQ/L (ref 23–33)
CREAT SERPL-MCNC: 0.3 MG/DL (ref 0.4–1.2)
ERYTHROCYTE [DISTWIDTH] IN BLOOD BY AUTOMATED COUNT: 16.1 % (ref 11.5–14.5)
ERYTHROCYTE [DISTWIDTH] IN BLOOD BY AUTOMATED COUNT: 57 FL (ref 35–45)
GFR SERPL CREATININE-BSD FRML MDRD: > 90 ML/MIN/1.73M2
GLUCOSE BLD-MCNC: 109 MG/DL (ref 70–108)
HCT VFR BLD CALC: 32.2 % (ref 42–52)
HEMOGLOBIN: 9.8 GM/DL (ref 14–18)
MCH RBC QN AUTO: 29.5 PG (ref 26–33)
MCHC RBC AUTO-ENTMCNC: 30.4 GM/DL (ref 32.2–35.5)
MCV RBC AUTO: 97 FL (ref 80–94)
MRSA SCREEN: NORMAL
PLATELET # BLD: 214 THOU/MM3 (ref 130–400)
PMV BLD AUTO: 8.4 FL (ref 9.4–12.4)
POTASSIUM SERPL-SCNC: 4 MEQ/L (ref 3.5–5.2)
RBC # BLD: 3.32 MILL/MM3 (ref 4.7–6.1)
SODIUM BLD-SCNC: 134 MEQ/L (ref 135–145)
WBC # BLD: 4.1 THOU/MM3 (ref 4.8–10.8)

## 2019-09-14 PROCEDURE — 94761 N-INVAS EAR/PLS OXIMETRY MLT: CPT

## 2019-09-14 PROCEDURE — 94640 AIRWAY INHALATION TREATMENT: CPT

## 2019-09-14 PROCEDURE — 6360000002 HC RX W HCPCS: Performed by: NURSE PRACTITIONER

## 2019-09-14 PROCEDURE — 6370000000 HC RX 637 (ALT 250 FOR IP): Performed by: PHYSICIAN ASSISTANT

## 2019-09-14 PROCEDURE — 6370000000 HC RX 637 (ALT 250 FOR IP): Performed by: INTERNAL MEDICINE

## 2019-09-14 PROCEDURE — 6360000002 HC RX W HCPCS: Performed by: PHYSICIAN ASSISTANT

## 2019-09-14 PROCEDURE — 36415 COLL VENOUS BLD VENIPUNCTURE: CPT

## 2019-09-14 PROCEDURE — 85027 COMPLETE CBC AUTOMATED: CPT

## 2019-09-14 PROCEDURE — 2060000000 HC ICU INTERMEDIATE R&B

## 2019-09-14 PROCEDURE — 80048 BASIC METABOLIC PNL TOTAL CA: CPT

## 2019-09-14 PROCEDURE — 2580000003 HC RX 258: Performed by: NURSE PRACTITIONER

## 2019-09-14 PROCEDURE — 99232 SBSQ HOSP IP/OBS MODERATE 35: CPT | Performed by: INTERNAL MEDICINE

## 2019-09-14 PROCEDURE — 2580000003 HC RX 258: Performed by: PHYSICIAN ASSISTANT

## 2019-09-14 PROCEDURE — 2700000000 HC OXYGEN THERAPY PER DAY

## 2019-09-14 PROCEDURE — 2709999900 HC NON-CHARGEABLE SUPPLY

## 2019-09-14 RX ORDER — POLYETHYLENE GLYCOL 3350 17 G/17G
17 POWDER, FOR SOLUTION ORAL DAILY
Status: DISCONTINUED | OUTPATIENT
Start: 2019-09-14 | End: 2019-09-17 | Stop reason: HOSPADM

## 2019-09-14 RX ORDER — SENNA PLUS 8.6 MG/1
2 TABLET ORAL 2 TIMES DAILY
Status: DISCONTINUED | OUTPATIENT
Start: 2019-09-14 | End: 2019-09-17 | Stop reason: HOSPADM

## 2019-09-14 RX ADMIN — Medication 1000 MCG: at 07:46

## 2019-09-14 RX ADMIN — ENOXAPARIN SODIUM 40 MG: 40 INJECTION SUBCUTANEOUS at 07:46

## 2019-09-14 RX ADMIN — BUSPIRONE HYDROCHLORIDE 10 MG: 10 TABLET ORAL at 21:23

## 2019-09-14 RX ADMIN — Medication 6 MG: at 21:23

## 2019-09-14 RX ADMIN — FUROSEMIDE 40 MG: 40 TABLET ORAL at 07:46

## 2019-09-14 RX ADMIN — SODIUM CHLORIDE, PRESERVATIVE FREE 10 ML: 5 INJECTION INTRAVENOUS at 07:48

## 2019-09-14 RX ADMIN — FOLIC ACID 1 MG: 1 TABLET ORAL at 07:46

## 2019-09-14 RX ADMIN — ASPIRIN 81 MG 81 MG: 81 TABLET ORAL at 07:45

## 2019-09-14 RX ADMIN — PANTOPRAZOLE SODIUM 40 MG: 40 TABLET, DELAYED RELEASE ORAL at 07:45

## 2019-09-14 RX ADMIN — IPRATROPIUM BROMIDE AND ALBUTEROL SULFATE 3 ML: .5; 3 SOLUTION RESPIRATORY (INHALATION) at 08:00

## 2019-09-14 RX ADMIN — METOPROLOL TARTRATE 25 MG: 25 TABLET ORAL at 07:46

## 2019-09-14 RX ADMIN — SENNOSIDES 17.2 MG: 8.6 TABLET, FILM COATED ORAL at 21:23

## 2019-09-14 RX ADMIN — OXYCODONE HYDROCHLORIDE AND ACETAMINOPHEN 1 TABLET: 5; 325 TABLET ORAL at 17:03

## 2019-09-14 RX ADMIN — OXYCODONE HYDROCHLORIDE AND ACETAMINOPHEN 1 TABLET: 5; 325 TABLET ORAL at 11:22

## 2019-09-14 RX ADMIN — CEFEPIME 2 G: 2 INJECTION, POWDER, FOR SOLUTION INTRAMUSCULAR; INTRAVENOUS at 05:12

## 2019-09-14 RX ADMIN — SODIUM CHLORIDE, PRESERVATIVE FREE 10 ML: 5 INJECTION INTRAVENOUS at 21:24

## 2019-09-14 RX ADMIN — OXYCODONE HYDROCHLORIDE AND ACETAMINOPHEN 1 TABLET: 5; 325 TABLET ORAL at 23:57

## 2019-09-14 RX ADMIN — FAMOTIDINE 20 MG: 20 TABLET ORAL at 07:46

## 2019-09-14 RX ADMIN — MAGNESIUM HYDROXIDE 30 ML: 400 SUSPENSION ORAL at 11:22

## 2019-09-14 RX ADMIN — ACETAMINOPHEN 650 MG: 325 TABLET ORAL at 21:23

## 2019-09-14 RX ADMIN — IPRATROPIUM BROMIDE AND ALBUTEROL SULFATE 3 ML: .5; 3 SOLUTION RESPIRATORY (INHALATION) at 20:28

## 2019-09-14 RX ADMIN — BUSPIRONE HYDROCHLORIDE 10 MG: 10 TABLET ORAL at 07:46

## 2019-09-14 RX ADMIN — TAMSULOSIN HYDROCHLORIDE 0.4 MG: 0.4 CAPSULE ORAL at 07:46

## 2019-09-14 RX ADMIN — Medication 2 PUFF: at 08:06

## 2019-09-14 RX ADMIN — IPRATROPIUM BROMIDE AND ALBUTEROL SULFATE 3 ML: .5; 3 SOLUTION RESPIRATORY (INHALATION) at 13:20

## 2019-09-14 RX ADMIN — Medication 2 PUFF: at 20:28

## 2019-09-14 RX ADMIN — CEFEPIME 2 G: 2 INJECTION, POWDER, FOR SOLUTION INTRAMUSCULAR; INTRAVENOUS at 16:24

## 2019-09-14 RX ADMIN — SODIUM CHLORIDE TAB 1 GM 1 G: 1 TAB at 07:48

## 2019-09-14 RX ADMIN — OXYCODONE HYDROCHLORIDE AND ACETAMINOPHEN 1 TABLET: 5; 325 TABLET ORAL at 05:14

## 2019-09-14 ASSESSMENT — PAIN SCALES - GENERAL
PAINLEVEL_OUTOF10: 7
PAINLEVEL_OUTOF10: 4
PAINLEVEL_OUTOF10: 5
PAINLEVEL_OUTOF10: 0
PAINLEVEL_OUTOF10: 5
PAINLEVEL_OUTOF10: 3
PAINLEVEL_OUTOF10: 5
PAINLEVEL_OUTOF10: 5

## 2019-09-14 ASSESSMENT — PAIN DESCRIPTION - ONSET
ONSET: ON-GOING
ONSET: ON-GOING

## 2019-09-14 ASSESSMENT — PAIN DESCRIPTION - DESCRIPTORS
DESCRIPTORS: DISCOMFORT
DESCRIPTORS: DISCOMFORT

## 2019-09-14 ASSESSMENT — PAIN DESCRIPTION - PROGRESSION
CLINICAL_PROGRESSION: NOT CHANGED
CLINICAL_PROGRESSION: NOT CHANGED

## 2019-09-14 ASSESSMENT — PAIN DESCRIPTION - FREQUENCY
FREQUENCY: CONTINUOUS
FREQUENCY: CONTINUOUS

## 2019-09-14 ASSESSMENT — PAIN DESCRIPTION - PAIN TYPE
TYPE: CHRONIC PAIN
TYPE: CHRONIC PAIN

## 2019-09-14 ASSESSMENT — PAIN DESCRIPTION - DIRECTION
RADIATING_TOWARDS: DOWN LEG
RADIATING_TOWARDS: DOWN LEG

## 2019-09-14 ASSESSMENT — PAIN DESCRIPTION - LOCATION
LOCATION: HIP
LOCATION: HIP

## 2019-09-14 ASSESSMENT — PAIN DESCRIPTION - ORIENTATION
ORIENTATION: LEFT
ORIENTATION: LEFT

## 2019-09-14 NOTE — PLAN OF CARE
Problem: Impaired respiratory status  Goal: Clear lung sounds  Outcome: Ongoing   Breath sounds diminished, continuing treatments as ordered.

## 2019-09-15 PROCEDURE — 99232 SBSQ HOSP IP/OBS MODERATE 35: CPT | Performed by: INTERNAL MEDICINE

## 2019-09-15 PROCEDURE — 2060000000 HC ICU INTERMEDIATE R&B

## 2019-09-15 PROCEDURE — 2580000003 HC RX 258: Performed by: NURSE PRACTITIONER

## 2019-09-15 PROCEDURE — 94640 AIRWAY INHALATION TREATMENT: CPT

## 2019-09-15 PROCEDURE — 6370000000 HC RX 637 (ALT 250 FOR IP): Performed by: INTERNAL MEDICINE

## 2019-09-15 PROCEDURE — 2580000003 HC RX 258: Performed by: PHYSICIAN ASSISTANT

## 2019-09-15 PROCEDURE — 6360000002 HC RX W HCPCS: Performed by: PHYSICIAN ASSISTANT

## 2019-09-15 PROCEDURE — 94761 N-INVAS EAR/PLS OXIMETRY MLT: CPT

## 2019-09-15 PROCEDURE — 2700000000 HC OXYGEN THERAPY PER DAY

## 2019-09-15 PROCEDURE — 6360000002 HC RX W HCPCS: Performed by: NURSE PRACTITIONER

## 2019-09-15 PROCEDURE — 6370000000 HC RX 637 (ALT 250 FOR IP): Performed by: PHYSICIAN ASSISTANT

## 2019-09-15 RX ADMIN — PANTOPRAZOLE SODIUM 40 MG: 40 TABLET, DELAYED RELEASE ORAL at 07:00

## 2019-09-15 RX ADMIN — SODIUM CHLORIDE, PRESERVATIVE FREE 10 ML: 5 INJECTION INTRAVENOUS at 20:16

## 2019-09-15 RX ADMIN — CEFEPIME 2 G: 2 INJECTION, POWDER, FOR SOLUTION INTRAMUSCULAR; INTRAVENOUS at 16:45

## 2019-09-15 RX ADMIN — BUSPIRONE HYDROCHLORIDE 10 MG: 10 TABLET ORAL at 20:17

## 2019-09-15 RX ADMIN — Medication 6 MG: at 23:42

## 2019-09-15 RX ADMIN — ONDANSETRON 4 MG: 2 INJECTION INTRAMUSCULAR; INTRAVENOUS at 06:44

## 2019-09-15 RX ADMIN — ACETAMINOPHEN 650 MG: 325 TABLET ORAL at 23:42

## 2019-09-15 RX ADMIN — FAMOTIDINE 20 MG: 20 TABLET ORAL at 09:11

## 2019-09-15 RX ADMIN — IPRATROPIUM BROMIDE AND ALBUTEROL SULFATE 3 ML: .5; 3 SOLUTION RESPIRATORY (INHALATION) at 08:02

## 2019-09-15 RX ADMIN — IPRATROPIUM BROMIDE AND ALBUTEROL SULFATE 3 ML: .5; 3 SOLUTION RESPIRATORY (INHALATION) at 22:05

## 2019-09-15 RX ADMIN — OXYCODONE HYDROCHLORIDE AND ACETAMINOPHEN 1 TABLET: 5; 325 TABLET ORAL at 19:01

## 2019-09-15 RX ADMIN — SODIUM CHLORIDE TAB 1 GM 1 G: 1 TAB at 09:11

## 2019-09-15 RX ADMIN — Medication 2 PUFF: at 08:02

## 2019-09-15 RX ADMIN — TAMSULOSIN HYDROCHLORIDE 0.4 MG: 0.4 CAPSULE ORAL at 12:35

## 2019-09-15 RX ADMIN — Medication 1000 MCG: at 10:58

## 2019-09-15 RX ADMIN — ASPIRIN 81 MG 81 MG: 81 TABLET ORAL at 09:11

## 2019-09-15 RX ADMIN — BUSPIRONE HYDROCHLORIDE 10 MG: 10 TABLET ORAL at 10:58

## 2019-09-15 RX ADMIN — METOPROLOL TARTRATE 12.5 MG: 25 TABLET ORAL at 12:37

## 2019-09-15 RX ADMIN — ENOXAPARIN SODIUM 40 MG: 40 INJECTION SUBCUTANEOUS at 09:11

## 2019-09-15 RX ADMIN — IPRATROPIUM BROMIDE AND ALBUTEROL SULFATE 3 ML: .5; 3 SOLUTION RESPIRATORY (INHALATION) at 11:28

## 2019-09-15 RX ADMIN — SODIUM CHLORIDE, PRESERVATIVE FREE 10 ML: 5 INJECTION INTRAVENOUS at 09:11

## 2019-09-15 RX ADMIN — NALOXEGOL OXALATE 12.5 MG: 12.5 TABLET, FILM COATED ORAL at 09:00

## 2019-09-15 RX ADMIN — IPRATROPIUM BROMIDE AND ALBUTEROL SULFATE 3 ML: .5; 3 SOLUTION RESPIRATORY (INHALATION) at 15:42

## 2019-09-15 RX ADMIN — Medication 2 PUFF: at 22:05

## 2019-09-15 RX ADMIN — SENNOSIDES 17.2 MG: 8.6 TABLET, FILM COATED ORAL at 10:58

## 2019-09-15 RX ADMIN — METOPROLOL TARTRATE 12.5 MG: 25 TABLET ORAL at 20:16

## 2019-09-15 RX ADMIN — SENNOSIDES 17.2 MG: 8.6 TABLET, FILM COATED ORAL at 20:17

## 2019-09-15 RX ADMIN — FOLIC ACID 1 MG: 1 TABLET ORAL at 09:11

## 2019-09-15 RX ADMIN — OXYCODONE HYDROCHLORIDE AND ACETAMINOPHEN 1 TABLET: 5; 325 TABLET ORAL at 12:35

## 2019-09-15 RX ADMIN — POLYETHYLENE GLYCOL (3350) 17 G: 17 POWDER, FOR SOLUTION ORAL at 09:11

## 2019-09-15 RX ADMIN — CEFEPIME 2 G: 2 INJECTION, POWDER, FOR SOLUTION INTRAMUSCULAR; INTRAVENOUS at 05:00

## 2019-09-15 ASSESSMENT — PAIN DESCRIPTION - LOCATION
LOCATION: HIP

## 2019-09-15 ASSESSMENT — PAIN - FUNCTIONAL ASSESSMENT
PAIN_FUNCTIONAL_ASSESSMENT: ACTIVITIES ARE NOT PREVENTED

## 2019-09-15 ASSESSMENT — PAIN DESCRIPTION - DESCRIPTORS
DESCRIPTORS: ACHING;DISCOMFORT

## 2019-09-15 ASSESSMENT — PAIN SCALES - GENERAL
PAINLEVEL_OUTOF10: 4
PAINLEVEL_OUTOF10: 4
PAINLEVEL_OUTOF10: 0
PAINLEVEL_OUTOF10: 0
PAINLEVEL_OUTOF10: 4
PAINLEVEL_OUTOF10: 0
PAINLEVEL_OUTOF10: 4

## 2019-09-15 ASSESSMENT — PAIN DESCRIPTION - ORIENTATION
ORIENTATION: LEFT

## 2019-09-15 ASSESSMENT — PAIN DESCRIPTION - FREQUENCY
FREQUENCY: CONTINUOUS

## 2019-09-15 ASSESSMENT — PAIN DESCRIPTION - PAIN TYPE
TYPE: CHRONIC PAIN

## 2019-09-15 ASSESSMENT — PAIN DESCRIPTION - PROGRESSION
CLINICAL_PROGRESSION: NOT CHANGED

## 2019-09-15 ASSESSMENT — PAIN DESCRIPTION - DIRECTION
RADIATING_TOWARDS: DOWN LEG

## 2019-09-15 ASSESSMENT — PAIN DESCRIPTION - ONSET
ONSET: ON-GOING

## 2019-09-15 NOTE — PLAN OF CARE
Problem: Pain:  Goal: Pain level will decrease  Description  Pain level will decrease  Outcome: Ongoing  Note:   Pain Assessment: 0-10  Pain Level: 0   Patient's Stated Pain Goal: No pain   Is pain goal met at this time? Yes     Non-Pharmaceutical Pain Intervention(s): Rest, Repositioned       Problem: Falls - Risk of:  Goal: Will remain free from falls  Description  Will remain free from falls  Outcome: Ongoing  Note:   No falls this shift. Bed is locked and in lowest position. Pt was instructed on how to use call light and oriented to room. Call light and overhead table within reach. Will continue to monitor        Problem: Risk for Impaired Skin Integrity  Goal: Tissue integrity - skin and mucous membranes  Description  Structural intactness and normal physiological function of skin and  mucous membranes. Outcome: Ongoing  Note:   Patient refuses to turn as recommended q2hrs  Education provided regarding the benefits of repositioning and the risk to skin integrity associated with not repositioning as recommended. Problem: Nutrition  Goal: Optimal nutrition therapy  Outcome: Ongoing  Note:   Poor appetite, ensures, encouraging PO fluids      Problem: DISCHARGE BARRIERS  Goal: Patient's continuum of care needs are met  Outcome: Ongoing  Note:   Discharge planning in place   Care plan reviewed with patient and family. Patient and family verbalize understanding of the plan of care and contribute to goal setting.

## 2019-09-16 ENCOUNTER — APPOINTMENT (OUTPATIENT)
Dept: RADIATION ONCOLOGY | Age: 69
End: 2019-09-16
Attending: RADIOLOGY
Payer: MEDICARE

## 2019-09-16 ENCOUNTER — HOSPITAL ENCOUNTER (OUTPATIENT)
Dept: RADIATION ONCOLOGY | Age: 69
Discharge: HOME | End: 2019-09-16
Attending: RADIOLOGY
Payer: MEDICARE

## 2019-09-16 PROCEDURE — 94640 AIRWAY INHALATION TREATMENT: CPT

## 2019-09-16 PROCEDURE — 2700000000 HC OXYGEN THERAPY PER DAY

## 2019-09-16 PROCEDURE — 6360000002 HC RX W HCPCS: Performed by: PHYSICIAN ASSISTANT

## 2019-09-16 PROCEDURE — 6370000000 HC RX 637 (ALT 250 FOR IP): Performed by: PHYSICIAN ASSISTANT

## 2019-09-16 PROCEDURE — 99232 SBSQ HOSP IP/OBS MODERATE 35: CPT | Performed by: INTERNAL MEDICINE

## 2019-09-16 PROCEDURE — 6370000000 HC RX 637 (ALT 250 FOR IP): Performed by: INTERNAL MEDICINE

## 2019-09-16 PROCEDURE — 2580000003 HC RX 258: Performed by: NURSE PRACTITIONER

## 2019-09-16 PROCEDURE — 77336 RADIATION PHYSICS CONSULT: CPT | Performed by: RADIOLOGY

## 2019-09-16 PROCEDURE — 2060000000 HC ICU INTERMEDIATE R&B

## 2019-09-16 PROCEDURE — 77387 GUIDANCE FOR RADJ TX DLVR: CPT | Performed by: RADIOLOGY

## 2019-09-16 PROCEDURE — 94760 N-INVAS EAR/PLS OXIMETRY 1: CPT

## 2019-09-16 PROCEDURE — 77412 RADIATION TX DELIVERY LVL 3: CPT | Performed by: RADIOLOGY

## 2019-09-16 PROCEDURE — 97110 THERAPEUTIC EXERCISES: CPT

## 2019-09-16 PROCEDURE — 6360000002 HC RX W HCPCS: Performed by: NURSE PRACTITIONER

## 2019-09-16 PROCEDURE — 2580000003 HC RX 258: Performed by: PHYSICIAN ASSISTANT

## 2019-09-16 PROCEDURE — 94761 N-INVAS EAR/PLS OXIMETRY MLT: CPT

## 2019-09-16 RX ORDER — OXYCODONE HYDROCHLORIDE AND ACETAMINOPHEN 5; 325 MG/1; MG/1
1 TABLET ORAL EVERY 4 HOURS PRN
Status: DISCONTINUED | OUTPATIENT
Start: 2019-09-16 | End: 2019-09-17 | Stop reason: HOSPADM

## 2019-09-16 RX ADMIN — FAMOTIDINE 20 MG: 20 TABLET ORAL at 09:28

## 2019-09-16 RX ADMIN — IPRATROPIUM BROMIDE AND ALBUTEROL SULFATE 3 ML: .5; 3 SOLUTION RESPIRATORY (INHALATION) at 20:58

## 2019-09-16 RX ADMIN — OXYCODONE HYDROCHLORIDE AND ACETAMINOPHEN 1 TABLET: 5; 325 TABLET ORAL at 18:06

## 2019-09-16 RX ADMIN — OXYCODONE HYDROCHLORIDE AND ACETAMINOPHEN 1 TABLET: 5; 325 TABLET ORAL at 07:44

## 2019-09-16 RX ADMIN — Medication 2 PUFF: at 09:50

## 2019-09-16 RX ADMIN — OXYCODONE HYDROCHLORIDE AND ACETAMINOPHEN 1 TABLET: 5; 325 TABLET ORAL at 02:19

## 2019-09-16 RX ADMIN — ASPIRIN 81 MG 81 MG: 81 TABLET ORAL at 09:28

## 2019-09-16 RX ADMIN — TAMSULOSIN HYDROCHLORIDE 0.4 MG: 0.4 CAPSULE ORAL at 09:27

## 2019-09-16 RX ADMIN — METOPROLOL TARTRATE 12.5 MG: 25 TABLET ORAL at 20:48

## 2019-09-16 RX ADMIN — Medication 6 MG: at 20:47

## 2019-09-16 RX ADMIN — IPRATROPIUM BROMIDE AND ALBUTEROL SULFATE 3 ML: .5; 3 SOLUTION RESPIRATORY (INHALATION) at 09:49

## 2019-09-16 RX ADMIN — Medication 2 PUFF: at 20:58

## 2019-09-16 RX ADMIN — PANTOPRAZOLE SODIUM 40 MG: 40 TABLET, DELAYED RELEASE ORAL at 04:51

## 2019-09-16 RX ADMIN — SODIUM CHLORIDE, PRESERVATIVE FREE 10 ML: 5 INJECTION INTRAVENOUS at 09:30

## 2019-09-16 RX ADMIN — SODIUM CHLORIDE TAB 1 GM 1 G: 1 TAB at 09:27

## 2019-09-16 RX ADMIN — CEFEPIME 2 G: 2 INJECTION, POWDER, FOR SOLUTION INTRAMUSCULAR; INTRAVENOUS at 04:51

## 2019-09-16 RX ADMIN — ONDANSETRON 4 MG: 2 INJECTION INTRAMUSCULAR; INTRAVENOUS at 10:46

## 2019-09-16 RX ADMIN — OXYCODONE HYDROCHLORIDE AND ACETAMINOPHEN 1 TABLET: 5; 325 TABLET ORAL at 22:44

## 2019-09-16 RX ADMIN — IPRATROPIUM BROMIDE AND ALBUTEROL SULFATE 3 ML: .5; 3 SOLUTION RESPIRATORY (INHALATION) at 16:09

## 2019-09-16 RX ADMIN — BUSPIRONE HYDROCHLORIDE 10 MG: 10 TABLET ORAL at 10:40

## 2019-09-16 RX ADMIN — FOLIC ACID 1 MG: 1 TABLET ORAL at 09:28

## 2019-09-16 RX ADMIN — NALOXEGOL OXALATE 12.5 MG: 12.5 TABLET, FILM COATED ORAL at 09:27

## 2019-09-16 RX ADMIN — ENOXAPARIN SODIUM 40 MG: 40 INJECTION SUBCUTANEOUS at 09:30

## 2019-09-16 RX ADMIN — METOPROLOL TARTRATE 12.5 MG: 25 TABLET ORAL at 12:35

## 2019-09-16 RX ADMIN — OXYCODONE HYDROCHLORIDE AND ACETAMINOPHEN 1 TABLET: 5; 325 TABLET ORAL at 14:02

## 2019-09-16 RX ADMIN — BUSPIRONE HYDROCHLORIDE 10 MG: 10 TABLET ORAL at 20:47

## 2019-09-16 RX ADMIN — Medication 1000 MCG: at 09:26

## 2019-09-16 RX ADMIN — CEFEPIME 2 G: 2 INJECTION, POWDER, FOR SOLUTION INTRAMUSCULAR; INTRAVENOUS at 18:07

## 2019-09-16 RX ADMIN — SENNOSIDES 17.2 MG: 8.6 TABLET, FILM COATED ORAL at 20:47

## 2019-09-16 RX ADMIN — POLYETHYLENE GLYCOL (3350) 17 G: 17 POWDER, FOR SOLUTION ORAL at 09:30

## 2019-09-16 RX ADMIN — SODIUM CHLORIDE, PRESERVATIVE FREE 10 ML: 5 INJECTION INTRAVENOUS at 20:47

## 2019-09-16 RX ADMIN — SENNOSIDES 17.2 MG: 8.6 TABLET, FILM COATED ORAL at 09:27

## 2019-09-16 ASSESSMENT — PAIN DESCRIPTION - PROGRESSION
CLINICAL_PROGRESSION: NOT CHANGED

## 2019-09-16 ASSESSMENT — PAIN DESCRIPTION - FREQUENCY
FREQUENCY: CONTINUOUS

## 2019-09-16 ASSESSMENT — PAIN - FUNCTIONAL ASSESSMENT
PAIN_FUNCTIONAL_ASSESSMENT: ACTIVITIES ARE NOT PREVENTED

## 2019-09-16 ASSESSMENT — PAIN DESCRIPTION - ORIENTATION
ORIENTATION: LEFT

## 2019-09-16 ASSESSMENT — PAIN DESCRIPTION - DESCRIPTORS
DESCRIPTORS: DISCOMFORT
DESCRIPTORS: DISCOMFORT;ACHING
DESCRIPTORS: ACHING;DISCOMFORT
DESCRIPTORS: DISCOMFORT

## 2019-09-16 ASSESSMENT — PAIN DESCRIPTION - ONSET
ONSET: ON-GOING

## 2019-09-16 ASSESSMENT — PAIN SCALES - GENERAL
PAINLEVEL_OUTOF10: 4
PAINLEVEL_OUTOF10: 2
PAINLEVEL_OUTOF10: 5
PAINLEVEL_OUTOF10: 4
PAINLEVEL_OUTOF10: 4
PAINLEVEL_OUTOF10: 0
PAINLEVEL_OUTOF10: 2
PAINLEVEL_OUTOF10: 5
PAINLEVEL_OUTOF10: 3

## 2019-09-16 ASSESSMENT — PAIN DESCRIPTION - PAIN TYPE
TYPE: CHRONIC PAIN

## 2019-09-16 ASSESSMENT — PAIN DESCRIPTION - DIRECTION
RADIATING_TOWARDS: DOWN LEG
RADIATING_TOWARDS: DOWN LEG

## 2019-09-16 ASSESSMENT — PAIN DESCRIPTION - LOCATION
LOCATION: HIP

## 2019-09-16 NOTE — PLAN OF CARE
Problem: Nutrition  Goal: Optimal nutrition therapy  9/16/2019 1028 by April Hinkle RD, LD  Outcome: Ongoing  Nutrition Problem: Severe malnutrition, In context of chronic illness  Intervention: Food and/or Nutrient Delivery: Continue current diet, Continue current ONS  Nutritional Goals: Pt will consume 75% or more of meals during LOS

## 2019-09-16 NOTE — PROGRESS NOTES
Drew Zule[facility] 59  OCCUPATIONAL THERAPY MISSED TREATMENT NOTE  [facility] ICU STEPDOWN TELEMETRY 4K  [id number]/[id number]      Date: 2019  Patient Name: Ortiz Cisneros        CSN: [id number]   : 1950  (70 y.o.)  Gender: male   Referring Practitioner: LIZET Parada Arm  Diagnosis: Failure ot Thrive in Adult         REASON FOR MISSED TREATMENT: Pt nauseated & not feeling up to therapy today. Will check back at a later date as appropriate.

## 2019-09-16 NOTE — PROGRESS NOTES
Nutrition Assessment    Type and Reason for Visit: Reassess    Nutrition Recommendations: Will continue Ensure Enlive 4 per day as pt. Reports acceptance. Encouraged po, ONS intake at best efforts. Consider MVI. Question if pt. Would benefit from appetite stimulant. Nutrition Assessment:   Pt improving from a nutritional standpoint AEB reported acceptance of ONS and overall tolerance of po diet. Remains at risk for further nutritional compromise r/t catabolic illness (lung cancer with mets to pancreas, liver, brain and bone); palliative radiation; hx of COPD. MD noting pt. Is too weak for aggressive treatment. Nutrition recommendations/interventions as per above. Malnutrition Assessment:  · Malnutrition Status: Meets the criteria for severe malnutrition  · Context: Chronic illness  · Findings of the 6 clinical characteristics of malnutrition (Minimum of 2 out of 6 clinical characteristics is required to make the diagnosis of moderate or severe Protein Calorie Malnutrition based on AND/ASPEN Guidelines):  1. Weight Loss-10% loss or greater(-11.8% weight loss), in 3 months  2. Fat Loss-Severe subcutaneous fat loss, Triceps, Fat overlying the ribs  3. Muscle Loss-Severe muscle mass loss, Temples (temporalis muscle), Clavicles (pectoralis and deltoids)    Nutrition Risk Level: Moderate    Nutrient Needs:  · Estimated Daily Total Kcal: 1197-8574 kcal/day (25-30 kcal/kg - 60.9 kg on 9/11)  · Estimated Daily Protein (g):  g/day (1.2-2 g/kg - 60.9 kg on 9/11)    Nutrition Diagnosis:   · Problem: Severe malnutrition, In context of chronic illness  · Etiology: related to Catabolic illness, Insufficient energy/nutrient consumption     Signs and symptoms:  as evidenced by Severe loss of subcutaneous fat, Severe muscle loss    Objective Information:  · Nutrition-Focused Physical Findings: appetite \"alright\"; reports some nausea a few nights ago but improved; no BM noted;  Rx includes Movantik, Senokot,

## 2019-09-16 NOTE — CARE COORDINATION
9/16/19, 2:36 PM    DISCHARGE BARRIERS      INDU spoke to Venus Kumar with ECF admissions. They presently do not have a bed but will tomorrow. INDU has been in contact with Rob Modi at [facility]. She has coordinated pickup times with Elzbieta Frey at the [facility]. INDU spoke to Tanner Arrington with radiation. His appointment time starting on Wednesday, Sept 18 will be 10:45 with pickup time at the [facility] at 10:15. They will transport to radiation while still a patient tomorrow at 8:15 pickup time appointment at 8:45.   INDU updated the nurse

## 2019-09-17 ENCOUNTER — HOSPITAL ENCOUNTER (OUTPATIENT)
Dept: RADIATION ONCOLOGY | Age: 69
Discharge: HOME | End: 2019-09-17
Attending: RADIOLOGY
Payer: MEDICARE

## 2019-09-17 VITALS
DIASTOLIC BLOOD PRESSURE: 59 MMHG | TEMPERATURE: 98 F | OXYGEN SATURATION: 90 % | HEART RATE: 111 BPM | RESPIRATION RATE: 20 BRPM | HEIGHT: 68 IN | WEIGHT: 132.3 LBS | BODY MASS INDEX: 20.05 KG/M2 | SYSTOLIC BLOOD PRESSURE: 112 MMHG

## 2019-09-17 LAB
BLOOD CULTURE, ROUTINE: NORMAL
BLOOD CULTURE, ROUTINE: NORMAL

## 2019-09-17 PROCEDURE — 2580000003 HC RX 258: Performed by: PHYSICIAN ASSISTANT

## 2019-09-17 PROCEDURE — 6370000000 HC RX 637 (ALT 250 FOR IP): Performed by: INTERNAL MEDICINE

## 2019-09-17 PROCEDURE — 94761 N-INVAS EAR/PLS OXIMETRY MLT: CPT

## 2019-09-17 PROCEDURE — 94640 AIRWAY INHALATION TREATMENT: CPT

## 2019-09-17 PROCEDURE — 99239 HOSP IP/OBS DSCHRG MGMT >30: CPT | Performed by: INTERNAL MEDICINE

## 2019-09-17 PROCEDURE — 2700000000 HC OXYGEN THERAPY PER DAY

## 2019-09-17 PROCEDURE — 99231 SBSQ HOSP IP/OBS SF/LOW 25: CPT | Performed by: NURSE PRACTITIONER

## 2019-09-17 PROCEDURE — 2580000003 HC RX 258: Performed by: NURSE PRACTITIONER

## 2019-09-17 PROCEDURE — 97116 GAIT TRAINING THERAPY: CPT

## 2019-09-17 PROCEDURE — 2709999900 HC NON-CHARGEABLE SUPPLY

## 2019-09-17 PROCEDURE — 6370000000 HC RX 637 (ALT 250 FOR IP): Performed by: PHYSICIAN ASSISTANT

## 2019-09-17 PROCEDURE — 77412 RADIATION TX DELIVERY LVL 3: CPT | Performed by: RADIOLOGY

## 2019-09-17 PROCEDURE — 6360000002 HC RX W HCPCS: Performed by: NURSE PRACTITIONER

## 2019-09-17 PROCEDURE — 6360000002 HC RX W HCPCS: Performed by: PHYSICIAN ASSISTANT

## 2019-09-17 PROCEDURE — 77387 GUIDANCE FOR RADJ TX DLVR: CPT | Performed by: RADIOLOGY

## 2019-09-17 RX ORDER — BISACODYL 10 MG
10 SUPPOSITORY, RECTAL RECTAL ONCE
Status: COMPLETED | OUTPATIENT
Start: 2019-09-17 | End: 2019-09-17

## 2019-09-17 RX ORDER — POLYETHYLENE GLYCOL 3350 17 G/17G
17 POWDER, FOR SOLUTION ORAL DAILY
Qty: 30 EACH | Refills: 0 | DISCHARGE
Start: 2019-09-17 | End: 2019-10-17

## 2019-09-17 RX ORDER — ACETAMINOPHEN 325 MG/1
650 TABLET ORAL EVERY 4 HOURS PRN
Qty: 120 TABLET | Refills: 3 | Status: ON HOLD | DISCHARGE
Start: 2019-09-17 | End: 2019-10-22 | Stop reason: SDUPTHER

## 2019-09-17 RX ORDER — SENNA PLUS 8.6 MG/1
1 TABLET ORAL 2 TIMES DAILY
Qty: 60 TABLET | Refills: 1 | DISCHARGE
Start: 2019-09-17 | End: 2019-10-17

## 2019-09-17 RX ORDER — CEFDINIR 300 MG/1
300 CAPSULE ORAL EVERY 12 HOURS SCHEDULED
Status: DISCONTINUED | OUTPATIENT
Start: 2019-09-17 | End: 2019-09-17 | Stop reason: HOSPADM

## 2019-09-17 RX ORDER — NICOTINE 21 MG/24HR
1 PATCH, TRANSDERMAL 24 HOURS TRANSDERMAL DAILY
Qty: 30 PATCH | Refills: 3 | DISCHARGE
Start: 2019-09-18

## 2019-09-17 RX ORDER — OXYCODONE HYDROCHLORIDE 5 MG/1
5 TABLET ORAL EVERY 6 HOURS PRN
Qty: 12 TABLET | Refills: 0 | Status: SHIPPED | OUTPATIENT
Start: 2019-09-17 | End: 2019-09-20

## 2019-09-17 RX ORDER — LIDOCAINE 4 G/G
1 PATCH TOPICAL DAILY
Qty: 30 PATCH | Refills: 0 | DISCHARGE
Start: 2019-09-17

## 2019-09-17 RX ORDER — LANOLIN ALCOHOL/MO/W.PET/CERES
6 CREAM (GRAM) TOPICAL NIGHTLY PRN
Refills: 3 | DISCHARGE
Start: 2019-09-17

## 2019-09-17 RX ORDER — LACTULOSE 10 G/15ML
20 SOLUTION ORAL ONCE
Status: COMPLETED | OUTPATIENT
Start: 2019-09-17 | End: 2019-09-17

## 2019-09-17 RX ORDER — CEFDINIR 300 MG/1
300 CAPSULE ORAL EVERY 12 HOURS SCHEDULED
Qty: 2 CAPSULE | Refills: 0 | DISCHARGE
Start: 2019-09-17 | End: 2019-09-18

## 2019-09-17 RX ORDER — DOCUSATE SODIUM 100 MG/1
100 CAPSULE, LIQUID FILLED ORAL 2 TIMES DAILY
Qty: 60 CAPSULE | Refills: 0 | DISCHARGE
Start: 2019-09-17 | End: 2019-10-17

## 2019-09-17 RX ADMIN — ASPIRIN 81 MG 81 MG: 81 TABLET ORAL at 09:09

## 2019-09-17 RX ADMIN — ENOXAPARIN SODIUM 40 MG: 40 INJECTION SUBCUTANEOUS at 09:09

## 2019-09-17 RX ADMIN — FAMOTIDINE 20 MG: 20 TABLET ORAL at 09:09

## 2019-09-17 RX ADMIN — LACTULOSE 20 G: 20 SOLUTION ORAL at 10:44

## 2019-09-17 RX ADMIN — TAMSULOSIN HYDROCHLORIDE 0.4 MG: 0.4 CAPSULE ORAL at 09:02

## 2019-09-17 RX ADMIN — NALOXEGOL OXALATE 12.5 MG: 12.5 TABLET, FILM COATED ORAL at 09:03

## 2019-09-17 RX ADMIN — OXYCODONE HYDROCHLORIDE AND ACETAMINOPHEN 1 TABLET: 5; 325 TABLET ORAL at 17:15

## 2019-09-17 RX ADMIN — BISACODYL 10 MG: 10 SUPPOSITORY RECTAL at 10:45

## 2019-09-17 RX ADMIN — CEFDINIR 300 MG: 300 CAPSULE ORAL at 16:33

## 2019-09-17 RX ADMIN — ONDANSETRON 4 MG: 2 INJECTION INTRAMUSCULAR; INTRAVENOUS at 04:06

## 2019-09-17 RX ADMIN — PANTOPRAZOLE SODIUM 40 MG: 40 TABLET, DELAYED RELEASE ORAL at 05:53

## 2019-09-17 RX ADMIN — CEFEPIME 2 G: 2 INJECTION, POWDER, FOR SOLUTION INTRAMUSCULAR; INTRAVENOUS at 04:09

## 2019-09-17 RX ADMIN — SODIUM CHLORIDE, PRESERVATIVE FREE 10 ML: 5 INJECTION INTRAVENOUS at 09:10

## 2019-09-17 RX ADMIN — IPRATROPIUM BROMIDE AND ALBUTEROL SULFATE 3 ML: .5; 3 SOLUTION RESPIRATORY (INHALATION) at 09:09

## 2019-09-17 RX ADMIN — SODIUM CHLORIDE TAB 1 GM 1 G: 1 TAB at 09:02

## 2019-09-17 RX ADMIN — FOLIC ACID 1 MG: 1 TABLET ORAL at 09:02

## 2019-09-17 RX ADMIN — Medication 2 PUFF: at 09:09

## 2019-09-17 RX ADMIN — IPRATROPIUM BROMIDE AND ALBUTEROL SULFATE 3 ML: .5; 3 SOLUTION RESPIRATORY (INHALATION) at 16:13

## 2019-09-17 RX ADMIN — BUSPIRONE HYDROCHLORIDE 10 MG: 10 TABLET ORAL at 09:03

## 2019-09-17 RX ADMIN — OXYCODONE HYDROCHLORIDE AND ACETAMINOPHEN 1 TABLET: 5; 325 TABLET ORAL at 04:06

## 2019-09-17 RX ADMIN — Medication 1000 MCG: at 09:03

## 2019-09-17 RX ADMIN — SENNOSIDES 17.2 MG: 8.6 TABLET, FILM COATED ORAL at 09:02

## 2019-09-17 RX ADMIN — OXYCODONE HYDROCHLORIDE AND ACETAMINOPHEN 1 TABLET: 5; 325 TABLET ORAL at 09:09

## 2019-09-17 ASSESSMENT — PAIN DESCRIPTION - FREQUENCY
FREQUENCY: CONTINUOUS

## 2019-09-17 ASSESSMENT — PAIN SCALES - GENERAL
PAINLEVEL_OUTOF10: 2
PAINLEVEL_OUTOF10: 0
PAINLEVEL_OUTOF10: 2
PAINLEVEL_OUTOF10: 0
PAINLEVEL_OUTOF10: 0
PAINLEVEL_OUTOF10: 4
PAINLEVEL_OUTOF10: 0
PAINLEVEL_OUTOF10: 0
PAINLEVEL_OUTOF10: 4

## 2019-09-17 ASSESSMENT — PAIN DESCRIPTION - ONSET
ONSET: ON-GOING

## 2019-09-17 ASSESSMENT — PAIN DESCRIPTION - PROGRESSION
CLINICAL_PROGRESSION: NOT CHANGED

## 2019-09-17 ASSESSMENT — PAIN DESCRIPTION - PAIN TYPE
TYPE: CHRONIC PAIN

## 2019-09-17 ASSESSMENT — PAIN DESCRIPTION - LOCATION
LOCATION: GENERALIZED
LOCATION: HIP
LOCATION: GENERALIZED

## 2019-09-17 ASSESSMENT — PAIN DESCRIPTION - DESCRIPTORS
DESCRIPTORS: DISCOMFORT
DESCRIPTORS: DISCOMFORT
DESCRIPTORS: ACHING;DISCOMFORT
DESCRIPTORS: DISCOMFORT

## 2019-09-17 ASSESSMENT — PAIN DESCRIPTION - ORIENTATION: ORIENTATION: LEFT

## 2019-09-17 NOTE — PROGRESS NOTES
[facility]    Patient - Rhonda Olivares   MRN -  [id number]   [facility] # - [de-identified]   - 1950      Date of Admission -  2019  4:17 PM  Date of evaluation -  2019  Room - [facility] Primary Care Physician - Taylor Gómez MD       Reason for Consult    Metastatic small cell lung cancer   FTT  Active Hospital Problem List      C/Braulio Cast[address] Problems    Diagnosis Date Noted    Acute and chronic respiratory failure, unspecified whether with hypoxia or hypercapnia ([facility] [address] [J96.20] 2019    Failure to thrive in adult [R62.7] 2019    Primary malignant neoplasm of lung metastatic to other site [facility]) [C34.90]     Bone metastases ([facility] [address] [C79.51] 2019    Smoker [F17.200]     Severe malnutrition ([facility] [address] [E43] 2018     Class: Chronic     HPI   Rhonda Olivares is a 70 y.o. male with SCLC last immunotherapy with admitted for ipilimumab and nivolumab on . After that treatment he was hospitalized for pneumonia vs pneumonitis and treated with antibiotics and prednisone. He came to ED for generalized weakness and left leg pain. Per his wife he has had falls (as many as 4 in the last two weeks and up to 10 in August)    Per the ED note, he has had productive cough and frequent falls at home and his wife is struggling to take care of him. He was seen by RT plan is to start palliative RT to start palliative radiation 10 treatments for total of 3000 Gy. He was seen by Dr. Kailash Mccray on  and plan was to wait another week to continue immunotherapy. Interim  He is still feeling weak. He has gotten 2 RT treatments. He feels that he is slightly better. He denies fever, chills, headache,  chest pain or palpitations, abdominal pain, nausea/vomiting, diarrhea/constipation, new leg pain or swelling, new neurological symptoms.      We discussed SNF to help him get stronger and he and his wife were open to a SNF placement. Discussed with INDU and they are working to determine eligibility. Oncology History    Kamini Acosta is a 70 y.o. male with extensive stage small cell lung cancer. Originally seen by Dr Pati Hooks for L hilar lung mass. He had a break due to medical [facility] isues with [facility] and had lapse in f/u until he saw Dr Saida Lynch and was trying to work out his medical care path but became more SOB. [facility] went to ED at [facility] in January 2019,  chest CT showed large mediastinal lymphadenopathy. Path from brushings came back positive for small cell lung ca. CT of the head on January 24, 2019 showed normal appearance of the brain.  CT of the abdomen on January 25, 2019 showed bilateral pleural metastases and effusions.  In addition there was 2.3 cm low attenuation area in the left lobe liver suspicious for metastases in the right hepatic lobe.  Left adrenal gland was enlarged and an measured 20 mm in the largest dimension there was a 3.8 cm mass in the region of the left iliac chain worrisome for lymph node metastasis.  Due to presentation with SVC the patient started radiation treatment on January 25, 2019 and completed on 02/20/2019.  On January 28, 2019 he received first cycle of chemotherapy with -16 and carboplatin. Overall, he tolerated it reasonably well, mild nausea. After 4 cycles of -16 and carboplatin, the patient underwent staging studies which showed scattered punctate foci of acute infarct throughout the brain with evidence of embolic phenomenon on MRI of the brain on 5/13/19. He was admitted to [facility] for further evaluation. He underwent embolic workup and had negative CARLOS on 5/15/19. Neurology was consulted and recommended repeat MRI in 2 weeks to show stability of a new brain lesions. MRI of the brain on 5/23/19 showed enlarging focal areas of restricted diffusion throughout the brain. In the June 2019 he received whole brain radiation treatment.   He received first treatment with palliative Ipilimumab and Opdivo on July 29, 2019.   Meds    Current Medications    cefdinir  300 mg Oral 2 times per day    metoprolol tartrate  12.5 mg Oral BID    polyethylene glycol  17 g Oral Daily    senna  2 tablet Oral BID    naloxegol  12.5 mg Oral QAM    mometasone-formoterol  2 puff Inhalation BID    aspirin  81 mg Oral Daily    busPIRone  10 mg Oral BID    folic acid  1 mg Oral Daily    [Held by provider] furosemide  40 mg Oral Daily    ipratropium-albuterol  1 vial Inhalation Q4H    sodium chloride  1 g Oral Daily    tamsulosin  0.4 mg Oral Daily    cyanocobalamin  1,000 mcg Oral Daily    sodium chloride flush  10 mL Intravenous 2 times per day    enoxaparin  40 mg Subcutaneous Daily    pantoprazole  40 mg Oral QAM AC    famotidine  20 mg Oral Daily    nicotine  1 patch Transdermal Daily     oxyCODONE-acetaminophen, acetaminophen, albuterol, sodium chloride flush, magnesium hydroxide, ondansetron, potassium chloride **OR** potassium alternative oral replacement **OR** potassium chloride, magnesium sulfate, morphine, melatonin  IV Drips/Infusions    Past Medical History         Diagnosis Date    Arthritis     Cancer ([facility] [address]     lung w/ mets    COPD (chronic obstructive pulmonary disease) (HCC)     Gastric reflux     GERD (gastroesophageal reflux disease)     Hyperlipidemia     Hypertension       Past Surgical History           Procedure Laterality Date    COLONOSCOPY      HERNIA REPAIR  80's    bilateral inguinal    KIDNEY SURGERY      stent placement    [place] [address] INCL FLUOR GDNCE DX W/CELL WASHG SPX N/A 9/7/2018    BRONCHOSCOPY FLUOROSCOPY performed by Tasha Negro MD at [facility]    [place] OFFICE/OUTPT VISIT,PROCEDURE ONLY Right 9/5/2018    RIGHT INGUINAL HERNIA REPAIR performed by Ale Zaragoza MD at [facility] OFFICE/OUTPT [facility] N/A 9/11/2018    EXPLORATORY LAPAROSCOPY  OPEN PROCEDURE, SMALL BOWEL RESECTION performed by Tony Velasco what is noted in HPI  Vitals     height is 5' 8\" (1.727 m) and weight is 132 lb 4.8 oz (60 kg). His oral temperature is 98.4 °F (36.9 °C). His blood pressure is 121/67 and his pulse is 94. His respiration is 18 and oxygen saturation is 92%. O2 Flow Rate (L/min): 2 L/min    Exam   Physical Exam   Constitutional: He is oriented to person, place, and time. HENT:   Head: Normocephalic. Eyes: Pupils are equal, round, and reactive to light. Conjunctivae are normal.   Neck: Normal range of motion. Neck supple. Cardiovascular: Normal rate, regular rhythm, normal heart sounds and intact distal pulses. Pulmonary/Chest: Effort normal and breath sounds normal.   Abdominal: Soft. Bowel sounds are normal.   Musculoskeletal: Normal range of motion. Neurological: He is alert and oriented to person, place, and time. Skin: Skin is warm and dry. Nursing note and vitals reviewed. Labs   CBC  No results for input(s): WBC, RBC, HGB, HCT, MCV, MCH, MCHC, RDW, PLT, MPV in the last 72 hours. BMP  No results for input(s): NA, K, CL, CO2, BUN, CREATININE, GLUCOSE, MG, PHOS, CALCIUM, IONCA, MG in the last 72 hours. LFT  No results for input(s): AST, ALT, ALB, BILITOT, ALKPHOS, LIPASE in the last 72 hours. Invalid input(s): AMYLASE  INR  No results for input(s): INR, PROTIME in the last 72 hours. PTT  No results for input(s): APTT in the last 72 hours. Radiology        Xr Hip Left (2-3 Views)    Result Date: 8/25/2019  PROCEDURE: XR HIP LEFT (2-3 VIEWS) CLINICAL INFORMATION: 50-year-old male with left-sided hip pain. No known injury. COMPARISON: No prior study. TECHNIQUE: AP and frog-leg lateral views were obtained  of the left hip. FINDINGS: There is no acute fracture or dislocation. There is joint space narrowing at the left hip. The left sacroiliac joint appears normal. The pubic symphysis is preserved. No soft tissue abnormalities identified.      Joint space narrowing with no acute fracture or

## 2019-09-17 NOTE — PROGRESS NOTES
I gave report to Charlene Wynne at [facility], then fax transportation forms to [facility]. I fax AVS to [facility] [phone] per  note. Rx of percocet in blue packet, Dr Rolo Turner said Ambien Rx not needed. Wife is aware of discharge. Waiting on [facility] to call me for final discharge time.

## 2019-09-18 ENCOUNTER — OUTSIDE SERVICES (OUTPATIENT)
Dept: FAMILY MEDICINE CLINIC | Age: 69
End: 2019-09-18
Payer: MEDICARE

## 2019-09-18 ENCOUNTER — HOSPITAL ENCOUNTER (OUTPATIENT)
Dept: RADIATION ONCOLOGY | Age: 69
Discharge: HOME | End: 2019-09-18
Attending: RADIOLOGY
Payer: MEDICARE

## 2019-09-18 DIAGNOSIS — J44.9 CHRONIC OBSTRUCTIVE PULMONARY DISEASE, UNSPECIFIED COPD TYPE (HCC): ICD-10-CM

## 2019-09-18 DIAGNOSIS — C79.51 BONE METASTASIS (HCC): Primary | ICD-10-CM

## 2019-09-18 DIAGNOSIS — E43 SEVERE MALNUTRITION (HCC): Chronic | ICD-10-CM

## 2019-09-18 DIAGNOSIS — C79.51 BONE METASTASES (HCC): ICD-10-CM

## 2019-09-18 DIAGNOSIS — C80.1 SMALL CELL CARCINOMA (HCC): Primary | ICD-10-CM

## 2019-09-18 DIAGNOSIS — E87.1 HYPONATREMIA: ICD-10-CM

## 2019-09-18 DIAGNOSIS — I63.10 CEREBROVASCULAR ACCIDENT (CVA) DUE TO EMBOLISM OF PRECEREBRAL ARTERY (HCC): ICD-10-CM

## 2019-09-18 DIAGNOSIS — C34.12 PRIMARY CANCER OF BRONCHUS OF LEFT UPPER LOBE (HCC): ICD-10-CM

## 2019-09-18 DIAGNOSIS — C34.90 PRIMARY MALIGNANT NEOPLASM OF LUNG METASTATIC TO OTHER SITE, UNSPECIFIED LATERALITY (HCC): ICD-10-CM

## 2019-09-18 DIAGNOSIS — F41.1 GENERALIZED ANXIETY DISORDER: ICD-10-CM

## 2019-09-18 PROCEDURE — 77412 RADIATION TX DELIVERY LVL 3: CPT | Performed by: RADIOLOGY

## 2019-09-18 PROCEDURE — 99309 SBSQ NF CARE MODERATE MDM 30: CPT | Performed by: NURSE PRACTITIONER

## 2019-09-18 PROCEDURE — 77387 GUIDANCE FOR RADJ TX DLVR: CPT | Performed by: RADIOLOGY

## 2019-09-19 ENCOUNTER — HOSPITAL ENCOUNTER (OUTPATIENT)
Dept: RADIATION ONCOLOGY | Age: 69
Discharge: HOME | End: 2019-09-19
Attending: RADIOLOGY
Payer: MEDICARE

## 2019-09-19 PROBLEM — R62.7 FAILURE TO THRIVE IN ADULT: Chronic | Status: ACTIVE | Noted: 2019-09-11

## 2019-09-19 PROBLEM — C34.12: Chronic | Status: ACTIVE | Noted: 2019-09-11

## 2019-09-19 PROCEDURE — 77412 RADIATION TX DELIVERY LVL 3: CPT | Performed by: RADIOLOGY

## 2019-09-19 PROCEDURE — 77387 GUIDANCE FOR RADJ TX DLVR: CPT | Performed by: RADIOLOGY

## 2019-09-19 NOTE — PROGRESS NOTES
Dorothy Hay is a 70 y.o. male that is being seen at [facility] for Other (Lung CA)      Dorothy Hay  1950  9/20/19      HPI:  Patient seen and examined at bedside. History obtained from patient and chart. Patient was recently admitted to [facility] for treatment of lung cancer. Per last physician note A/P:     Acute Hypoxic Respiratory Insufficiency: Recently treated for pneumonitis. Less likely pneumonia but improving on antibiotics. Seems stable, monitor.      Sepsis/PNA: Had one fever and tachy. Suspect infiltrates are more to do with recent pneumonitis. However, improving with really only abx on board. Will transition to PO and dc on 7 days.       Extensive Stage Small Cell Lung Cancer: diffuse mets (liver, bone, brain, ?pancreas?). Has had issues with pancreatitis recently, so could be post inflammatory lesion. Now on palliative nivo/ipi (only received one round on 7/29/19).   Had previously completed WBI, and was also given  and carbo in 1/2019.   - Poor Prognosis.  Previous provider reports, \"Discussed with patient. Explained he is far too weak for aggressive treatment. Explained cancer is alright disseminated and will continue to grow. He states he will talk with his wife tomorrow about goals of care. Hospice came by to discuss philosophy. He would like to pursue rehab and try to resume treatment in the future\". - Oncology consult - appreciate recs     Cancer related pain (back and hip) - has known sclerotic lesions in low back and pelvis - patient felt groggy on methadone in past. Continue tylenol and nsaids. Continue percocet, increase to q4hrs prn. Getting palliative radiation.     Anemia, Normocytic - monitor.     Severe malnutrition - Body mass index is 19.72 kg/m²., POA, likely related to malignancy.      Chronic Hydronephrosis of right kidney - noted. No MELISSA.      History of Partial Small Bowel Resection     Tobacco Abuse     Generalized Weakness - due to malignancy.  PT/OT -> dispo to

## 2019-09-20 ENCOUNTER — HOSPITAL ENCOUNTER (OUTPATIENT)
Dept: RADIATION ONCOLOGY | Age: 69
Discharge: HOME | End: 2019-09-20
Attending: RADIOLOGY
Payer: MEDICARE

## 2019-09-20 ENCOUNTER — OUTSIDE SERVICES (OUTPATIENT)
Dept: FAMILY MEDICINE CLINIC | Age: 69
End: 2019-09-20
Payer: MEDICARE

## 2019-09-20 DIAGNOSIS — C34.12 PRIMARY CANCER OF BRONCHUS OF LEFT UPPER LOBE (HCC): Chronic | ICD-10-CM

## 2019-09-20 DIAGNOSIS — J96.21 ACUTE ON CHRONIC RESPIRATORY FAILURE WITH HYPOXIA AND HYPERCAPNIA (HCC): ICD-10-CM

## 2019-09-20 DIAGNOSIS — J18.9 PNEUMONITIS: ICD-10-CM

## 2019-09-20 DIAGNOSIS — E43 SEVERE MALNUTRITION (HCC): Primary | Chronic | ICD-10-CM

## 2019-09-20 DIAGNOSIS — C34.90 PRIMARY MALIGNANT NEOPLASM OF LUNG METASTATIC TO OTHER SITE, UNSPECIFIED LATERALITY (HCC): Chronic | ICD-10-CM

## 2019-09-20 DIAGNOSIS — J96.22 ACUTE ON CHRONIC RESPIRATORY FAILURE WITH HYPOXIA AND HYPERCAPNIA (HCC): ICD-10-CM

## 2019-09-20 DIAGNOSIS — I10 ESSENTIAL HYPERTENSION: ICD-10-CM

## 2019-09-20 DIAGNOSIS — R62.7 FAILURE TO THRIVE IN ADULT: ICD-10-CM

## 2019-09-20 PROCEDURE — 99306 1ST NF CARE HIGH MDM 50: CPT | Performed by: FAMILY MEDICINE

## 2019-09-20 PROCEDURE — 77387 GUIDANCE FOR RADJ TX DLVR: CPT | Performed by: RADIOLOGY

## 2019-09-20 PROCEDURE — 77412 RADIATION TX DELIVERY LVL 3: CPT | Performed by: RADIOLOGY

## 2019-09-20 ASSESSMENT — ENCOUNTER SYMPTOMS
COUGH: 1
BACK PAIN: 0
EYE PAIN: 0
SORE THROAT: 0
VOMITING: 0
SHORTNESS OF BREATH: 1
BLOOD IN STOOL: 0
NAUSEA: 0
EYE REDNESS: 0
CONSTIPATION: 0
DIARRHEA: 0
EYE DISCHARGE: 0
WHEEZING: 0

## 2019-09-22 NOTE — DISCHARGE SUMMARY
repeating CXR in future will not  or prognosis, so no future imaging will be obtained as of now. Please see below or view chart for more details from hospital course. Discharge Diagnoses:    Acute Hypoxic Respiratory Insufficiency: Recently treated for pneumonitis. Less likely pneumonia but improving on antibiotics. Stable, monitor - dc with supp O2.      Sepsis/PNA: Had one fever and tachy. Suspect infiltrates are more to do with recent pneumonitis. However, improving with really only abx on board. Will transition to PO and dc on 7 days.      Extensive Stage Small Cell Lung Cancer: diffuse mets (liver, bone, brain, ?pancreas?). Has had issues with pancreatitis recently, so could be post inflammatory lesion. Now on palliative nivo/ipi (only received one round on 7/29/19).   Had previously completed WBI, and was also given  and carbo in 1/2019.   - Poor Prognosis.  Previous provider reports, \"Discussed with patient. Explained he is far too weak for aggressive treatment. Explained cancer is alright disseminated and will continue to grow. He states he will talk with his wife tomorrow about goals of care. Hospice came by to discuss philosophy. He would like to pursue rehab and try to resume treatment in the future\". - Oncology consult - appreciate recs     Cancer related pain (back and hip) - has known sclerotic lesions in low back and pelvis - patient felt groggy on methadone in past. Continue tylenol and nsaids. Continue percocet, increase to q4hrs prn. Getting palliative radiation.     Anemia, Normocytic - monitor.     Severe malnutrition - Body mass index is 19.72 kg/m²., POA, likely related to malignancy. Dietary has seen.      Chronic Hydronephrosis of right kidney - noted. No MELISSA.      History of Partial Small Bowel Resection     Tobacco Abuse     Generalized Weakness - due to malignancy.  PT/OT -> dispo to snf.          The patient was seen and examined on day of discharge and this sure you understand how and when to take each. * acetaminophen 325 MG tablet  Commonly known as:  TYLENOL  Take 2 tablets by mouth every 4 hours as needed for Pain  What changed: You were already taking a medication with the same name, and this prescription was added. Make sure you understand how and when to take each.     lidocaine 4 % external patch  Place 1 patch onto the skin daily  What changed:  additional instructions     senna 8.6 MG tablet  Commonly known as:  SENOKOT  Take 1 tablet by mouth 2 times daily For constipation prevention  What changed:  when to take this         * This list has 2 medication(s) that are the same as other medications prescribed for you. Read the directions carefully, and ask your doctor or other care provider to review them with you.             CONTINUE taking these medications    aspirin 81 MG chewable tablet  Take 1 tablet by mouth daily     busPIRone 10 MG tablet  Commonly known as:  BUSPAR     cyanocobalamin 1000 MCG tablet  Take 1 tablet by mouth daily     folic acid 1 MG tablet  Commonly known as:  FOLVITE  Take 1 tablet by mouth daily     ipratropium-albuterol 0.5-2.5 (3) MG/3ML Soln nebulizer solution  Commonly known as:  DUONEB     metoprolol tartrate 25 MG tablet  Commonly known as:  LOPRESSOR     naproxen 250 MG tablet  Commonly known as:  NAPROSYN  Take 1 tablet by mouth 2 times daily as needed for Pain     NUTRITIONAL SUPPLEMENT Liqd  Take 1 Bottle by mouth 3 times daily     omeprazole 20 MG delayed release capsule  Commonly known as:  PRILOSEC     ondansetron 4 MG tablet  Commonly known as:  ZOFRAN  Take 1 tablet by mouth every 8 hours as needed for Nausea or Vomiting     polyethylene glycol packet  Commonly known as:  GLYCOLAX  Take 17 g by mouth daily     ranitidine 150 MG tablet  Commonly known as:  ZANTAC     sodium chloride 1 g tablet  Take 1 tablet by mouth 3 times daily (with meals)     SYMBICORT 160-4.5 MCG/ACT Aero  Generic drug: budesonide-formoterol     tamsulosin 0.4 MG capsule  Commonly known as:  FLOMAX  Take 1 capsule by mouth daily     VENTOLIN  (90 Base) MCG/ACT inhaler  Generic drug:  albuterol sulfate HFA        STOP taking these medications    ATROVENT IN     oxyCODONE 5 MG immediate release tablet  Commonly known as:  ROXICODONE     predniSONE 20 MG tablet  Commonly known as:  Michael Dates your doctor about these medications    cefdinir 300 MG capsule  Commonly known as:  OMNICEF  Take 1 capsule by mouth every 12 hours for 1 day  Ask about: Should I take this medication?     oxyCODONE 5 MG immediate release tablet  Commonly known as:  ROXICODONE  Take 1 tablet by mouth every 6 hours as needed for Pain for up to 3 days. Ask about: Should I take this medication? Where to Get Your Medications      You can get these medications from any pharmacy    Bring a paper prescription for each of these medications  · oxyCODONE 5 MG immediate release tablet     Information about where to get these medications is not yet available    Ask your nurse or doctor about these medications  · acetaminophen 325 MG tablet  · cefdinir 300 MG capsule  · docusate sodium 100 MG capsule  · lidocaine 4 % external patch  · melatonin 3 MG Tabs tablet  · nicotine 14 MG/24HR  · polyethylene glycol packet  · senna 8.6 MG tablet         Time Spent on discharge is roughly 35 minutes in the examination, evaluation, counseling and review of medications and discharge plan. Thank you Celio Mclaughlin MD for the opportunity to be involved in this patient's care.     Signed:    Electronically signed by Sweta Mcfadden DO on 9/22/2019 at 9:05 AM

## 2019-09-23 ENCOUNTER — OUTSIDE SERVICES (OUTPATIENT)
Dept: FAMILY MEDICINE CLINIC | Age: 69
End: 2019-09-23
Payer: MEDICARE

## 2019-09-23 ENCOUNTER — HOSPITAL ENCOUNTER (OUTPATIENT)
Dept: RADIATION ONCOLOGY | Age: 69
Discharge: HOME | End: 2019-09-23
Attending: RADIOLOGY
Payer: MEDICARE

## 2019-09-23 DIAGNOSIS — R11.0 NAUSEA: ICD-10-CM

## 2019-09-23 PROCEDURE — 77387 GUIDANCE FOR RADJ TX DLVR: CPT | Performed by: RADIOLOGY

## 2019-09-23 PROCEDURE — 77412 RADIATION TX DELIVERY LVL 3: CPT | Performed by: RADIOLOGY

## 2019-09-23 PROCEDURE — 99308 SBSQ NF CARE LOW MDM 20: CPT | Performed by: NURSE PRACTITIONER

## 2019-09-23 PROCEDURE — 77336 RADIATION PHYSICS CONSULT: CPT | Performed by: RADIOLOGY

## 2019-09-24 ENCOUNTER — HOSPITAL ENCOUNTER (OUTPATIENT)
Dept: RADIATION ONCOLOGY | Age: 69
Discharge: HOME | End: 2019-09-24
Attending: RADIOLOGY
Payer: MEDICARE

## 2019-09-24 PROBLEM — R11.0 NAUSEA: Status: ACTIVE | Noted: 2019-09-24

## 2019-09-24 PROCEDURE — 77412 RADIATION TX DELIVERY LVL 3: CPT | Performed by: RADIOLOGY

## 2019-09-24 PROCEDURE — 77387 GUIDANCE FOR RADJ TX DLVR: CPT | Performed by: RADIOLOGY

## 2019-09-24 NOTE — PROGRESS NOTES
[address] Progress Note    NAME: Sukhdeep Vega  DATE: 19  ROOM #: D 56-1  CODE STATUS: Full Code  CHIEF COMPLAINT:  Acute visit regarding poorly controlled nauses  : 1950  ADMISSION DATE: 19  SKILLED PATIENT: Yes    History obtained from chart review, the patient and staff. SUBJECTIVE:  HPI: Sukhdeep Vega is a 70 y.o. male. Pt seen and examined at bedside this morning for c/o poorly controlled nausea. He is also asking questions about pain management. He is alert and oriented and is in no distress. He denies pain and sob at rest.  He is a good historian and denies questions or concerns at this time. He is admitted to [facility] for PT/OT services with plans to return to home with his wife. Patient was seen at [facility] from  through  and treated for severe malnutrition, bone metastases, primary malignant neoplasm of lung metastatic to other site, failure to thrive in adult, acute and chronic respiratory failure. Staff update that he maintains Full Code and wants to continue cancer treatment. He had declined Hospice consultation during hospitalization. Oncology history:  Saranya Shall a 70 y. o. male with extensive stage small cell lung cancer. Originally seen by Dr Natasha Ca for L hilar lung mass. He had a break due to medical insurance isues with [facility] and had lapse in f/u until he saw Dr Tena Mclaughlin and was trying to work out his medical care path but became more SOB. Slim Valentine went to ED at [facility] in 2019,  chest CT showed large mediastinal lymphadenopathy. Path from brushings came back positive for small cell lung ca.  CT of the head on 2019 showed normal appearance of the brain.  CT of the abdomen on 2019 showed bilateral pleural metastases and effusions.  In addition there was 2.3 cm low attenuation area in the left lobe liver suspicious for metastases in the right hepatic lobe.  Left adrenal gland was enlarged and an measured 20 mm in the largest dimension there was a 3.8 cm mass in the region of the left iliac chain worrisome for lymph node metastasis.  Due to presentation with SVC the patient started radiation treatment on January 25, 2019 and completed on 02/20/2019.  On January 28, 2019 he received first cycle of chemotherapy with -16 and carboplatin. Overall, he tolerated it reasonably well, mild nausea. After 4 cycles of -16 and carboplatin, the patient underwent staging studies which showed scattered punctate foci of acute infarct throughout the brain with evidence of embolic phenomenon on MRI of the brain on 5/13/19. He was admitted to [facility] for further evaluation. Pat Morejon underwent embolic workup and had negative CARLOS on 5/15/19.  Neurology was consulted and recommended repeat MRI in 2 weeks to show stability of a new brain lesions. MRI of the brain on 5/23/19 showed enlarging focal areas of restricted diffusion throughout the brain.  In the June 2019 he received whole brain radiation treatment. He received first treatment with palliative Ipilimumab and Opdivo on July 29, 2019. Allergies and Medications were reviewed through the [facility] EMR. All medications reviewed and reconciled, including OTC and herbal medications.      Patient Active Problem List    Diagnosis Date Noted    Nausea 09/24/2019    Acute and chronic respiratory failure, unspecified whether with hypoxia or hypercapnia ([address] 09/12/2019    Primary cancer of bronchus of left upper lobe ([address] 09/11/2019    Failure to thrive in adult 09/11/2019    Pneumonitis     Primary malignant neoplasm of lung metastatic to other site [facility])     Multifocal pneumonia 08/25/2019    Hypoxia 08/25/2019    Other chronic pancreatitis ([address] 08/25/2019    Acute pancreatitis 08/12/2019    Constipation 08/08/2019    Bone metastases ([address] 06/07/2019    Hyponatremia 60/27/3539    Embolic cerebrovascular disease     Acute CVA (cerebrovascular accident) ([address] 05/14/2019    physiologic,  no rebound or guarding, no masses or hernias noted. Liver and spleen without enlargement. Extremities: no cyanosis, clubbing or edema of the lower extremities  Musculoskeletal: No joint swelling or gross deformity; decreased muscle tone. Neuro:  Alert, 4/5 strength globally and symmetrically, normal speech, no focal findings or movement disorder noted  Psych:  Normal affect without evidence of depression or anxiety, insight and judgement are intact, memory appears intact. Skin: pale, warm and dry, no rash or erythema  Lymph:  No cervical, auricular or supraclavicular lymph nodes palpated    ASSESSMENT & PLAN  1. Small cell carcinoma ([address]  Continues with radiation therapy at [address] [place] [address]. Patient was seen this morning after treatment. He denies nausea, pain, sob. He appears fatigued and states he is looking forward to lunch, as he did not eat breakfast this morning. Continue supplemental oxygen, 2 liters per nasal cannula, for sob with exertion. Discussed benefits of supplemental oxygen with patient. Continue Cefdinir as ordered at discharge from [facility]. Continue Naproxen, Roxicodone, Lidocaine patch for pain. Encourage Guaifenesin for thick secretions. Follow up with Delmi Aguilar MD (Oncology) on 9/26/2019; at 9:20 am   Follow up with [facility] Bone Scan Whole Body on 9/30/2019; arrive at 9:30 am, test starts at 10:00am, scan is done at 1:00 pm     2. Acute on chronic nausea  Today, he states nausea is poorly controlled. Increase dosage of Zofran to 8 mg po every 8 hours as needed. Increase dosage of Zantac to 150 mg po twice daily. Encourage patient to take medications with food. Continue to monitor. Disposition:  Assessment and plan as stated above. Follow up per routine and as warranted. Plan of care reviewed with Dr. Sonny Amaya DO.   Electronically signed by Emily Babin on 9/24/2019 at 7:01 PM

## 2019-09-25 ENCOUNTER — HOSPITAL ENCOUNTER (OUTPATIENT)
Dept: RADIATION ONCOLOGY | Age: 69
Discharge: HOME | End: 2019-09-25
Attending: RADIOLOGY
Payer: MEDICARE

## 2019-09-25 ENCOUNTER — OUTSIDE SERVICES (OUTPATIENT)
Dept: FAMILY MEDICINE CLINIC | Age: 69
End: 2019-09-25
Payer: MEDICARE

## 2019-09-25 DIAGNOSIS — R11.0 NAUSEA: Primary | ICD-10-CM

## 2019-09-25 PROCEDURE — 99308 SBSQ NF CARE LOW MDM 20: CPT | Performed by: NURSE PRACTITIONER

## 2019-09-25 PROCEDURE — 77387 GUIDANCE FOR RADJ TX DLVR: CPT | Performed by: RADIOLOGY

## 2019-09-25 PROCEDURE — 77412 RADIATION TX DELIVERY LVL 3: CPT | Performed by: RADIOLOGY

## 2019-09-30 ENCOUNTER — HOSPITAL ENCOUNTER (OUTPATIENT)
Dept: NUCLEAR MEDICINE | Age: 69
Discharge: HOME | End: 2019-09-30
Payer: MEDICARE

## 2019-09-30 DIAGNOSIS — C79.51 BONE METASTASIS (HCC): ICD-10-CM

## 2019-09-30 DIAGNOSIS — C80.1 SMALL CELL CARCINOMA (HCC): ICD-10-CM

## 2019-09-30 PROCEDURE — 78306 BONE IMAGING WHOLE BODY: CPT

## 2019-09-30 PROCEDURE — A9503 TC99M MEDRONATE: HCPCS | Performed by: NURSE PRACTITIONER

## 2019-09-30 PROCEDURE — 3430000000 HC RX DIAGNOSTIC RADIOPHARMACEUTICAL: Performed by: NURSE PRACTITIONER

## 2019-09-30 RX ORDER — TC 99M MEDRONATE 20 MG/10ML
29.9 INJECTION, POWDER, LYOPHILIZED, FOR SOLUTION INTRAVENOUS
Status: COMPLETED | OUTPATIENT
Start: 2019-09-30 | End: 2019-09-30

## 2019-09-30 RX ADMIN — TC 99M MEDRONATE 29.9 MILLICURIE: 20 INJECTION, POWDER, LYOPHILIZED, FOR SOLUTION INTRAVENOUS at 10:05

## 2019-10-18 ENCOUNTER — APPOINTMENT (OUTPATIENT)
Dept: GENERAL RADIOLOGY | Age: 69
DRG: 871 | End: 2019-10-18
Payer: MEDICARE

## 2019-10-18 ENCOUNTER — HOSPITAL ENCOUNTER (INPATIENT)
Age: 69
LOS: 4 days | Discharge: HOSPICE | DRG: 871 | End: 2019-10-22
Attending: EMERGENCY MEDICINE | Admitting: FAMILY MEDICINE
Payer: MEDICARE

## 2019-10-18 DIAGNOSIS — A41.9 SEVERE SEPSIS (HCC): ICD-10-CM

## 2019-10-18 DIAGNOSIS — I31.39 PERICARDIAL EFFUSION: ICD-10-CM

## 2019-10-18 DIAGNOSIS — J18.9 PNEUMONIA DUE TO ORGANISM: Primary | ICD-10-CM

## 2019-10-18 DIAGNOSIS — R65.20 SEVERE SEPSIS (HCC): ICD-10-CM

## 2019-10-18 DIAGNOSIS — G89.3 CANCER RELATED PAIN: ICD-10-CM

## 2019-10-18 DIAGNOSIS — C80.1 SMALL CELL CARCINOMA (HCC): ICD-10-CM

## 2019-10-18 DIAGNOSIS — C79.51 BONE METASTASES (HCC): ICD-10-CM

## 2019-10-18 LAB
ALBUMIN SERPL-MCNC: 2.4 G/DL (ref 3.5–5.1)
ALLEN TEST: ABNORMAL
ALP BLD-CCNC: 197 U/L (ref 38–126)
ALT SERPL-CCNC: 13 U/L (ref 11–66)
ANION GAP SERPL CALCULATED.3IONS-SCNC: 13 MEQ/L (ref 8–16)
AST SERPL-CCNC: 17 U/L (ref 5–40)
BACTERIA: ABNORMAL /HPF
BASE EXCESS (CALCULATED): 2.1 MMOL/L (ref -2.5–2.5)
BASOPHILS # BLD: 0 %
BASOPHILS ABSOLUTE: 0 THOU/MM3 (ref 0–0.1)
BILIRUB SERPL-MCNC: 0.4 MG/DL (ref 0.3–1.2)
BILIRUBIN URINE: ABNORMAL
BLOOD, URINE: NEGATIVE
BUN BLDV-MCNC: 13 MG/DL (ref 7–22)
CALCIUM SERPL-MCNC: 8.5 MG/DL (ref 8.5–10.5)
CASTS 2: ABNORMAL /LPF
CASTS UA: ABNORMAL /LPF
CHARACTER, URINE: CLEAR
CHLORIDE BLD-SCNC: 98 MEQ/L (ref 98–111)
CO2: 23 MEQ/L (ref 23–33)
COLLECTED BY:: ABNORMAL
COLOR: ABNORMAL
CREAT SERPL-MCNC: 0.4 MG/DL (ref 0.4–1.2)
CRYSTALS, UA: ABNORMAL
DEVICE: ABNORMAL
DIFFERENTIAL, MANUAL: NORMAL
EKG ATRIAL RATE: 139 BPM
EKG P AXIS: 86 DEGREES
EKG P-R INTERVAL: 134 MS
EKG Q-T INTERVAL: 352 MS
EKG QRS DURATION: 116 MS
EKG QTC CALCULATION (BAZETT): 535 MS
EKG R AXIS: 25 DEGREES
EKG T AXIS: 86 DEGREES
EKG VENTRICULAR RATE: 139 BPM
EOSINOPHIL # BLD: 0 %
EOSINOPHILS ABSOLUTE: 0 THOU/MM3 (ref 0–0.4)
EPITHELIAL CELLS, UA: ABNORMAL /HPF
ERYTHROCYTE [DISTWIDTH] IN BLOOD BY AUTOMATED COUNT: 17.2 % (ref 11.5–14.5)
ERYTHROCYTE [DISTWIDTH] IN BLOOD BY AUTOMATED COUNT: 60.1 FL (ref 35–45)
GFR SERPL CREATININE-BSD FRML MDRD: > 90 ML/MIN/1.73M2
GLUCOSE BLD-MCNC: 131 MG/DL (ref 70–108)
GLUCOSE URINE: NEGATIVE MG/DL
HCO3: 25 MMOL/L (ref 23–28)
HCT VFR BLD CALC: 36.9 % (ref 42–52)
HEMOGLOBIN: 11.2 GM/DL (ref 14–18)
ICTOTEST: NEGATIVE
IFIO2: 6
KETONES, URINE: NEGATIVE
LACTIC ACID, SEPSIS: 2.2 MMOL/L (ref 0.5–1.9)
LEUKOCYTE ESTERASE, URINE: ABNORMAL
LYMPHOCYTES # BLD: 9 %
LYMPHOCYTES ABSOLUTE: 0.8 THOU/MM3 (ref 1–4.8)
MAGNESIUM: 1.8 MG/DL (ref 1.6–2.4)
MCH RBC QN AUTO: 28.9 PG (ref 26–33)
MCHC RBC AUTO-ENTMCNC: 30.4 GM/DL (ref 32.2–35.5)
MCV RBC AUTO: 95.3 FL (ref 80–94)
MISCELLANEOUS 2: ABNORMAL
MONOCYTES # BLD: 2 %
MONOCYTES ABSOLUTE: 0.2 THOU/MM3 (ref 0.4–1.3)
NITRITE, URINE: NEGATIVE
NUCLEATED RED BLOOD CELLS: 0 /100 WBC
O2 SATURATION: 97 %
OSMOLALITY CALCULATION: 270.2 MOSMOL/KG (ref 275–300)
PCO2: 32 MMHG (ref 35–45)
PH BLOOD GAS: 7.5 (ref 7.35–7.45)
PH UA: 6.5 (ref 5–9)
PLATELET # BLD: 298 THOU/MM3 (ref 130–400)
PLATELET ESTIMATE: ADEQUATE
PMV BLD AUTO: 8.2 FL (ref 9.4–12.4)
PO2: 77 MMHG (ref 71–104)
POTASSIUM REFLEX MAGNESIUM: 3.5 MEQ/L (ref 3.5–5.2)
PRO-BNP: 284.6 PG/ML (ref 0–900)
PROCALCITONIN: 0.64 NG/ML (ref 0.01–0.09)
PROTEIN UA: ABNORMAL
RBC # BLD: 3.87 MILL/MM3 (ref 4.7–6.1)
RBC URINE: ABNORMAL /HPF
RENAL EPITHELIAL, UA: ABNORMAL
SEG NEUTROPHILS: 89 %
SEGMENTED NEUTROPHILS ABSOLUTE COUNT: 8.2 THOU/MM3 (ref 1.8–7.7)
SODIUM BLD-SCNC: 134 MEQ/L (ref 135–145)
SOURCE, BLOOD GAS: ABNORMAL
SPECIFIC GRAVITY, URINE: 1.02 (ref 1–1.03)
TOTAL PROTEIN: 6.1 G/DL (ref 6.1–8)
TROPONIN T: < 0.01 NG/ML
UROBILINOGEN, URINE: 4 EU/DL (ref 0–1)
WBC # BLD: 9.2 THOU/MM3 (ref 4.8–10.8)
WBC UA: ABNORMAL /HPF
YEAST: ABNORMAL

## 2019-10-18 PROCEDURE — 93005 ELECTROCARDIOGRAM TRACING: CPT | Performed by: EMERGENCY MEDICINE

## 2019-10-18 PROCEDURE — 2580000003 HC RX 258: Performed by: EMERGENCY MEDICINE

## 2019-10-18 PROCEDURE — 2060000000 HC ICU INTERMEDIATE R&B

## 2019-10-18 PROCEDURE — 51702 INSERT TEMP BLADDER CATH: CPT

## 2019-10-18 PROCEDURE — 87040 BLOOD CULTURE FOR BACTERIA: CPT

## 2019-10-18 PROCEDURE — 83735 ASSAY OF MAGNESIUM: CPT

## 2019-10-18 PROCEDURE — 94761 N-INVAS EAR/PLS OXIMETRY MLT: CPT

## 2019-10-18 PROCEDURE — 99285 EMERGENCY DEPT VISIT HI MDM: CPT

## 2019-10-18 PROCEDURE — 81001 URINALYSIS AUTO W/SCOPE: CPT

## 2019-10-18 PROCEDURE — 83880 ASSAY OF NATRIURETIC PEPTIDE: CPT

## 2019-10-18 PROCEDURE — 82803 BLOOD GASES ANY COMBINATION: CPT

## 2019-10-18 PROCEDURE — 80053 COMPREHEN METABOLIC PANEL: CPT

## 2019-10-18 PROCEDURE — 83605 ASSAY OF LACTIC ACID: CPT

## 2019-10-18 PROCEDURE — 36415 COLL VENOUS BLD VENIPUNCTURE: CPT

## 2019-10-18 PROCEDURE — 85025 COMPLETE CBC W/AUTO DIFF WBC: CPT

## 2019-10-18 PROCEDURE — 99223 1ST HOSP IP/OBS HIGH 75: CPT | Performed by: PHYSICIAN ASSISTANT

## 2019-10-18 PROCEDURE — 84145 PROCALCITONIN (PCT): CPT

## 2019-10-18 PROCEDURE — 36600 WITHDRAWAL OF ARTERIAL BLOOD: CPT

## 2019-10-18 PROCEDURE — 6360000002 HC RX W HCPCS: Performed by: EMERGENCY MEDICINE

## 2019-10-18 PROCEDURE — 2700000000 HC OXYGEN THERAPY PER DAY

## 2019-10-18 PROCEDURE — 84484 ASSAY OF TROPONIN QUANT: CPT

## 2019-10-18 PROCEDURE — 71045 X-RAY EXAM CHEST 1 VIEW: CPT

## 2019-10-18 RX ORDER — SODIUM CHLORIDE 9 MG/ML
INJECTION, SOLUTION INTRAVENOUS CONTINUOUS
Status: DISCONTINUED | OUTPATIENT
Start: 2019-10-18 | End: 2019-10-19

## 2019-10-18 RX ORDER — KETOROLAC TROMETHAMINE 30 MG/ML
15 INJECTION, SOLUTION INTRAMUSCULAR; INTRAVENOUS EVERY 6 HOURS PRN
Status: DISCONTINUED | OUTPATIENT
Start: 2019-10-18 | End: 2019-10-22 | Stop reason: HOSPADM

## 2019-10-18 RX ORDER — 0.9 % SODIUM CHLORIDE 0.9 %
30 INTRAVENOUS SOLUTION INTRAVENOUS ONCE
Status: COMPLETED | OUTPATIENT
Start: 2019-10-18 | End: 2019-10-19

## 2019-10-18 RX ADMIN — PIPERACILLIN AND TAZOBACTAM 3.38 G: 3; .375 INJECTION, POWDER, LYOPHILIZED, FOR SOLUTION INTRAVENOUS at 22:57

## 2019-10-18 RX ADMIN — VANCOMYCIN HYDROCHLORIDE 1000 MG: 1 INJECTION, POWDER, LYOPHILIZED, FOR SOLUTION INTRAVENOUS at 23:25

## 2019-10-18 RX ADMIN — SODIUM CHLORIDE 2052 ML: 9 INJECTION, SOLUTION INTRAVENOUS at 21:47

## 2019-10-18 ASSESSMENT — PAIN DESCRIPTION - DESCRIPTORS: DESCRIPTORS: ACHING

## 2019-10-18 ASSESSMENT — ENCOUNTER SYMPTOMS
COUGH: 0
BACK PAIN: 1
NAUSEA: 0
DIARRHEA: 0
VOMITING: 0
SORE THROAT: 0
SHORTNESS OF BREATH: 1
BLOOD IN STOOL: 0
WHEEZING: 0
ABDOMINAL PAIN: 0

## 2019-10-18 ASSESSMENT — PAIN DESCRIPTION - ORIENTATION: ORIENTATION: LEFT

## 2019-10-18 ASSESSMENT — PAIN DESCRIPTION - FREQUENCY: FREQUENCY: CONTINUOUS

## 2019-10-18 ASSESSMENT — PAIN SCALES - GENERAL: PAINLEVEL_OUTOF10: 8

## 2019-10-18 ASSESSMENT — PAIN DESCRIPTION - LOCATION: LOCATION: HIP

## 2019-10-18 ASSESSMENT — PAIN DESCRIPTION - PAIN TYPE: TYPE: ACUTE PAIN

## 2019-10-18 ASSESSMENT — PAIN DESCRIPTION - PROGRESSION: CLINICAL_PROGRESSION: GRADUALLY WORSENING

## 2019-10-18 ASSESSMENT — PAIN DESCRIPTION - ONSET: ONSET: ON-GOING

## 2019-10-19 ENCOUNTER — APPOINTMENT (OUTPATIENT)
Dept: CT IMAGING | Age: 69
DRG: 871 | End: 2019-10-19
Payer: MEDICARE

## 2019-10-19 PROBLEM — E87.6 HYPOKALEMIA: Status: ACTIVE | Noted: 2019-10-19

## 2019-10-19 LAB
ANION GAP SERPL CALCULATED.3IONS-SCNC: 9 MEQ/L (ref 8–16)
BUN BLDV-MCNC: 11 MG/DL (ref 7–22)
CALCIUM SERPL-MCNC: 7.5 MG/DL (ref 8.5–10.5)
CHLORIDE BLD-SCNC: 103 MEQ/L (ref 98–111)
CO2: 22 MEQ/L (ref 23–33)
CREAT SERPL-MCNC: 0.4 MG/DL (ref 0.4–1.2)
ERYTHROCYTE [DISTWIDTH] IN BLOOD BY AUTOMATED COUNT: 17.2 % (ref 11.5–14.5)
ERYTHROCYTE [DISTWIDTH] IN BLOOD BY AUTOMATED COUNT: 59.7 FL (ref 35–45)
GFR SERPL CREATININE-BSD FRML MDRD: > 90 ML/MIN/1.73M2
GLUCOSE BLD-MCNC: 140 MG/DL (ref 70–108)
HCT VFR BLD CALC: 30.1 % (ref 42–52)
HEMOGLOBIN: 9.2 GM/DL (ref 14–18)
LACTIC ACID, SEPSIS: 1 MMOL/L (ref 0.5–1.9)
MAGNESIUM: 1.7 MG/DL (ref 1.6–2.4)
MAGNESIUM: 1.7 MG/DL (ref 1.6–2.4)
MCH RBC QN AUTO: 29.1 PG (ref 26–33)
MCHC RBC AUTO-ENTMCNC: 30.6 GM/DL (ref 32.2–35.5)
MCV RBC AUTO: 95.3 FL (ref 80–94)
PLATELET # BLD: 259 THOU/MM3 (ref 130–400)
PMV BLD AUTO: 8.7 FL (ref 9.4–12.4)
POTASSIUM REFLEX MAGNESIUM: 3.4 MEQ/L (ref 3.5–5.2)
RBC # BLD: 3.16 MILL/MM3 (ref 4.7–6.1)
SODIUM BLD-SCNC: 134 MEQ/L (ref 135–145)
VANCOMYCIN RESISTANT ENTEROCOCCUS: NEGATIVE
WBC # BLD: 8.4 THOU/MM3 (ref 4.8–10.8)

## 2019-10-19 PROCEDURE — 6370000000 HC RX 637 (ALT 250 FOR IP): Performed by: INTERNAL MEDICINE

## 2019-10-19 PROCEDURE — 6370000000 HC RX 637 (ALT 250 FOR IP): Performed by: PHYSICIAN ASSISTANT

## 2019-10-19 PROCEDURE — 85027 COMPLETE CBC AUTOMATED: CPT

## 2019-10-19 PROCEDURE — 83605 ASSAY OF LACTIC ACID: CPT

## 2019-10-19 PROCEDURE — 6360000002 HC RX W HCPCS: Performed by: PHYSICIAN ASSISTANT

## 2019-10-19 PROCEDURE — 2060000000 HC ICU INTERMEDIATE R&B

## 2019-10-19 PROCEDURE — 71275 CT ANGIOGRAPHY CHEST: CPT

## 2019-10-19 PROCEDURE — 2709999900 HC NON-CHARGEABLE SUPPLY

## 2019-10-19 PROCEDURE — 94640 AIRWAY INHALATION TREATMENT: CPT

## 2019-10-19 PROCEDURE — 94760 N-INVAS EAR/PLS OXIMETRY 1: CPT

## 2019-10-19 PROCEDURE — 2700000000 HC OXYGEN THERAPY PER DAY

## 2019-10-19 PROCEDURE — 2580000003 HC RX 258: Performed by: PHYSICIAN ASSISTANT

## 2019-10-19 PROCEDURE — 83735 ASSAY OF MAGNESIUM: CPT

## 2019-10-19 PROCEDURE — 87081 CULTURE SCREEN ONLY: CPT

## 2019-10-19 PROCEDURE — 94761 N-INVAS EAR/PLS OXIMETRY MLT: CPT

## 2019-10-19 PROCEDURE — 87184 SC STD DISK METHOD PER PLATE: CPT

## 2019-10-19 PROCEDURE — 99233 SBSQ HOSP IP/OBS HIGH 50: CPT | Performed by: INTERNAL MEDICINE

## 2019-10-19 PROCEDURE — 87500 VANOMYCIN DNA AMP PROBE: CPT

## 2019-10-19 PROCEDURE — 2580000003 HC RX 258: Performed by: EMERGENCY MEDICINE

## 2019-10-19 PROCEDURE — 36415 COLL VENOUS BLD VENIPUNCTURE: CPT

## 2019-10-19 PROCEDURE — 80048 BASIC METABOLIC PNL TOTAL CA: CPT

## 2019-10-19 PROCEDURE — 6360000002 HC RX W HCPCS: Performed by: INTERNAL MEDICINE

## 2019-10-19 PROCEDURE — 6360000004 HC RX CONTRAST MEDICATION: Performed by: INTERNAL MEDICINE

## 2019-10-19 PROCEDURE — 87147 CULTURE TYPE IMMUNOLOGIC: CPT

## 2019-10-19 RX ORDER — MIRTAZAPINE 15 MG/1
7.5 TABLET, FILM COATED ORAL NIGHTLY
Status: DISCONTINUED | OUTPATIENT
Start: 2019-10-19 | End: 2019-10-22 | Stop reason: HOSPADM

## 2019-10-19 RX ORDER — LANOLIN ALCOHOL/MO/W.PET/CERES
1000 CREAM (GRAM) TOPICAL DAILY
Status: DISCONTINUED | OUTPATIENT
Start: 2019-10-19 | End: 2019-10-22 | Stop reason: HOSPADM

## 2019-10-19 RX ORDER — FOLIC ACID 1 MG/1
1 TABLET ORAL DAILY
Status: DISCONTINUED | OUTPATIENT
Start: 2019-10-19 | End: 2019-10-22 | Stop reason: HOSPADM

## 2019-10-19 RX ORDER — SENNA PLUS 8.6 MG/1
1 TABLET ORAL 2 TIMES DAILY
COMMUNITY

## 2019-10-19 RX ORDER — ACETAMINOPHEN 325 MG/1
650 TABLET ORAL EVERY 4 HOURS PRN
Status: DISCONTINUED | OUTPATIENT
Start: 2019-10-19 | End: 2019-10-22 | Stop reason: HOSPADM

## 2019-10-19 RX ORDER — SENNA PLUS 8.6 MG/1
1 TABLET ORAL 2 TIMES DAILY
Status: DISCONTINUED | OUTPATIENT
Start: 2019-10-19 | End: 2019-10-22 | Stop reason: HOSPADM

## 2019-10-19 RX ORDER — DOCUSATE SODIUM 100 MG/1
100 CAPSULE, LIQUID FILLED ORAL 2 TIMES DAILY
Status: DISCONTINUED | OUTPATIENT
Start: 2019-10-19 | End: 2019-10-22 | Stop reason: HOSPADM

## 2019-10-19 RX ORDER — IPRATROPIUM BROMIDE AND ALBUTEROL SULFATE 2.5; .5 MG/3ML; MG/3ML
1 SOLUTION RESPIRATORY (INHALATION) EVERY 4 HOURS
Status: DISCONTINUED | OUTPATIENT
Start: 2019-10-19 | End: 2019-10-22 | Stop reason: HOSPADM

## 2019-10-19 RX ORDER — TAMSULOSIN HYDROCHLORIDE 0.4 MG/1
0.4 CAPSULE ORAL DAILY
Status: DISCONTINUED | OUTPATIENT
Start: 2019-10-19 | End: 2019-10-22 | Stop reason: HOSPADM

## 2019-10-19 RX ORDER — ZINC OXIDE 216 MG/ML
1 LOTION TOPICAL 3 TIMES DAILY
Status: DISCONTINUED | OUTPATIENT
Start: 2019-10-19 | End: 2019-10-19

## 2019-10-19 RX ORDER — ALBUTEROL SULFATE 90 UG/1
2 AEROSOL, METERED RESPIRATORY (INHALATION) EVERY 4 HOURS PRN
Status: DISCONTINUED | OUTPATIENT
Start: 2019-10-19 | End: 2019-10-22 | Stop reason: HOSPADM

## 2019-10-19 RX ORDER — LANOLIN ALCOHOL/MO/W.PET/CERES
6 CREAM (GRAM) TOPICAL NIGHTLY PRN
Status: DISCONTINUED | OUTPATIENT
Start: 2019-10-19 | End: 2019-10-22 | Stop reason: HOSPADM

## 2019-10-19 RX ORDER — MIRTAZAPINE 7.5 MG/1
7.5 TABLET, FILM COATED ORAL NIGHTLY
COMMUNITY

## 2019-10-19 RX ORDER — PREDNISONE 10 MG/1
10 TABLET ORAL DAILY
COMMUNITY

## 2019-10-19 RX ORDER — DOCUSATE SODIUM 100 MG/1
100 CAPSULE, LIQUID FILLED ORAL 2 TIMES DAILY
COMMUNITY

## 2019-10-19 RX ORDER — POTASSIUM CHLORIDE 20 MEQ/1
40 TABLET, EXTENDED RELEASE ORAL ONCE
Status: COMPLETED | OUTPATIENT
Start: 2019-10-19 | End: 2019-10-19

## 2019-10-19 RX ORDER — FAMOTIDINE 20 MG/1
20 TABLET, FILM COATED ORAL DAILY
Status: DISCONTINUED | OUTPATIENT
Start: 2019-10-19 | End: 2019-10-22 | Stop reason: HOSPADM

## 2019-10-19 RX ORDER — LIDOCAINE 4 G/G
1 PATCH TOPICAL DAILY
Status: DISCONTINUED | OUTPATIENT
Start: 2019-10-19 | End: 2019-10-22 | Stop reason: HOSPADM

## 2019-10-19 RX ORDER — BUSPIRONE HYDROCHLORIDE 10 MG/1
10 TABLET ORAL 2 TIMES DAILY
Status: DISCONTINUED | OUTPATIENT
Start: 2019-10-19 | End: 2019-10-22 | Stop reason: HOSPADM

## 2019-10-19 RX ORDER — ASPIRIN 81 MG/1
81 TABLET, CHEWABLE ORAL DAILY
Status: DISCONTINUED | OUTPATIENT
Start: 2019-10-19 | End: 2019-10-22 | Stop reason: HOSPADM

## 2019-10-19 RX ORDER — SODIUM CHLORIDE AND POTASSIUM CHLORIDE .9; .15 G/100ML; G/100ML
SOLUTION INTRAVENOUS CONTINUOUS
Status: DISPENSED | OUTPATIENT
Start: 2019-10-19 | End: 2019-10-20

## 2019-10-19 RX ORDER — NICOTINE 21 MG/24HR
1 PATCH, TRANSDERMAL 24 HOURS TRANSDERMAL DAILY
Status: DISCONTINUED | OUTPATIENT
Start: 2019-10-19 | End: 2019-10-22 | Stop reason: HOSPADM

## 2019-10-19 RX ORDER — BISACODYL 10 MG
10 SUPPOSITORY, RECTAL RECTAL DAILY
COMMUNITY

## 2019-10-19 RX ORDER — SODIUM CHLORIDE 0.9 % (FLUSH) 0.9 %
10 SYRINGE (ML) INJECTION PRN
Status: DISCONTINUED | OUTPATIENT
Start: 2019-10-19 | End: 2019-10-22 | Stop reason: HOSPADM

## 2019-10-19 RX ORDER — SODIUM CHLORIDE 1000 MG
1 TABLET, SOLUBLE MISCELLANEOUS
Status: DISCONTINUED | OUTPATIENT
Start: 2019-10-19 | End: 2019-10-22 | Stop reason: HOSPADM

## 2019-10-19 RX ORDER — PREDNISONE 10 MG/1
10 TABLET ORAL DAILY
Status: DISCONTINUED | OUTPATIENT
Start: 2019-10-19 | End: 2019-10-22 | Stop reason: HOSPADM

## 2019-10-19 RX ORDER — SODIUM CHLORIDE 9 MG/ML
INJECTION, SOLUTION INTRAVENOUS CONTINUOUS
Status: DISCONTINUED | OUTPATIENT
Start: 2019-10-19 | End: 2019-10-19

## 2019-10-19 RX ORDER — SODIUM CHLORIDE 0.9 % (FLUSH) 0.9 %
10 SYRINGE (ML) INJECTION EVERY 12 HOURS SCHEDULED
Status: DISCONTINUED | OUTPATIENT
Start: 2019-10-19 | End: 2019-10-22 | Stop reason: HOSPADM

## 2019-10-19 RX ORDER — 0.9 % SODIUM CHLORIDE 0.9 %
1000 INTRAVENOUS SOLUTION INTRAVENOUS ONCE
Status: COMPLETED | OUTPATIENT
Start: 2019-10-19 | End: 2019-10-19

## 2019-10-19 RX ORDER — OXYCODONE HYDROCHLORIDE 5 MG/1
10 TABLET ORAL EVERY 4 HOURS PRN
Status: DISCONTINUED | OUTPATIENT
Start: 2019-10-19 | End: 2019-10-20

## 2019-10-19 RX ADMIN — BUSPIRONE HYDROCHLORIDE 10 MG: 10 TABLET ORAL at 08:13

## 2019-10-19 RX ADMIN — IPRATROPIUM BROMIDE AND ALBUTEROL SULFATE 3 ML: .5; 3 SOLUTION RESPIRATORY (INHALATION) at 02:33

## 2019-10-19 RX ADMIN — OXYCODONE HYDROCHLORIDE 10 MG: 5 TABLET ORAL at 10:28

## 2019-10-19 RX ADMIN — SODIUM CHLORIDE: 9 INJECTION, SOLUTION INTRAVENOUS at 00:44

## 2019-10-19 RX ADMIN — SENNOSIDES 8.6 MG: 8.6 TABLET, FILM COATED ORAL at 10:28

## 2019-10-19 RX ADMIN — PIPERACILLIN AND TAZOBACTAM 3.38 G: 3; .375 INJECTION, POWDER, LYOPHILIZED, FOR SOLUTION INTRAVENOUS at 06:09

## 2019-10-19 RX ADMIN — TAMSULOSIN HYDROCHLORIDE 0.4 MG: 0.4 CAPSULE ORAL at 08:13

## 2019-10-19 RX ADMIN — ENOXAPARIN SODIUM 40 MG: 40 INJECTION SUBCUTANEOUS at 08:16

## 2019-10-19 RX ADMIN — FAMOTIDINE 20 MG: 20 TABLET ORAL at 08:12

## 2019-10-19 RX ADMIN — KETOROLAC TROMETHAMINE 15 MG: 30 INJECTION, SOLUTION INTRAMUSCULAR at 00:51

## 2019-10-19 RX ADMIN — SODIUM CHLORIDE TAB 1 GM 1 G: 1 TAB at 11:47

## 2019-10-19 RX ADMIN — IPRATROPIUM BROMIDE AND ALBUTEROL SULFATE 3 ML: .5; 3 SOLUTION RESPIRATORY (INHALATION) at 17:11

## 2019-10-19 RX ADMIN — Medication 1000 MCG: at 08:13

## 2019-10-19 RX ADMIN — OXYCODONE HYDROCHLORIDE 10 MG: 5 TABLET ORAL at 15:10

## 2019-10-19 RX ADMIN — SODIUM CHLORIDE AND POTASSIUM CHLORIDE: .9; .15 SOLUTION INTRAVENOUS at 10:57

## 2019-10-19 RX ADMIN — ACETAMINOPHEN 650 MG: 325 TABLET ORAL at 08:13

## 2019-10-19 RX ADMIN — SODIUM CHLORIDE, PRESERVATIVE FREE 10 ML: 5 INJECTION INTRAVENOUS at 08:16

## 2019-10-19 RX ADMIN — IPRATROPIUM BROMIDE AND ALBUTEROL SULFATE 3 ML: .5; 3 SOLUTION RESPIRATORY (INHALATION) at 06:19

## 2019-10-19 RX ADMIN — SODIUM CHLORIDE AND POTASSIUM CHLORIDE: .9; .15 SOLUTION INTRAVENOUS at 23:56

## 2019-10-19 RX ADMIN — Medication 2 PUFF: at 21:31

## 2019-10-19 RX ADMIN — OXYCODONE HYDROCHLORIDE 10 MG: 5 TABLET ORAL at 20:06

## 2019-10-19 RX ADMIN — IPRATROPIUM BROMIDE AND ALBUTEROL SULFATE 3 ML: .5; 3 SOLUTION RESPIRATORY (INHALATION) at 09:50

## 2019-10-19 RX ADMIN — PREDNISONE 10 MG: 10 TABLET ORAL at 08:13

## 2019-10-19 RX ADMIN — SODIUM CHLORIDE: 9 INJECTION, SOLUTION INTRAVENOUS at 01:30

## 2019-10-19 RX ADMIN — SODIUM CHLORIDE, PRESERVATIVE FREE 10 ML: 5 INJECTION INTRAVENOUS at 19:41

## 2019-10-19 RX ADMIN — POTASSIUM CHLORIDE 40 MEQ: 1500 TABLET, EXTENDED RELEASE ORAL at 06:09

## 2019-10-19 RX ADMIN — MIRTAZAPINE 7.5 MG: 15 TABLET, FILM COATED ORAL at 19:41

## 2019-10-19 RX ADMIN — PIPERACILLIN AND TAZOBACTAM 3.38 G: 3; .375 INJECTION, POWDER, LYOPHILIZED, FOR SOLUTION INTRAVENOUS at 23:56

## 2019-10-19 RX ADMIN — PIPERACILLIN AND TAZOBACTAM 3.38 G: 3; .375 INJECTION, POWDER, LYOPHILIZED, FOR SOLUTION INTRAVENOUS at 15:05

## 2019-10-19 RX ADMIN — SENNOSIDES 8.6 MG: 8.6 TABLET, FILM COATED ORAL at 19:41

## 2019-10-19 RX ADMIN — BUSPIRONE HYDROCHLORIDE 10 MG: 10 TABLET ORAL at 19:40

## 2019-10-19 RX ADMIN — SODIUM CHLORIDE TAB 1 GM 1 G: 1 TAB at 16:47

## 2019-10-19 RX ADMIN — DOCUSATE SODIUM 100 MG: 100 CAPSULE, LIQUID FILLED ORAL at 19:41

## 2019-10-19 RX ADMIN — KETOROLAC TROMETHAMINE 15 MG: 30 INJECTION, SOLUTION INTRAMUSCULAR at 08:16

## 2019-10-19 RX ADMIN — FOLIC ACID 1 MG: 1 TABLET ORAL at 08:13

## 2019-10-19 RX ADMIN — VANCOMYCIN HYDROCHLORIDE 1000 MG: 1 INJECTION, POWDER, LYOPHILIZED, FOR SOLUTION INTRAVENOUS at 22:32

## 2019-10-19 RX ADMIN — IPRATROPIUM BROMIDE AND ALBUTEROL SULFATE 3 ML: .5; 3 SOLUTION RESPIRATORY (INHALATION) at 21:31

## 2019-10-19 RX ADMIN — IPRATROPIUM BROMIDE AND ALBUTEROL SULFATE 3 ML: .5; 3 SOLUTION RESPIRATORY (INHALATION) at 13:25

## 2019-10-19 RX ADMIN — Medication 2 PUFF: at 09:50

## 2019-10-19 RX ADMIN — SODIUM CHLORIDE 1000 ML: 9 INJECTION, SOLUTION INTRAVENOUS at 01:30

## 2019-10-19 RX ADMIN — VANCOMYCIN HYDROCHLORIDE 1000 MG: 1 INJECTION, POWDER, LYOPHILIZED, FOR SOLUTION INTRAVENOUS at 10:57

## 2019-10-19 RX ADMIN — ASPIRIN 81 MG 81 MG: 81 TABLET ORAL at 08:12

## 2019-10-19 RX ADMIN — DOCUSATE SODIUM 100 MG: 100 CAPSULE, LIQUID FILLED ORAL at 10:28

## 2019-10-19 RX ADMIN — IOPAMIDOL 80 ML: 755 INJECTION, SOLUTION INTRAVENOUS at 14:19

## 2019-10-19 ASSESSMENT — PAIN DESCRIPTION - DESCRIPTORS
DESCRIPTORS: ACHING

## 2019-10-19 ASSESSMENT — PAIN DESCRIPTION - ONSET
ONSET: ON-GOING

## 2019-10-19 ASSESSMENT — PAIN - FUNCTIONAL ASSESSMENT
PAIN_FUNCTIONAL_ASSESSMENT: PREVENTS OR INTERFERES SOME ACTIVE ACTIVITIES AND ADLS
PAIN_FUNCTIONAL_ASSESSMENT: PREVENTS OR INTERFERES WITH ALL ACTIVE AND SOME PASSIVE ACTIVITIES

## 2019-10-19 ASSESSMENT — PAIN SCALES - GENERAL
PAINLEVEL_OUTOF10: 0
PAINLEVEL_OUTOF10: 5
PAINLEVEL_OUTOF10: 10
PAINLEVEL_OUTOF10: 6
PAINLEVEL_OUTOF10: 7
PAINLEVEL_OUTOF10: 5
PAINLEVEL_OUTOF10: 2
PAINLEVEL_OUTOF10: 4
PAINLEVEL_OUTOF10: 5
PAINLEVEL_OUTOF10: 5
PAINLEVEL_OUTOF10: 2
PAINLEVEL_OUTOF10: 5
PAINLEVEL_OUTOF10: 5
PAINLEVEL_OUTOF10: 0

## 2019-10-19 ASSESSMENT — PAIN DESCRIPTION - PROGRESSION
CLINICAL_PROGRESSION: NOT CHANGED

## 2019-10-19 ASSESSMENT — PAIN DESCRIPTION - PAIN TYPE
TYPE: CHRONIC PAIN

## 2019-10-19 ASSESSMENT — PAIN DESCRIPTION - FREQUENCY
FREQUENCY: CONTINUOUS

## 2019-10-19 ASSESSMENT — PAIN DESCRIPTION - LOCATION
LOCATION: GENERALIZED
LOCATION: HIP
LOCATION: GENERALIZED

## 2019-10-19 ASSESSMENT — PAIN DESCRIPTION - ORIENTATION
ORIENTATION: LEFT
ORIENTATION: OTHER (COMMENT)

## 2019-10-20 LAB
ANION GAP SERPL CALCULATED.3IONS-SCNC: 10 MEQ/L (ref 8–16)
BUN BLDV-MCNC: 6 MG/DL (ref 7–22)
CALCIUM IONIZED: 1.1 MMOL/L (ref 1.12–1.32)
CALCIUM SERPL-MCNC: 7.7 MG/DL (ref 8.5–10.5)
CHLORIDE BLD-SCNC: 97 MEQ/L (ref 98–111)
CO2: 21 MEQ/L (ref 23–33)
CREAT SERPL-MCNC: 0.2 MG/DL (ref 0.4–1.2)
ERYTHROCYTE [DISTWIDTH] IN BLOOD BY AUTOMATED COUNT: 16.9 % (ref 11.5–14.5)
ERYTHROCYTE [DISTWIDTH] IN BLOOD BY AUTOMATED COUNT: 58.1 FL (ref 35–45)
GFR SERPL CREATININE-BSD FRML MDRD: > 90 ML/MIN/1.73M2
GLUCOSE BLD-MCNC: 124 MG/DL (ref 70–108)
GLUCOSE BLD-MCNC: 134 MG/DL (ref 70–108)
HCT VFR BLD CALC: 25.6 % (ref 42–52)
HEMOGLOBIN: 7.8 GM/DL (ref 14–18)
MCH RBC QN AUTO: 28.9 PG (ref 26–33)
MCHC RBC AUTO-ENTMCNC: 30.5 GM/DL (ref 32.2–35.5)
MCV RBC AUTO: 94.8 FL (ref 80–94)
PLATELET # BLD: 201 THOU/MM3 (ref 130–400)
PMV BLD AUTO: 8.3 FL (ref 9.4–12.4)
POTASSIUM SERPL-SCNC: 3.9 MEQ/L (ref 3.5–5.2)
RBC # BLD: 2.7 MILL/MM3 (ref 4.7–6.1)
SODIUM BLD-SCNC: 126 MEQ/L (ref 135–145)
SODIUM BLD-SCNC: 128 MEQ/L (ref 135–145)
SODIUM BLD-SCNC: 128 MEQ/L (ref 135–145)
WBC # BLD: 3.8 THOU/MM3 (ref 4.8–10.8)

## 2019-10-20 PROCEDURE — 6370000000 HC RX 637 (ALT 250 FOR IP): Performed by: PHYSICIAN ASSISTANT

## 2019-10-20 PROCEDURE — 94640 AIRWAY INHALATION TREATMENT: CPT

## 2019-10-20 PROCEDURE — 94761 N-INVAS EAR/PLS OXIMETRY MLT: CPT

## 2019-10-20 PROCEDURE — 6360000002 HC RX W HCPCS: Performed by: PHYSICIAN ASSISTANT

## 2019-10-20 PROCEDURE — 2709999900 HC NON-CHARGEABLE SUPPLY

## 2019-10-20 PROCEDURE — 6370000000 HC RX 637 (ALT 250 FOR IP): Performed by: INTERNAL MEDICINE

## 2019-10-20 PROCEDURE — 85027 COMPLETE CBC AUTOMATED: CPT

## 2019-10-20 PROCEDURE — 2580000003 HC RX 258: Performed by: PHYSICIAN ASSISTANT

## 2019-10-20 PROCEDURE — 1200000003 HC TELEMETRY R&B

## 2019-10-20 PROCEDURE — 80048 BASIC METABOLIC PNL TOTAL CA: CPT

## 2019-10-20 PROCEDURE — 2700000000 HC OXYGEN THERAPY PER DAY

## 2019-10-20 PROCEDURE — 84295 ASSAY OF SERUM SODIUM: CPT

## 2019-10-20 PROCEDURE — 82948 REAGENT STRIP/BLOOD GLUCOSE: CPT

## 2019-10-20 PROCEDURE — 99233 SBSQ HOSP IP/OBS HIGH 50: CPT | Performed by: INTERNAL MEDICINE

## 2019-10-20 PROCEDURE — 82330 ASSAY OF CALCIUM: CPT

## 2019-10-20 PROCEDURE — 99223 1ST HOSP IP/OBS HIGH 75: CPT | Performed by: INTERNAL MEDICINE

## 2019-10-20 PROCEDURE — 36415 COLL VENOUS BLD VENIPUNCTURE: CPT

## 2019-10-20 RX ORDER — OXYCODONE HYDROCHLORIDE 5 MG/1
5 TABLET ORAL EVERY 4 HOURS PRN
Status: DISCONTINUED | OUTPATIENT
Start: 2019-10-20 | End: 2019-10-21

## 2019-10-20 RX ORDER — OXYCODONE HCL 10 MG/1
10 TABLET, FILM COATED, EXTENDED RELEASE ORAL 2 TIMES DAILY
Status: DISCONTINUED | OUTPATIENT
Start: 2019-10-20 | End: 2019-10-22

## 2019-10-20 RX ORDER — AZITHROMYCIN 250 MG/1
250 TABLET, FILM COATED ORAL DAILY
Status: DISCONTINUED | OUTPATIENT
Start: 2019-10-20 | End: 2019-10-22 | Stop reason: HOSPADM

## 2019-10-20 RX ORDER — AMOXICILLIN AND CLAVULANATE POTASSIUM 875; 125 MG/1; MG/1
1 TABLET, FILM COATED ORAL EVERY 12 HOURS SCHEDULED
Status: DISCONTINUED | OUTPATIENT
Start: 2019-10-20 | End: 2019-10-22 | Stop reason: HOSPADM

## 2019-10-20 RX ADMIN — AZITHROMYCIN 250 MG: 250 TABLET, FILM COATED ORAL at 10:06

## 2019-10-20 RX ADMIN — FOLIC ACID 1 MG: 1 TABLET ORAL at 08:09

## 2019-10-20 RX ADMIN — IPRATROPIUM BROMIDE AND ALBUTEROL SULFATE 3 ML: .5; 3 SOLUTION RESPIRATORY (INHALATION) at 14:17

## 2019-10-20 RX ADMIN — IPRATROPIUM BROMIDE AND ALBUTEROL SULFATE 3 ML: .5; 3 SOLUTION RESPIRATORY (INHALATION) at 00:32

## 2019-10-20 RX ADMIN — DOCUSATE SODIUM 100 MG: 100 CAPSULE, LIQUID FILLED ORAL at 08:09

## 2019-10-20 RX ADMIN — TAMSULOSIN HYDROCHLORIDE 0.4 MG: 0.4 CAPSULE ORAL at 08:09

## 2019-10-20 RX ADMIN — IPRATROPIUM BROMIDE AND ALBUTEROL SULFATE 3 ML: .5; 3 SOLUTION RESPIRATORY (INHALATION) at 21:04

## 2019-10-20 RX ADMIN — Medication 6 MG: at 20:08

## 2019-10-20 RX ADMIN — Medication 2 PUFF: at 21:04

## 2019-10-20 RX ADMIN — BUSPIRONE HYDROCHLORIDE 10 MG: 10 TABLET ORAL at 20:08

## 2019-10-20 RX ADMIN — IPRATROPIUM BROMIDE AND ALBUTEROL SULFATE 3 ML: .5; 3 SOLUTION RESPIRATORY (INHALATION) at 05:20

## 2019-10-20 RX ADMIN — OXYCODONE HYDROCHLORIDE 10 MG: 5 TABLET ORAL at 06:28

## 2019-10-20 RX ADMIN — AMOXICILLIN AND CLAVULANATE POTASSIUM 1 TABLET: 875; 125 TABLET, FILM COATED ORAL at 20:08

## 2019-10-20 RX ADMIN — Medication 2 PUFF: at 09:52

## 2019-10-20 RX ADMIN — KETOROLAC TROMETHAMINE 15 MG: 30 INJECTION, SOLUTION INTRAMUSCULAR at 20:07

## 2019-10-20 RX ADMIN — SODIUM CHLORIDE TAB 1 GM 1 G: 1 TAB at 17:58

## 2019-10-20 RX ADMIN — DOCUSATE SODIUM 100 MG: 100 CAPSULE, LIQUID FILLED ORAL at 20:08

## 2019-10-20 RX ADMIN — OXYCODONE HYDROCHLORIDE 10 MG: 10 TABLET, FILM COATED, EXTENDED RELEASE ORAL at 20:08

## 2019-10-20 RX ADMIN — ENOXAPARIN SODIUM 40 MG: 40 INJECTION SUBCUTANEOUS at 10:07

## 2019-10-20 RX ADMIN — FAMOTIDINE 20 MG: 20 TABLET ORAL at 08:09

## 2019-10-20 RX ADMIN — SODIUM CHLORIDE, PRESERVATIVE FREE 10 ML: 5 INJECTION INTRAVENOUS at 21:00

## 2019-10-20 RX ADMIN — AMOXICILLIN AND CLAVULANATE POTASSIUM 1 TABLET: 875; 125 TABLET, FILM COATED ORAL at 10:06

## 2019-10-20 RX ADMIN — BUSPIRONE HYDROCHLORIDE 10 MG: 10 TABLET ORAL at 08:09

## 2019-10-20 RX ADMIN — OXYCODONE HYDROCHLORIDE 10 MG: 10 TABLET, FILM COATED, EXTENDED RELEASE ORAL at 10:07

## 2019-10-20 RX ADMIN — SODIUM CHLORIDE TAB 1 GM 1 G: 1 TAB at 12:23

## 2019-10-20 RX ADMIN — Medication 1000 MCG: at 08:09

## 2019-10-20 RX ADMIN — SENNOSIDES 8.6 MG: 8.6 TABLET, FILM COATED ORAL at 20:07

## 2019-10-20 RX ADMIN — SENNOSIDES 8.6 MG: 8.6 TABLET, FILM COATED ORAL at 08:09

## 2019-10-20 RX ADMIN — MIRTAZAPINE 7.5 MG: 15 TABLET, FILM COATED ORAL at 20:08

## 2019-10-20 RX ADMIN — IPRATROPIUM BROMIDE AND ALBUTEROL SULFATE 3 ML: .5; 3 SOLUTION RESPIRATORY (INHALATION) at 09:52

## 2019-10-20 RX ADMIN — PREDNISONE 10 MG: 10 TABLET ORAL at 08:09

## 2019-10-20 RX ADMIN — PIPERACILLIN AND TAZOBACTAM 3.38 G: 3; .375 INJECTION, POWDER, LYOPHILIZED, FOR SOLUTION INTRAVENOUS at 06:14

## 2019-10-20 RX ADMIN — SODIUM CHLORIDE TAB 1 GM 1 G: 1 TAB at 08:09

## 2019-10-20 ASSESSMENT — PAIN DESCRIPTION - LOCATION
LOCATION: GENERALIZED
LOCATION: GENERALIZED
LOCATION: BACK;HIP
LOCATION: GENERALIZED

## 2019-10-20 ASSESSMENT — PAIN DESCRIPTION - DESCRIPTORS
DESCRIPTORS: ACHING
DESCRIPTORS: ACHING
DESCRIPTORS: SHARP;NAGGING
DESCRIPTORS: ACHING

## 2019-10-20 ASSESSMENT — PAIN DESCRIPTION - PAIN TYPE
TYPE: CHRONIC PAIN

## 2019-10-20 ASSESSMENT — PAIN DESCRIPTION - ORIENTATION: ORIENTATION: LEFT

## 2019-10-20 ASSESSMENT — PAIN SCALES - GENERAL
PAINLEVEL_OUTOF10: 4
PAINLEVEL_OUTOF10: 4
PAINLEVEL_OUTOF10: 0
PAINLEVEL_OUTOF10: 4
PAINLEVEL_OUTOF10: 0
PAINLEVEL_OUTOF10: 4
PAINLEVEL_OUTOF10: 0

## 2019-10-20 ASSESSMENT — PAIN DESCRIPTION - FREQUENCY
FREQUENCY: CONTINUOUS

## 2019-10-20 ASSESSMENT — PAIN DESCRIPTION - PROGRESSION

## 2019-10-20 ASSESSMENT — PAIN DESCRIPTION - ONSET
ONSET: ON-GOING

## 2019-10-21 LAB
ANION GAP SERPL CALCULATED.3IONS-SCNC: 11 MEQ/L (ref 8–16)
BASOPHILS # BLD: 0.5 %
BASOPHILS ABSOLUTE: 0 THOU/MM3 (ref 0–0.1)
BUN BLDV-MCNC: 5 MG/DL (ref 7–22)
CALCIUM SERPL-MCNC: 8.4 MG/DL (ref 8.5–10.5)
CHLORIDE BLD-SCNC: 92 MEQ/L (ref 98–111)
CO2: 24 MEQ/L (ref 23–33)
CREAT SERPL-MCNC: 0.2 MG/DL (ref 0.4–1.2)
EOSINOPHIL # BLD: 0 %
EOSINOPHILS ABSOLUTE: 0 THOU/MM3 (ref 0–0.4)
ERYTHROCYTE [DISTWIDTH] IN BLOOD BY AUTOMATED COUNT: 16.3 % (ref 11.5–14.5)
ERYTHROCYTE [DISTWIDTH] IN BLOOD BY AUTOMATED COUNT: 55.3 FL (ref 35–45)
GFR SERPL CREATININE-BSD FRML MDRD: > 90 ML/MIN/1.73M2
GLUCOSE BLD-MCNC: 99 MG/DL (ref 70–108)
HCT VFR BLD CALC: 28.1 % (ref 42–52)
HEMOGLOBIN: 8.7 GM/DL (ref 14–18)
IMMATURE GRANS (ABS): 0.2 THOU/MM3 (ref 0–0.07)
IMMATURE GRANULOCYTES: 5 %
LYMPHOCYTES # BLD: 8.7 %
LYMPHOCYTES ABSOLUTE: 0.3 THOU/MM3 (ref 1–4.8)
MCH RBC QN AUTO: 28.4 PG (ref 26–33)
MCHC RBC AUTO-ENTMCNC: 31 GM/DL (ref 32.2–35.5)
MCV RBC AUTO: 91.8 FL (ref 80–94)
MONOCYTES # BLD: 9.5 %
MONOCYTES ABSOLUTE: 0.4 THOU/MM3 (ref 0.4–1.3)
NUCLEATED RED BLOOD CELLS: 0 /100 WBC
PLATELET # BLD: 248 THOU/MM3 (ref 130–400)
PMV BLD AUTO: 8.3 FL (ref 9.4–12.4)
POTASSIUM SERPL-SCNC: 3.9 MEQ/L (ref 3.5–5.2)
RBC # BLD: 3.06 MILL/MM3 (ref 4.7–6.1)
SEG NEUTROPHILS: 76.3 %
SEGMENTED NEUTROPHILS ABSOLUTE COUNT: 3.1 THOU/MM3 (ref 1.8–7.7)
SODIUM BLD-SCNC: 127 MEQ/L (ref 135–145)
WBC # BLD: 4 THOU/MM3 (ref 4.8–10.8)

## 2019-10-21 PROCEDURE — 6370000000 HC RX 637 (ALT 250 FOR IP): Performed by: PHYSICIAN ASSISTANT

## 2019-10-21 PROCEDURE — 85025 COMPLETE CBC W/AUTO DIFF WBC: CPT

## 2019-10-21 PROCEDURE — 2700000000 HC OXYGEN THERAPY PER DAY

## 2019-10-21 PROCEDURE — 6360000002 HC RX W HCPCS: Performed by: PHYSICIAN ASSISTANT

## 2019-10-21 PROCEDURE — 94760 N-INVAS EAR/PLS OXIMETRY 1: CPT

## 2019-10-21 PROCEDURE — 2709999900 HC NON-CHARGEABLE SUPPLY

## 2019-10-21 PROCEDURE — 93010 ELECTROCARDIOGRAM REPORT: CPT | Performed by: INTERNAL MEDICINE

## 2019-10-21 PROCEDURE — 6370000000 HC RX 637 (ALT 250 FOR IP): Performed by: INTERNAL MEDICINE

## 2019-10-21 PROCEDURE — 6360000002 HC RX W HCPCS: Performed by: INTERNAL MEDICINE

## 2019-10-21 PROCEDURE — 80048 BASIC METABOLIC PNL TOTAL CA: CPT

## 2019-10-21 PROCEDURE — 94640 AIRWAY INHALATION TREATMENT: CPT

## 2019-10-21 PROCEDURE — 2580000003 HC RX 258: Performed by: PHYSICIAN ASSISTANT

## 2019-10-21 PROCEDURE — 99232 SBSQ HOSP IP/OBS MODERATE 35: CPT | Performed by: PHYSICIAN ASSISTANT

## 2019-10-21 PROCEDURE — 36415 COLL VENOUS BLD VENIPUNCTURE: CPT

## 2019-10-21 PROCEDURE — 93307 TTE W/O DOPPLER COMPLETE: CPT

## 2019-10-21 PROCEDURE — 1200000003 HC TELEMETRY R&B

## 2019-10-21 PROCEDURE — 99232 SBSQ HOSP IP/OBS MODERATE 35: CPT | Performed by: INTERNAL MEDICINE

## 2019-10-21 RX ORDER — POLYETHYLENE GLYCOL 3350 17 G/17G
17 POWDER, FOR SOLUTION ORAL DAILY
Status: DISCONTINUED | OUTPATIENT
Start: 2019-10-21 | End: 2019-10-22 | Stop reason: HOSPADM

## 2019-10-21 RX ORDER — OXYCODONE HYDROCHLORIDE 5 MG/1
10 TABLET ORAL EVERY 4 HOURS PRN
Status: DISCONTINUED | OUTPATIENT
Start: 2019-10-21 | End: 2019-10-22 | Stop reason: HOSPADM

## 2019-10-21 RX ORDER — FUROSEMIDE 10 MG/ML
20 INJECTION INTRAMUSCULAR; INTRAVENOUS ONCE
Status: COMPLETED | OUTPATIENT
Start: 2019-10-21 | End: 2019-10-21

## 2019-10-21 RX ADMIN — SODIUM CHLORIDE, PRESERVATIVE FREE 10 ML: 5 INJECTION INTRAVENOUS at 10:58

## 2019-10-21 RX ADMIN — AZITHROMYCIN 250 MG: 250 TABLET, FILM COATED ORAL at 10:56

## 2019-10-21 RX ADMIN — DOCUSATE SODIUM 100 MG: 100 CAPSULE, LIQUID FILLED ORAL at 10:57

## 2019-10-21 RX ADMIN — ENOXAPARIN SODIUM 40 MG: 40 INJECTION SUBCUTANEOUS at 10:56

## 2019-10-21 RX ADMIN — Medication 2 PUFF: at 20:42

## 2019-10-21 RX ADMIN — Medication 2 PUFF: at 09:10

## 2019-10-21 RX ADMIN — SODIUM CHLORIDE TAB 1 GM 1 G: 1 TAB at 17:21

## 2019-10-21 RX ADMIN — OXYCODONE HYDROCHLORIDE 10 MG: 5 TABLET ORAL at 17:21

## 2019-10-21 RX ADMIN — IPRATROPIUM BROMIDE AND ALBUTEROL SULFATE 3 ML: .5; 3 SOLUTION RESPIRATORY (INHALATION) at 15:48

## 2019-10-21 RX ADMIN — Medication 1000 MCG: at 10:56

## 2019-10-21 RX ADMIN — AMOXICILLIN AND CLAVULANATE POTASSIUM 1 TABLET: 875; 125 TABLET, FILM COATED ORAL at 21:22

## 2019-10-21 RX ADMIN — FAMOTIDINE 20 MG: 20 TABLET ORAL at 10:57

## 2019-10-21 RX ADMIN — FOLIC ACID 1 MG: 1 TABLET ORAL at 10:56

## 2019-10-21 RX ADMIN — PREDNISONE 10 MG: 10 TABLET ORAL at 10:56

## 2019-10-21 RX ADMIN — IPRATROPIUM BROMIDE AND ALBUTEROL SULFATE 3 ML: .5; 3 SOLUTION RESPIRATORY (INHALATION) at 12:11

## 2019-10-21 RX ADMIN — OXYCODONE HYDROCHLORIDE 10 MG: 10 TABLET, FILM COATED, EXTENDED RELEASE ORAL at 10:57

## 2019-10-21 RX ADMIN — SENNOSIDES 8.6 MG: 8.6 TABLET, FILM COATED ORAL at 21:21

## 2019-10-21 RX ADMIN — OXYCODONE HYDROCHLORIDE 10 MG: 5 TABLET ORAL at 21:22

## 2019-10-21 RX ADMIN — OXYCODONE HYDROCHLORIDE 10 MG: 5 TABLET ORAL at 13:27

## 2019-10-21 RX ADMIN — IPRATROPIUM BROMIDE AND ALBUTEROL SULFATE 3 ML: .5; 3 SOLUTION RESPIRATORY (INHALATION) at 09:10

## 2019-10-21 RX ADMIN — KETOROLAC TROMETHAMINE 15 MG: 30 INJECTION, SOLUTION INTRAMUSCULAR at 12:25

## 2019-10-21 RX ADMIN — DOCUSATE SODIUM 100 MG: 100 CAPSULE, LIQUID FILLED ORAL at 21:21

## 2019-10-21 RX ADMIN — FUROSEMIDE 20 MG: 10 INJECTION, SOLUTION INTRAMUSCULAR; INTRAVENOUS at 10:58

## 2019-10-21 RX ADMIN — IPRATROPIUM BROMIDE AND ALBUTEROL SULFATE 3 ML: .5; 3 SOLUTION RESPIRATORY (INHALATION) at 20:42

## 2019-10-21 RX ADMIN — KETOROLAC TROMETHAMINE 15 MG: 30 INJECTION, SOLUTION INTRAMUSCULAR at 02:43

## 2019-10-21 RX ADMIN — TAMSULOSIN HYDROCHLORIDE 0.4 MG: 0.4 CAPSULE ORAL at 10:56

## 2019-10-21 RX ADMIN — SENNOSIDES 8.6 MG: 8.6 TABLET, FILM COATED ORAL at 10:56

## 2019-10-21 RX ADMIN — OXYCODONE HYDROCHLORIDE 5 MG: 5 TABLET ORAL at 09:40

## 2019-10-21 RX ADMIN — Medication 6 MG: at 21:22

## 2019-10-21 RX ADMIN — SODIUM CHLORIDE, PRESERVATIVE FREE 10 ML: 5 INJECTION INTRAVENOUS at 21:29

## 2019-10-21 RX ADMIN — SODIUM CHLORIDE TAB 1 GM 1 G: 1 TAB at 10:56

## 2019-10-21 RX ADMIN — OXYCODONE HYDROCHLORIDE 5 MG: 5 TABLET ORAL at 05:45

## 2019-10-21 RX ADMIN — AMOXICILLIN AND CLAVULANATE POTASSIUM 1 TABLET: 875; 125 TABLET, FILM COATED ORAL at 10:56

## 2019-10-21 RX ADMIN — BUSPIRONE HYDROCHLORIDE 10 MG: 10 TABLET ORAL at 21:22

## 2019-10-21 RX ADMIN — OXYCODONE HYDROCHLORIDE 10 MG: 10 TABLET, FILM COATED, EXTENDED RELEASE ORAL at 21:21

## 2019-10-21 RX ADMIN — MIRTAZAPINE 7.5 MG: 15 TABLET, FILM COATED ORAL at 21:22

## 2019-10-21 RX ADMIN — BUSPIRONE HYDROCHLORIDE 10 MG: 10 TABLET ORAL at 10:56

## 2019-10-21 ASSESSMENT — PAIN DESCRIPTION - PAIN TYPE
TYPE: ACUTE PAIN
TYPE: CHRONIC PAIN

## 2019-10-21 ASSESSMENT — PAIN DESCRIPTION - ONSET
ONSET: ON-GOING
ONSET: ON-GOING

## 2019-10-21 ASSESSMENT — PAIN SCALES - GENERAL
PAINLEVEL_OUTOF10: 6
PAINLEVEL_OUTOF10: 7
PAINLEVEL_OUTOF10: 8
PAINLEVEL_OUTOF10: 8
PAINLEVEL_OUTOF10: 4
PAINLEVEL_OUTOF10: 8
PAINLEVEL_OUTOF10: 8
PAINLEVEL_OUTOF10: 7
PAINLEVEL_OUTOF10: 4

## 2019-10-21 ASSESSMENT — PAIN DESCRIPTION - PROGRESSION
CLINICAL_PROGRESSION: GRADUALLY IMPROVING
CLINICAL_PROGRESSION: NOT CHANGED

## 2019-10-21 ASSESSMENT — PAIN DESCRIPTION - DESCRIPTORS
DESCRIPTORS: ACHING
DESCRIPTORS: ACHING

## 2019-10-21 ASSESSMENT — PAIN DESCRIPTION - LOCATION
LOCATION: BACK;HIP
LOCATION: BACK
LOCATION: BACK

## 2019-10-21 ASSESSMENT — PAIN DESCRIPTION - FREQUENCY
FREQUENCY: CONTINUOUS
FREQUENCY: CONTINUOUS

## 2019-10-21 ASSESSMENT — PAIN DESCRIPTION - ORIENTATION
ORIENTATION: LOWER
ORIENTATION: LOWER

## 2019-10-21 ASSESSMENT — PAIN DESCRIPTION - DIRECTION
RADIATING_TOWARDS: LEFT SIDE
RADIATING_TOWARDS: LEFT SIDE

## 2019-10-22 VITALS
OXYGEN SATURATION: 91 % | HEIGHT: 68 IN | WEIGHT: 132.2 LBS | BODY MASS INDEX: 20.03 KG/M2 | RESPIRATION RATE: 17 BRPM | TEMPERATURE: 98.2 F | DIASTOLIC BLOOD PRESSURE: 73 MMHG | SYSTOLIC BLOOD PRESSURE: 127 MMHG | HEART RATE: 98 BPM

## 2019-10-22 LAB — MRSA SCREEN: NORMAL

## 2019-10-22 PROCEDURE — 6370000000 HC RX 637 (ALT 250 FOR IP): Performed by: PHYSICIAN ASSISTANT

## 2019-10-22 PROCEDURE — 6360000002 HC RX W HCPCS: Performed by: PHYSICIAN ASSISTANT

## 2019-10-22 PROCEDURE — 94760 N-INVAS EAR/PLS OXIMETRY 1: CPT

## 2019-10-22 PROCEDURE — 6370000000 HC RX 637 (ALT 250 FOR IP): Performed by: INTERNAL MEDICINE

## 2019-10-22 PROCEDURE — 94640 AIRWAY INHALATION TREATMENT: CPT

## 2019-10-22 PROCEDURE — 99239 HOSP IP/OBS DSCHRG MGMT >30: CPT | Performed by: INTERNAL MEDICINE

## 2019-10-22 PROCEDURE — 2580000003 HC RX 258: Performed by: PHYSICIAN ASSISTANT

## 2019-10-22 RX ORDER — LORAZEPAM 0.5 MG/1
0.5 TABLET ORAL EVERY 4 HOURS PRN
Status: DISCONTINUED | OUTPATIENT
Start: 2019-10-22 | End: 2019-10-22 | Stop reason: HOSPADM

## 2019-10-22 RX ORDER — OXYCODONE HYDROCHLORIDE 15 MG/1
15 TABLET, FILM COATED, EXTENDED RELEASE ORAL EVERY 8 HOURS
Status: DISCONTINUED | OUTPATIENT
Start: 2019-10-22 | End: 2019-10-22 | Stop reason: HOSPADM

## 2019-10-22 RX ORDER — OXYCODONE HYDROCHLORIDE 15 MG/1
15 TABLET, FILM COATED, EXTENDED RELEASE ORAL EVERY 8 HOURS
Qty: 60 TABLET | Refills: 0 | Status: SHIPPED | OUTPATIENT
Start: 2019-10-22 | End: 2019-11-21

## 2019-10-22 RX ORDER — OXYCODONE HYDROCHLORIDE 10 MG/1
10 TABLET ORAL EVERY 4 HOURS PRN
Refills: 0 | Status: SHIPPED | OUTPATIENT
Start: 2019-10-22 | End: 2019-10-25

## 2019-10-22 RX ORDER — AMOXICILLIN AND CLAVULANATE POTASSIUM 875; 125 MG/1; MG/1
1 TABLET, FILM COATED ORAL EVERY 12 HOURS SCHEDULED
Qty: 8 TABLET | Refills: 0 | DISCHARGE
Start: 2019-10-22 | End: 2019-10-26

## 2019-10-22 RX ORDER — LORAZEPAM 0.5 MG/1
0.5 TABLET ORAL EVERY 4 HOURS PRN
Status: SHIPPED | OUTPATIENT
Start: 2019-10-22 | End: 2019-11-21

## 2019-10-22 RX ORDER — ACETAMINOPHEN 325 MG/1
650 TABLET ORAL EVERY 4 HOURS PRN
Qty: 120 TABLET | Refills: 3 | DISCHARGE
Start: 2019-10-22

## 2019-10-22 RX ORDER — POLYETHYLENE GLYCOL 3350 17 G/17G
17 POWDER, FOR SOLUTION ORAL DAILY
Qty: 527 G | Refills: 1 | DISCHARGE
Start: 2019-10-23 | End: 2019-11-22

## 2019-10-22 RX ADMIN — SODIUM CHLORIDE, PRESERVATIVE FREE 10 ML: 5 INJECTION INTRAVENOUS at 08:55

## 2019-10-22 RX ADMIN — OXYCODONE HYDROCHLORIDE 10 MG: 5 TABLET ORAL at 01:48

## 2019-10-22 RX ADMIN — OXYCODONE HYDROCHLORIDE 10 MG: 5 TABLET ORAL at 05:40

## 2019-10-22 RX ADMIN — SODIUM CHLORIDE TAB 1 GM 1 G: 1 TAB at 08:52

## 2019-10-22 RX ADMIN — AZITHROMYCIN 250 MG: 250 TABLET, FILM COATED ORAL at 08:52

## 2019-10-22 RX ADMIN — FOLIC ACID 1 MG: 1 TABLET ORAL at 08:52

## 2019-10-22 RX ADMIN — IPRATROPIUM BROMIDE AND ALBUTEROL SULFATE 3 ML: .5; 3 SOLUTION RESPIRATORY (INHALATION) at 12:04

## 2019-10-22 RX ADMIN — OXYCODONE HYDROCHLORIDE 10 MG: 5 TABLET ORAL at 13:13

## 2019-10-22 RX ADMIN — FAMOTIDINE 20 MG: 20 TABLET ORAL at 08:53

## 2019-10-22 RX ADMIN — Medication 1000 MCG: at 08:56

## 2019-10-22 RX ADMIN — TAMSULOSIN HYDROCHLORIDE 0.4 MG: 0.4 CAPSULE ORAL at 08:52

## 2019-10-22 RX ADMIN — AMOXICILLIN AND CLAVULANATE POTASSIUM 1 TABLET: 875; 125 TABLET, FILM COATED ORAL at 08:52

## 2019-10-22 RX ADMIN — IPRATROPIUM BROMIDE AND ALBUTEROL SULFATE 3 ML: .5; 3 SOLUTION RESPIRATORY (INHALATION) at 07:29

## 2019-10-22 RX ADMIN — BUSPIRONE HYDROCHLORIDE 10 MG: 10 TABLET ORAL at 08:52

## 2019-10-22 RX ADMIN — SODIUM CHLORIDE TAB 1 GM 1 G: 1 TAB at 13:12

## 2019-10-22 RX ADMIN — Medication 2 PUFF: at 07:29

## 2019-10-22 RX ADMIN — ENOXAPARIN SODIUM 40 MG: 40 INJECTION SUBCUTANEOUS at 08:52

## 2019-10-22 RX ADMIN — OXYCODONE HYDROCHLORIDE 10 MG: 10 TABLET, FILM COATED, EXTENDED RELEASE ORAL at 08:53

## 2019-10-22 RX ADMIN — PREDNISONE 10 MG: 10 TABLET ORAL at 08:52

## 2019-10-22 ASSESSMENT — PAIN DESCRIPTION - PAIN TYPE
TYPE: CHRONIC PAIN

## 2019-10-22 ASSESSMENT — PAIN SCALES - GENERAL
PAINLEVEL_OUTOF10: 4
PAINLEVEL_OUTOF10: 3
PAINLEVEL_OUTOF10: 4
PAINLEVEL_OUTOF10: 5

## 2019-10-22 ASSESSMENT — PAIN DESCRIPTION - ONSET
ONSET: ON-GOING

## 2019-10-22 ASSESSMENT — PAIN DESCRIPTION - LOCATION
LOCATION: BACK

## 2019-10-22 ASSESSMENT — PAIN DESCRIPTION - ORIENTATION
ORIENTATION: LOWER

## 2019-10-22 ASSESSMENT — PAIN DESCRIPTION - DESCRIPTORS
DESCRIPTORS: ACHING

## 2019-10-22 ASSESSMENT — PAIN DESCRIPTION - FREQUENCY
FREQUENCY: CONTINUOUS

## 2019-10-22 ASSESSMENT — PAIN DESCRIPTION - DIRECTION
RADIATING_TOWARDS: LEFT

## 2019-10-22 ASSESSMENT — PAIN - FUNCTIONAL ASSESSMENT
PAIN_FUNCTIONAL_ASSESSMENT: PREVENTS OR INTERFERES WITH MANY ACTIVE NOT PASSIVE ACTIVITIES
PAIN_FUNCTIONAL_ASSESSMENT: PREVENTS OR INTERFERES SOME ACTIVE ACTIVITIES AND ADLS

## 2019-10-22 ASSESSMENT — PAIN DESCRIPTION - PROGRESSION
CLINICAL_PROGRESSION: GRADUALLY IMPROVING

## 2019-10-23 ENCOUNTER — OUTSIDE SERVICES (OUTPATIENT)
Dept: FAMILY MEDICINE CLINIC | Age: 69
End: 2019-10-23
Payer: MEDICARE

## 2019-10-23 DIAGNOSIS — I10 ESSENTIAL HYPERTENSION: ICD-10-CM

## 2019-10-23 DIAGNOSIS — C34.90 PRIMARY MALIGNANT NEOPLASM OF LUNG METASTATIC TO OTHER SITE, UNSPECIFIED LATERALITY (HCC): Primary | Chronic | ICD-10-CM

## 2019-10-23 DIAGNOSIS — R62.7 FAILURE TO THRIVE IN ADULT: Chronic | ICD-10-CM

## 2019-10-23 DIAGNOSIS — A41.9 SEPSIS, DUE TO UNSPECIFIED ORGANISM, UNSPECIFIED WHETHER ACUTE ORGAN DYSFUNCTION PRESENT (HCC): ICD-10-CM

## 2019-10-23 PROCEDURE — 99310 SBSQ NF CARE HIGH MDM 45: CPT | Performed by: FAMILY MEDICINE

## 2019-10-23 ASSESSMENT — ENCOUNTER SYMPTOMS
EYE DISCHARGE: 0
VOMITING: 0
SHORTNESS OF BREATH: 1
BLOOD IN STOOL: 0
SORE THROAT: 0
DIARRHEA: 0
NAUSEA: 0
COUGH: 0
CONSTIPATION: 0
WHEEZING: 0
EYE REDNESS: 0
BACK PAIN: 0
EYE PAIN: 0

## 2019-10-24 LAB
BLOOD CULTURE, ROUTINE: NORMAL
BLOOD CULTURE, ROUTINE: NORMAL

## 2019-10-29 ENCOUNTER — OUTSIDE SERVICES (OUTPATIENT)
Dept: FAMILY MEDICINE CLINIC | Age: 69
End: 2019-10-29
Payer: MEDICARE

## 2019-10-29 DIAGNOSIS — C34.90 PRIMARY MALIGNANT NEOPLASM OF LUNG METASTATIC TO OTHER SITE, UNSPECIFIED LATERALITY (HCC): Primary | Chronic | ICD-10-CM

## 2019-10-29 PROCEDURE — 99307 SBSQ NF CARE SF MDM 10: CPT | Performed by: NURSE PRACTITIONER

## 2020-03-25 PROBLEM — E87.1 HYPONATREMIA: Status: RESOLVED | Noted: 2019-01-21 | Resolved: 2020-03-24

## 2021-08-10 NOTE — PROGRESS NOTES
IM Progress Note  Dr. Jenni Baker for Dr Lawyer Whyte    9/10/2018 10:55 AM            Subjective:   Patient name Gracie Corral  [id number]  PCP: Natalie Delarosa MD  Admit Date: 9/5/2018  Acct No. [de-identified]    Interval History:   No flatus today  Wants his hytrin resumed to help with bladder emptying    Diet: Diet NPO Effective Now    Medications:   Scheduled Meds:   potassium replacement protocol   Other RX Placeholder    pantoprazole  40 mg Intravenous Daily    And    sodium chloride (PF)  10 mL Intravenous Daily    busPIRone  10 mg Oral BID    losartan  50 mg Oral Daily    ipratropium-albuterol  1 ampule Inhalation Q4H WA    enoxaparin  40 mg Subcutaneous Daily    cefOXitin  1 g Intravenous 4 times per day    doxazosin  2 mg Oral Nightly    mometasone-formoterol  2 puff Inhalation BID    famotidine  20 mg Oral QAM     Continuous Infusions:   sodium chloride 100 mL/hr at 09/10/18 0153    sodium chloride 100 mL/hr at 09/08/18 1915       Labs :       CBC:   Recent Labs      09/09/18   0617  09/10/18   0543   WBC  3.8*  4.7*   HGB  13.4*  12.3*   PLT  210  235     BMP:    Recent Labs      09/08/18   0840  09/09/18   0617  09/09/18   0842   NA  135  137   --    K  3.7  3.3*  3.6   CL  96*  98   --    CO2  25  25   --    BUN  12  13   --    CREATININE  0.5  0.6   --    GLUCOSE  112*  110*   --      Hepatic:   No results for input(s): AST, ALT, ALB, BILITOT, ALKPHOS in the last 72 hours. Troponin: No results for input(s): TROPONINI in the last 72 hours. BNP: No results for input(s): BNP in the last 72 hours. Lipids: No results for input(s): CHOL, HDL in the last 72 hours.     Invalid input(s): LDLCALCU  INR:   Recent Labs      09/07/18   1525   INR  1.07       Radiology    Objective:   Vitals: BP (!) 119/59   Pulse 100   Temp 98.2 °F (36.8 °C) (Oral)   Resp 20   Ht 5' 8\" (1.727 m)   Wt 128 lb 3.2 oz (58.2 kg)   SpO2 91%   BMI 19.49 kg/m²   HEENT: Head:pupils react  Neck: supple  Lungs: decreased bilat   Heart: regular rate and rhythm   Abdomen: distended soft BS heard but very sluggish  Extremities: warm no edema  Neurologic:  Alert, oriented X3      Impression :   S/p Repair of incarcerated Inguinal hernia Rt   S/p bronch with washing per pulm  COPD  HTN   Hyperlipidemia  GERD  Hypokalemia  Urinary retention    Plan :    For CT abd today  Cont NGT and IVF  Resume hytrin and HCTZ if BP tolerated    Pepper Garcia MD Complex Repair And O-L Flap Text: The defect edges were debeveled with a #15 scalpel blade.  The primary defect was closed partially with a complex linear closure.  Given the location of the remaining defect, shape of the defect and the proximity to free margins an O-L flap was deemed most appropriate for complete closure of the defect.  Using a sterile surgical marker, an appropriate flap was drawn incorporating the defect and placing the expected incisions within the relaxed skin tension lines where possible.    The area thus outlined was incised deep to adipose tissue with a #15 scalpel blade.  The skin margins were undermined to an appropriate distance in all directions utilizing iris scissors.

## 2022-01-26 NOTE — ED PROVIDER NOTES
[facility]  eMERGENCY dEPARTMENT eNCOUnter          CHIEF COMPLAINT       Chief Complaint   Patient presents with    Other     PCP told to come in after imaging today       Nurses Notes reviewed and I agree except as noted in the HPI. HISTORY OF PRESENT ILLNESS    Marcelina Huizar is a 70 y.o. male who presents to the Emergency Department for an evaluation. Patient had an MRI done at 1230. Results were called to Dr. Sahara Jensen. Patient was sent back to have a CT scan done. He states he completed that and was advised to come into the ED for further work up. Patient is seen by Dr. Sahara Jensen for stage IV lung cancer. He is currently undergoing chemotherapy and will begin another round on 5/20/19. He reports that he has bilateral posterior calf pain and had an US completed around 4/27/19 which was negative for DVT. Patient states he was told he may need to be placed on anticoagulants and possible admission. He denies fever, chills, headache, weakness, vision changes, speech changes, emesis, shortness of breath, or chest pain. No further complaints at initial evaluation. REVIEW OF SYSTEMS     Review of Systems   Constitutional: Negative for appetite change, chills, fatigue and fever. HENT: Negative for congestion, ear pain, rhinorrhea and sore throat. Eyes: Negative for discharge, redness and visual disturbance. Respiratory: Negative for cough, shortness of breath and wheezing. Cardiovascular: Negative for chest pain, palpitations and leg swelling. Gastrointestinal: Negative for abdominal pain, diarrhea, nausea and vomiting. Genitourinary: Negative for decreased urine volume, difficulty urinating, dysuria and hematuria. Musculoskeletal: Negative for arthralgias, back pain, joint swelling and neck pain. Skin: Negative for pallor and rash. Allergic/Immunologic: Negative for environmental allergies.    Neurological: Negative for dizziness, syncope, weakness, light-headedness, numbness and headaches. Hematological: Negative for adenopathy. Psychiatric/Behavioral: Negative for confusion and suicidal ideas. The patient is not nervous/anxious. PAST MEDICAL HISTORY    has a past medical history of Arthritis, COPD (chronic obstructive pulmonary disease) ([address] Gastric reflux, Hyperlipidemia, and Hypertension. SURGICAL HISTORY      has a past surgical history that includes Kidney surgery; hernia repair (80's); pr office/outpt visit,procedure only (Right, 9/5/2018); pr [facility] incl fluor gdnce dx w/cell washg spx (N/A, 9/7/2018); pr office/outpt visit,procedure only (N/A, 9/11/2018); and Colonoscopy. CURRENT MEDICATIONS       Current Discharge Medication List      CONTINUE these medications which have NOT CHANGED    Details   tamsulosin (FLOMAX) 0.4 MG capsule Take 1 capsule by mouth daily  Qty: 30 capsule, Refills: 0      ipratropium-albuterol (DUONEB) 0.5-2.5 (3) MG/3ML SOLN nebulizer solution Inhale 3 mLs into the lungs every 4 hours  Qty: 360 mL, Refills: 11      busPIRone (BUSPAR) 10 MG tablet Take 10 mg by mouth 2 times daily      Ipratropium Bromide (ATROVENT IN) Inhale into the lungs 2 times daily       budesonide-formoterol (SYMBICORT) 160-4.5 MCG/ACT AERO Inhale 2 puffs into the lungs 2 times daily. terazosin (HYTRIN) 2 MG capsule Take 2 mg by mouth nightly. omeprazole (PRILOSEC) 20 MG capsule Take 40 mg by mouth daily       ranitidine (ZANTAC) 150 MG tablet Take 150 mg by mouth every morning.       atorvastatin (LIPITOR) 80 MG tablet Take 80 mg by mouth daily       ondansetron (ZOFRAN) 4 MG tablet Take 1 tablet by mouth every 8 hours as needed for Nausea or Vomiting  Qty: 30 tablet, Refills: 0      albuterol sulfate HFA (VENTOLIN HFA) 108 (90 Base) MCG/ACT inhaler Inhale 2 puffs into the lungs every 4 hours as needed for Wheezing or Shortness of Breath  Qty: 1 Inhaler, Refills: 11    Associated Diagnoses: SOB (shortness of breath); Lung mass; Chronic obstructive pulmonary disease, unspecified COPD type ([facility] [address] Smoker; Tobacco abuse      acetaminophen (TYLENOL) 325 MG tablet Take 2 tablets by mouth every 4 hours as needed for Pain  Qty: 120 tablet, Refills: 3             ALLERGIES     is allergic to lisinopril. FAMILY HISTORY     is adopted. family history is not on file. He was adopted. SOCIAL HISTORY      reports that he quit smoking about 3 months ago. His smoking use included cigarettes. He started smoking about 50 years ago. He has a 100.00 pack-year smoking history. He has never used smokeless tobacco. He reports that he drank alcohol. He reports that he does not use drugs. PHYSICAL EXAM     INITIAL VITALS:  height is 5' 8\" (1.727 m) and weight is 150 lb (68 kg). His oral temperature is 98.7 °F (37.1 °C). His blood pressure is 122/58 (abnormal) and his pulse is 68. His respiration is 16 and oxygen saturation is 95%. Physical Exam   Constitutional: He is oriented to person, place, and time. He appears well-developed and well-nourished. Non-toxic appearance. HENT:   Head: Normocephalic and atraumatic. Right Ear: Tympanic membrane and external ear normal.   Left Ear: Tympanic membrane and external ear normal.   Nose: Nose normal.   Mouth/Throat: Oropharynx is clear and moist and mucous membranes are normal. No oropharyngeal exudate, posterior oropharyngeal edema or posterior oropharyngeal erythema. Eyes: Conjunctivae and EOM are normal.   Neck: Normal range of motion. Neck supple. No JVD present. Cardiovascular: Normal rate, regular rhythm, normal heart sounds, intact distal pulses and normal pulses. Exam reveals no gallop and no friction rub. No murmur heard. Pulmonary/Chest: Effort normal and breath sounds normal. No respiratory distress. He has no decreased breath sounds. He has no wheezes. He has no rhonchi. He has no rales. Abdominal: Soft. Bowel sounds are normal. He exhibits no distension. There is no tenderness.  There is no rebound, no guarding and no CVA tenderness. Musculoskeletal: Normal range of motion. He exhibits no edema. Neurological: He is alert and oriented to person, place, and time. He has normal strength. He is not disoriented. No cranial nerve deficit or sensory deficit. He exhibits normal muscle tone. Coordination and gait normal. GCS eye subscore is 4. GCS verbal subscore is 5. GCS motor subscore is 6. Skin: Skin is warm and dry. No rash noted. He is not diaphoretic. Nursing note and vitals reviewed. DIFFERENTIAL DIAGNOSIS:   Lung cancer, metastatic cancer, stroke, atrial fibrillation    DIAGNOSTIC RESULTS     EKG: All EKG's are interpreted by the Emergency Department Physician who either signs or Co-signs this chart in the absence of a cardiologist.    None    RADIOLOGY: non-plain film images(s) such as CT, Ultrasound and MRI are readby theradiologist.    Imaging was completed outpatient. Ordered by Dr. Sherri Collado. MRI and CTs obtained outpatient. MRI impression  1. Scattered punctate foci of acute infarcts throughout the brain as evidence for embolic phenomenon. 2. No evidence of metastatic lesions to the brain. CT abd/pelvis  1. Improvement in previously identified liver lesions and pelvic and inguinal lymphadenopathy with a stable right inguinal lymph node. 2. 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         CT Chest  1. Dramatic improvement in the appearance of mediastinal adenopathy with almost complete resolution. Details in the above report   2.  Sclerotic lesions in the T8 and L1 vertebral body consistent with osteoblastic metastases                 No orders to display       LABS:   Labs Reviewed   CBC WITH AUTO DIFFERENTIAL - Abnormal; Notable for the following components:       Result Value    WBC 3.0 (*)     RBC 3.49 (*)     Hemoglobin 11.9 (*)     Hematocrit 34.0 (*)     MCV 97.4 (*)     MCH 34.1 (*)     RDW-SD 48.1 (*)     MPV 8.8 (*)     Lymphocytes # 0.7 (*)     All other components within normal limits   BASIC METABOLIC PANEL - Abnormal; Notable for the following components:    Glucose 109 (*)     All other components within normal limits   OSMOLALITY - Abnormal; Notable for the following components:    Osmolality Calc 270.1 (*)     All other components within normal limits   BASIC METABOLIC PANEL W/ REFLEX TO MG FOR LOW K - Abnormal; Notable for the following components:    Potassium reflex Magnesium 3.4 (*)     All other components within normal limits   CBC WITH AUTO DIFFERENTIAL - Abnormal; Notable for the following components:    WBC 2.6 (*)     RBC 3.25 (*)     Hemoglobin 10.7 (*)     Hematocrit 31.4 (*)     MCV 96.6 (*)     RDW-SD 47.3 (*)     MPV 8.8 (*)     Segs Absolute 1.4 (*)     Lymphocytes # 0.7 (*)     All other components within normal limits   PROTIME-INR   APTT   ANION GAP   GLOMERULAR FILTRATION RATE, ESTIMATED   MAGNESIUM   ANION GAP   GLOMERULAR FILTRATION RATE, ESTIMATED   SCAN OF BLOOD SMEAR       EMERGENCYDEPARTMENTCOURSE:   Vitals:    Vitals:    05/13/19 2145 05/13/19 2314 05/14/19 0050 05/14/19 0431   BP: 138/80 (!) 130/95  (!) 122/58   Pulse: 69 85  68   Resp: 16   16   Temp:  98.9 °F (37.2 °C)  98.7 °F (37.1 °C)   TempSrc:  Oral  Oral   SpO2: 96% 95% 96% 95%   Weight:       Height:         Patient was seen history physical exam was performed. See disposition below    MDM:  Labs ordered. Imaging reviewed. MRI concerned for embolic acute infarcts. NIH=0, no neurologic symptoms. Discussed case with Dr. Donna Jimenez of neurology, suggests patient admission, no anticoagulation. Patient admitted to Dr. Rosario Jackman, hospitalist. Dr. Navin Sanchez called to inform of patient's admission. Patient amenable to plan of care.      CRITICAL CARE:   None    CONSULTS:  Dr. Donna Jimenez of neurology,  Dr. Navin Sanchez, oncology  Dr. Rosario Jackman, hospitalist  My ED attending, Dr. Makenzie Hraper:  None    FINAL IMPRESSION      1. Cerebrovascular accident (CVA) due to embolism of cerebral artery [facility])          DISPOSITION/PLAN   Admit    PATIENT REFERREDTO:  Kailash Stephens, [address] [facility] [id number]            DISCHARGE MEDICATIONS:  Current Discharge Medication List          (Please note that portions of this note were completed with a voice recognition program.  Efforts were made to edit the dictations but occasionally words are mis-transcribed.)    The patient was given an opportunity to see the Emergency Attending. The patient voiced understanding that I was a Mid-Level Provider and was in agreement with being seen independently by myself. Scribe:  Virgen Malagon(Name) 5/13/19 3:25 PM Scribing for and in the presence of MAT Szymanski.     Signed by: Citlali Malagon(Name), Scribe, 05/14/19 9:02 AM    Provider:  I personally performed the services described in the documentation, reviewed and edited the documentation which was dictated to the scribe in my presence, and it accurately records my words and actions.     Africa Bullard PA-C 5/13/19 9:02 AM    HAMLET Seymour PA-C  05/14/19 1927 Attending

## (undated) DEVICE — COVER ARMBRD W13XL28.5IN IMPERV BLU FOR OP RM

## (undated) DEVICE — SUTURE VCRL SZ 0 L18IN ABSRB VLT SUTUPAK PRECUT W/O NDL J106T

## (undated) DEVICE — SOLUTION IV IRRIG POUR BRL 0.9% SODIUM CHL 2F7124

## (undated) DEVICE — SUTURE VCRL SZ 3-0 L27IN ABSRB UD L26MM SH 1/2 CIR J416H

## (undated) DEVICE — TROCAR ENDOSCP L100MM DIA12MM BLNT STBL SL DISP ENDOPATH

## (undated) DEVICE — TUBING, SUCTION, 1/4" X 20', STRAIGHT: Brand: MEDLINE INDUSTRIES, INC.

## (undated) DEVICE — STAPLER INT L60MM REG TISS BLU B FRM 8 FIRING 2 ROW AUTO

## (undated) DEVICE — GAUZE,SPONGE,4"X4",12PLY,STERILE,LF,2'S: Brand: MEDLINE

## (undated) DEVICE — GLOVE SURG SZ 65 THK91MIL LTX FREE SYN POLYISOPRENE

## (undated) DEVICE — LINER SUCT CANSTR 1500CC SEMI RIG W/ POR HYDROPHOBIC SHUT

## (undated) DEVICE — SOLUTION IV 1000ML LAC RINGERS PH 6.5 INJ USP VIAFLX PLAS

## (undated) DEVICE — STAPLER INT L55MM CUT LN L53MM STPL LN L57MM BLU B FRM 8

## (undated) DEVICE — SKIN AFFIX SURG ADHESIVE 72/CS 0.55ML: Brand: MEDLINE

## (undated) DEVICE — YANKAUER,POOLE TIP,STERILE,50/CS: Brand: MEDLINE

## (undated) DEVICE — SOLUTION IV 1000ML 0.9% SOD CHL PH 5 INJ USP VIAFLX PLAS

## (undated) DEVICE — 3M™ WARMING BLANKET, UPPER BODY, 10 PER CASE, 42268: Brand: BAIR HUGGER™

## (undated) DEVICE — YANKAUER,BULB TIP,W/O VENT,RIGID,STERILE: Brand: MEDLINE

## (undated) DEVICE — ELECTROSURGICAL PENCIL BUTTON SWITCH E-Z CLEAN COATED BLADE ELECTRODE 10 FT (3 M) CORD HOLSTER: Brand: MEGADYNE

## (undated) DEVICE — GOWN,SIRUS,NON REINFRCD,LARGE,SET IN SL: Brand: MEDLINE

## (undated) DEVICE — GLOVE ORANGE PI 7   MSG9070

## (undated) DEVICE — BLADE CLIPPER GEN PURP NS

## (undated) DEVICE — 3M™ BAIR HUGGER® MULTI ACCESS BLANKET, PEDIATRIC, FULL BODY, 10 PER CASE 31000: Brand: BAIR HUGGER™

## (undated) DEVICE — EXCEL 10FT (3.05 M) INSUFFLATION TUBING SET WITH 0.1 MICRON FILTER: Brand: EXCEL

## (undated) DEVICE — TROCAR ENDOSCP L100MM DIA12MM DIL TIP STBL SL ENDOPATH XCEL

## (undated) DEVICE — TROCAR ENDOSCP SHFT L100MM DIA5MM DIL TIP ENDOPATH XCEL

## (undated) DEVICE — SPONGE LAP W18XL18IN WHT COT 4 PLY FLD STRUNG RADPQ DISP ST

## (undated) DEVICE — BLADE LARYNSCP SZ 4 ENH DIR INTUB GLIDESCOPE MCGRATH MAC